# Patient Record
Sex: FEMALE | Race: WHITE | NOT HISPANIC OR LATINO | Employment: PART TIME | URBAN - METROPOLITAN AREA
[De-identification: names, ages, dates, MRNs, and addresses within clinical notes are randomized per-mention and may not be internally consistent; named-entity substitution may affect disease eponyms.]

---

## 2017-01-06 ENCOUNTER — CARE COORDINATION (OUTPATIENT)
Dept: CARE COORDINATION | Facility: CLINIC | Age: 57
End: 2017-01-06

## 2017-01-23 ENCOUNTER — CARE COORDINATION (OUTPATIENT)
Dept: CARE COORDINATION | Facility: CLINIC | Age: 57
End: 2017-01-23

## 2017-01-23 NOTE — PROGRESS NOTES
Clinic Care Coordination Contact 1/23/17  Care Team Conversations    Contacted the pt this am to inquire if she contacted [employer]. Pt and her  did and they found out the application will be processed by [place] and not [place]. They were directed to call [employer] which they did. [employer] reportedly was supposed to call them back with information in regards to their application but the couple has not heard from them yet. This writer encouraged them to follow up with [employer] or [employer] (as they may be able to see if the gary has been processed) to continue pushing their insurance plan through as open enrollment ends on 1/31/17.     This writer will follow up with them on Friday of this week to offer assistance if needed.    Ruth Joshi, [facility]  Care Coordinator Social Work    [facility]  [phone]  1/23/2017 11:39 AM

## 2017-01-27 ENCOUNTER — CARE COORDINATION (OUTPATIENT)
Dept: CARE COORDINATION | Facility: CLINIC | Age: 57
End: 2017-01-27

## 2017-01-27 NOTE — PROGRESS NOTES
Clinic Care Coordination Contact 1/26/17  Care Team Conversations-Social Work    Pt's  asked to see this writer as he was in clinic today. He explained that he has still not heard from [place] [employer] about [place]. This writer encouraged him to contact [place] [employer] often to f/u on the progress of the application as they are the ones who know the details of it. He was also recommended to contact the financial counselors at [facility] with questions as they may be of assistance to point him in the right direction.     This writer explained that open enrollment ended on the 31st of this month and encouraged him to call before than so there are no additional problems. SS will f/u with them on the 31st to inquire what progress they made.     Ruth Joshi, ESTEPHANIE  Care Coordinator [facility] Work    [facility]  [phone]  1/27/2017 1:58 PM

## 2017-01-31 ENCOUNTER — CARE COORDINATION (OUTPATIENT)
Dept: CARE COORDINATION | Facility: CLINIC | Age: 57
End: 2017-01-31

## 2017-01-31 NOTE — PROGRESS NOTES
Clinic Care Coordination Contact 1/31/17  Care Team Conversations-Social Work    Received a phone call from pt's  who states he sent the first payment for his insurance this am. They have elected to have [employer] insurance plan through [facility]. This writer will no longer follow the pt as we have reached our goal. Pt will contact this writer should he have any questions or concerns.     ESTEPHANIE Henderson  Care Coordinator Social Work    [facility]  [phone]  1/31/2017 3:34 PM

## 2017-01-31 NOTE — PROGRESS NOTES
Clinic Care Coordination Contact 1/31/17  [facility]/Voicemail    Referral Source: PCP (Dr. Anisha Alfaro)  Clinical Data: Care Coordinator Outreach  Outreach attempted x 1.  Left message on voicemail with call back information and requested return call.  Plan: Care Coordinator will try to reach patient again in 3-5 business days.    ESTEPHANIE Henderson  Care Coordinator Social Work    [facility]  [phone]  1/31/2017 2:22 PM

## 2017-04-05 NOTE — PROGRESS NOTES
SUBJECTIVE:                                                    Janice Ulloa is a 56 year old female who presents to clinic today for the following health issues:      Hyperlipidemia Follow-Up      Rate your low fat/cholesterol diet?: fair    Taking statin?  Yes, no muscle aches from statin    Other lipid medications/supplements?:  none     Hypertension Follow-up      Outpatient blood pressures are not being checked.    Low Salt Diet: not monitoring salt   She has gained some weight.  She is not eating differently. Exercise is about the same per pt.  No increase in salt intake.        Amount of exercise or physical activity: a lot lately doing things outside    Problems taking medications regularly: No    Medication side effects: none    Diet: regular (no restrictions), low salt and low fat/cholesterol          Problem list and histories reviewed & adjusted, as indicated.  Additional history: as documented    BP Readings from Last 3 Encounters:   04/11/17 138/76   09/20/16 130/74   03/10/16 124/66    Wt Readings from Last 3 Encounters:   04/11/17 176 lb (79.8 kg)   09/20/16 174 lb (78.9 kg)   03/10/16 168 lb (76.2 kg)                  Labs reviewed in EPIC    Reviewed and updated as needed this visit by clinical staff       Reviewed and updated as needed this visit by Provider         ROS:  C: NEGATIVE for fever, chills, change in weight  ENT/MOUTH: has had some PND, congestion, fatigue, and non productive cough this has been for the past 4 days.  No fevers HA , facial pain and pressure   RESP:non productive coughing  CV: NEGATIVE for chest pain, palpitations or peripheral edema  GI: NEGATIVE for nausea, abdominal pain, heartburn, or change in bowel habits  PSYCHIATRIC: has been feeling a bit down.  Also some anxiety.  At night when she is laying in bed she thinks about things.  if she lays on her left side her left arm goes numb, this worries her if she moves it resolves . See PHQ 9 and CHELO 7 questionnaires for  "symptoms.   Her symptoms have been an issue now since June. She lost her job as a result of a layoff in the fall.    OBJECTIVE:                                                    /76  Pulse 68  Temp 97.9  F (36.6  C) (Oral)  Ht 5' 1.5\" (1.562 m)  Wt 176 lb (79.8 kg)  LMP 08/19/2013  HC 12\" (30.5 cm)  BMI 32.72 kg/m2  Body mass index is 32.72 kg/(m^2).  GENERAL: healthy, alert and no distress  EYES: Eyes grossly normal to inspection  HENT: normal cephalic/atraumatic, ear canals and TM's normal, nose and mouth without ulcers or lesions, nasal mucosa edematous , oropharynx clear, oral mucous membranes moist and sinuses: not tender  NECK: no adenopathy, no asymmetry, masses, or scars and thyroid normal to palpation  RESP: lungs clear to auscultation - no rales, rhonchi or wheezes  CV: regular rate and rhythm, normal S1 S2, no S3 or S4, no murmur, click or rub, no peripheral edema and peripheral pulses strong  ABDOMEN: soft, nontender, no hepatosplenomegaly, no masses and bowel sounds normal  MS: no gross musculoskeletal defects noted, no edema  NEURO: Normal strength and tone, mentation intact and speech normal  PSYCH: mentation appears normal, anxious, fatigued, judgement and insight intact and appearance well groomed    Diagnostic Test Results:  Results for orders placed or performed in visit on 04/11/17 (from the past 24 hour(s))   Hemoglobin   Result Value Ref Range    Hemoglobin 12.7 11.7 - 15.7 g/dL        ASSESSMENT/PLAN:                                                          1. Hypertension goal BP (blood pressure) < 140/90  At goal, encourage weight loss and continued salt restriction to keep her under her goal  If BP increases when increase amlodipine  - Albumin Random Urine Quantitative  - amLODIPine (NORVASC) 5 MG tablet; Take 1 tablet (5 mg) by mouth daily  Dispense: 90 tablet; Refill: 1    2. Neck pain  Uses as needed  - tiZANidine (ZANAFLEX) 4 MG tablet; TAKE ONE TO TWO TABLETS BY MOUTH " THREE TIMES A DAY AS NEEDED FOR MUSCLE SPASM  Dispense: 30 tablet; Refill: 3    3. Viral URI with cough  Over-the-counter meds  Mucinex  Follow-up with worsening  - guaiFENesin-codeine (ROBITUSSIN AC) 100-10 MG/5ML SOLN solution; Take 5 mLs by mouth every 4 hours as needed for cough  Dispense: 120 mL; Refill: 0    4. Fatigue, unspecified type  Likely related to depression  - TSH with free T4 reflex  - Glucose  - Hemoglobin    5. Adjustment disorder with mixed anxiety and depressed mood  New diagnosis, Discussed use of medication, common side effects, how medication works and the fact that improvement in anticipated in 4-6 weeks.   - FLUoxetine (PROZAC) 20 MG capsule; Take 1 capsule (20 mg) by mouth daily  Dispense: 30 capsule; Refill: 1    Recheck as needed or phone or office visit in 1 month    Anisha Alfaro PA-C  [facility]  Electronically signed by Anisha Alfaro PA-C     This chart documentation was completed in part with Dragon voice recognition software.  Documentation is reviewed after completion, however, some words and grammatical errors may remain.  Anisha Alfaro PA-C

## 2017-04-11 ENCOUNTER — OFFICE VISIT (OUTPATIENT)
Dept: FAMILY MEDICINE | Facility: OTHER | Age: 57
End: 2017-04-11
Payer: COMMERCIAL

## 2017-04-11 VITALS
DIASTOLIC BLOOD PRESSURE: 76 MMHG | HEIGHT: 62 IN | SYSTOLIC BLOOD PRESSURE: 138 MMHG | TEMPERATURE: 97.9 F | BODY MASS INDEX: 32.39 KG/M2 | HEART RATE: 68 BPM | WEIGHT: 176 LBS

## 2017-04-11 DIAGNOSIS — M54.2 NECK PAIN: ICD-10-CM

## 2017-04-11 DIAGNOSIS — R53.83 FATIGUE, UNSPECIFIED TYPE: ICD-10-CM

## 2017-04-11 DIAGNOSIS — I10 HYPERTENSION GOAL BP (BLOOD PRESSURE) < 140/90: ICD-10-CM

## 2017-04-11 DIAGNOSIS — F43.23 ADJUSTMENT DISORDER WITH MIXED ANXIETY AND DEPRESSED MOOD: ICD-10-CM

## 2017-04-11 DIAGNOSIS — J06.9 VIRAL URI WITH COUGH: Primary | ICD-10-CM

## 2017-04-11 LAB
CREAT UR-MCNC: 36 MG/DL
GLUCOSE SERPL-MCNC: 92 MG/DL (ref 70–99)
HGB BLD-MCNC: 12.7 G/DL (ref 11.7–15.7)
MICROALBUMIN UR-MCNC: <5 MG/L
MICROALBUMIN/CREAT UR: NORMAL MG/G CR (ref 0–25)
TSH SERPL DL<=0.005 MIU/L-ACNC: 2.01 MU/L (ref 0.4–4)

## 2017-04-11 PROCEDURE — 99214 OFFICE O/P EST MOD 30 MIN: CPT | Performed by: PHYSICIAN ASSISTANT

## 2017-04-11 PROCEDURE — 36415 COLL VENOUS BLD VENIPUNCTURE: CPT | Performed by: PHYSICIAN ASSISTANT

## 2017-04-11 PROCEDURE — 85018 HEMOGLOBIN: CPT | Performed by: PHYSICIAN ASSISTANT

## 2017-04-11 PROCEDURE — 84443 ASSAY THYROID STIM HORMONE: CPT | Performed by: PHYSICIAN ASSISTANT

## 2017-04-11 PROCEDURE — 82043 UR ALBUMIN QUANTITATIVE: CPT | Performed by: PHYSICIAN ASSISTANT

## 2017-04-11 PROCEDURE — 82947 ASSAY GLUCOSE BLOOD QUANT: CPT | Performed by: PHYSICIAN ASSISTANT

## 2017-04-11 RX ORDER — AMLODIPINE BESYLATE 5 MG/1
5 TABLET ORAL DAILY
Qty: 90 TABLET | Refills: 1 | Status: SHIPPED | OUTPATIENT
Start: 2017-04-11 | End: 2017-10-11

## 2017-04-11 RX ORDER — CODEINE PHOSPHATE AND GUAIFENESIN 10; 100 MG/5ML; MG/5ML
1 SOLUTION ORAL EVERY 4 HOURS PRN
Qty: 120 ML | Refills: 0 | Status: SHIPPED | OUTPATIENT
Start: 2017-04-11 | End: 2017-06-26

## 2017-04-11 ASSESSMENT — ANXIETY QUESTIONNAIRES
IF YOU CHECKED OFF ANY PROBLEMS ON THIS QUESTIONNAIRE, HOW DIFFICULT HAVE THESE PROBLEMS MADE IT FOR YOU TO DO YOUR WORK, TAKE CARE OF THINGS AT HOME, OR GET ALONG WITH OTHER PEOPLE: SOMEWHAT DIFFICULT
6. BECOMING EASILY ANNOYED OR IRRITABLE: SEVERAL DAYS
GAD7 TOTAL SCORE: 11
5. BEING SO RESTLESS THAT IT IS HARD TO SIT STILL: SEVERAL DAYS
2. NOT BEING ABLE TO STOP OR CONTROL WORRYING: MORE THAN HALF THE DAYS
3. WORRYING TOO MUCH ABOUT DIFFERENT THINGS: MORE THAN HALF THE DAYS
1. FEELING NERVOUS, ANXIOUS, OR ON EDGE: SEVERAL DAYS
7. FEELING AFRAID AS IF SOMETHING AWFUL MIGHT HAPPEN: MORE THAN HALF THE DAYS

## 2017-04-11 ASSESSMENT — PATIENT HEALTH QUESTIONNAIRE - PHQ9: 5. POOR APPETITE OR OVEREATING: MORE THAN HALF THE DAYS

## 2017-04-11 ASSESSMENT — PAIN SCALES - GENERAL: PAINLEVEL: NO PAIN (0)

## 2017-04-11 NOTE — NURSING NOTE
"Chief Complaint   Patient presents with     Recheck Medication     Panel Management     MyChart, Microalbumin       Initial /76  Pulse 68  Temp 97.9  F (36.6  C) (Oral)  Ht 5' 1.5\" (1.562 m)  Wt 176 lb (79.8 kg)  LMP 08/19/2013  HC 12\" (30.5 cm)  BMI 32.72 kg/m2 Estimated body mass index is 32.72 kg/(m^2) as calculated from the following:    Height as of this encounter: 5' 1.5\" (1.562 m).    Weight as of this encounter: 176 lb (79.8 kg).  Medication Reconciliation: complete     Kamryn Quigley CMA (AAMA)      "

## 2017-04-11 NOTE — MR AVS SNAPSHOT
"              After Visit Summary   2017    Janice Ulloa    MRN: [id number]           Patient Information     Date Of Birth          1960        Visit Information        Provider Department      2017 12:00 PM Anisha Alfaro PA-C [facility]        Today's Diagnoses     Viral URI with cough    -  1    Hypertension goal BP (blood pressure) < 140/90        Neck pain        Fatigue, unspecified type        Adjustment disorder with mixed anxiety and depressed mood           Follow-ups after your visit        Who to contact     If you have questions or need follow up information about today's clinic visit or your schedule please contact [facility] directly at [phone].  Normal or non-critical lab and imaging results will be communicated to you by MyChart, letter or phone within 4 business days after the clinic has received the results. If you do not hear from us within 7 days, please contact the clinic through MyChart or phone. If you have a critical or abnormal lab result, we will notify you by phone as soon as possible.  Submit refill requests through [email or web address] or call your pharmacy and they will forward the refill request to us. Please allow 3 business days for your refill to be completed.          Additional Information About Your Visit        MyChart Information     [email or web address] lets you send messages to your doctor, view your test results, renew your prescriptions, schedule appointments and more. To sign up, go to [email or web address] . Click on \"Log in\" on the left side of the screen, which will take you to the Welcome page. Then click on \"Sign up Now\" on the right side of the page.     You will be asked to enter the access code listed below, as well as some personal information. Please follow the directions to create your username and password.     Your access code is: [email or web address]  Expires: 7/10/2017 12:20 PM     Your access code will  in 90 days. If you need " "help or a new code, please call your [facility] clinic or [phone].        Care EveryWhere ID     This is your Care EveryWhere ID. This could be used by other organizations to access your [facility] medical records  [id number]        Your Vitals Were     Pulse Temperature Height Last Period Head Circumference BMI (Body Mass Index)    68 97.9  F (36.6  C) (Oral) 5' 1.5\" (1.562 m) 08/19/2013 12\" (30.5 cm) 32.72 kg/m2       Blood Pressure from Last 3 Encounters:   04/11/17 138/76   09/20/16 130/74   03/10/16 124/66    Weight from Last 3 Encounters:   04/11/17 176 lb (79.8 kg)   09/20/16 174 lb (78.9 kg)   03/10/16 168 lb (76.2 kg)              We Performed the Following     Albumin Random Urine Quantitative     Glucose     Hemoglobin     TSH with free T4 reflex          Today's Medication Changes          These changes are accurate as of: 4/11/17 12:20 PM.  If you have any questions, ask your nurse or doctor.               Start taking these medicines.        Dose/Directions    FLUoxetine 20 MG capsule   Commonly known as:  PROzac   Used for:  Adjustment disorder with mixed anxiety and depressed mood   Started by:  Anisha Alfaro PA-C        Dose:  20 mg   Take 1 capsule (20 mg) by mouth daily   Quantity:  30 capsule   Refills:  1       guaiFENesin-codeine 100-10 MG/5ML Soln solution   Commonly known as:  ROBITUSSIN AC   Used for:  Viral URI with cough   Started by:  Anisha Alfaro PA-C        Dose:  1 tsp.   Take 5 mLs by mouth every 4 hours as needed for cough   Quantity:  120 mL   Refills:  0         These medicines have changed or have updated prescriptions.        Dose/Directions    amLODIPine 5 MG tablet   Commonly known as:  NORVASC   This may have changed:  See the new instructions.   Used for:  Hypertension goal BP (blood pressure) < 140/90   Changed by:  Anisha Alfaro PA-C        Dose:  5 mg   Take 1 tablet (5 mg) by mouth daily   Quantity:  90 tablet   Refills:  1       tiZANidine 4 MG tablet "   Commonly known as:  ZANAFLEX   This may have changed:  See the new instructions.   Used for:  Neck pain   Changed by:  Anisha Alfaro PA-C        TAKE ONE TO TWO TABLETS BY MOUTH THREE TIMES A DAY AS NEEDED FOR MUSCLE SPASM   Quantity:  30 tablet   Refills:  3            Where to get your medicines      These medications were sent to "[facility] - [place], [place] - [address]  [address], [facility] [address]     Phone:  [phone]     amLODIPine 5 MG tablet    FLUoxetine 20 MG capsule    tiZANidine 4 MG tablet         Some of these will need a paper prescription and others can be bought over the counter.  Ask your nurse if you have questions.     Bring a paper prescription for each of these medications     guaiFENesin-codeine 100-10 MG/5ML Soln solution                Primary Care Provider Office Phone # Fax #    Anisha Alfaro PA-C [phone]       [facility] [address] [place] [address]        Thank you!     Thank you for choosing [facility]  for your care. Our goal is always to provide you with excellent care. Hearing back from our patients is one way we can continue to improve our services. Please take a few minutes to complete the written survey that you may receive in the mail after your visit with us. Thank you!             Your Updated Medication List - Protect others around you: Learn how to safely use, store and throw away your medicines at www.disposemymeds.org.          This list is accurate as of: 4/11/17 12:20 PM.  Always use your most recent med list.                   Brand Name Dispense Instructions for use    amLODIPine 5 MG tablet    NORVASC    90 tablet    Take 1 tablet (5 mg) by mouth daily       aspirin 81 MG tablet      ONE DAILY       atorvastatin 20 MG tablet    LIPITOR    90 tablet    Take 1 tablet (20 mg) by mouth daily       FLUoxetine 20 MG capsule    PROzac    30 capsule    Take 1 capsule (20 mg) by mouth daily        guaiFENesin-codeine 100-10 MG/5ML Soln solution    ROBITUSSIN AC    120 mL    Take 5 mLs by mouth every 4 hours as needed for cough       IBUPROFEN PO      Take 400 mg by mouth daily as needed       lisinopril-hydrochlorothiazide 20-12.5 MG per tablet    PRINZIDE/ZESTORETIC    180 tablet    Take 2 tablets by mouth daily       MULTI-VITAMIN DAILY PO          propranolol 120 MG 24 hr capsule    INDERAL LA    60 capsule    TAKE TWO CAPSULES BY MOUTH EVERY DAY       tiZANidine 4 MG tablet    ZANAFLEX    30 tablet    TAKE ONE TO TWO TABLETS BY MOUTH THREE TIMES A DAY AS NEEDED FOR MUSCLE SPASM

## 2017-04-12 ASSESSMENT — PATIENT HEALTH QUESTIONNAIRE - PHQ9: SUM OF ALL RESPONSES TO PHQ QUESTIONS 1-9: 14

## 2017-04-12 ASSESSMENT — ANXIETY QUESTIONNAIRES: GAD7 TOTAL SCORE: 11

## 2017-06-23 ENCOUNTER — TELEPHONE (OUTPATIENT)
Dept: FAMILY MEDICINE | Facility: OTHER | Age: 57
End: 2017-06-23

## 2017-06-23 NOTE — PROGRESS NOTES
"  SUBJECTIVE:                                                    Janice Ulloa is a 56 year old female who presents to clinic today for the following health issues:  Pt not taking prozac anymore, she wasn't getting any sleep taking that medication, does not think that she needs any meds at this time, managing her depression better per pt.  She will let me know if she changes her mind. See PHQ 9 and CHELO 7 questionnaires for symptoms.     Acute Illness   Acute illness concerns: ear ache  Onset: 5 days, last wednesday    Fever: no    Chills/Sweats: no    Headache (location?): no    Sinus Pressure:no    Conjunctivitis:  YES- just a little bit of mattering    Ear Pain: YES: right that has pain but would like both checked, was so sore she could not even touch it     Rhinorrhea: YES- clear    Congestion: YES- little bit and sneezing, no known allergies     Sore Throat: no     Cough: Yes- non productive, with a tickle in throat, may be SOB at times passes quickly per pt     Wheeze: no    Decreased Appetite: no    Nausea: no    Vomiting: no    Diarrhea:  No little bit a while back    Dysuria/Freq.: no    Fatigue/Achiness: no    Sick/Strep Exposure: no     Therapies Tried and outcome: baby oil and lays on a heating pad          Problem list and histories reviewed & adjusted, as indicated.  Additional history: as documented    BP Readings from Last 3 Encounters:   06/26/17 130/80   04/11/17 138/76   09/20/16 130/74    Wt Readings from Last 3 Encounters:   06/26/17 171 lb 3.2 oz (77.7 kg)   04/11/17 176 lb (79.8 kg)   09/20/16 174 lb (78.9 kg)                  Labs reviewed in EPIC    Reviewed and updated as needed this visit by clinical staff       Reviewed and updated as needed this visit by Provider         ROS:  As documented above     OBJECTIVE:     /80  Pulse 68  Temp 98.1  F (36.7  C) (Temporal)  Resp 16  Ht 5' 1.5\" (1.562 m)  Wt 171 lb 3.2 oz (77.7 kg)  LMP 08/19/2013  BMI 31.82 kg/m2  Body mass index is " 31.82 kg/(m^2).  GENERAL: healthy, alert and no distress  EYES: Eyes grossly normal to inspection  HENT: normal cephalic/atraumatic, ear canals and TM's normal, nasal mucosa edematous , rhinorrhea clear, oropharynx clear, oral mucous membranes moist and sinuses: not tender  NECK: no adenopathy, no asymmetry, masses, or scars and thyroid normal to palpation  RESP: lungs clear to auscultation - no rales, rhonchi or wheezes  CV: regular rate and rhythm, normal S1 S2, no S3 or S4, no murmur, click or rub, no peripheral edema and peripheral pulses strong  PSYCH: mentation appears normal, affect normal/bright    Diagnostic Test Results:  none     ASSESSMENT/PLAN:             1. Acute seasonal allergic rhinitis, unspecified trigger  Use otc loratidine and flonase, if develops fevers or no improvements of symptoms needs recheck    2. Adjustment disorder with mixed anxiety and depressed mood  Pt will make appt or call for phone visit if she would like to try a different medication, likely lexapro or sertraline           Anisha Alfaro PA-C  [facility]  Electronically signed by Anisha Alfaro PA-C

## 2017-06-23 NOTE — TELEPHONE ENCOUNTER
Started out with sore ear on Wednesday. When she lays down it plugs up.  States she tired. Still doing daily activities. No fever. No headache.  Eating and drinking ok Responding to questions right away.   Only wants to see NP.  Would like Monday if possible.    Appointment made for Monday.    Michael Tomas, LAURA, BSN

## 2017-06-23 NOTE — TELEPHONE ENCOUNTER
Requested Provider:  KARLI Sanderson    PCP: Anisha Alfaro    Reason for visit: tired and lethargic and ear ache    Duration of symptoms: 3 days    Have you been treated for this in the past? No    Additional comments: wants to see Dominic next week. I did offer her other providers and she declined

## 2017-06-26 ENCOUNTER — OFFICE VISIT (OUTPATIENT)
Dept: FAMILY MEDICINE | Facility: OTHER | Age: 57
End: 2017-06-26
Payer: COMMERCIAL

## 2017-06-26 VITALS
RESPIRATION RATE: 16 BRPM | HEIGHT: 62 IN | TEMPERATURE: 98.1 F | SYSTOLIC BLOOD PRESSURE: 130 MMHG | WEIGHT: 171.2 LBS | HEART RATE: 68 BPM | DIASTOLIC BLOOD PRESSURE: 80 MMHG | BODY MASS INDEX: 31.5 KG/M2

## 2017-06-26 DIAGNOSIS — J30.2 ACUTE SEASONAL ALLERGIC RHINITIS, UNSPECIFIED TRIGGER: ICD-10-CM

## 2017-06-26 DIAGNOSIS — F43.23 ADJUSTMENT DISORDER WITH MIXED ANXIETY AND DEPRESSED MOOD: Primary | ICD-10-CM

## 2017-06-26 PROCEDURE — 99213 OFFICE O/P EST LOW 20 MIN: CPT | Performed by: PHYSICIAN ASSISTANT

## 2017-06-26 ASSESSMENT — ANXIETY QUESTIONNAIRES
1. FEELING NERVOUS, ANXIOUS, OR ON EDGE: SEVERAL DAYS
7. FEELING AFRAID AS IF SOMETHING AWFUL MIGHT HAPPEN: SEVERAL DAYS
5. BEING SO RESTLESS THAT IT IS HARD TO SIT STILL: SEVERAL DAYS
GAD7 TOTAL SCORE: 7
3. WORRYING TOO MUCH ABOUT DIFFERENT THINGS: SEVERAL DAYS
6. BECOMING EASILY ANNOYED OR IRRITABLE: SEVERAL DAYS
IF YOU CHECKED OFF ANY PROBLEMS ON THIS QUESTIONNAIRE, HOW DIFFICULT HAVE THESE PROBLEMS MADE IT FOR YOU TO DO YOUR WORK, TAKE CARE OF THINGS AT HOME, OR GET ALONG WITH OTHER PEOPLE: SOMEWHAT DIFFICULT
2. NOT BEING ABLE TO STOP OR CONTROL WORRYING: SEVERAL DAYS

## 2017-06-26 ASSESSMENT — PAIN SCALES - GENERAL: PAINLEVEL: NO PAIN (0)

## 2017-06-26 ASSESSMENT — PATIENT HEALTH QUESTIONNAIRE - PHQ9: 5. POOR APPETITE OR OVEREATING: SEVERAL DAYS

## 2017-06-26 NOTE — LETTER
My Depression Action Plan  Name: Janice Ulloa   Date of Birth 1960  Date: 6/23/2017    My doctor: Anisha Alfaro   My clinic: [address] [address] [phone]          GREEN    ZONE   Good Control    What it looks like:     Things are going generally well. You have normal up s and down s. You may even feel depressed from time to time, but bad moods usually last less than a day.   What you need to do:  1. Continue to care for yourself (see self care plan)  2. Check your depression survival kit and update it as needed  3. Follow your physician s recommendations including any medication.  4. Do not stop taking medication unless you consult with your physician first.           YELLOW         ZONE Getting Worse    What it looks like:     Depression is starting to interfere with your life.     It may be hard to get out of bed; you may be starting to isolate yourself from others.    Symptoms of depression are starting to last most all day and this has happened for several days.     You may have suicidal thoughts but they are not constant.   What you need to do:     1. Call your care team, your response to treatment will improve if you keep your care team informed of your progress. Yellow periods are signs an adjustment may need to be made.     2. Continue your self-care, even if you have to fake it!    3. Talk to someone in your support network    4. Open up your depression survival kit           RED    ZONE Medical Alert - Get Help    What it looks like:     Depression is seriously interfering with your life.     You may experience these or other symptoms: You can t get out of bed most days, can t work or engage in other necessary activities, you have trouble taking care of basic hygiene, or basic responsibilities, thoughts of suicide or death that will not go away, self-injurious behavior.     What you need to do:  1. Call your care team and  request a same-day appointment. If they are not available (weekends or after hours) call your local crisis line, emergency room or 911.      Electronically signed by: Angeline Anguiano, June 23, 2017    Depression Self Care Plan / Survival Kit    Self-Care for Depression  Here s the deal. Your body and mind are really not as separate as most people think.  What you do and think affects how you feel and how you feel influences what you do and think. This means if you do things that people who feel good do, it will help you feel better.  Sometimes this is all it takes.  There is also a place for medication and therapy depending on how severe your depression is, so be sure to consult with your medical provider and/ or Behavioral Health Consultant if your symptoms are worsening or not improving.     In order to better manage my stress, I will:    Exercise  Get some form of exercise, every day. This will help reduce pain and release endorphins, the  feel good  chemicals in your brain. This is almost as good as taking antidepressants!  This is not the same as joining a gym and then never going! (they count on that by the way ) It can be as simple as just going for a walk or doing some gardening, anything that will get you moving.      Hygiene   Maintain good hygiene (Get out of bed in the morning, Make your bed, Brush your teeth, Take a shower, and Get dressed like you were going to work, even if you are unemployed).  If your clothes don't fit try to get ones that do.    Diet  I will strive to eat foods that are good for me, drink plenty of water, and avoid excessive sugar, caffeine, alcohol, and other mood-altering substances.  Some foods that are helpful in depression are: complex carbohydrates, B vitamins, flaxseed, fish or fish oil, fresh fruits and vegetables.    Psychotherapy  I agree to participate in Individual Therapy (if recommended).    Medication  If prescribed medications, I agree to take them.  Missing  doses can result in serious side effects.  I understand that drinking alcohol, or other illicit drug use, may cause potential side effects.  I will not stop my medication abruptly without first discussing it with my provider.    Staying Connected With Others  I will stay in touch with my friends, family members, and my primary care provider/team.    Use your imagination  Be creative.  We all have a creative side; it doesn t matter if it s oil painting, sand castles, or mud pies! This will also kick up the endorphins.    Witness Beauty  (AKA stop and smell the roses) Take a look outside, even in mid-winter. Notice colors, textures. Watch the squirrels and birds.     Service to others  Be of service to others.  There is always someone else in need.  By helping others we can  get out of ourselves  and remember the really important things.  This also provides opportunities for practicing all the other parts of the program.    Humor  Laugh and be silly!  Adjust your TV habits for less news and crime-drama and more comedy.    Control your stress  Try breathing deep, massage therapy, biofeedback, and meditation. Find time to relax each day.     My support system    Clinic Contact:  Phone number:    Contact 1:  Phone number:    Contact 2:  Phone number:    Jainism/:  Phone number:    Therapist:  Phone number:    Local crisis center:    Phone number:    Other community support:  Phone number:

## 2017-06-26 NOTE — MR AVS SNAPSHOT
"              After Visit Summary   2017    Janice Ulloa    MRN: [id number]           Patient Information     Date Of Birth          1960        Visit Information        Provider Department      2017 9:30 AM Anisha Alfaro PA-C [facility]        Today's Diagnoses     Adjustment disorder with mixed anxiety and depressed mood    -  1       Follow-ups after your visit        Who to contact     If you have questions or need follow up information about today's clinic visit or your schedule please contact [facility] directly at [phone].  Normal or non-critical lab and imaging results will be communicated to you by Playviewshart, letter or phone within 4 business days after the clinic has received the results. If you do not hear from us within 7 days, please contact the clinic through Playviewshart or phone. If you have a critical or abnormal lab result, we will notify you by phone as soon as possible.  Submit refill requests through AquaMost or call your pharmacy and they will forward the refill request to us. Please allow 3 business days for your refill to be completed.          Additional Information About Your Visit        MyChart Information     AquaMost lets you send messages to your doctor, view your test results, renew your prescriptions, schedule appointments and more. To sign up, go to [email or web address] . Click on \"Log in\" on the left side of the screen, which will take you to the Welcome page. Then click on \"Sign up Now\" on the right side of the page.     You will be asked to enter the access code listed below, as well as some personal information. Please follow the directions to create your username and password.     Your access code is: [email or web address]  Expires: 7/10/2017 12:20 PM     Your access code will  in 90 days. If you need help or a new code, please call your [facility] or [phone].        Care EveryWhere ID     This is your Care EveryWhere ID. " "This could be used by other organizations to access your [facility] medical records  [id number]        Your Vitals Were     Pulse Temperature Respirations Height Last Period BMI (Body Mass Index)    68 98.1  F (36.7  C) (Temporal) 16 5' 1.5\" (1.562 m) 08/19/2013 31.82 kg/m2       Blood Pressure from Last 3 Encounters:   06/26/17 130/80   04/11/17 138/76   09/20/16 130/74    Weight from Last 3 Encounters:   06/26/17 171 lb 3.2 oz (77.7 kg)   04/11/17 176 lb (79.8 kg)   09/20/16 174 lb (78.9 kg)              We Performed the Following     DEPRESSION ACTION PLAN (DAP)          Today's Medication Changes          These changes are accurate as of: 6/26/17  9:42 AM.  If you have any questions, ask your nurse or doctor.               Stop taking these medicines if you haven't already. Please contact your care team if you have questions.     FLUoxetine 20 MG capsule   Commonly known as:  PROzac   Stopped by:  Anisha Alfaro PA-C                    Primary Care Provider Office Phone # Fax #    Anisha Alfaro PA-C [phone]       [facility] [address] [place] [address]        Equal Access to Services     JANNETTE OLIVA : Hadii chirag schaefero Soomaali, waaxda luqadaha, qaybta kaalmada adeegyada, kleber lima. So [facility] [phone].    ATENCIÓN: Si habla español, tiene a larios disposición servicios gratuitos de asistencia lingüística. Llame al [phone].    We comply with applicable federal civil rights laws and [place] laws. We do not discriminate on the basis of race, color, national origin, age, disability sex, sexual orientation or gender identity.            Thank you!     Thank you for choosing [facility]  for your care. Our goal is always to provide you with excellent care. Hearing back from our patients is one way we can continue to improve our services. Please take a few minutes to complete the written survey that you may receive in " the mail after your visit with us. Thank you!             Your Updated Medication List - Protect others around you: Learn how to safely use, store and throw away your medicines at www.disposemymeds.org.          This list is accurate as of: 6/26/17  9:42 AM.  Always use your most recent med list.                   Brand Name Dispense Instructions for use Diagnosis    amLODIPine 5 MG tablet    NORVASC    90 tablet    Take 1 tablet (5 mg) by mouth daily    Hypertension goal BP (blood pressure) < 140/90       aspirin 81 MG tablet      ONE DAILY        atorvastatin 20 MG tablet    LIPITOR    90 tablet    Take 1 tablet (20 mg) by mouth daily    Hyperlipidemia with target LDL less than 130       IBUPROFEN PO      Take 400 mg by mouth daily as needed        lisinopril-hydrochlorothiazide 20-12.5 MG per tablet    PRINZIDE/ZESTORETIC    180 tablet    Take 2 tablets by mouth daily    Essential hypertension with goal blood pressure less than 140/90       MULTI-VITAMIN DAILY PO           propranolol 120 MG 24 hr capsule    INDERAL LA    60 capsule    TAKE TWO CAPSULES BY MOUTH EVERY DAY    Essential hypertension with goal blood pressure less than 140/90       tiZANidine 4 MG tablet    ZANAFLEX    30 tablet    TAKE ONE TO TWO TABLETS BY MOUTH THREE TIMES A DAY AS NEEDED FOR MUSCLE SPASM    Neck pain

## 2017-06-26 NOTE — NURSING NOTE
"Chief Complaint   Patient presents with     Ear Problem     Panel Management     mychart, dap, phq9/alfredo       Initial BP (!) 146/100 (BP Location: Right arm, Patient Position: Chair, Cuff Size: Adult Regular)  Pulse 68  Temp 98.1  F (36.7  C) (Temporal)  Resp 16  Ht 5' 1.5\" (1.562 m)  Wt 171 lb 3.2 oz (77.7 kg)  LMP 08/19/2013  BMI 31.82 kg/m2 Estimated body mass index is 31.82 kg/(m^2) as calculated from the following:    Height as of this encounter: 5' 1.5\" (1.562 m).    Weight as of this encounter: 171 lb 3.2 oz (77.7 kg).  Medication Reconciliation: olamide Butt, RAFI    "

## 2017-06-27 ASSESSMENT — ANXIETY QUESTIONNAIRES: GAD7 TOTAL SCORE: 7

## 2017-08-25 ENCOUNTER — OFFICE VISIT (OUTPATIENT)
Dept: URGENT CARE | Facility: RETAIL CLINIC | Age: 57
End: 2017-08-25
Payer: COMMERCIAL

## 2017-08-25 VITALS
DIASTOLIC BLOOD PRESSURE: 80 MMHG | SYSTOLIC BLOOD PRESSURE: 140 MMHG | OXYGEN SATURATION: 96 % | TEMPERATURE: 98.2 F | WEIGHT: 180 LBS | RESPIRATION RATE: 12 BRPM | HEART RATE: 70 BPM | BODY MASS INDEX: 33.46 KG/M2

## 2017-08-25 DIAGNOSIS — H92.02 EAR DISCOMFORT, LEFT: ICD-10-CM

## 2017-08-25 DIAGNOSIS — H61.22 IMPACTED CERUMEN OF LEFT EAR: Primary | ICD-10-CM

## 2017-08-25 PROCEDURE — 99202 OFFICE O/P NEW SF 15 MIN: CPT | Performed by: PHYSICIAN ASSISTANT

## 2017-08-25 ASSESSMENT — PAIN SCALES - GENERAL: PAINLEVEL: MODERATE PAIN (5)

## 2017-08-25 NOTE — MR AVS SNAPSHOT
"              After Visit Summary   2017    Janice Ulloa    MRN: [id number]           Patient Information     Date Of Birth          1960        Visit Information        Provider Department      2017 11:20 AM Kerry Jamil PA-C [facility]        Today's Diagnoses     Impacted cerumen of left ear    -  1    Ear discomfort, left           Follow-ups after your visit        Your next 10 appointments already scheduled     Aug 29, 2017  9:15 AM CDT   SHORT with Anisha Alfaro PA-C   [facility]    [address] [facility] [address] [phone]              Who to contact     You can reach your care team any time of the day by calling [phone].  Notification of test results:  If you have an abnormal lab result, we will notify you by phone as soon as possible.         Additional Information About Your Visit        MyChart Information     Coherent Patht lets you send messages to your doctor, view your test results, renew your prescriptions, schedule appointments and more. To sign up, go to [email or web address] . Click on \"Log in\" on the left side of the screen, which will take you to the Welcome page. Then click on \"Sign up Now\" on the right side of the page.     You will be asked to enter the access code listed below, as well as some personal information. Please follow the directions to create your username and password.     Your access code is: [email or web address]  Expires: 2017 11:52 AM     Your access code will  in 90 days. If you need help or a new code, please call your [place] clinic or [phone].        Care EveryWhere ID     This is your Care EveryWhere ID. This could be used by other organizations to access your [place] medical records  [id number]        Your Vitals Were     Pulse Temperature Respirations Last Period Pulse Oximetry BMI (Body Mass Index)    70 98.2  F (36.8  C) (Tympanic) 12 2013 " 96% 33.46 kg/m2       Blood Pressure from Last 3 Encounters:   08/25/17 140/80   06/26/17 130/80   04/11/17 138/76    Weight from Last 3 Encounters:   08/25/17 180 lb (81.6 kg)   06/26/17 171 lb 3.2 oz (77.7 kg)   04/11/17 176 lb (79.8 kg)              Today, you had the following     No orders found for display       Primary Care Provider Office Phone # Fax #    Anisha Alfaro PA-C [phone] [address] [place] [address]        Equal Access to Services     [facility]: Hadii chirag thorne hadasho Soiliana, waaxda luqadaha, qaybta kaalmada adenidhiyada, kleber dorman . So [facility] [phone].    ATENCIÓN: Si habla español, tiene a larios disposición servicios gratuitos de asistencia lingüística. Llame al [phone].    We comply with applicable federal civil rights laws and [place] laws. We do not discriminate on the basis of race, color, national origin, age, disability sex, sexual orientation or gender identity.            Thank you!     Thank you for choosing [facility]  for your care. Our goal is always to provide you with excellent care. Hearing back from our patients is one way we can continue to improve our services. Please take a few minutes to complete the written survey that you may receive in the mail after your visit with us. Thank you!             Your Updated Medication List - Protect others around you: Learn how to safely use, store and throw away your medicines at www.disposemymeds.org.          This list is accurate as of: 8/25/17 11:52 AM.  Always use your most recent med list.                   Brand Name Dispense Instructions for use Diagnosis    amLODIPine 5 MG tablet    NORVASC    90 tablet    Take 1 tablet (5 mg) by mouth daily    Hypertension goal BP (blood pressure) < 140/90       aspirin 81 MG tablet      ONE DAILY        atorvastatin 20 MG tablet    LIPITOR    90 tablet    Take 1 tablet (20 mg) by mouth daily     Hyperlipidemia with target LDL less than 130       IBUPROFEN PO      Take 400 mg by mouth daily as needed        lisinopril-hydrochlorothiazide 20-12.5 MG per tablet    PRINZIDE/ZESTORETIC    180 tablet    Take 2 tablets by mouth daily    Essential hypertension with goal blood pressure less than 140/90       MULTI-VITAMIN DAILY PO           propranolol 120 MG 24 hr capsule    INDERAL LA    60 capsule    TAKE TWO CAPSULES BY MOUTH EVERY DAY    Essential hypertension with goal blood pressure less than 140/90       tiZANidine 4 MG tablet    ZANAFLEX    30 tablet    TAKE ONE TO TWO TABLETS BY MOUTH THREE TIMES A DAY AS NEEDED FOR MUSCLE SPASM    Neck pain

## 2017-08-25 NOTE — PROGRESS NOTES
Chief Complaint   Patient presents with     Ear Problem     Left Ear for about 4 days now     S: Pt presents to [facility] in [place] with possible cerumen impaction.  Plugged sensation left ear x 4 days.  Mild URI symptoms x few days -nasal congestion  No fever .  No ear pain .  Hx of cerumen impaction - no. Used OTC ear drops and warm pack last night -no help.    ROS:  ENT - denies throat pain.    CP - no cough,SOB or chest pain.   GI/ - Appetite - normal. No nausea, vomiting or diarrhea.   No bowel or bladder changes   MSK - no joint pain or swelling.   Skin-  no rashes. no lesions.    Past Medical History:   Diagnosis Date     Carpal tunnel syndrome      Intervertebral cervical disc disorder with myelopathy, cervical region     C6 herniation with right C6 radiculopathy     Migraine, unspecified, without mention of intractable migraine without mention of status migrainosus      Perimenopausal     irreg      Personal history of tobacco use, presenting hazards to health      Past Surgical History:   Procedure Laterality Date     C DISK SURG,ANTER,CERVICAL,SINGLE LVL  10/02/2007    C5-C6 anterior cervical diskectomy & fusion w/plate.     C NONSPECIFIC PROCEDURE      Bilateral inguinal hernia repairs     C NONSPECIFIC PROCEDURE      Oral surgery x2     COLONOSCOPY  1/10/2014    Procedure: COMBINED COLONOSCOPY, SINGLE BIOPSY/POLYPECTOMY BY BIOPSY;  colonoscopy with polypectomy by forceps;  Surgeon: Chevy Yang MD;  Location: [facility]     HC REVISE MEDIAN N/CARPAL TUNNEL SURG  2004     HC REVISE MEDIAN N/CARPAL TUNNEL SURG  06    Left     HC TREATMENT INCOMPLETE  SURG, ANY TRIMESTER      D&C after miscarriage     Patient Active Problem List   Diagnosis     Migraine     Carpal tunnel syndrome     Headache     Neck pain     Hypercholesteremia     Intervertebral cervical disc disorder with myelopathy, cervical region     Hyperlipidemia with target LDL less than 130      Perimenopausal     Anemia     Microscopic hematuria     Neck muscle spasm     Hypertension goal BP (blood pressure) < 140/90     Pneumonia     Sepsis (H)     Acute and chronic respiratory failure with hypoxia (H)     Hypokalemia     DVT prophylaxis     Advanced directives, counseling/discussion     Health Care Home     Adjustment disorder with mixed anxiety and depressed mood     Current Outpatient Prescriptions   Medication     amLODIPine (NORVASC) 5 MG tablet     tiZANidine (ZANAFLEX) 4 MG tablet     propranolol (INDERAL LA) 120 MG 24 hr capsule     atorvastatin (LIPITOR) 20 MG tablet     lisinopril-hydrochlorothiazide (PRINZIDE,ZESTORETIC) 20-12.5 MG per tablet     IBUPROFEN PO     Multiple Vitamin (MULTI-VITAMIN DAILY PO)     ASPIRIN 81 MG PO TABS     No current facility-administered medications for this visit.          O: /79  Pulse 70  Temp 98.2  F (36.8  C) (Tympanic)  Resp 12  Wt 180 lb (81.6 kg)  LMP 08/19/2013  SpO2 96%  BMI 33.46 kg/m2    Rt canal clear and TM appears normal.  Lt canal filled with soft cerumen.Lavage by MP and she became lightheaded, dizzy (10 squirts )and lavage stopped. She laid down, few minutes and lightheadedness passed. /80  Pulse 70  Temp 98.2  F (36.8  C) (Tympanic)  Resp 12  Wt 180 lb (81.6 kg)  LMP 08/19/2013  SpO2 96%  BMI 33.46 kg/m2   I rechecked ear and there was still some soft cerumen mid canal but I could see upper 1/3 of TM - appeared normal.  N - some congestion - clear discharge  T - unremarkable.  Neck - no palp LA  NT to palpate over frontal and max sinus areas.  Lungs -clear    A;    Ear discomfort, left  Impacted cerumen of left ear      P: No Qtips  Ear hygiene discussed.  Warm packs  Try gentle autoinusfflation  Saline nose spray if > nasal congestion  FOLLOW UP with PCP - appointment set 8/29/2017 - earlier if new or worsening symptoms.    Pt is comfortable with this plan.  Electronically signed,  RAMY Jamil, TAYE

## 2017-08-25 NOTE — NURSING NOTE
"Chief Complaint   Patient presents with     Ear Problem     Left Ear for about 4 days now       Initial /79  Pulse 70  Temp 98.2  F (36.8  C) (Tympanic)  Resp 12  Wt 180 lb (81.6 kg)  LMP 08/19/2013  SpO2 96%  BMI 33.46 kg/m2 Estimated body mass index is 33.46 kg/(m^2) as calculated from the following:    Height as of 6/26/17: 5' 1.5\" (1.562 m).    Weight as of this encounter: 180 lb (81.6 kg).  Medication Reconciliation: complete    "

## 2017-08-25 NOTE — PROGRESS NOTES
"  SUBJECTIVE:   Janice Ulloa is a 56 year old female who presents to clinic today for the following health issues:      Acute Illness   Acute illness concerns: Ear plugged, her other viral symptoms just started yesterday   Onset: plugged for about 8 days.  Had ears washed on Friday and started to feel dizzy so the ear wash was stopped and she was to come here to follow up .  She is still on occsaion a bit \"lightheaded\" though not as significantly as at [facility] care when is was being washed out    Fever: no     Chills/Sweats: YES- hot feeling just the once after the ear wash and then she gets hot flashes     Headache (location?): YES head feels full, no facial pain or pressure specific to sinus    Sinus Pressure:YES- post-nasal drainage, pressure in head all over    Conjunctivitis:  no    Ear Pain: YES: left- plugged and painful     Rhinorrhea: YES- clear    Congestion: YES    Sore Throat: no      Cough: YES-non-productive- this just started yesterday, not extreme    Wheeze: YES- sometimes, with activity    Decreased Appetite: not eating much    Nausea: no     Vomiting: no     Diarrhea:  no     Dysuria/Freq.: no     Fatigue/Achiness: YES- tired    Sick/Strep Exposure: no      Therapies Tried and outcome: OTC ibuprofen, OTC allergy, heating pad, warm shower        Problem list and histories reviewed & adjusted, as indicated.  Additional history: as documented    BP Readings from Last 3 Encounters:   08/29/17 124/62   08/25/17 140/80   06/26/17 130/80    Wt Readings from Last 3 Encounters:   08/29/17 175 lb 6.4 oz (79.6 kg)   08/25/17 180 lb (81.6 kg)   06/26/17 171 lb 3.2 oz (77.7 kg)                  Labs reviewed in EPIC      Reviewed and updated as needed this visit by clinical staff     Reviewed and updated as needed this visit by Provider         ROS:  CONSTITUTIONAL:she feels so tired all the time per pt, no more menses, she had significant anemia from heavy menses   ENT/MOUTH: as above  RESP:she gets out of " "breath when walking up a hill or flight of stairs  CV: NEGATIVE for chest pain, palpitations or peripheral edema  GI: occasional heartburn, tums treats this belches and it improves     OBJECTIVE:     /62  Pulse 72  Temp 98.1  F (36.7  C) (Oral)  Ht 5' 2\" (1.575 m)  Wt 175 lb 6.4 oz (79.6 kg)  LMP 08/19/2013  Breastfeeding? No  BMI 32.08 kg/m2  Body mass index is 32.08 kg/(m^2).  GENERAL: healthy, alert and no distress  EYES: Eyes grossly normal to inspection  HENT: normal cephalic/atraumatic, right ear: normal: no effusions, no erythema, normal landmarks, left ear: occluded with wax, nose and mouth without ulcers or lesions, oropharynx clear and oral mucous membranes moist  NECK: no adenopathy, no asymmetry, masses, or scars and thyroid normal to palpation  RESP: lungs clear to auscultation - no rales, rhonchi or wheezes  CV: regular rate and rhythm, normal S1 S2, no S3 or S4, no murmur, click or rub, no peripheral edema and peripheral pulses strong  MS: no gross musculoskeletal defects noted, no edema  PSYCH: mentation appears normal, affect normal/bright    Diagnostic Test Results:  Results for orders placed or performed in visit on 08/29/17 (from the past 24 hour(s))   CBC with platelets   Result Value Ref Range    WBC 7.1 4.0 - 11.0 10e9/L    RBC Count 4.87 3.8 - 5.2 10e12/L    Hemoglobin 13.6 11.7 - 15.7 g/dL    Hematocrit 41.0 35.0 - 47.0 %    MCV 84 78 - 100 fl    MCH 27.9 26.5 - 33.0 pg    MCHC 33.2 31.5 - 36.5 g/dL    RDW 14.7 10.0 - 15.0 %    Platelet Count 255 150 - 450 10e9/L       ASSESSMENT/PLAN:       1. Impacted cerumen of left ear  She is very bothered by the pressure in her ear, attempted lavage again and she had increased dizziness.  Will use wax softening drops and see ENT  - OTOLARYNGOLOGY REFERRAL    2. Fatigue, unspecified type  Will start with lab work up, if this is continuing consider, EKG , CXR, depression screening, perhaps echo  - Comprehensive metabolic panel (BMP + Alb, " Alk Phos, ALT, AST, Total. Bili, TP)  - CBC with platelets  - TSH with free T4 reflex  - Lyme Disease Funmi with reflex to WB Serum    3. Viral URI with cough  otc meds prn and follow up if worsening   - CBC with platelets    4. Hyperlipidemia with target LDL less than 130    - **Lipid panel reflex to direct LDL FUTURE anytime; Future  - **Lipid panel reflex to direct LDL FUTURE anytime    Recheck as needed     Anisha Alfaro PA-C  [facility]  Answers for HPI/ROS submitted by the patient on 8/29/2017   If you checked off any problems, how difficult have these problems made it for you to do your work, take care of things at home, or get along with other people?: Somewhat difficult  PHQ9 TOTAL SCORE: 12  CHELO 7 TOTAL SCORE: IncompleteElectronically signed by Anisha Alfaro PA-C

## 2017-08-29 ENCOUNTER — OFFICE VISIT (OUTPATIENT)
Dept: FAMILY MEDICINE | Facility: OTHER | Age: 57
End: 2017-08-29
Payer: COMMERCIAL

## 2017-08-29 VITALS
WEIGHT: 175.4 LBS | TEMPERATURE: 98.1 F | BODY MASS INDEX: 32.28 KG/M2 | SYSTOLIC BLOOD PRESSURE: 124 MMHG | HEART RATE: 72 BPM | HEIGHT: 62 IN | DIASTOLIC BLOOD PRESSURE: 62 MMHG

## 2017-08-29 DIAGNOSIS — H61.22 IMPACTED CERUMEN OF LEFT EAR: Primary | ICD-10-CM

## 2017-08-29 DIAGNOSIS — R53.83 FATIGUE, UNSPECIFIED TYPE: ICD-10-CM

## 2017-08-29 DIAGNOSIS — J06.9 VIRAL URI WITH COUGH: ICD-10-CM

## 2017-08-29 DIAGNOSIS — E78.5 HYPERLIPIDEMIA WITH TARGET LDL LESS THAN 130: ICD-10-CM

## 2017-08-29 LAB
ALBUMIN SERPL-MCNC: 4 G/DL (ref 3.4–5)
ALP SERPL-CCNC: 75 U/L (ref 40–150)
ALT SERPL W P-5'-P-CCNC: 43 U/L (ref 0–50)
ANION GAP SERPL CALCULATED.3IONS-SCNC: 11 MMOL/L (ref 3–14)
AST SERPL W P-5'-P-CCNC: 19 U/L (ref 0–45)
BILIRUB SERPL-MCNC: 0.3 MG/DL (ref 0.2–1.3)
BUN SERPL-MCNC: 10 MG/DL (ref 7–30)
CALCIUM SERPL-MCNC: 9.4 MG/DL (ref 8.5–10.1)
CHLORIDE SERPL-SCNC: 100 MMOL/L (ref 94–109)
CHOLEST SERPL-MCNC: 189 MG/DL
CO2 SERPL-SCNC: 29 MMOL/L (ref 20–32)
CREAT SERPL-MCNC: 0.8 MG/DL (ref 0.52–1.04)
ERYTHROCYTE [DISTWIDTH] IN BLOOD BY AUTOMATED COUNT: 14.7 % (ref 10–15)
GFR SERPL CREATININE-BSD FRML MDRD: 74 ML/MIN/1.7M2
GLUCOSE SERPL-MCNC: 102 MG/DL (ref 70–99)
HCT VFR BLD AUTO: 41 % (ref 35–47)
HDLC SERPL-MCNC: 48 MG/DL
HGB BLD-MCNC: 13.6 G/DL (ref 11.7–15.7)
LDLC SERPL CALC-MCNC: 91 MG/DL
MCH RBC QN AUTO: 27.9 PG (ref 26.5–33)
MCHC RBC AUTO-ENTMCNC: 33.2 G/DL (ref 31.5–36.5)
MCV RBC AUTO: 84 FL (ref 78–100)
NONHDLC SERPL-MCNC: 141 MG/DL
PLATELET # BLD AUTO: 255 10E9/L (ref 150–450)
POTASSIUM SERPL-SCNC: 3.4 MMOL/L (ref 3.4–5.3)
PROT SERPL-MCNC: 8.3 G/DL (ref 6.8–8.8)
RBC # BLD AUTO: 4.87 10E12/L (ref 3.8–5.2)
SODIUM SERPL-SCNC: 140 MMOL/L (ref 133–144)
TRIGL SERPL-MCNC: 249 MG/DL
TSH SERPL DL<=0.005 MIU/L-ACNC: 2.82 MU/L (ref 0.4–4)
WBC # BLD AUTO: 7.1 10E9/L (ref 4–11)

## 2017-08-29 PROCEDURE — 99214 OFFICE O/P EST MOD 30 MIN: CPT | Performed by: PHYSICIAN ASSISTANT

## 2017-08-29 PROCEDURE — 36415 COLL VENOUS BLD VENIPUNCTURE: CPT | Performed by: PHYSICIAN ASSISTANT

## 2017-08-29 PROCEDURE — 80053 COMPREHEN METABOLIC PANEL: CPT | Performed by: PHYSICIAN ASSISTANT

## 2017-08-29 PROCEDURE — 86618 LYME DISEASE ANTIBODY: CPT | Performed by: PHYSICIAN ASSISTANT

## 2017-08-29 PROCEDURE — 80061 LIPID PANEL: CPT | Performed by: PHYSICIAN ASSISTANT

## 2017-08-29 PROCEDURE — 84443 ASSAY THYROID STIM HORMONE: CPT | Performed by: PHYSICIAN ASSISTANT

## 2017-08-29 PROCEDURE — 85027 COMPLETE CBC AUTOMATED: CPT | Performed by: PHYSICIAN ASSISTANT

## 2017-08-29 ASSESSMENT — PATIENT HEALTH QUESTIONNAIRE - PHQ9
10. IF YOU CHECKED OFF ANY PROBLEMS, HOW DIFFICULT HAVE THESE PROBLEMS MADE IT FOR YOU TO DO YOUR WORK, TAKE CARE OF THINGS AT HOME, OR GET ALONG WITH OTHER PEOPLE: SOMEWHAT DIFFICULT
SUM OF ALL RESPONSES TO PHQ QUESTIONS 1-9: 12
SUM OF ALL RESPONSES TO PHQ QUESTIONS 1-9: 12

## 2017-08-29 ASSESSMENT — ANXIETY QUESTIONNAIRES: GAD7 TOTAL SCORE: INCOMPLETE

## 2017-08-29 ASSESSMENT — PAIN SCALES - GENERAL: PAINLEVEL: NO PAIN (0)

## 2017-08-29 NOTE — MR AVS SNAPSHOT
After Visit Summary   8/29/2017    Janice Ulloa    MRN: [id number]           Patient Information     Date Of Birth          1960        Visit Information        Provider Department      8/29/2017 9:15 AM Anisha Alfaro PA-C [facility]        Today's Diagnoses     Impacted cerumen of left ear    -  1    Fatigue, unspecified type        Viral URI with cough        Hyperlipidemia with target LDL less than 130           Follow-ups after your visit        Additional Services     OTOLARYNGOLOGY REFERRAL       Your provider has referred you to: [facility]: [facility] [place] [phone]   [email or web address]    Please be aware that coverage of these services is subject to the terms and limitations of your health insurance plan.  Call member services at your health plan with any benefit or coverage questions.      Please bring the following with you to your appointment:    (1) Any X-Rays, CTs or MRIs which have been performed.  Contact the facility where they were done to arrange for  prior to your scheduled appointment.   (2) List of current medications  (3) This referral request   (4) Any documents/labs given to you for this referral                  Who to contact     If you have questions or need follow up information about today's clinic visit or your schedule please contact [facility] directly at [phone].  Normal or non-critical lab and imaging results will be communicated to you by MyChart, letter or phone within 4 business days after the clinic has received the results. If you do not hear from us within 7 days, please contact the clinic through MyChart or phone. If you have a critical or abnormal lab result, we will notify you by phone as soon as possible.  Submit refill requests through [employer] or call your pharmacy and they will forward the refill request to us. Please allow 3 business days for your  "refill to be completed.          Additional Information About Your Visit        [facility] Information     [employer] lets you send messages to your doctor, view your test results, renew your prescriptions, schedule appointments and more. To sign up, go to [email or web address] . Click on \"Log in\" on the left side of the screen, which will take you to the Welcome page. Then click on \"Sign up Now\" on the right side of the page.     You will be asked to enter the access code listed below, as well as some personal information. Please follow the directions to create your username and password.     Your access code is: [email or web address]  Expires: 2017 11:52 AM     Your access code will  in 90 days. If you need help or a new code, please call your [place] clinic or [phone].        Care EveryWhere ID     This is your [facility] EveryWhere ID. This could be used by other organizations to access your [place] medical records  [id number]        Your Vitals Were     Pulse Temperature Height Last Period Breastfeeding? BMI (Body Mass Index)    72 98.1  F (36.7  C) (Oral) 5' 2\" (1.575 m) 2013 No 32.08 kg/m2       Blood Pressure from Last 3 Encounters:   17 124/62   17 140/80   17 130/80    Weight from Last 3 Encounters:   17 175 lb 6.4 oz (79.6 kg)   17 180 lb (81.6 kg)   17 171 lb 3.2 oz (77.7 kg)              We Performed the Following     **Lipid panel reflex to direct LDL FUTURE anytime     CBC with platelets     Comprehensive metabolic panel (BMP + Alb, Alk Phos, ALT, AST, Total. Bili, TP)     Lyme Disease Funmi with reflex to WB Serum     OTOLARYNGOLOGY REFERRAL     TSH with free T4 reflex        Primary Care Provider Office Phone # Fax Aryan Alfaro PA-C [phone] [address] [place] [address]        Equal Access to Services     JANNETTE OLIVA AH: Mara Boss, waaxnataliya andrew waxay idiin hayaan adeeg" marcy dorman ah. So [facility] [phone].    ATENCIÓN: Si habla luis fernando, tiene a larios disposición servicios gratuitos de asistencia lingüística. Franca miller [phone].    We comply with applicable federal civil rights laws and [place] laws. We do not discriminate on the basis of race, color, national origin, age, disability sex, sexual orientation or gender identity.            Thank you!     Thank you for choosing [facility]  for your care. Our goal is always to provide you with excellent care. Hearing back from our patients is one way we can continue to improve our services. Please take a few minutes to complete the written survey that you may receive in the mail after your visit with us. Thank you!             Your Updated Medication List - Protect others around you: Learn how to safely use, store and throw away your medicines at www.disposemymeds.org.          This list is accurate as of: 8/29/17 11:03 AM.  Always use your most recent med list.                   Brand Name Dispense Instructions for use Diagnosis    amLODIPine 5 MG tablet    NORVASC    90 tablet    Take 1 tablet (5 mg) by mouth daily    Hypertension goal BP (blood pressure) < 140/90       aspirin 81 MG tablet      ONE DAILY        atorvastatin 20 MG tablet    LIPITOR    90 tablet    Take 1 tablet (20 mg) by mouth daily    Hyperlipidemia with target LDL less than 130       IBUPROFEN PO      Take 400 mg by mouth daily as needed        lisinopril-hydrochlorothiazide 20-12.5 MG per tablet    PRINZIDE/ZESTORETIC    180 tablet    Take 2 tablets by mouth daily    Essential hypertension with goal blood pressure less than 140/90       MULTI-VITAMIN DAILY PO           propranolol 120 MG 24 hr capsule    INDERAL LA    60 capsule    TAKE TWO CAPSULES BY MOUTH EVERY DAY    Essential hypertension with goal blood pressure less than 140/90       tiZANidine 4 MG tablet    ZANAFLEX    30 tablet    TAKE ONE TO TWO TABLETS BY MOUTH THREE TIMES A DAY AS  NEEDED FOR MUSCLE SPASM    Neck pain

## 2017-08-29 NOTE — NURSING NOTE
"Chief Complaint   Patient presents with     Ear Problem     Panel Management     mychart, lipid, phq9, bmp       Initial /62  Pulse 72  Temp 98.1  F (36.7  C) (Oral)  Ht 5' 2\" (1.575 m)  Wt 175 lb 6.4 oz (79.6 kg)  LMP 08/19/2013  Breastfeeding? No  BMI 32.08 kg/m2 Estimated body mass index is 32.08 kg/(m^2) as calculated from the following:    Height as of this encounter: 5' 2\" (1.575 m).    Weight as of this encounter: 175 lb 6.4 oz (79.6 kg).  Medication Reconciliation: complete     Micahel Tomas, RN, BSN          "

## 2017-08-29 NOTE — LETTER
August 31, 2017      Janice Ulloa  [address]  [place] [address]        Dear ,    We are writing to inform you of your test results.    Your lyme test is negative.  Your kidney functions, liver functions and electrolytes are normal. Your blood sugar was slightly high at 102.  Your thyroid test and complete blood count is normal.  Your cholesterol is slightly better though your triglycerides are still elevated.  There is nothing showing up on your test results that would explain your fatigue.  I see that I will be seeing you on the 6th, we can further discuss our next steps then.     Resulted Orders   Comprehensive metabolic panel (BMP + Alb, Alk Phos, ALT, AST, Total. Bili, TP)   Result Value Ref Range    Sodium 140 133 - 144 mmol/L    Potassium 3.4 3.4 - 5.3 mmol/L    Chloride 100 94 - 109 mmol/L    Carbon Dioxide 29 20 - 32 mmol/L    Anion Gap 11 3 - 14 mmol/L    Glucose 102 (H) 70 - 99 mg/dL    Urea Nitrogen 10 7 - 30 mg/dL    Creatinine 0.80 0.52 - 1.04 mg/dL    GFR Estimate 74 >60 mL/min/1.7m2      Comment:      Non  GFR Calc    GFR Estimate If Black 89 >60 mL/min/1.7m2      Comment:       GFR Calc    Calcium 9.4 8.5 - 10.1 mg/dL    Bilirubin Total 0.3 0.2 - 1.3 mg/dL    Albumin 4.0 3.4 - 5.0 g/dL    Protein Total 8.3 6.8 - 8.8 g/dL    Alkaline Phosphatase 75 40 - 150 U/L    ALT 43 0 - 50 U/L    AST 19 0 - 45 U/L   CBC with platelets   Result Value Ref Range    WBC 7.1 4.0 - 11.0 10e9/L    RBC Count 4.87 3.8 - 5.2 10e12/L    Hemoglobin 13.6 11.7 - 15.7 g/dL    Hematocrit 41.0 35.0 - 47.0 %    MCV 84 78 - 100 fl    MCH 27.9 26.5 - 33.0 pg    MCHC 33.2 31.5 - 36.5 g/dL    RDW 14.7 10.0 - 15.0 %    Platelet Count 255 150 - 450 10e9/L   TSH with free T4 reflex   Result Value Ref Range    TSH 2.82 0.40 - 4.00 mU/L   Lyme Disease Funmi with reflex to WB Serum   Result Value Ref Range    Lyme Disease Antibodies Serum 0.08 0.00 - 0.89      Comment:      Negative,  Absence of detectable Borrelia burdorferi antibodies. A negative   result does not exclude the possibility of Borrelia burgdorferi infection. If   early Lyme disease is suspected, a second sample should be collected and   tested 2 to 4 weeks later.     **Lipid panel reflex to direct LDL FUTURE anytime   Result Value Ref Range    Cholesterol 189 <200 mg/dL    Triglycerides 249 (H) <150 mg/dL      Comment:      Borderline high:  150-199 mg/dl  High:             200-499 mg/dl  Very high:       >499 mg/dl      HDL Cholesterol 48 (L) >49 mg/dL    LDL Cholesterol Calculated 91 <100 mg/dL      Comment:      Desirable:       <100 mg/dl    Non HDL Cholesterol 141 (H) <130 mg/dL      Comment:      Above Desirable:  130-159 mg/dl  Borderline high:  160-189 mg/dl  High:             190-219 mg/dl  Very high:       >219 mg/dl         If you have any questions or concerns, please call the clinic at the number listed above.       Sincerely,        Anisha Alfaro PA-C

## 2017-08-30 LAB — B BURGDOR IGG+IGM SER QL: 0.08 (ref 0–0.89)

## 2017-08-30 ASSESSMENT — PATIENT HEALTH QUESTIONNAIRE - PHQ9: SUM OF ALL RESPONSES TO PHQ QUESTIONS 1-9: 12

## 2017-08-31 NOTE — PROGRESS NOTES
"  SUBJECTIVE:   Janice Ulloa is a 56 year old female who presents to clinic today for the following health issues:          Acute Illness   Acute illness concerns: left ear clogged , has been using wax softening drops and water irrigation at home  Onset: 3 weeks , the rest of her viral symptoms have improved but the plugged ears.  No longer feeling dizzy on a routine basis, will be dizzy on rare occasion    Fever: no     Chills/Sweats: no     Headache (location?): no     Sinus Pressure:no    Conjunctivitis:  no    Ear Pain: no    Rhinorrhea: yes    Congestion: no     Sore Throat: no      Cough: no    Wheeze: no     Decreased Appetite: no     Nausea: no     Vomiting: no     Diarrhea:  no     Dysuria/Freq.: no     Fatigue/Achiness: no     Sick/Strep Exposure: no      Therapies Tried and outcome:  Went to urgent care to flush out ear, but did not help.       Problem list and histories reviewed & adjusted, as indicated.  Additional history: as documented    BP Readings from Last 3 Encounters:   09/06/17 120/60   08/29/17 124/62   08/25/17 140/80    Wt Readings from Last 3 Encounters:   09/06/17 177 lb 1.6 oz (80.3 kg)   08/29/17 175 lb 6.4 oz (79.6 kg)   08/25/17 180 lb (81.6 kg)                  Labs reviewed in EPIC      Reviewed and updated as needed this visit by clinical staff     Reviewed and updated as needed this visit by Provider         ROS:  As above    OBJECTIVE:     /60  Pulse 69  Temp 97.6  F (36.4  C) (Temporal)  Resp 16  Ht 5' 1.42\" (1.56 m)  Wt 177 lb 1.6 oz (80.3 kg)  LMP 08/19/2013  SpO2 97%  BMI 33.01 kg/m2  Body mass index is 33.01 kg/(m^2).  GENERAL: healthy, alert and no distress  HENT: normal cephalic/atraumatic, right ear: normal: no effusions, no erythema, normal landmarks, left ear: darkly colored dry ear wax  After lavage the majority has been removed, nose and mouth without ulcers or lesions, oropharynx clear and oral mucous membranes moist    Diagnostic Test " Results:  Results for orders placed or performed in visit on 08/29/17   Comprehensive metabolic panel (BMP + Alb, Alk Phos, ALT, AST, Total. Bili, TP)   Result Value Ref Range    Sodium 140 133 - 144 mmol/L    Potassium 3.4 3.4 - 5.3 mmol/L    Chloride 100 94 - 109 mmol/L    Carbon Dioxide 29 20 - 32 mmol/L    Anion Gap 11 3 - 14 mmol/L    Glucose 102 (H) 70 - 99 mg/dL    Urea Nitrogen 10 7 - 30 mg/dL    Creatinine 0.80 0.52 - 1.04 mg/dL    GFR Estimate 74 >60 mL/min/1.7m2    GFR Estimate If Black 89 >60 mL/min/1.7m2    Calcium 9.4 8.5 - 10.1 mg/dL    Bilirubin Total 0.3 0.2 - 1.3 mg/dL    Albumin 4.0 3.4 - 5.0 g/dL    Protein Total 8.3 6.8 - 8.8 g/dL    Alkaline Phosphatase 75 40 - 150 U/L    ALT 43 0 - 50 U/L    AST 19 0 - 45 U/L   CBC with platelets   Result Value Ref Range    WBC 7.1 4.0 - 11.0 10e9/L    RBC Count 4.87 3.8 - 5.2 10e12/L    Hemoglobin 13.6 11.7 - 15.7 g/dL    Hematocrit 41.0 35.0 - 47.0 %    MCV 84 78 - 100 fl    MCH 27.9 26.5 - 33.0 pg    MCHC 33.2 31.5 - 36.5 g/dL    RDW 14.7 10.0 - 15.0 %    Platelet Count 255 150 - 450 10e9/L   TSH with free T4 reflex   Result Value Ref Range    TSH 2.82 0.40 - 4.00 mU/L   Lyme Disease Funmi with reflex to WB Serum   Result Value Ref Range    Lyme Disease Antibodies Serum 0.08 0.00 - 0.89   **Lipid panel reflex to direct LDL FUTURE anytime   Result Value Ref Range    Cholesterol 189 <200 mg/dL    Triglycerides 249 (H) <150 mg/dL    HDL Cholesterol 48 (L) >49 mg/dL    LDL Cholesterol Calculated 91 <100 mg/dL    Non HDL Cholesterol 141 (H) <130 mg/dL       ASSESSMENT/PLAN:         1. Impacted cerumen of left ear  Ear lavage by [facility]As    2. Sensation of plugged ear on left side  Will do a trial of flonase or nasacort to see if this is helpful, also she wanted us to make appt with ENT appt is not until sept 25, she will cancel it if not improved           Anisha Alfaro PA-C  [facility]  Electronically signed by Anisha Alfaro PA-C

## 2017-09-06 ENCOUNTER — OFFICE VISIT (OUTPATIENT)
Dept: FAMILY MEDICINE | Facility: OTHER | Age: 57
End: 2017-09-06
Payer: COMMERCIAL

## 2017-09-06 VITALS
OXYGEN SATURATION: 97 % | HEIGHT: 61 IN | DIASTOLIC BLOOD PRESSURE: 60 MMHG | WEIGHT: 177.1 LBS | TEMPERATURE: 97.6 F | BODY MASS INDEX: 33.44 KG/M2 | RESPIRATION RATE: 16 BRPM | HEART RATE: 69 BPM | SYSTOLIC BLOOD PRESSURE: 120 MMHG

## 2017-09-06 DIAGNOSIS — H93.8X2 SENSATION OF PLUGGED EAR ON LEFT SIDE: ICD-10-CM

## 2017-09-06 DIAGNOSIS — H61.22 IMPACTED CERUMEN OF LEFT EAR: Primary | ICD-10-CM

## 2017-09-06 PROCEDURE — 99213 OFFICE O/P EST LOW 20 MIN: CPT | Performed by: PHYSICIAN ASSISTANT

## 2017-09-06 ASSESSMENT — PAIN SCALES - GENERAL: PAINLEVEL: NO PAIN (0)

## 2017-09-06 NOTE — MR AVS SNAPSHOT
"              After Visit Summary   2017    Janice Ulloa    MRN: [id number]           Patient Information     Date Of Birth          1960        Visit Information        Provider Department      2017 8:15 AM Anisha Alfaro PA-C [facility]         Follow-ups after your visit        Your next 10 appointments already scheduled     Sep 25, 2017  9:45 AM CDT   New Visit with Coleman [facility]rego MD   [facility]    [address] [place] [address] [phone]              Who to contact     If you have questions or need follow up information about today's clinic visit or your schedule please contact [facility] directly at [phone].  Normal or non-critical lab and imaging results will be communicated to you by MyChart, letter or phone within 4 business days after the clinic has received the results. If you do not hear from us within 7 days, please contact the clinic through MyChart or phone. If you have a critical or abnormal lab result, we will notify you by phone as soon as possible.  Submit refill requests through [employer] or call your pharmacy and they will forward the refill request to us. Please allow 3 business days for your refill to be completed.          Additional Information About Your Visit        MyChart Information     [employer] lets you send messages to your doctor, view your test results, renew your prescriptions, schedule appointments and more. To sign up, go to [email or web address] . Click on \"Log in\" on the left side of the screen, which will take you to the Welcome page. Then click on \"Sign up Now\" on the right side of the page.     You will be asked to enter the access code listed below, as well as some personal information. Please follow the directions to create your username and password.     Your access code is: [email or web address]  Expires: 2017 11:52 AM     Your access code will  in 90 " "days. If you need help or a new code, please call your [place] clinic or [phone].        Care EveryWhere ID     This is your Care EveryWhere ID. This could be used by other organizations to access your [place] medical records  [id number]        Your Vitals Were     Pulse Temperature Respirations Height Last Period Pulse Oximetry    69 97.6  F (36.4  C) (Temporal) 16 5' 1.42\" (1.56 m) 08/19/2013 97%    BMI (Body Mass Index)                   33.01 kg/m2            Blood Pressure from Last 3 Encounters:   09/06/17 120/60   08/29/17 124/62   08/25/17 140/80    Weight from Last 3 Encounters:   09/06/17 177 lb 1.6 oz (80.3 kg)   08/29/17 175 lb 6.4 oz (79.6 kg)   08/25/17 180 lb (81.6 kg)              Today, you had the following     No orders found for display       Primary Care Provider Office Phone # Fax #    Anisha Alfaro PA-C [phone] [address] [place] [address]        Equal Access to Services     [facility]: Hadii aad ku hadasho Soomaali, waaxda luqadaha, qaybta kaalmada adenidhiyada, kleber dorman . So [facility] [phone].    ATENCIÓN: Si habla español, tiene a larios disposición servicios gratuitos de asistencia lingüística. [facility] [phone].    We comply with applicable federal civil rights laws and [place] laws. We do not discriminate on the basis of race, color, national origin, age, disability sex, sexual orientation or gender identity.            Thank you!     Thank you for choosing [facility] [place]  for your care. Our goal is always to provide you with excellent care. Hearing back from our patients is one way we can continue to improve our services. Please take a few minutes to complete the written survey that you may receive in the mail after your visit with us. Thank you!             Your Updated Medication List - Protect others around you: Learn how to safely use, store and throw away your medicines at " www.disposemymeds.org.          This list is accurate as of: 9/6/17  8:37 AM.  Always use your most recent med list.                   Brand Name Dispense Instructions for use Diagnosis    amLODIPine 5 MG tablet    NORVASC    90 tablet    Take 1 tablet (5 mg) by mouth daily    Hypertension goal BP (blood pressure) < 140/90       aspirin 81 MG tablet      ONE DAILY        atorvastatin 20 MG tablet    LIPITOR    90 tablet    Take 1 tablet (20 mg) by mouth daily    Hyperlipidemia with target LDL less than 130       IBUPROFEN PO      Take 400 mg by mouth daily as needed        lisinopril-hydrochlorothiazide 20-12.5 MG per tablet    PRINZIDE/ZESTORETIC    180 tablet    Take 2 tablets by mouth daily    Essential hypertension with goal blood pressure less than 140/90       MULTI-VITAMIN DAILY PO           propranolol 120 MG 24 hr capsule    INDERAL LA    60 capsule    TAKE TWO CAPSULES BY MOUTH EVERY DAY    Essential hypertension with goal blood pressure less than 140/90       tiZANidine 4 MG tablet    ZANAFLEX    30 tablet    TAKE ONE TO TWO TABLETS BY MOUTH THREE TIMES A DAY AS NEEDED FOR MUSCLE SPASM    Neck pain

## 2017-09-06 NOTE — NURSING NOTE
"Chief Complaint   Patient presents with     RECHECK     ear     Panel Management     UTD       Initial /60  Pulse 69  Temp 97.6  F (36.4  C) (Temporal)  Resp 16  Ht 5' 1.42\" (1.56 m)  Wt 177 lb 1.6 oz (80.3 kg)  LMP 08/19/2013  SpO2 97%  BMI 33.01 kg/m2 Estimated body mass index is 33.01 kg/(m^2) as calculated from the following:    Height as of this encounter: 5' 1.42\" (1.56 m).    Weight as of this encounter: 177 lb 1.6 oz (80.3 kg).  Medication Reconciliation: complete       Chantelle Ramey MA       "

## 2017-09-13 ENCOUNTER — TELEPHONE (OUTPATIENT)
Dept: FAMILY MEDICINE | Facility: OTHER | Age: 57
End: 2017-09-13

## 2017-09-13 NOTE — TELEPHONE ENCOUNTER
Reason for call:  Symptom  Reason for call:  Patient reporting a symptom    Symptom or request: left ear pain,  Duration (how long have symptoms been present): for 2 days    Have you been treated for this before? Yes    Additional comments:  she has been laying on heating pad and has been taking ibuprofin.  she has an appointment with the ENT on 9/25, but can she do for the pain in the mean time    Phone Number patient can be reached at:  Home number on file [phone] (home)    Best Time:  any    Can we leave a detailed message on this number:  YES    Call taken on 9/13/2017 at 3:29 PM by Liza Headley

## 2017-09-14 ENCOUNTER — HOSPITAL ENCOUNTER (EMERGENCY)
Facility: CLINIC | Age: 57
Discharge: HOME | End: 2017-09-14
Attending: FAMILY MEDICINE | Admitting: FAMILY MEDICINE
Payer: COMMERCIAL

## 2017-09-14 VITALS
OXYGEN SATURATION: 99 % | TEMPERATURE: 97 F | RESPIRATION RATE: 14 BRPM | DIASTOLIC BLOOD PRESSURE: 94 MMHG | BODY MASS INDEX: 32.62 KG/M2 | WEIGHT: 175 LBS | SYSTOLIC BLOOD PRESSURE: 187 MMHG

## 2017-09-14 DIAGNOSIS — H69.92 ETD (EUSTACHIAN TUBE DYSFUNCTION), LEFT: ICD-10-CM

## 2017-09-14 PROCEDURE — 99284 EMERGENCY DEPT VISIT MOD MDM: CPT | Mod: Z6 | Performed by: FAMILY MEDICINE

## 2017-09-14 PROCEDURE — 99282 EMERGENCY DEPT VISIT SF MDM: CPT | Performed by: FAMILY MEDICINE

## 2017-09-14 RX ORDER — HYDROCODONE BITARTRATE AND ACETAMINOPHEN 5; 325 MG/1; MG/1
1-2 TABLET ORAL 3 TIMES DAILY PRN
Qty: 12 TABLET | Refills: 0 | Status: SHIPPED | OUTPATIENT
Start: 2017-09-14 | End: 2017-10-11

## 2017-09-14 RX ORDER — PREDNISONE 10 MG/1
20 TABLET ORAL 2 TIMES DAILY
Qty: 20 TABLET | Refills: 0 | Status: SHIPPED | OUTPATIENT
Start: 2017-09-14 | End: 2017-09-19

## 2017-09-14 NOTE — ED AVS SNAPSHOT
[facility] Emergency Department    [address] [place] [address]    Phone:  [phone]    Fax:  [phone]                                       Janice Ulloa   MRN: [id number]    Department:  [facility] Emergency Department   Date of Visit:  9/14/2017           Patient Information     Date Of Birth          1960        Your diagnoses for this visit were:     ETD (eustachian tube dysfunction), left        You were seen by Jesu Fields MD.      Follow-up Information     Follow up with Anisha Alfaro PA-C In 4 days.    Specialty:  Family Practice    Why:  if not improving    Contact information:    [address] [place] [address] [phone]          Follow up with Coleman Borrego MD In 1 week.    Specialty:  Otolaryngology    Contact information:    [address] [place] [address]  [phone]          Discharge Instructions       You have eustachian tube dysfunction.    Take Augmentin 875 mg twice a day for 10 days.    Take prednisone 20 mg twice a day for 5 days.    Reserve Vicodin for severe pain at that time.    Follow up with Dr. Borrego as scheduled for next week.  Follow up with your primary care provider if you are not better in 3-5 days.    Discharge References/Attachments     EARACHE WITHOUT INFECTION (ADULT) (ENGLISH)      Future Appointments        Provider Department Dept Phone Center    9/21/2017 3:15 PM NIKOLE Schilling, Nikole [facility] [phone] [facility]    9/21/2017 3:45 PM Coleman Borrego MD, MD [facility] [phone] [facility]      24 Hour Appointment Hotline       To make an appointment at any [facility], call [phone] ([phone]). If you don't have a family doctor or clinic, we will help you find one. [facility] are conveniently located to serve the needs of you and your family.             Review of your medicines      START taking        Dose / Directions  Last dose taken    amoxicillin-clavulanate 875-125 MG per tablet   Commonly known as:  AUGMENTIN   Dose:  1 tablet   Quantity:  20 tablet        Take 1 tablet by mouth 2 times daily for 10 days   Refills:  0        HYDROcodone-acetaminophen 5-325 MG per tablet   Commonly known as:  NORCO   Dose:  1-2 tablet   Quantity:  12 tablet        Take 1-2 tablets by mouth 3 times daily as needed for moderate to severe pain   Refills:  0        predniSONE 10 MG tablet   Commonly known as:  DELTASONE   Dose:  20 mg   Quantity:  20 tablet        Take 2 tablets (20 mg) by mouth 2 times daily for 5 days   Refills:  0          Our records show that you are taking the medicines listed below. If these are incorrect, please call your family doctor or clinic.        Dose / Directions Last dose taken    amLODIPine 5 MG tablet   Commonly known as:  NORVASC   Dose:  5 mg   Quantity:  90 tablet        Take 1 tablet (5 mg) by mouth daily   Refills:  1        aspirin 81 MG tablet        ONE DAILY   Refills:  3        atorvastatin 20 MG tablet   Commonly known as:  LIPITOR   Dose:  20 mg   Quantity:  90 tablet        Take 1 tablet (20 mg) by mouth daily   Refills:  3        IBUPROFEN PO   Dose:  400 mg   Indication:  Fever        Take 400 mg by mouth daily as needed   Refills:  0        lisinopril-hydrochlorothiazide 20-12.5 MG per tablet   Commonly known as:  PRINZIDE/ZESTORETIC   Dose:  2 tablet   Quantity:  180 tablet        Take 2 tablets by mouth daily   Refills:  3        MULTI-VITAMIN DAILY PO        Refills:  0        propranolol 120 MG 24 hr capsule   Commonly known as:  INDERAL LA   Quantity:  60 capsule        TAKE TWO CAPSULES BY MOUTH EVERY DAY   Refills:  11        tiZANidine 4 MG tablet   Commonly known as:  ZANAFLEX   Quantity:  30 tablet        TAKE ONE TO TWO TABLETS BY MOUTH THREE TIMES A DAY AS NEEDED FOR MUSCLE SPASM   Refills:  3                Prescriptions were sent or printed at these locations (3 Prescriptions)      "              [facility] - [place], [place] - [address]   [address], [facility] [address]    Telephone:  [phone]   Fax:  [phone]   Hours:                  E-Prescribed (2 of 3)         amoxicillin-clavulanate (AUGMENTIN) 875-125 MG per tablet               predniSONE (DELTASONE) 10 MG tablet                 Printed at Department/Unit printer (1 of 3)         HYDROcodone-acetaminophen (NORCO) 5-325 MG per tablet                Orders Needing Specimen Collection     None      Pending Results     No orders found from 9/12/2017 to 9/15/2017.            Pending Culture Results     No orders found from 9/12/2017 to 9/15/2017.            Pending Results Instructions     If you had any lab results that were not finalized at the time of your Discharge, you can call the ED Lab Result RN at [phone]. You will be contacted by this team for any positive Lab results or changes in treatment. The nurses are available 7 days a week from 10A to 6:30P.  You can leave a message 24 hours per day and they will return your call.        Thank you for choosing [facility]       Thank you for choosing [facility] for your care. Our goal is always to provide you with excellent care. Hearing back from our patients is one way we can continue to improve our services. Please take a few minutes to complete the written survey that you may receive in the mail after you visit with us. Thank you!        [facility] Information     [employer] lets you send messages to your doctor, view your test results, renew your prescriptions, schedule appointments and more. To sign up, go to [email or web address] . Click on \"Log in\" on the left side of the screen, which will take you to the Welcome page. Then click on \"Sign up Now\" on the right side of the page.     You will be asked to enter the access code listed below, as well as some personal information. Please follow the directions to create your username and password.     Your access code is: " [email or web address]  Expires: 2017 11:52 AM     Your access code will  in 90 days. If you need help or a new code, please call your [facility] clinic or [phone].        Care EveryWhere ID     This is your Care EveryWhere ID. This could be used by other organizations to access your [facility] medical records  [id number]        Equal Access to Services     JANNETTE OLIVA : Hadii chirag schaefero Soiliana, waaxda luqadaha, qaybta kaalmada adeegyada, kleber dorman . So [facility] [phone].    ATENCIÓN: Si habla maxxañol, tiene a larios disposición servicios gratuitos de asistencia lingüística. Llame al [phone].    We comply with applicable federal civil rights laws and [place] laws. We do not discriminate on the basis of race, color, national origin, age, disability sex, sexual orientation or gender identity.            After Visit Summary       This is your record. Keep this with you and show to your community pharmacist(s) and doctor(s) at your next visit.

## 2017-09-14 NOTE — ED PROVIDER NOTES
ED Provider Note     CC:     Chief Complaint   Patient presents with     Otalgia          History is obtained from the patient.    HPI: Janice Ulloa is a 57 year old female presenting with left ear problems since August.  She was seen in urgent care and thought that she had cerumen impaction.  Her ears were flushed, but she states it was ineffective.  She has persistent fullness and discomfort in the left ear.  She denies any fever, chills, drainage.  She has been using Nasonex steroid spray.  She is having trouble sleeping because of the pressure in her ears.  Patient denies any neck pain, headache, blurred vision, fever, chills.  She does not have jaw joint pain.  Patient has hypertension and has been avoiding decongestants.      Patient Active Problem List   Diagnosis     Migraine     Carpal tunnel syndrome     Headache     Neck pain     Hypercholesteremia     Intervertebral cervical disc disorder with myelopathy, cervical region     Hyperlipidemia with target LDL less than 130     Perimenopausal     Anemia     Microscopic hematuria     Neck muscle spasm     Hypertension goal BP (blood pressure) < 140/90     Pneumonia     Sepsis (H)     Acute and chronic respiratory failure with hypoxia (H)     Hypokalemia     DVT prophylaxis     Advanced directives, counseling/discussion     Health Care Home     Adjustment disorder with mixed anxiety and depressed mood       MEDS:     No current facility-administered medications on file prior to encounter.   Current Outpatient Prescriptions on File Prior to Encounter:  amLODIPine (NORVASC) 5 MG tablet Take 1 tablet (5 mg) by mouth daily   tiZANidine (ZANAFLEX) 4 MG tablet TAKE ONE TO TWO TABLETS BY MOUTH THREE TIMES A DAY AS NEEDED FOR MUSCLE SPASM   propranolol (INDERAL LA) 120 MG 24 hr capsule TAKE TWO CAPSULES BY MOUTH EVERY DAY   atorvastatin (LIPITOR) 20 MG tablet Take 1 tablet (20 mg) by mouth daily    lisinopril-hydrochlorothiazide (PRINZIDE,ZESTORETIC) 20-12.5 MG per tablet Take 2 tablets by mouth daily   IBUPROFEN PO Take 400 mg by mouth daily as needed    Multiple Vitamin (MULTI-VITAMIN DAILY PO)    ASPIRIN 81 MG PO TABS ONE DAILY       Allergies: No known drug allergies    Triage and ursing notes were reviewed.    ROS: Negative except in HPI    Physical Exam:  Vitals:    09/14/17 1817 09/14/17 1848   BP: (!) 187/94 (!) 187/94   Resp: 14    Temp: 97  F (36.1  C)    TempSrc: Oral    SpO2: 98% 99%   Weight: 79.4 kg (175 lb)      GENERAL APPEARANCE: Alert, moderate discomfort due to ear discomfort and fullness   HEAD: atraumatic  EYES: PERRL, EOMI  HENT: oral exam benign; left TM is dull with fluid level  NECK: no adenopathy or masses, trachea is midline  RESP: lungs clear to auscultation - no rales, rhonchi or wheezes  CV: regular rate and rhythm, no significant murmurs or rubs  SKIN: no rash; as above  NEURO: mentation and speech normal; no focal deficits    No results found for this or any previous visit (from the past 24 hour(s)).        Impression:  Final diagnoses:   ETD (eustachian tube dysfunction), left         ED Course & Medical Decision Making (Plan):  Janice Ulloa is a 57 year old female with left ear problems for the past 4-6 weeks.  She has been seen at urgent care with possible cerumen impaction, status post irrigation, but continues to have fullness, plugged sensation in her ears, and no pain.  There is been no discharge or drainage, and she does not have any fever or chills.  She is currently using Nasonex spray.  Patient has an appointment with ENT but not until September 21.  On exam, patient has intact tympanic membrane on the left side with no significant cerumen in the canal.  There is some fluid that appears to be serous posterior to the tympanic membrane.  The rest of her exam is unremarkable.  Patient likely has eustachian tube dysfunction versus serous otitis media.  I asked patient  to begin Augmentin twice a day for 10 days since she has had the symptoms for well over 4 weeks.  Had prednisone to help with possible inflammation from eustachian tube dysfunction, and reserve Vicodin for severe pain.  Follow-up with Dr. Borrego as planned next week, and sooner with her primary care provider as needed.  Return to the ED if symptoms worsen further.  Written after-visit summary and instructions were given at the time of discharge.          Discharge Medication List as of 9/14/2017  6:39 PM      START taking these medications    Details   amoxicillin-clavulanate (AUGMENTIN) 875-125 MG per tablet Take 1 tablet by mouth 2 times daily for 10 days, Disp-20 tablet, R-0, E-Prescribe      predniSONE (DELTASONE) 10 MG tablet Take 2 tablets (20 mg) by mouth 2 times daily for 5 days, Disp-20 tablet, R-0, E-Prescribe      HYDROcodone-acetaminophen (NORCO) 5-325 MG per tablet Take 1-2 tablets by mouth 3 times daily as needed for moderate to severe pain, Disp-12 tablet, R-0, Local Print                 This note was completed in part using Dragon voice recognition, and may contain word and grammatical errors.        Jesu Fields MD  09/14/17 2805

## 2017-09-14 NOTE — DISCHARGE INSTRUCTIONS
You have eustachian tube dysfunction.    Take Augmentin 875 mg twice a day for 10 days.    Take prednisone 20 mg twice a day for 5 days.    Reserve Vicodin for severe pain at that time.    Follow up with Dr. Borrego as scheduled for next week.  Follow up with your primary care provider if you are not better in 3-5 days.

## 2017-09-14 NOTE — ED NOTES
Here with left ear pain that she has been dealing with since Aug. States she was seen in Urgent care where they attempted to flush her ear-that did not work so she was given drops which where ineffective. She has an up coming appointment with ENT but can not wait related to pain

## 2017-09-14 NOTE — ED AVS SNAPSHOT
[facility] Emergency Department    [address] [place] [address]    Phone:  [phone]    Fax:  [phone]                                       Janice Ulloa   MRN: [id number]    Department:  [facility] Emergency Department   Date of Visit:  9/14/2017           After Visit Summary Signature Page     I have received my discharge instructions, and my questions have been answered. I have discussed any challenges I see with this plan with the nurse or doctor.    ..........................................................................................................................................  Patient/Patient Representative Signature      ..........................................................................................................................................  Patient Representative Print Name and Relationship to Patient    ..................................................               ................................................  Date                                            Time    ..........................................................................................................................................  Reviewed by Signature/Title    ...................................................              ..............................................  Date                                                            Time

## 2017-09-15 DIAGNOSIS — I10 ESSENTIAL HYPERTENSION WITH GOAL BLOOD PRESSURE LESS THAN 140/90: ICD-10-CM

## 2017-09-18 RX ORDER — PROPRANOLOL HYDROCHLORIDE 120 MG/1
CAPSULE, EXTENDED RELEASE ORAL
Qty: 60 CAPSULE | Refills: 7 | Status: SHIPPED | OUTPATIENT
Start: 2017-09-18 | End: 2018-03-15

## 2017-09-18 NOTE — TELEPHONE ENCOUNTER
propranolol (INDERAL LA) 120 MG 24 hr capsule       Last Written Prescription Date: 9/20/16  Last Fill Quantity: 60, # refills: 11    Last Office Visit with G, P or [facility] prescribing provider:  4/11/17 elo   Future Office Visit:      Creatinine   Date Value Ref Range Status   08/29/2017 0.80 0.52 - 1.04 mg/dL Final

## 2017-09-18 NOTE — TELEPHONE ENCOUNTER
Prescription approved per [facility] Refill Protocol.  Michael Tomas, RN, BSN

## 2017-09-21 ENCOUNTER — OFFICE VISIT (OUTPATIENT)
Dept: OTOLARYNGOLOGY | Facility: CLINIC | Age: 57
End: 2017-09-21
Payer: COMMERCIAL

## 2017-09-21 VITALS — BODY MASS INDEX: 32.43 KG/M2 | HEART RATE: 74 BPM | WEIGHT: 174 LBS | OXYGEN SATURATION: 98 %

## 2017-09-21 DIAGNOSIS — I10 ESSENTIAL HYPERTENSION WITH GOAL BLOOD PRESSURE LESS THAN 140/90: ICD-10-CM

## 2017-09-21 DIAGNOSIS — H60.502 ACUTE OTITIS EXTERNA OF LEFT EAR, UNSPECIFIED TYPE: Primary | ICD-10-CM

## 2017-09-21 PROCEDURE — 99203 OFFICE O/P NEW LOW 30 MIN: CPT | Performed by: OTOLARYNGOLOGY

## 2017-09-21 RX ORDER — CIPROFLOXACIN 500 MG/1
500 TABLET, FILM COATED ORAL 2 TIMES DAILY
Qty: 20 TABLET | Refills: 0 | Status: SHIPPED | OUTPATIENT
Start: 2017-09-21 | End: 2017-10-11

## 2017-09-21 RX ORDER — OFLOXACIN 3 MG/ML
5 SOLUTION AURICULAR (OTIC) 2 TIMES DAILY
Qty: 5 ML | Refills: 0 | Status: SHIPPED | OUTPATIENT
Start: 2017-09-21 | End: 2017-10-01

## 2017-09-21 RX ORDER — PREDNISOLONE SODIUM PHOSPHATE 10 MG/ML
SOLUTION/ DROPS OPHTHALMIC
Qty: 4 ML | Refills: 1 | Status: SHIPPED | OUTPATIENT
Start: 2017-09-21 | End: 2018-04-25

## 2017-09-21 NOTE — PROGRESS NOTES
ENT Consultation    Janice Ulloa is a 57 year old female who is seen in consultation at the request of Anisha Alfaro PA-C.      History of Present Illness - Janice Ulloa is a 57 year old female here today for a consult for left  plugged ear.   Has been ongoing for about 4 weeks. Thee patient feels ear plugged and painful.  No prior ear infections, no h/o DM.  She was on oral abx and oral steroids with minimal improvement.      Past Medical History -   Past Medical History:   Diagnosis Date     Carpal tunnel syndrome      Intervertebral cervical disc disorder with myelopathy, cervical region 2007    C6 herniation with right C6 radiculopathy     Migraine, unspecified, without mention of intractable migraine without mention of status migrainosus      Perimenopausal     irreg 2011     Personal history of tobacco use, presenting hazards to health        Current Medications -   Current Outpatient Prescriptions:      propranolol (INDERAL LA) 120 MG 24 hr capsule, TAKE TWO CAPSULES BY MOUTH EVERY DAY, Disp: 60 capsule, Rfl: 7     amoxicillin-clavulanate (AUGMENTIN) 875-125 MG per tablet, Take 1 tablet by mouth 2 times daily for 10 days, Disp: 20 tablet, Rfl: 0     HYDROcodone-acetaminophen (NORCO) 5-325 MG per tablet, Take 1-2 tablets by mouth 3 times daily as needed for moderate to severe pain, Disp: 12 tablet, Rfl: 0     amLODIPine (NORVASC) 5 MG tablet, Take 1 tablet (5 mg) by mouth daily, Disp: 90 tablet, Rfl: 1     tiZANidine (ZANAFLEX) 4 MG tablet, TAKE ONE TO TWO TABLETS BY MOUTH THREE TIMES A DAY AS NEEDED FOR MUSCLE SPASM, Disp: 30 tablet, Rfl: 3     atorvastatin (LIPITOR) 20 MG tablet, Take 1 tablet (20 mg) by mouth daily, Disp: 90 tablet, Rfl: 3     lisinopril-hydrochlorothiazide (PRINZIDE,ZESTORETIC) 20-12.5 MG per tablet, Take 2 tablets by mouth daily, Disp: 180 tablet, Rfl: 3     IBUPROFEN PO, Take 400 mg by mouth daily as needed , Disp: , Rfl:      Multiple Vitamin (MULTI-VITAMIN DAILY PO), , Disp: ,  Rfl:      ASPIRIN 81 MG PO TABS, ONE DAILY, Disp: , Rfl: 3    Allergies -   Allergies   Allergen Reactions     No Known Drug Allergies        Social History -   Social History     Social History     Marital status:      Spouse name: Carlin     Number of children: 2     Years of education: 12     Occupational History      [employer]     Social History Main Topics     Smoking status: Former Smoker     Packs/day: 1.50     Years: 20.00     Types: Cigarettes     Quit date: 10/17/2007     Smokeless tobacco: Never Used     Alcohol use Yes      Comment: rare     Drug use: No     Sexual activity: Not Currently     Partners: Male      Comment: not currently     Other Topics Concern     Caffeine Concern No     Sleep Concern No     Stress Concern No     Weight Concern No     Exercise No     Seat Belt Yes     Parent/Sibling W/ Cabg, Mi Or Angioplasty Before 65f 55m? Yes     Social History Narrative    Lives with spouse and son. No domestic violence issues.       Family History -   Family History   Problem Relation Age of Onset     CANCER Mother      parotid     HEART DISEASE Father 47     MI     Depression Daughter      Hypertension Sister        Review of Systems - As per HPI and PMHx, otherwise review of system review of the head and neck negative.    Physical Exam  LMP 08/19/2013  BMI: There is no height or weight on file to calculate BMI.    General - The patient is well nourished and well developed, and appears to have good nutritional status.  Alert and oriented to person and place, answers questions and cooperates with examination appropriately.    SKIN - No suspicious lesions or rashes.  Respiration - No respiratory distress.     Head and Face - Normocephalic and atraumatic, with no gross asymmetry noted of the contour of the facial features.  The facial nerve is intact, with strong symmetric movements.    Voice and Breathing - The patient was breathing comfortably without the use of accessory  muscles. There was no wheezing, stridor, or stertor.  The patients voice was clear and strong, and had appropriate pitch and quality.    Ears - Bilateral pinna and EACs with normal appearing overlying skin. Right Tympanic membrane intact with good mobility on pneumatic otoscopy . Bony landmarks of the ossicular chain are normal. The tympanic membranes are normal in appearance. No retraction, perforation, or masses.  No fluid or purulence was seen in the external canal or the middle ear. On the left EAC is quite swollen and painful. WE suction some debris.  Tm has some pus on it but is fully mobile.     Eyes - Extraocular movements intact.  Sclera were not icteric or injected, conjunctiva were pink and moist.    Mouth - Examination of the oral cavity showed pink, healthy oral mucosa. No lesions or ulcerations noted.  The tongue was mobile and midline, and the dentition were in good condition.      Throat - The walls of the oropharynx were smooth, pink, moist, symmetric, and had no lesions or ulcerations.  The tonsillar pillars and soft palate were symmetric.  The uvula was midline on elevation.    Neck - Normal midline excursion of the laryngotracheal complex during swallowing.  Full range of motion on passive movement.  Palpation of the occipital, submental, submandibular, internal jugular chain, and supraclavicular nodes did not demonstrate any abnormal lymph nodes or masses.  The carotid pulse was palpable bilaterally.  Palpation of the thyroid was soft and smooth, with no nodules or goiter appreciated.  The trachea was mobile and midline.    Nose - External contour is symmetric, no gross deflection or scars.  Nasal mucosa is pink and moist with no abnormal mucus.  The septum was midline and non-obstructive, turbinates of normal size and position.  No polyps, masses, or purulence noted on examination.    Neuro - Nonfocal neuro exam is normal, CN 2 through 12 intact, normal gait and muscle tone.    Performed in  clinic today:  No procedures preformed in clinic today.          A/P - Janice Ulloa is a 57 year old female with left otitis externa. Considering duration for 4 weeks, will start on oral Cipro, Floxin and PRed Forte drops in left ear. Will reassess in 1 week.      Coleman Borrego MD

## 2017-09-21 NOTE — NURSING NOTE
"Chief Complaint   Patient presents with     Consult     Referring Anisha Alfaro PA-C     Ear Problem     Need for ear cleaning       Initial Pulse 74  Wt 78.9 kg (174 lb)  LMP 08/19/2013  SpO2 98%  BMI 32.43 kg/m2 Estimated body mass index is 32.43 kg/(m^2) as calculated from the following:    Height as of 9/6/17: 1.56 m (5' 1.42\").    Weight as of this encounter: 78.9 kg (174 lb).  Medication Reconciliation: complete  "

## 2017-09-21 NOTE — TELEPHONE ENCOUNTER
lisinopril-hydrochlorothiazide (PRINZIDE,ZESTORETIC) 20-12.5 MG per tablet      Last Written Prescription Date: 09/20/16  Last Fill Quantity: 180, # refills: 3  Last Office Visit with G, [facility] or [facility] prescribing provider: 09/06/17       Potassium   Date Value Ref Range Status   08/29/2017 3.4 3.4 - 5.3 mmol/L Final     Creatinine   Date Value Ref Range Status   08/29/2017 0.80 0.52 - 1.04 mg/dL Final     BP Readings from Last 3 Encounters:   09/14/17 (!) 187/94   09/06/17 120/60   08/29/17 124/62

## 2017-09-22 RX ORDER — LISINOPRIL AND HYDROCHLOROTHIAZIDE 12.5; 2 MG/1; MG/1
TABLET ORAL
Qty: 180 TABLET | Refills: 2 | Status: SHIPPED | OUTPATIENT
Start: 2017-09-22 | End: 2018-03-15

## 2017-09-22 NOTE — TELEPHONE ENCOUNTER
Prescription approved per [facility] Refill Protocol.  Last FP BP was in goal.     Janis Andersen, RN, BSN

## 2017-09-27 ENCOUNTER — OFFICE VISIT (OUTPATIENT)
Dept: OTOLARYNGOLOGY | Facility: OTHER | Age: 57
End: 2017-09-27
Payer: COMMERCIAL

## 2017-09-27 VITALS — OXYGEN SATURATION: 98 % | BODY MASS INDEX: 32.43 KG/M2 | HEART RATE: 76 BPM | WEIGHT: 174 LBS

## 2017-09-27 DIAGNOSIS — H71.92 CHOLESTEATOMA OF LEFT EAR: Primary | ICD-10-CM

## 2017-09-27 DIAGNOSIS — H60.502 ACUTE OTITIS EXTERNA OF LEFT EAR, UNSPECIFIED TYPE: ICD-10-CM

## 2017-09-27 PROCEDURE — 99213 OFFICE O/P EST LOW 20 MIN: CPT | Performed by: OTOLARYNGOLOGY

## 2017-09-27 NOTE — MR AVS SNAPSHOT
After Visit Summary   9/27/2017    Janice Ulloa    MRN: [id number]           Patient Information     Date Of Birth          1960        Visit Information        Provider Department      9/27/2017 2:45 PM Coleman Borrego MD [facility]        Today's Diagnoses     Cholesteatoma of left ear    -  1    Acute otitis externa of left ear, unspecified type           Follow-ups after your visit        Your next 10 appointments already scheduled     Sep 28, 2017  1:30 PM CDT   CT TEMPORAL ORBITAL SELLA W/O & W CONTRAST with PHCT1   [facility] CT Scan ([facility])    [address] [address] [phone]           Please bring any scans or X-rays taken at other hospitals, if similar tests were done. Also bring a list of your medicines, including vitamins, minerals and over-the-counter drugs. It is safest to leave personal items at home.  Be sure to tell your doctor:   If you have any allergies.   If there s any chance you are pregnant.   If you are breastfeeding.   If you have any special needs.  You will have contrast for this exam. To prepare:   Do not eat or drink for 2 hours before your exam. If you need to take medicine, you may take it with small sips of water. (We may ask you to take liquid medicine as well.)   The day before your exam, drink extra fluids at least six 8-ounce glasses (unless your doctor tells you to restrict your fluids).  Patients over 70 or patients with diabetes or kidney problems:   If you haven t had a blood test (creatinine test) within the last 30 days, go to your clinic or Diagnostic Imaging Department for this test.  If you have diabetes:   If your kidney function is normal, continue taking your metformin (Avandamet, Glucophage, Glucovance, Metaglip) on the day of your exam.   If your kidney function is abnormal, wait 48 hours before restarting this medicine.  Please wear loose clothing, such as a sweat suit or  "jogging clothes. Avoid snaps, zippers and other metal. We may ask you to undress and put on a hospital gown.  If you have any questions, please call the Imaging Department where you will have your exam.            Oct 11, 2017 10:15 AM CDT   Return Visit with Coleman Borrego MD   [facility]    [address]  [facility] [address] [phone]              Future tests that were ordered for you today     Open Future Orders        Priority Expected Expires Ordered    CT Temporal Orbital Sella w/o & w Cont Routine  9/27/2018 9/27/2017            Who to contact     If you have questions or need follow up information about today's clinic visit or your schedule please contact [facility] directly at [phone].  Normal or non-critical lab and imaging results will be communicated to you by MyChart, letter or phone within 4 business days after the clinic has received the results. If you do not hear from us within 7 days, please contact the clinic through Startupeandohart or phone. If you have a critical or abnormal lab result, we will notify you by phone as soon as possible.  Submit refill requests through MEETiiN or call your pharmacy and they will forward the refill request to us. Please allow 3 business days for your refill to be completed.          Additional Information About Your Visit        MyCharYPlan Information     MEETiiN lets you send messages to your doctor, view your test results, renew your prescriptions, schedule appointments and more. To sign up, go to [email or web address] . Click on \"Log in\" on the left side of the screen, which will take you to the Welcome page. Then click on \"Sign up Now\" on the right side of the page.     You will be asked to enter the access code listed below, as well as some personal information. Please follow the directions to create your username and password.     Your access code is: [email or web address]  Expires: " 2017 11:52 AM     Your access code will  in 90 days. If you need help or a new code, please call your [place] clinic or [phone].        Care EveryWhere ID     This is your Care EveryWhere ID. This could be used by other organizations to access your [place] medical records  [id number]        Your Vitals Were     Pulse Last Period Pulse Oximetry BMI (Body Mass Index)          76 2013 98% 32.43 kg/m2         Blood Pressure from Last 3 Encounters:   17 (!) 187/94   17 120/60   17 124/62    Weight from Last 3 Encounters:   17 78.9 kg (174 lb)   17 78.9 kg (174 lb)   17 79.4 kg (175 lb)               Primary Care Provider Office Phone # Fax #    Anisha Alfaro PA-C [phone] [address] [place] [address]        Equal Access to Services     [facility]: Hadii aad ku hadasho Soomaali, waaxda luqadaha, qaybta kaalmada adeegyada, waxay chentein hayboydn pawel dorman . So [facility] [phone].    ATENCIÓN: Si habla español, tiene a larios disposición servicios gratuitos de asistencia lingüística. [facility] [phone].    We comply with applicable federal civil rights laws and [place] laws. We do not discriminate on the basis of race, color, national origin, age, disability sex, sexual orientation or gender identity.            Thank you!     Thank you for choosing [facility]  for your care. Our goal is always to provide you with excellent care. Hearing back from our patients is one way we can continue to improve our services. Please take a few minutes to complete the written survey that you may receive in the mail after your visit with us. Thank you!             Your Updated Medication List - Protect others around you: Learn how to safely use, store and throw away your medicines at www.disposemymeds.org.          This list is accurate as of: 17  4:14 PM.  Always use your most recent med list.                    Brand Name Dispense Instructions for use Diagnosis    amLODIPine 5 MG tablet    NORVASC    90 tablet    Take 1 tablet (5 mg) by mouth daily    Hypertension goal BP (blood pressure) < 140/90       aspirin 81 MG tablet      ONE DAILY        atorvastatin 20 MG tablet    LIPITOR    90 tablet    Take 1 tablet (20 mg) by mouth daily    Hyperlipidemia with target LDL less than 130       ciprofloxacin 500 MG tablet    CIPRO    20 tablet    Take 1 tablet (500 mg) by mouth 2 times daily    Acute otitis externa of left ear, unspecified type       HYDROcodone-acetaminophen 5-325 MG per tablet    NORCO    12 tablet    Take 1-2 tablets by mouth 3 times daily as needed for moderate to severe pain        IBUPROFEN PO      Take 400 mg by mouth daily as needed        lisinopril-hydrochlorothiazide 20-12.5 MG per tablet    PRINZIDE/ZESTORETIC    180 tablet    TAKE TWO TABLETS BY MOUTH DAILY    Essential hypertension with goal blood pressure less than 140/90       MULTI-VITAMIN DAILY PO           ofloxacin 0.3 % otic solution    FLOXIN    5 mL    Place 5 drops Into the left ear 2 times daily for 10 days    Acute otitis externa of left ear, unspecified type       prednisoLONE sodium phosphate 1 % ophthalmic solution    INFLAMASE FORTE    4 mL    4 drops in left ear bid    Acute otitis externa of left ear, unspecified type       propranolol 120 MG 24 hr capsule    INDERAL LA    60 capsule    TAKE TWO CAPSULES BY MOUTH EVERY DAY    Essential hypertension with goal blood pressure less than 140/90       tiZANidine 4 MG tablet    ZANAFLEX    30 tablet    TAKE ONE TO TWO TABLETS BY MOUTH THREE TIMES A DAY AS NEEDED FOR MUSCLE SPASM    Neck pain

## 2017-09-27 NOTE — NURSING NOTE
"Chief Complaint   Patient presents with     RECHECK     Ear Problem       Initial Pulse 76  Wt 78.9 kg (174 lb)  LMP 08/19/2013  SpO2 98%  BMI 32.43 kg/m2 Estimated body mass index is 32.43 kg/(m^2) as calculated from the following:    Height as of 9/6/17: 1.56 m (5' 1.42\").    Weight as of this encounter: 78.9 kg (174 lb).  Medication Reconciliation: complete  "

## 2017-09-27 NOTE — PROGRESS NOTES
History of Present Illness - Janice Ulloa is a 57 year old female presenting in clinic today for a recheck on left otitis externa    Janice continues to experience sensation of plugged left ear since visit on 9/21. Otalgia of left ear has much improved since last week. She was prescribed PO Cipro, Floxin and Pred Forte. Prior to visit on 9/21, sensation of plugged ear had been present for 1 month. Associated current symptoms include: hearing loss, tinnitus, vertigo and otorrhea. Denies history of ear infections.       Past Medical History -   Past Medical History:   Diagnosis Date     Carpal tunnel syndrome      Intervertebral cervical disc disorder with myelopathy, cervical region 2007    C6 herniation with right C6 radiculopathy     Migraine, unspecified, without mention of intractable migraine without mention of status migrainosus      Perimenopausal     irreg 2011     Personal history of tobacco use, presenting hazards to health        Current Medications -   Current Outpatient Prescriptions:      lisinopril-hydrochlorothiazide (PRINZIDE/ZESTORETIC) 20-12.5 MG per tablet, TAKE TWO TABLETS BY MOUTH DAILY, Disp: 180 tablet, Rfl: 2     ofloxacin (FLOXIN) 0.3 % otic solution, Place 5 drops Into the left ear 2 times daily for 10 days, Disp: 5 mL, Rfl: 0     ciprofloxacin (CIPRO) 500 MG tablet, Take 1 tablet (500 mg) by mouth 2 times daily, Disp: 20 tablet, Rfl: 0     prednisoLONE sodium phosphate (INFLAMASE FORTE) 1 % ophthalmic solution, 4 drops in left ear bid, Disp: 4 mL, Rfl: 1     propranolol (INDERAL LA) 120 MG 24 hr capsule, TAKE TWO CAPSULES BY MOUTH EVERY DAY, Disp: 60 capsule, Rfl: 7     HYDROcodone-acetaminophen (NORCO) 5-325 MG per tablet, Take 1-2 tablets by mouth 3 times daily as needed for moderate to severe pain, Disp: 12 tablet, Rfl: 0     amLODIPine (NORVASC) 5 MG tablet, Take 1 tablet (5 mg) by mouth daily, Disp: 90 tablet, Rfl: 1     tiZANidine (ZANAFLEX) 4 MG tablet, TAKE ONE TO TWO TABLETS BY  MOUTH THREE TIMES A DAY AS NEEDED FOR MUSCLE SPASM, Disp: 30 tablet, Rfl: 3     atorvastatin (LIPITOR) 20 MG tablet, Take 1 tablet (20 mg) by mouth daily, Disp: 90 tablet, Rfl: 3     IBUPROFEN PO, Take 400 mg by mouth daily as needed , Disp: , Rfl:      Multiple Vitamin (MULTI-VITAMIN DAILY PO), , Disp: , Rfl:      ASPIRIN 81 MG PO TABS, ONE DAILY, Disp: , Rfl: 3    Allergies -   Allergies   Allergen Reactions     No Known Drug Allergies        Social History -   Social History     Social History     Marital status:      Spouse name: Carlin     Number of children: 2     Years of education: 12     Occupational History     [employer]     Social History Main Topics     Smoking status: Former Smoker     Packs/day: 1.50     Years: 20.00     Types: Cigarettes     Quit date: 10/17/2007     Smokeless tobacco: Never Used     Alcohol use Yes      Comment: rare     Drug use: No     Sexual activity: Not Currently     Partners: Male      Comment: not currently     Other Topics Concern     Caffeine Concern No     Sleep Concern No     Stress Concern No     Weight Concern No     Exercise No     Seat Belt Yes     Parent/Sibling W/ Cabg, Mi Or Angioplasty Before 65f 55m? Yes     Social History Narrative    Lives with spouse and son. No domestic violence issues.       Family History -   Family History   Problem Relation Age of Onset     CANCER Mother      parotid     HEART DISEASE Father 47     MI     Depression Daughter      Hypertension Sister        Review of Systems - As per HPI and PMHx, otherwise review of system review of the head and neck negative.    Physical Exam  LMP 08/19/2013  BMI: There is no height or weight on file to calculate BMI.    General - The patient is well nourished and well developed, and appears to have good nutritional status.  Alert and oriented to person and place, answers questions and cooperates with examination appropriately.    SKIN - No suspicious lesions or  rashes.  Respiration - No respiratory distress.     Head and Face - Normocephalic and atraumatic, with no gross asymmetry noted of the contour of the facial features.  The facial nerve is intact, with strong symmetric movements.    Voice and Breathing - The patient was breathing comfortably without the use of accessory muscles. There was no wheezing, stridor, or stertor.  The patients voice was clear and strong, and had appropriate pitch and quality.    Ears - Bilateral pinna and EACs with normal appearing overlying skin. Tympanic membrane intact with good mobility on pneumatic otoscopy bilaterally. However, patient becomes dizzy when I place pneumatic otoscopy in left ear. Bony landmarks of the ossicular chain are normal. The tympanic membranes are normal in appearance. No retraction, perforation, or masses.  No fluid or purulence was seen in the external canal or the middle ear. Left EAC with inflammation. Wynne goes to the right, Rinne: AC>BC    Eyes - Extraocular movements intact.  Sclera were not icteric or injected, conjunctiva were pink and moist.    Mouth - Examination of the oral cavity showed pink, healthy oral mucosa. No lesions or ulcerations noted.  The tongue was mobile and midline, and the dentition were in good condition.      Throat - The walls of the oropharynx were smooth, pink, moist, symmetric, and had no lesions or ulcerations.  The tonsillar pillars and soft palate were symmetric. The uvula was midline on elevation.    Neck - Normal midline excursion of the laryngotracheal complex during swallowing.  Full range of motion on passive movement.  Palpation of the occipital, submental, submandibular, internal jugular chain, and supraclavicular nodes did not demonstrate any abnormal lymph nodes or masses.  The carotid pulse was palpable bilaterally.  Palpation of the thyroid was soft and smooth, with no nodules or goiter appreciated.  The trachea was mobile and midline.    Nose - External contour is  symmetric, no gross deflection or scars.  Nasal mucosa is pink and moist with no abnormal mucus.  The septum was midline and non-obstructive, turbinates of normal size and position.  No polyps, masses, or purulence noted on examination.    Neuro - Nonfocal neuro exam is normal, CN 2 through 12 intact, normal gait and muscle tone.      Performed in clinic today:  No procedures performed in clinic today      A/P - Janice Ulloa is a 57 year old female with Cholesteatoma of Left Ear and Acute Otitis Externa of Left Ear. She continues to experience sensation of plugged ear, while otalgia has resolved. I have ordered a CT Scan to be completed prior to there next appointment to further investigate. Patient will continue with Pred Forte and Floxin drops.     Janice should follow up in in 2 weeks     At Janice next appointment they will need a hearing test.    The information in this document, created by the medical scribe for me, accurately reflects the services I personally performed and the decisions made by me. I have reviewed and approved this document for accuracy prior to leaving the patient care area.  September 27, 2017 2:55 PM    Coleman Borrego MD

## 2017-09-28 ENCOUNTER — HOSPITAL ENCOUNTER (OUTPATIENT)
Dept: CT IMAGING | Facility: CLINIC | Age: 57
Discharge: HOME | End: 2017-09-28
Attending: OTOLARYNGOLOGY | Admitting: OTOLARYNGOLOGY
Payer: COMMERCIAL

## 2017-09-28 DIAGNOSIS — H71.92 CHOLESTEATOMA OF LEFT EAR: ICD-10-CM

## 2017-09-28 PROCEDURE — 70480 CT ORBIT/EAR/FOSSA W/O DYE: CPT

## 2017-09-29 NOTE — PROGRESS NOTES
History of Present Illness - Janice Ulloa is a 57 year old female presenting in clinic today for a recheck on ears and CT results.       Janice with history of persistent external otitis media. Symptoms include: otalgia, tinnitus, vertigo, ear itching and plugged at night and they remain unchanged. CT scan on 9/28/17 showed circumferential mucosal thickening in the left external auditory canal, normal middle ear and mastoid. There was no evidence of cholesteatoma. She continues to use ear drops.       Past Medical History -   Past Medical History:   Diagnosis Date     Carpal tunnel syndrome      Intervertebral cervical disc disorder with myelopathy, cervical region 2007    C6 herniation with right C6 radiculopathy     Migraine, unspecified, without mention of intractable migraine without mention of status migrainosus      Perimenopausal     irreg 2011     Personal history of tobacco use, presenting hazards to health        Current Medications -   Current Outpatient Prescriptions:      lisinopril-hydrochlorothiazide (PRINZIDE/ZESTORETIC) 20-12.5 MG per tablet, TAKE TWO TABLETS BY MOUTH DAILY, Disp: 180 tablet, Rfl: 2     prednisoLONE sodium phosphate (INFLAMASE FORTE) 1 % ophthalmic solution, 4 drops in left ear bid, Disp: 4 mL, Rfl: 1     propranolol (INDERAL LA) 120 MG 24 hr capsule, TAKE TWO CAPSULES BY MOUTH EVERY DAY, Disp: 60 capsule, Rfl: 7     amLODIPine (NORVASC) 5 MG tablet, Take 1 tablet (5 mg) by mouth daily, Disp: 90 tablet, Rfl: 1     tiZANidine (ZANAFLEX) 4 MG tablet, TAKE ONE TO TWO TABLETS BY MOUTH THREE TIMES A DAY AS NEEDED FOR MUSCLE SPASM, Disp: 30 tablet, Rfl: 3     atorvastatin (LIPITOR) 20 MG tablet, Take 1 tablet (20 mg) by mouth daily, Disp: 90 tablet, Rfl: 3     IBUPROFEN PO, Take 400 mg by mouth daily as needed , Disp: , Rfl:      Multiple Vitamin (MULTI-VITAMIN DAILY PO), , Disp: , Rfl:      ASPIRIN 81 MG PO TABS, ONE DAILY, Disp: , Rfl: 3    Allergies -   Allergies   Allergen Reactions      No Known Drug Allergies        Social History -   Social History     Social History     Marital status:      Spouse name: Carlin     Number of children: 2     Years of education: 12     Occupational History      [employer]     Social History Main Topics     Smoking status: Former Smoker     Packs/day: 1.50     Years: 20.00     Types: Cigarettes     Quit date: 10/17/2007     Smokeless tobacco: Never Used     Alcohol use Yes      Comment: rare     Drug use: No     Sexual activity: Not Currently     Partners: Male      Comment: not currently     Other Topics Concern     Caffeine Concern No     Sleep Concern No     Stress Concern No     Weight Concern No     Exercise No     Seat Belt Yes     Parent/Sibling W/ Cabg, Mi Or Angioplasty Before 65f 55m? Yes     Social History Narrative    Lives with spouse and son. No domestic violence issues.       Family History -   Family History   Problem Relation Age of Onset     CANCER Mother      parotid     HEART DISEASE Father 47     MI     Depression Daughter      Hypertension Sister        Review of Systems - As per HPI and PMHx, otherwise review of system review of the head and neck negative.    Physical Exam  Pulse 97  Wt 78.9 kg (174 lb)  LMP 08/19/2013  SpO2 97%  BMI 32.43 kg/m2  BMI: Body mass index is 32.43 kg/(m^2).    General - The patient is well nourished and well developed, and appears to have good nutritional status.  Alert and oriented to person and place, answers questions and cooperates with examination appropriately.    SKIN - No suspicious lesions or rashes.  Respiration - No respiratory distress.  Head and Face - Normocephalic and atraumatic, with no gross asymmetry noted of the contour of the facial features.  The facial nerve is intact, with strong symmetric movements.    Voice and Breathing - The patient was breathing comfortably without the use of accessory muscles. There was no wheezing, stridor, or stertor.  The patients  voice was clear and strong, and had appropriate pitch and quality.    Ears - Bilateral pinna and EACs with normal appearing overlying skin. Inflammation of left EAC has improved. Tympanic membrane intact with good mobility on pneumatic otoscopy bilaterally. Bony landmarks of the ossicular chain are normal. The tympanic membranes are normal in appearance. No retraction, perforation, or masses.  No fluid or purulence was seen in the external canal or the middle ear.     Eyes - Extraocular movements intact.  Sclera were not icteric or injected, conjunctiva were pink and moist.    Mouth - Examination of the oral cavity showed pink, healthy oral mucosa. No lesions or ulcerations noted.  The tongue was mobile and midline, and the dentition were in good condition.      Throat - The walls of the oropharynx were smooth, pink, moist, symmetric, and had no lesions or ulcerations.  The tonsillar pillars and soft palate were symmetric. The uvula was midline on elevation.    Neck - Normal midline excursion of the laryngotracheal complex during swallowing.  Full range of motion on passive movement.  Palpation of the occipital, submental, submandibular, internal jugular chain, and supraclavicular nodes did not demonstrate any abnormal lymph nodes or masses.  The carotid pulse was palpable bilaterally.  Palpation of the thyroid was soft and smooth, with no nodules or goiter appreciated.  The trachea was mobile and midline.    Nose - External contour is symmetric, no gross deflection or scars.  Nasal mucosa is pink and moist with no abnormal mucus.  The septum was midline and non-obstructive, turbinates of normal size and position.  No polyps, masses, or purulence noted on examination.    Neuro - Nonfocal neuro exam is normal, CN 2 through 12 intact, normal gait and muscle tone.      Performed in clinic today:  Ears - On examination of the ears, I found that left ears were impacted with debris. I positioned the patient in the  examination chair in a semi-supine position. I used the magnified speculum to perform cerumen removal.  On the left side, I began by using a cerumen loop to gently lift the edges of the cerumen mass away from the walls of the external canal.  Once I did this, I was able to use suction to  pull/suction away fragments of wax and debris. I removed all the wax and debri.  The tympanic membrane was intact, no sign of perforation or middle ear effusion. Collected culture of debris in ear canal.           A/P - Janice Ulloa is a 57 year old female with Left Otitis Externa. CT results were unremarkable for cholesteatoma. Culture obtained in clinic today, will treat pending results if necessary. Will discontinue ear drops as inflammation has improved significantly. Advised patient to keep ear dry at all times. To prevent water entering EAC during shower, apply Vaseline.     Janice should follow up in in 1 week for additional treatment.      At Janice next appointment they will not need a hearing test.    The information in this document, created by the medical scribe for me, accurately reflects the services I personally performed and the decisions made by me. I have reviewed and approved this document for accuracy prior to leaving the patient care area.  October 11, 2017 10:41 AM    Coleman Borrego MD

## 2017-10-11 ENCOUNTER — OFFICE VISIT (OUTPATIENT)
Dept: OTOLARYNGOLOGY | Facility: OTHER | Age: 57
End: 2017-10-11
Payer: COMMERCIAL

## 2017-10-11 VITALS — HEART RATE: 97 BPM | WEIGHT: 174 LBS | BODY MASS INDEX: 32.43 KG/M2 | OXYGEN SATURATION: 97 %

## 2017-10-11 DIAGNOSIS — I10 HYPERTENSION GOAL BP (BLOOD PRESSURE) < 140/90: ICD-10-CM

## 2017-10-11 DIAGNOSIS — H60.392 OTHER INFECTIVE ACUTE OTITIS EXTERNA OF LEFT EAR: Primary | ICD-10-CM

## 2017-10-11 PROCEDURE — 87106 FUNGI IDENTIFICATION YEAST: CPT | Performed by: OTOLARYNGOLOGY

## 2017-10-11 PROCEDURE — 87070 CULTURE OTHR SPECIMN AEROBIC: CPT | Performed by: OTOLARYNGOLOGY

## 2017-10-11 PROCEDURE — 99213 OFFICE O/P EST LOW 20 MIN: CPT | Performed by: OTOLARYNGOLOGY

## 2017-10-11 PROCEDURE — 87107 FUNGI IDENTIFICATION MOLD: CPT | Mod: 91 | Performed by: OTOLARYNGOLOGY

## 2017-10-11 NOTE — NURSING NOTE
"Chief Complaint   Patient presents with     RECHECK     Results     CT of Orbital Sella       Initial Pulse 97  Wt 78.9 kg (174 lb)  LMP 08/19/2013  SpO2 97%  BMI 32.43 kg/m2 Estimated body mass index is 32.43 kg/(m^2) as calculated from the following:    Height as of 9/6/17: 1.56 m (5' 1.42\").    Weight as of this encounter: 78.9 kg (174 lb).  Medication Reconciliation: complete  "

## 2017-10-11 NOTE — MR AVS SNAPSHOT
"              After Visit Summary   10/11/2017    Janice Ulloa    MRN: [id number]           Patient Information     Date Of Birth          1960        Visit Information        Provider Department      10/11/2017 10:15 AM Coleman Borrego MD [facility]        Today's Diagnoses     Other infective acute otitis externa of left ear    -  1       Follow-ups after your visit        Additional Services     AUDIOLOGY ADULT REFERRAL                 Your next 10 appointments already scheduled     Oct 19, 2017 11:15 AM CDT   [facility] with [facility]   [facility]    [address] [place] [address] [phone]              Who to contact     If you have questions or need follow up information about today's clinic visit or your schedule please contact [facility] directly at [phone].  Normal or non-critical lab and imaging results will be communicated to you by MyChart, letter or phone within 4 business days after the clinic has received the results. If you do not hear from us within 7 days, please contact the clinic through MyChart or phone. If you have a critical or abnormal lab result, we will notify you by phone as soon as possible.  Submit refill requests through [employer] or call your pharmacy and they will forward the refill request to us. Please allow 3 business days for your refill to be completed.          Additional Information About Your Visit        MyChart Information     [employer] lets you send messages to your doctor, view your test results, renew your prescriptions, schedule appointments and more. To sign up, go to [email or web address] . Click on \"Log in\" on the left side of the screen, which will take you to the Welcome page. Then click on \"Sign up Now\" on the right side of the page.     You will be asked to enter the access code listed below, as well as some personal information. Please follow " the directions to create your username and password.     Your access code is: [email or web address]  Expires: 2017 11:52 AM     Your access code will  in 90 days. If you need help or a new code, please call your [facility] clinic or [phone].        Care EveryWhere ID     This is your Care EveryWhere ID. This could be used by other organizations to access your [facility] medical records  [id number]        Your Vitals Were     Pulse Last Period Pulse Oximetry BMI (Body Mass Index)          97 2013 97% 32.43 kg/m2         Blood Pressure from Last 3 Encounters:   17 (!) 187/94   17 120/60   17 124/62    Weight from Last 3 Encounters:   10/11/17 78.9 kg (174 lb)   17 78.9 kg (174 lb)   17 78.9 kg (174 lb)              We Performed the Following     AUDIOLOGY ADULT REFERRAL     Ear Culture Aerobic Bacterial        Primary Care Provider Office Phone # Fax #    Anisha Alfaro PA-C [phone] [address] [place] [address]        Equal Access to Services     DESMOND [facility]NEELAM AH: Hadii aad ku hadasho Soomaali, waaxda luqadaha, qaybta kaalmada adeegyada, kleber lima. So [facility] [phone].    ATENCIÓN: Si habla español, tiene a larios disposición servicios gratuitos de asistencia lingüística. Llame al [phone].    We comply with applicable federal civil rights laws and [place] laws. We do not discriminate on the basis of race, color, national origin, age, disability, sex, sexual orientation, or gender identity.            Thank you!     Thank you for choosing [facility]  for your care. Our goal is always to provide you with excellent care. Hearing back from our patients is one way we can continue to improve our services. Please take a few minutes to complete the written survey that you may receive in the mail after your visit with us. Thank you!             Your Updated Medication List - Protect others around you:  Learn how to safely use, store and throw away your medicines at www.disposemymeds.org.          This list is accurate as of: 10/11/17 12:10 PM.  Always use your most recent med list.                   Brand Name Dispense Instructions for use Diagnosis    amLODIPine 5 MG tablet    NORVASC    90 tablet    Take 1 tablet (5 mg) by mouth daily    Hypertension goal BP (blood pressure) < 140/90       aspirin 81 MG tablet      ONE DAILY        atorvastatin 20 MG tablet    LIPITOR    90 tablet    Take 1 tablet (20 mg) by mouth daily    Hyperlipidemia with target LDL less than 130       IBUPROFEN PO      Take 400 mg by mouth daily as needed        lisinopril-hydrochlorothiazide 20-12.5 MG per tablet    PRINZIDE/ZESTORETIC    180 tablet    TAKE TWO TABLETS BY MOUTH DAILY    Essential hypertension with goal blood pressure less than 140/90       MULTI-VITAMIN DAILY PO           prednisoLONE sodium phosphate 1 % ophthalmic solution    INFLAMASE FORTE    4 mL    4 drops in left ear bid    Acute otitis externa of left ear, unspecified type       propranolol 120 MG 24 hr capsule    INDERAL LA    60 capsule    TAKE TWO CAPSULES BY MOUTH EVERY DAY    Essential hypertension with goal blood pressure less than 140/90       tiZANidine 4 MG tablet    ZANAFLEX    30 tablet    TAKE ONE TO TWO TABLETS BY MOUTH THREE TIMES A DAY AS NEEDED FOR MUSCLE SPASM    Neck pain

## 2017-10-13 NOTE — PROGRESS NOTES
History of Present Illness - Janice Ulloa is a 57 year old female presenting in clinic today for a recheck on Left Otitis Externa      Present Symptoms include: tinnitis and vertigo and they are stable.  Janice denies otolgia and otorhea.    Janice has a history of Other:  No surgery.    She has had this infection ongoing for a while and she has been treated with Gentin Violet and ear drops.  We did culture this and it showed heavy growth of Candida albicans / dubliniensis and Moderate growth of Aspergillus niger.        Past Medical History -   Past Medical History:   Diagnosis Date     Carpal tunnel syndrome      Intervertebral cervical disc disorder with myelopathy, cervical region 2007    C6 herniation with right C6 radiculopathy     Migraine, unspecified, without mention of intractable migraine without mention of status migrainosus      Perimenopausal     irreg 2011     Personal history of tobacco use, presenting hazards to health        Current Medications -   Current Outpatient Prescriptions:      lisinopril-hydrochlorothiazide (PRINZIDE/ZESTORETIC) 20-12.5 MG per tablet, TAKE TWO TABLETS BY MOUTH DAILY, Disp: 180 tablet, Rfl: 2     prednisoLONE sodium phosphate (INFLAMASE FORTE) 1 % ophthalmic solution, 4 drops in left ear bid, Disp: 4 mL, Rfl: 1     propranolol (INDERAL LA) 120 MG 24 hr capsule, TAKE TWO CAPSULES BY MOUTH EVERY DAY, Disp: 60 capsule, Rfl: 7     amLODIPine (NORVASC) 5 MG tablet, Take 1 tablet (5 mg) by mouth daily, Disp: 90 tablet, Rfl: 1     tiZANidine (ZANAFLEX) 4 MG tablet, TAKE ONE TO TWO TABLETS BY MOUTH THREE TIMES A DAY AS NEEDED FOR MUSCLE SPASM, Disp: 30 tablet, Rfl: 3     atorvastatin (LIPITOR) 20 MG tablet, Take 1 tablet (20 mg) by mouth daily, Disp: 90 tablet, Rfl: 3     IBUPROFEN PO, Take 400 mg by mouth daily as needed , Disp: , Rfl:      Multiple Vitamin (MULTI-VITAMIN DAILY PO), , Disp: , Rfl:      ASPIRIN 81 MG PO TABS, ONE DAILY, Disp: , Rfl: 3    Allergies -   Allergies    Allergen Reactions     No Known Drug Allergies        Social History -   Social History     Social History     Marital status:      Spouse name: Carlin     Number of children: 2     Years of education: 12     Occupational History      [email or web address]     Social History Main Topics     Smoking status: Former Smoker     Packs/day: 1.50     Years: 20.00     Types: Cigarettes     Quit date: 10/17/2007     Smokeless tobacco: Never Used     Alcohol use Yes      Comment: rare     Drug use: No     Sexual activity: Not Currently     Partners: Male      Comment: not currently     Other Topics Concern     Caffeine Concern No     Sleep Concern No     Stress Concern No     Weight Concern No     Exercise No     Seat Belt Yes     Parent/Sibling W/ Cabg, Mi Or Angioplasty Before 65f 55m? Yes     Social History Narrative    Lives with spouse and son. No domestic violence issues.       Family History -   Family History   Problem Relation Age of Onset     CANCER Mother      parotid     HEART DISEASE Father 47     MI     Depression Daughter      Hypertension Sister        Review of Systems - As per HPI and PMHx, otherwise review of system review of the head and neck negative.    Physical Exam  Pulse 88  Wt 78.9 kg (174 lb)  LMP 08/19/2013  SpO2 98%  BMI 32.43 kg/m2  BMI: Body mass index is 32.43 kg/(m^2).    General - The patient is well nourished and well developed, and appears to have good nutritional status.  Alert and oriented to person and place, answers questions and cooperates with examination appropriately.    SKIN - No suspicious lesions or rashes.  Respiration - No respiratory distress.  Head and Face - Normocephalic and atraumatic, with no gross asymmetry noted of the contour of the facial features.  The facial nerve is intact, with strong symmetric movements.    Voice and Breathing - The patient was breathing comfortably without the use of accessory muscles. There was no wheezing, stridor, or  stertor.  The patients voice was clear and strong, and had appropriate pitch and quality.    Ears - Bilateral pinna and EACs with normal appearing overlying skin. Left ear canal looks much better today.  There is still debris and this is suctioned out of the canal.      Eyes - Extraocular movements intact.  Sclera were not icteric or injected, conjunctiva were pink and moist.        Performed in clinic today:  No procedures preformed in clinic today          A/P - Janice Ulloa is a 57 year old female with infective Otitis externa.      Janice is advised to continue on her ear drops for the next week and she will rechecked in 2 weeks.        Janice should follow up in in 2 weeks.      At Janice next appointment they will not need a hearing test.    Progress note was partially captured by Soumya Saenz MA and reviewed/addended by Dr. Borrego for accuracy and content.      Coleman Borrego MD

## 2017-10-13 NOTE — TELEPHONE ENCOUNTER
Routing refill request to provider for review/approval because:  Labs out of range:  BP is not at goal.  Michael Tomas RN, BSN

## 2017-10-13 NOTE — TELEPHONE ENCOUNTER
amLODIPine (NORVASC) 5 MG tablet      Last Written Prescription Date: 4/11/17  Last Fill Quantity: 90, # refills: 1  Last Office Visit with [facility], [facility] or [facility] prescribing provider: 9/6/17 DOUG  Next 5 appointments (look out 90 days)     Oct 19, 2017 11:15 AM CDT   [facility] with Coleman [facility]    [address] [place] [address] [phone]                   Potassium   Date Value Ref Range Status   08/29/2017 3.4 3.4 - 5.3 mmol/L Final     Creatinine   Date Value Ref Range Status   08/29/2017 0.80 0.52 - 1.04 mg/dL Final     BP Readings from Last 3 Encounters:   09/14/17 (!) 187/94   09/06/17 120/60   08/29/17 124/62

## 2017-10-15 DIAGNOSIS — H60.392 INFECTIVE OTITIS EXTERNA, LEFT: Primary | ICD-10-CM

## 2017-10-15 RX ORDER — CLOTRIMAZOLE 1 G/ML
SOLUTION TOPICAL
Qty: 10 ML | Refills: 0 | Status: SHIPPED | OUTPATIENT
Start: 2017-10-15 | End: 2018-04-20

## 2017-10-16 DIAGNOSIS — E78.5 HYPERLIPIDEMIA WITH TARGET LDL LESS THAN 130: ICD-10-CM

## 2017-10-16 RX ORDER — AMLODIPINE BESYLATE 5 MG/1
TABLET ORAL
Qty: 90 TABLET | Refills: 0 | Status: SHIPPED | OUTPATIENT
Start: 2017-10-16 | End: 2017-12-11

## 2017-10-16 NOTE — TELEPHONE ENCOUNTER
Left message for pt to return call, when call is returned give information below and help schedule float visit. Rx was sent to pharmacy.    Kamryn Quigley CMA ([facility])

## 2017-10-16 NOTE — TELEPHONE ENCOUNTER
Please call pt- I see that her bp have been high, she needs float nurse appt and or [facility] bp recheck  Anisha Alfaro PA-C

## 2017-10-17 LAB
BACTERIA SPEC CULT: ABNORMAL
Lab: ABNORMAL
SPECIMEN SOURCE: ABNORMAL

## 2017-10-17 RX ORDER — ATORVASTATIN CALCIUM 20 MG/1
TABLET, FILM COATED ORAL
Qty: 90 TABLET | Refills: 1 | Status: SHIPPED | OUTPATIENT
Start: 2017-10-17 | End: 2018-03-15

## 2017-10-17 NOTE — TELEPHONE ENCOUNTER
atorvastatin (LIPITOR) 20 MG tablet     Last Written Prescription Date: 09/20/16  Last Fill Quantity: 90, # refills: 3  Last Office Visit with [facility], [facility] or [facility] prescribing provider: 09/06/17  Next 5 appointments (look out 90 days)     Oct 19, 2017 11:00 AM CDT   Nurse Only with CAROL VAZQUEZ TEAM JEY [facility] ([facility]    [address] [place] [address]   [phone]            Oct 19, 2017 11:15 AM CDT   Return Visit with Coleman Borrego MD   [facility]    [address] [place] [address]   [phone]                   Lab Results   Component Value Date    CHOL 189 08/29/2017     Lab Results   Component Value Date    HDL 48 08/29/2017     Lab Results   Component Value Date    LDL 91 08/29/2017     Lab Results   Component Value Date    TRIG 249 08/29/2017     Lab Results   Component Value Date    CHOLHDLRATIO 4.6 11/13/2014

## 2017-10-17 NOTE — TELEPHONE ENCOUNTER
atorvastatin (LIPITOR) 20 MG tablet  Prescription approved per [facility] Refill Protocol.  Fiona Bronson RN, BSN

## 2017-10-19 ENCOUNTER — ALLIED HEALTH/NURSE VISIT (OUTPATIENT)
Dept: FAMILY MEDICINE | Facility: CLINIC | Age: 57
End: 2017-10-19
Payer: COMMERCIAL

## 2017-10-19 ENCOUNTER — OFFICE VISIT (OUTPATIENT)
Dept: OTOLARYNGOLOGY | Facility: CLINIC | Age: 57
End: 2017-10-19
Payer: COMMERCIAL

## 2017-10-19 VITALS — HEART RATE: 88 BPM | OXYGEN SATURATION: 98 % | WEIGHT: 174 LBS | BODY MASS INDEX: 32.43 KG/M2

## 2017-10-19 VITALS — SYSTOLIC BLOOD PRESSURE: 132 MMHG | DIASTOLIC BLOOD PRESSURE: 86 MMHG

## 2017-10-19 DIAGNOSIS — H60.392 INFECTIVE OTITIS EXTERNA, LEFT: Primary | ICD-10-CM

## 2017-10-19 DIAGNOSIS — I10 HYPERTENSION GOAL BP (BLOOD PRESSURE) < 140/90: Primary | ICD-10-CM

## 2017-10-19 PROCEDURE — 99213 OFFICE O/P EST LOW 20 MIN: CPT | Performed by: OTOLARYNGOLOGY

## 2017-10-19 PROCEDURE — 99207 ZZC NO CHARGE NURSE ONLY: CPT

## 2017-10-19 NOTE — MR AVS SNAPSHOT
"              After Visit Summary   10/19/2017    Janice Ulloa    MRN: [id number]           Patient Information     Date Of Birth          1960        Visit Information        Provider Department      10/19/2017 11:15 AM Coleman Borrego MD [facility]        Today's Diagnoses     Infective otitis externa, left    -  1       Follow-ups after your visit        Who to contact     If you have questions or need follow up information about today's clinic visit or your schedule please contact [facility] directly at [phone].  Normal or non-critical lab and imaging results will be communicated to you by [employer], letter or phone within 4 business days after the clinic has received the results. If you do not hear from us within 7 days, please contact the clinic through [employer] or phone. If you have a critical or abnormal lab result, we will notify you by phone as soon as possible.  Submit refill requests through "[employer]" or call your pharmacy and they will forward the refill request to us. Please allow 3 business days for your refill to be completed.          Additional Information About Your Visit        MyChart Information     "[employer]" lets you send messages to your doctor, view your test results, renew your prescriptions, schedule appointments and more. To sign up, go to [email or web address]" . Click on \"Log in\" on the left side of the screen, which will take you to the Welcome page. Then click on \"Sign up Now\" on the right side of the page.     You will be asked to enter the access code listed below, as well as some personal information. Please follow the directions to create your username and password.     Your access code is: [email or web address]  Expires: 2017 11:52 AM     Your access code will  in 90 days. If you need help or a new code, please call your [facility] or [phone].        Care EveryWhere ID     This is your Care EveryWhere ID. This could be used by " other organizations to access your [facility] medical records  [id number]        Your Vitals Were     Pulse Last Period Pulse Oximetry BMI (Body Mass Index)          88 08/19/2013 98% 32.43 kg/m2         Blood Pressure from Last 3 Encounters:   10/19/17 132/86   09/14/17 (!) 187/94   09/06/17 120/60    Weight from Last 3 Encounters:   10/19/17 78.9 kg (174 lb)   10/11/17 78.9 kg (174 lb)   09/27/17 78.9 kg (174 lb)              Today, you had the following     No orders found for display       Primary Care Provider Office Phone # Fax #    Anisha Alfaro PA-C [phone] [address] [place] [address]        Equal Access to Services     JANNETTE OLIVA : Hadii aad ku hadasho Soomaali, waaxda luqadaha, qaybta kaalmada adeegyada, kleber dorman . So [facility] [phone].    ATENCIÓN: Si habla español, tiene a larios disposición servicios gratuitos de asistencia lingüística. Llame al [phone].    We comply with applicable federal civil rights laws and [place] laws. We do not discriminate on the basis of race, color, national origin, age, disability, sex, sexual orientation, or gender identity.            Thank you!     Thank you for choosing [facility]  for your care. Our goal is always to provide you with excellent care. Hearing back from our patients is one way we can continue to improve our services. Please take a few minutes to complete the written survey that you may receive in the mail after your visit with us. Thank you!             Your Updated Medication List - Protect others around you: Learn how to safely use, store and throw away your medicines at www.disposemymeds.org.          This list is accurate as of: 10/19/17 12:04 PM.  Always use your most recent med list.                   Brand Name Dispense Instructions for use Diagnosis    amLODIPine 5 MG tablet    NORVASC    90 tablet    TAKE ONE TABLET BY MOUTH DAILY.    Hypertension goal BP  (blood pressure) < 140/90       aspirin 81 MG tablet      ONE DAILY        atorvastatin 20 MG tablet    LIPITOR    90 tablet    TAKE ONE TABLET BY MOUTH DAILY    Hyperlipidemia with target LDL less than 130       clotrimazole 1 % solution    LOTRIMIN    10 mL    4 drops in left ear twice daily        IBUPROFEN PO      Take 400 mg by mouth daily as needed        lisinopril-hydrochlorothiazide 20-12.5 MG per tablet    PRINZIDE/ZESTORETIC    180 tablet    TAKE TWO TABLETS BY MOUTH DAILY    Essential hypertension with goal blood pressure less than 140/90       MULTI-VITAMIN DAILY PO           prednisoLONE sodium phosphate 1 % ophthalmic solution    INFLAMASE FORTE    4 mL    4 drops in left ear bid    Acute otitis externa of left ear, unspecified type       propranolol 120 MG 24 hr capsule    INDERAL LA    60 capsule    TAKE TWO CAPSULES BY MOUTH EVERY DAY    Essential hypertension with goal blood pressure less than 140/90       tiZANidine 4 MG tablet    ZANAFLEX    30 tablet    TAKE ONE TO TWO TABLETS BY MOUTH THREE TIMES A DAY AS NEEDED FOR MUSCLE SPASM    Neck pain

## 2017-10-19 NOTE — LETTER
10/19/2017         RE: Janice Ulloa  [address]  [place] [address]        Dear Colleague,    Thank you for referring your patient, Janice Ulloa, to the [facility]. Please see a copy of my visit note below.    History of Present Illness - Janice Ulloa is a 57 year old female presenting in clinic today for a recheck on Left Otitis Externa      Present Symptoms include: tinnitis and vertigo and they are stable.  Janice denies otolgia and otorhea.    Janice has a history of Other:  No surgery.    She has had this infection ongoing for a while and she has been treated with Gentin Violet and ear drops.  We did culture this and it showed heavy growth of Candida albicans / dubliniensis and Moderate growth of Aspergillus niger.        Past Medical History -   Past Medical History:   Diagnosis Date     Carpal tunnel syndrome      Intervertebral cervical disc disorder with myelopathy, cervical region 2007    C6 herniation with right C6 radiculopathy     Migraine, unspecified, without mention of intractable migraine without mention of status migrainosus      Perimenopausal     irreg 2011     Personal history of tobacco use, presenting hazards to health        Current Medications -   Current Outpatient Prescriptions:      lisinopril-hydrochlorothiazide (PRINZIDE/ZESTORETIC) 20-12.5 MG per tablet, TAKE TWO TABLETS BY MOUTH DAILY, Disp: 180 tablet, Rfl: 2     prednisoLONE sodium phosphate (INFLAMASE FORTE) 1 % ophthalmic solution, 4 drops in left ear bid, Disp: 4 mL, Rfl: 1     propranolol (INDERAL LA) 120 MG 24 hr capsule, TAKE TWO CAPSULES BY MOUTH EVERY DAY, Disp: 60 capsule, Rfl: 7     amLODIPine (NORVASC) 5 MG tablet, Take 1 tablet (5 mg) by mouth daily, Disp: 90 tablet, Rfl: 1     tiZANidine (ZANAFLEX) 4 MG tablet, TAKE ONE TO TWO TABLETS BY MOUTH THREE TIMES A DAY AS NEEDED FOR MUSCLE SPASM, Disp: 30 tablet, Rfl: 3     atorvastatin (LIPITOR) 20 MG tablet, Take 1 tablet (20 mg) by mouth  daily, Disp: 90 tablet, Rfl: 3     IBUPROFEN PO, Take 400 mg by mouth daily as needed , Disp: , Rfl:      Multiple Vitamin (MULTI-VITAMIN DAILY PO), , Disp: , Rfl:      ASPIRIN 81 MG PO TABS, ONE DAILY, Disp: , Rfl: 3    Allergies -   Allergies   Allergen Reactions     No Known Drug Allergies        Social History -   Social History     Social History     Marital status:      Spouse name: Carlin     Number of children: 2     Years of education: 12     Occupational History      [employer]     Social History Main Topics     Smoking status: Former Smoker     Packs/day: 1.50     Years: 20.00     Types: Cigarettes     Quit date: 10/17/2007     Smokeless tobacco: Never Used     Alcohol use Yes      Comment: rare     Drug use: No     Sexual activity: Not Currently     Partners: Male      Comment: not currently     Other Topics Concern     Caffeine Concern No     Sleep Concern No     Stress Concern No     Weight Concern No     Exercise No     Seat Belt Yes     Parent/Sibling W/ Cabg, Mi Or Angioplasty Before 65f 55m? Yes     Social History Narrative    Lives with spouse and son. No domestic violence issues.       Family History -   Family History   Problem Relation Age of Onset     CANCER Mother      parotid     HEART DISEASE Father 47     MI     Depression Daughter      Hypertension Sister        Review of Systems - As per HPI and PMHx, otherwise review of system review of the head and neck negative.    Physical Exam  Pulse 88  Wt 78.9 kg (174 lb)  LMP 08/19/2013  SpO2 98%  BMI 32.43 kg/m2  BMI: Body mass index is 32.43 kg/(m^2).    General - The patient is well nourished and well developed, and appears to have good nutritional status.  Alert and oriented to person and place, answers questions and cooperates with examination appropriately.    SKIN - No suspicious lesions or rashes.  Respiration - No respiratory distress.  Head and Face - Normocephalic and atraumatic, with no gross asymmetry  noted of the contour of the facial features.  The facial nerve is intact, with strong symmetric movements.    Voice and Breathing - The patient was breathing comfortably without the use of accessory muscles. There was no wheezing, stridor, or stertor.  The patients voice was clear and strong, and had appropriate pitch and quality.    Ears - Bilateral pinna and EACs with normal appearing overlying skin. Left ear canal looks much better today.  There is still debris and this is suctioned out of the canal.      Eyes - Extraocular movements intact.  Sclera were not icteric or injected, conjunctiva were pink and moist.        Performed in clinic today:  No procedures preformed in clinic today          A/P - Janice Ulloa is a 57 year old female with infective Otitis externa.      Janice is advised to continue on her ear drops for the next week and she will rechecked in 2 weeks.        Janice should follow up in in 2 weeks.      At Janice next appointment they will not need a hearing test.    Progress note was partially captured by Soumya Saenz MA and reviewed/addended by Dr. Borrego for accuracy and content.      Coleman Borrego MD      Again, thank you for allowing me to participate in the care of your patient.        Sincerely,        Coleman Borrego MD, MD

## 2017-10-19 NOTE — NURSING NOTE
Patient came into clinic today for a blood pressure check.  Patients blood pressure was 132/86.  No further action is needed at this time.     Shi Fairchild, CMA

## 2017-10-19 NOTE — NURSING NOTE
"Chief Complaint   Patient presents with     RECHECK     Ear Problem       Initial Pulse 88  Wt 78.9 kg (174 lb)  LMP 08/19/2013  SpO2 98%  BMI 32.43 kg/m2 Estimated body mass index is 32.43 kg/(m^2) as calculated from the following:    Height as of 9/6/17: 1.56 m (5' 1.42\").    Weight as of this encounter: 78.9 kg (174 lb).  Medication Reconciliation: complete  "

## 2017-10-24 ENCOUNTER — TELEPHONE (OUTPATIENT)
Dept: OTOLARYNGOLOGY | Facility: CLINIC | Age: 57
End: 2017-10-24

## 2017-10-24 NOTE — TELEPHONE ENCOUNTER
Reason for Call:  Other call back    Detailed comments: Fu left ear next wk, per pt had visit with Dr Borrego last wk and he wanted to see him back in 1 wk.  Please advise.Thank You Rosita      Phone Number Patient can be reached at: Home number on file [phone] (home)    Best Time:any    Can we leave a detailed message on this number? YES    Call taken on 10/24/2017 at 11:51 AM by Rosita Luis

## 2017-11-03 NOTE — PROGRESS NOTES
History of Present Illness - Janice Ulloa is a 57 year old female presenting in clinic today for a recheck on  Left Otitis Externa  She had quite prolonged course of her OE which finally feels better after aggressive treatment and Gentian Violet therapy.      Present Symptoms include: No symptoms and they are   getting better .  Janice denies otolgia, otorhea and ear itching.      Past Medical History -   Past Medical History:   Diagnosis Date     Carpal tunnel syndrome      Intervertebral cervical disc disorder with myelopathy, cervical region 2007    C6 herniation with right C6 radiculopathy     Migraine, unspecified, without mention of intractable migraine without mention of status migrainosus      Perimenopausal     irreg 2011     Personal history of tobacco use, presenting hazards to health        Current Medications -   Current Outpatient Prescriptions:      atorvastatin (LIPITOR) 20 MG tablet, TAKE ONE TABLET BY MOUTH DAILY, Disp: 90 tablet, Rfl: 1     amLODIPine (NORVASC) 5 MG tablet, TAKE ONE TABLET BY MOUTH DAILY., Disp: 90 tablet, Rfl: 0     clotrimazole (LOTRIMIN) 1 % solution, 4 drops in left ear twice daily, Disp: 10 mL, Rfl: 0     lisinopril-hydrochlorothiazide (PRINZIDE/ZESTORETIC) 20-12.5 MG per tablet, TAKE TWO TABLETS BY MOUTH DAILY, Disp: 180 tablet, Rfl: 2     prednisoLONE sodium phosphate (INFLAMASE FORTE) 1 % ophthalmic solution, 4 drops in left ear bid, Disp: 4 mL, Rfl: 1     propranolol (INDERAL LA) 120 MG 24 hr capsule, TAKE TWO CAPSULES BY MOUTH EVERY DAY, Disp: 60 capsule, Rfl: 7     tiZANidine (ZANAFLEX) 4 MG tablet, TAKE ONE TO TWO TABLETS BY MOUTH THREE TIMES A DAY AS NEEDED FOR MUSCLE SPASM, Disp: 30 tablet, Rfl: 3     IBUPROFEN PO, Take 400 mg by mouth daily as needed , Disp: , Rfl:      Multiple Vitamin (MULTI-VITAMIN DAILY PO), , Disp: , Rfl:      ASPIRIN 81 MG PO TABS, ONE DAILY, Disp: , Rfl: 3    Allergies -   Allergies   Allergen Reactions     No Known Drug Allergies         Social History -   Social History     Social History     Marital status:      Spouse name: Carlin     Number of children: 2     Years of education: 12     Occupational History      [employer]     Social History Main Topics     Smoking status: Former Smoker     Packs/day: 1.50     Years: 20.00     Types: Cigarettes     Quit date: 10/17/2007     Smokeless tobacco: Never Used     Alcohol use Yes      Comment: rare     Drug use: No     Sexual activity: Not Currently     Partners: Male      Comment: not currently     Other Topics Concern     Caffeine Concern No     Sleep Concern No     Stress Concern No     Weight Concern No     Exercise No     Seat Belt Yes     Parent/Sibling W/ Cabg, Mi Or Angioplasty Before 65f 55m? Yes     Social History Narrative    Lives with spouse and son. No domestic violence issues.       Family History -   Family History   Problem Relation Age of Onset     CANCER Mother      parotid     HEART DISEASE Father 47     MI     Depression Daughter      Hypertension Sister        Review of Systems - As per HPI and PMHx, otherwise review of system review of the head and neck negative.    Physical Exam  LMP 08/19/2013  BMI: There is no height or weight on file to calculate BMI.    General - The patient is well nourished and well developed, and appears to have good nutritional status.  Alert and oriented to person and place, answers questions and cooperates with examination appropriately.    SKIN - No suspicious lesions or rashes.  Respiration - No respiratory distress.     Head and Face - Normocephalic and atraumatic, with no gross asymmetry noted of the contour of the facial features.  The facial nerve is intact, with strong symmetric movements.    Voice and Breathing - The patient was breathing comfortably without the use of accessory muscles. There was no wheezing, stridor, or stertor.  The patients voice was clear and strong, and had appropriate pitch and  quality.    Ears - Bilateral pinna and EACs with normal appearing overlying skin. Tympanic membrane intact with good mobility on pneumatic otoscopy bilaterally. Bony landmarks of the ossicular chain are normal. The tympanic membranes are normal in appearance. No retraction, perforation, or masses.  No fluid or purulence was seen in the external canal or the middle ear.     Eyes - Extraocular movements intact.  Sclera were not icteric or injected, conjunctiva were pink and moist.    Mouth - Examination of the oral cavity showed pink, healthy oral mucosa. No lesions or ulcerations noted.  The tongue was mobile and midline, and the dentition were in good condition.      Throat - The walls of the oropharynx were smooth, pink, moist, symmetric, and had no lesions or ulcerations.  The tonsillar pillars and soft palate were symmetric. The uvula was midline on elevation.    Neck - Normal midline excursion of the laryngotracheal complex during swallowing.  Full range of motion on passive movement.  Palpation of the occipital, submental, submandibular, internal jugular chain, and supraclavicular nodes did not demonstrate any abnormal lymph nodes or masses.  The carotid pulse was palpable bilaterally.  Palpation of the thyroid was soft and smooth, with no nodules or goiter appreciated.  The trachea was mobile and midline.    Nose - External contour is symmetric, no gross deflection or scars.  Nasal mucosa is pink and moist with no abnormal mucus.  The septum was midline and non-obstructive, turbinates of normal size and position.  No polyps, masses, or purulence noted on examination.    Neuro - Nonfocal neuro exam is normal, CN 2 through 12 intact, normal gait and muscle tone.      Performed in clinic today:  No procedures preformed in clinic today          A/P - Janice RIVERO Artemio is a 57 year old female with resolved OE chronic.      Janice should follow up in as needed.        Coleman Borrego MD

## 2017-11-06 ENCOUNTER — OFFICE VISIT (OUTPATIENT)
Dept: OTOLARYNGOLOGY | Facility: CLINIC | Age: 57
End: 2017-11-06
Payer: COMMERCIAL

## 2017-11-06 VITALS — HEART RATE: 71 BPM | BODY MASS INDEX: 32.99 KG/M2 | OXYGEN SATURATION: 100 % | WEIGHT: 177 LBS

## 2017-11-06 DIAGNOSIS — H60.392 INFECTIVE OTITIS EXTERNA, LEFT: Primary | ICD-10-CM

## 2017-11-06 PROCEDURE — 99213 OFFICE O/P EST LOW 20 MIN: CPT | Performed by: OTOLARYNGOLOGY

## 2017-11-06 NOTE — NURSING NOTE
"Chief Complaint   Patient presents with     RECHECK     Ear Problem      Left Otitis Externa       Initial Pulse 71  Wt 80.3 kg (177 lb)  LMP 08/19/2013  SpO2 100%  BMI 32.99 kg/m2 Estimated body mass index is 32.99 kg/(m^2) as calculated from the following:    Height as of 9/6/17: 1.56 m (5' 1.42\").    Weight as of this encounter: 80.3 kg (177 lb).  Medication Reconciliation: complete  "

## 2017-11-06 NOTE — MR AVS SNAPSHOT
"              After Visit Summary   2017    Janice Ulloa    MRN: [id number]           Patient Information     Date Of Birth          1960        Visit Information        Provider Department      2017 2:45 PM Coleman Borrego MD [facility]        Today's Diagnoses     Infective otitis externa, left    -  1       Follow-ups after your visit        Who to contact     If you have questions or need follow up information about today's clinic visit or your schedule please contact [facility] directly at [phone].  Normal or non-critical lab and imaging results will be communicated to you by JellyCloudhart, letter or phone within 4 business days after the clinic has received the results. If you do not hear from us within 7 days, please contact the clinic through JellyCloudhart or phone. If you have a critical or abnormal lab result, we will notify you by phone as soon as possible.  Submit refill requests through [employer] or call your pharmacy and they will forward the refill request to us. Please allow 3 business days for your refill to be completed.          Additional Information About Your Visit        MyChart Information     [employer] lets you send messages to your doctor, view your test results, renew your prescriptions, schedule appointments and more. To sign up, go to [email or web address] . Click on \"Log in\" on the left side of the screen, which will take you to the Welcome page. Then click on \"Sign up Now\" on the right side of the page.     You will be asked to enter the access code listed below, as well as some personal information. Please follow the directions to create your username and password.     Your access code is: [email or web address]  Expires: 2017 10:52 AM     Your access code will  in 90 days. If you need help or a new code, please call your [facility] or [phone].        Care EveryWhere ID     This is your Care EveryWhere ID. This could be used by " other organizations to access your [place] medical records  [id number]        Your Vitals Were     Pulse Last Period Pulse Oximetry BMI (Body Mass Index)          71 08/19/2013 100% 32.99 kg/m2         Blood Pressure from Last 3 Encounters:   10/19/17 132/86   09/14/17 (!) 187/94   09/06/17 120/60    Weight from Last 3 Encounters:   11/06/17 80.3 kg (177 lb)   10/19/17 78.9 kg (174 lb)   10/11/17 78.9 kg (174 lb)              Today, you had the following     No orders found for display       Primary Care Provider Office Phone # Fax #    Anisha Alfaro PA-C [phone] [address] [place] [address]        Equal Access to Services     JANNETTE OLIVA : Hadii aad ku hadasho Soomaali, waaxda luqadaha, qaybta kaalmada adeegyada, kleber dorman . So [facility] [phone].    ATENCIÓN: Si habla español, tiene a larios disposición servicios gratuitos de asistencia lingüística. Llame al [phone].    We comply with applicable federal civil rights laws and [place] laws. We do not discriminate on the basis of race, color, national origin, age, disability, sex, sexual orientation, or gender identity.            Thank you!     Thank you for choosing [facility]  for your care. Our goal is always to provide you with excellent care. Hearing back from our patients is one way we can continue to improve our services. Please take a few minutes to complete the written survey that you may receive in the mail after your visit with us. Thank you!             Your Updated Medication List - Protect others around you: Learn how to safely use, store and throw away your medicines at www.disposemymeds.org.          This list is accurate as of: 11/6/17  4:19 PM.  Always use your most recent med list.                   Brand Name Dispense Instructions for use Diagnosis    amLODIPine 5 MG tablet    NORVASC    90 tablet    TAKE ONE TABLET BY MOUTH DAILY.    Hypertension goal BP  (blood pressure) < 140/90       aspirin 81 MG tablet      ONE DAILY        atorvastatin 20 MG tablet    LIPITOR    90 tablet    TAKE ONE TABLET BY MOUTH DAILY    Hyperlipidemia with target LDL less than 130       clotrimazole 1 % solution    LOTRIMIN    10 mL    4 drops in left ear twice daily        IBUPROFEN PO      Take 400 mg by mouth daily as needed        lisinopril-hydrochlorothiazide 20-12.5 MG per tablet    PRINZIDE/ZESTORETIC    180 tablet    TAKE TWO TABLETS BY MOUTH DAILY    Essential hypertension with goal blood pressure less than 140/90       MULTI-VITAMIN DAILY PO           prednisoLONE sodium phosphate 1 % ophthalmic solution    INFLAMASE FORTE    4 mL    4 drops in left ear bid    Acute otitis externa of left ear, unspecified type       propranolol 120 MG 24 hr capsule    INDERAL LA    60 capsule    TAKE TWO CAPSULES BY MOUTH EVERY DAY    Essential hypertension with goal blood pressure less than 140/90       tiZANidine 4 MG tablet    ZANAFLEX    30 tablet    TAKE ONE TO TWO TABLETS BY MOUTH THREE TIMES A DAY AS NEEDED FOR MUSCLE SPASM    Neck pain

## 2017-11-06 NOTE — LETTER
11/6/2017         RE: Janice Ulloa  [address]  [place] [facility] [facility] [address]        Dear Colleague,    Thank you for referring your patient, Janice Ulloa, to the [facility]. Please see a copy of my visit note below.    History of Present Illness - Janice Ulloa is a 57 year old female presenting in clinic today for a recheck on  Left Otitis Externa  She had quite prolonged course of her OE which finally feels better after aggressive treatment and Gentian Violet therapy.      Present Symptoms include: No symptoms and they are   getting better .  Janice denies otolgia, otorhea and ear itching.      Past Medical History -   Past Medical History:   Diagnosis Date     Carpal tunnel syndrome      Intervertebral cervical disc disorder with myelopathy, cervical region 2007    C6 herniation with right C6 radiculopathy     Migraine, unspecified, without mention of intractable migraine without mention of status migrainosus      Perimenopausal     irreg 2011     Personal history of tobacco use, presenting hazards to health        Current Medications -   Current Outpatient Prescriptions:      atorvastatin (LIPITOR) 20 MG tablet, TAKE ONE TABLET BY MOUTH DAILY, Disp: 90 tablet, Rfl: 1     amLODIPine (NORVASC) 5 MG tablet, TAKE ONE TABLET BY MOUTH DAILY., Disp: 90 tablet, Rfl: 0     clotrimazole (LOTRIMIN) 1 % solution, 4 drops in left ear twice daily, Disp: 10 mL, Rfl: 0     lisinopril-hydrochlorothiazide (PRINZIDE/ZESTORETIC) 20-12.5 MG per tablet, TAKE TWO TABLETS BY MOUTH DAILY, Disp: 180 tablet, Rfl: 2     prednisoLONE sodium phosphate (INFLAMASE FORTE) 1 % ophthalmic solution, 4 drops in left ear bid, Disp: 4 mL, Rfl: 1     propranolol (INDERAL LA) 120 MG 24 hr capsule, TAKE TWO CAPSULES BY MOUTH EVERY DAY, Disp: 60 capsule, Rfl: 7     tiZANidine (ZANAFLEX) 4 MG tablet, TAKE ONE TO TWO TABLETS BY MOUTH THREE TIMES A DAY AS NEEDED FOR MUSCLE SPASM, Disp: 30 tablet, Rfl: 3     IBUPROFEN PO, Take 400  mg by mouth daily as needed , Disp: , Rfl:      Multiple Vitamin (MULTI-VITAMIN DAILY PO), , Disp: , Rfl:      ASPIRIN 81 MG PO TABS, ONE DAILY, Disp: , Rfl: 3    Allergies -   Allergies   Allergen Reactions     No Known Drug Allergies        Social History -   Social History     Social History     Marital status:      Spouse name: Carlin     Number of children: 2     Years of education: 12     Occupational History      [employer]     Social History Main Topics     Smoking status: Former Smoker     Packs/day: 1.50     Years: 20.00     Types: Cigarettes     Quit date: 10/17/2007     Smokeless tobacco: Never Used     Alcohol use Yes      Comment: rare     Drug use: No     Sexual activity: Not Currently     Partners: Male      Comment: not currently     Other Topics Concern     Caffeine Concern No     Sleep Concern No     Stress Concern No     Weight Concern No     Exercise No     Seat Belt Yes     Parent/Sibling W/ Cabg, Mi Or Angioplasty Before 65f 55m? Yes     Social History Narrative    Lives with spouse and son. No domestic violence issues.       Family History -   Family History   Problem Relation Age of Onset     CANCER Mother      parotid     HEART DISEASE Father 47     MI     Depression Daughter      Hypertension Sister        Review of Systems - As per HPI and PMHx, otherwise review of system review of the head and neck negative.    Physical Exam  LMP 08/19/2013  BMI: There is no height or weight on file to calculate BMI.    General - The patient is well nourished and well developed, and appears to have good nutritional status.  Alert and oriented to person and place, answers questions and cooperates with examination appropriately.    SKIN - No suspicious lesions or rashes.  Respiration - No respiratory distress.     Head and Face - Normocephalic and atraumatic, with no gross asymmetry noted of the contour of the facial features.  The facial nerve is intact, with strong symmetric  movements.    Voice and Breathing - The patient was breathing comfortably without the use of accessory muscles. There was no wheezing, stridor, or stertor.  The patients voice was clear and strong, and had appropriate pitch and quality.    Ears - Bilateral pinna and EACs with normal appearing overlying skin. Tympanic membrane intact with good mobility on pneumatic otoscopy bilaterally. Bony landmarks of the ossicular chain are normal. The tympanic membranes are normal in appearance. No retraction, perforation, or masses.  No fluid or purulence was seen in the external canal or the middle ear.     Eyes - Extraocular movements intact.  Sclera were not icteric or injected, conjunctiva were pink and moist.    Mouth - Examination of the oral cavity showed pink, healthy oral mucosa. No lesions or ulcerations noted.  The tongue was mobile and midline, and the dentition were in good condition.      Throat - The walls of the oropharynx were smooth, pink, moist, symmetric, and had no lesions or ulcerations.  The tonsillar pillars and soft palate were symmetric. The uvula was midline on elevation.    Neck - Normal midline excursion of the laryngotracheal complex during swallowing.  Full range of motion on passive movement.  Palpation of the occipital, submental, submandibular, internal jugular chain, and supraclavicular nodes did not demonstrate any abnormal lymph nodes or masses.  The carotid pulse was palpable bilaterally.  Palpation of the thyroid was soft and smooth, with no nodules or goiter appreciated.  The trachea was mobile and midline.    Nose - External contour is symmetric, no gross deflection or scars.  Nasal mucosa is pink and moist with no abnormal mucus.  The septum was midline and non-obstructive, turbinates of normal size and position.  No polyps, masses, or purulence noted on examination.    Neuro - Nonfocal neuro exam is normal, CN 2 through 12 intact, normal gait and muscle tone.      Performed in clinic  today:  No procedures preformed in clinic today          A/P - Janice Ulloa is a 57 year old female with resolved OE chronic.      Janice should follow up in as needed.        Coleman Borrego MD      Again, thank you for allowing me to participate in the care of your patient.        Sincerely,        Coleman Borrego MD, MD

## 2017-12-11 DIAGNOSIS — M54.2 NECK PAIN: ICD-10-CM

## 2017-12-11 DIAGNOSIS — I10 HYPERTENSION GOAL BP (BLOOD PRESSURE) < 140/90: ICD-10-CM

## 2017-12-13 RX ORDER — AMLODIPINE BESYLATE 5 MG/1
TABLET ORAL
Qty: 90 TABLET | Refills: 1 | Status: SHIPPED | OUTPATIENT
Start: 2017-12-13 | End: 2018-03-15

## 2017-12-13 NOTE — TELEPHONE ENCOUNTER
Amlodipine refill:  BP Readings from Last 3 Encounters:   10/19/17 132/86   09/14/17 (!) 187/94   09/06/17 120/60     Lab Results   Component Value Date    ALT 43 08/29/2017     Prescription approved per [facility] Refill Protocol.      Tizanidine refill:  Out of RN scope, routing to provider.    Alfonso Mayberry, RN, BSN

## 2018-03-12 NOTE — PROGRESS NOTES
SUBJECTIVE:   Janice Ulloa is a 57 year old female who presents to clinic today for the following health issues:      History of Present Illness     Asthma:     Cough::  No    Wheezing::  YES    Dyspnea::  YES    Use of rescue inhaler::  None    Taking Asthma medication as prescribed::  NO    Asthma triggers::  Exercise or sports    ER/UC Visits or Admissions::  None    Depression & Anxiety Follow-up:     Depression/Anxiety:  Depression only    Status since last visit::  Stable    Other associated symptoms of depression::  None    Significant life event::  No    Current substance use::  None    Anxiety/Panic symptoms::  No       Today's PHQ-9         PHQ-9 Total Score:     (P) 8   PHQ-9 Q9 Suicidal ideation:   (P) Not at all   Thoughts of suicide or self harm:      Self-harm Plan:        Self-harm Action:          Safety concerns for self or others:       CHELO-7 Total Score: (P) 3  Depression & Anxiety Follow-up comment:  She still feels that she is doing well off medications and does not need them at this time    Hypertension:     Outpatient blood pressures:  Are not being checked    Dietary sodium intake::  1 gm sodium diet    Diet:  Other  Frequency of exercise:  2-3 days/week  Duration of exercise:  15-30 minutes  Taking medications regularly:  Yes  Medication side effects:  None      Problem list and histories reviewed & adjusted, as indicated.  Additional history: She believes she strained her arm and neck shoveling 1-2 weeks ago.  Her left upper back neck and shoulder has been sore.  She wonders what she can take.  She has tizanidine at home but does not like to use it because it makes her too tired.  She wonders if she can take ibuprofen for this    Her eyes have been watering and burning.  Her vision is blurry.  She denies any eye pain.  She has no history of eye allergies or allergic rhinitis.        BP Readings from Last 3 Encounters:   03/15/18 126/80   10/19/17 132/86   09/14/17 (!) 187/94    Wt  Readings from Last 3 Encounters:   03/15/18 176 lb (79.8 kg)   11/06/17 177 lb (80.3 kg)   10/19/17 174 lb (78.9 kg)                  Labs reviewed in EPIC    ROS:  CONSTITUTIONAL: NEGATIVE for fever, chills, change in weight  EYES: Eyes are watery and burning, blurred vision  ENT/MOUTH: NEGATIVE for ear, mouth and throat problems  RESP: NEGATIVE for significant cough or SOB  Breast: She has left lateral breast pain, states she has always had a red right breast she has had it for many many years without any changes  CV: NEGATIVE for chest pain, palpitations or peripheral edema  CV: Occasionally feels that her heart is beating fast, notices it mostly at night when she is laying down though may notice it throughout the day no associated chest pains or palpitations  GI: NEGATIVE for nausea, abdominal pain, heartburn, or change in bowel habits  ENDOCRINE: She states she is always tired, wonders if any of her medications may cause this    OBJECTIVE:     /80  Temp 98.2  F (36.8  C) (Temporal)  Resp 18  Wt 176 lb (79.8 kg)  LMP 08/19/2013  BMI 32.8 kg/m2  Body mass index is 32.8 kg/(m^2).  GENERAL: healthy, alert and no distress  NECK: no adenopathy, no asymmetry, masses, or scars and thyroid normal to palpation  RESP: lungs clear to auscultation - no rales, rhonchi or wheezes  BREAST: Right breast  normal without masses, tenderness or nipple discharge, she does have erythematous macular vascular pattern over the most of her right breast and no palpable axillary masses or adenopathy  BREAST: tenderness left lateral breast, no corresponding masses or axillary masses.  Is difficult to tell if its breast tissue that is tender or if it is underlying musculature  CV: regular rate and rhythm, normal S1 S2, no S3 or S4, no murmur, click or rub, no peripheral edema and peripheral pulses strong  ABDOMEN: soft, nontender, no hepatosplenomegaly, no masses and bowel sounds normal  MS: no gross musculoskeletal defects noted,  no edema  SKIN: no suspicious lesions or rashes  NEURO: Normal strength and tone, mentation intact and speech normal  PSYCH: mentation appears normal, affect normal/bright    Diagnostic Test Results:  Results for orders placed or performed in visit on 03/15/18 (from the past 24 hour(s))   CBC with platelets   Result Value Ref Range    WBC 6.4 4.0 - 11.0 10e9/L    RBC Count 4.70 3.8 - 5.2 10e12/L    Hemoglobin 13.4 11.7 - 15.7 g/dL    Hematocrit 40.2 35.0 - 47.0 %    MCV 86 78 - 100 fl    MCH 28.5 26.5 - 33.0 pg    MCHC 33.3 31.5 - 36.5 g/dL    RDW 14.6 10.0 - 15.0 %    Platelet Count 256 150 - 450 10e9/L       ASSESSMENT/PLAN:             1. Essential hypertension with goal blood pressure less than 140/90  Due to patient's fatigue, we will try to lower her propranolol to 1 pill daily continue other meds.  She will make an appointment to see the [facility] nurse for blood pressure recheck in 2 weeks to ensure that her blood pressure continues to be well controlled  - propranolol (INDERAL LA) 120 MG 24 hr capsule; Take 1 capsule (120 mg) by mouth daily  Dispense: 90 capsule; Refill: 1  - lisinopril-hydrochlorothiazide (PRINZIDE/ZESTORETIC) 20-12.5 MG per tablet; Take 2 tablets by mouth daily  Dispense: 180 tablet; Refill: 1  - Potassium    2. Hypertension goal BP (blood pressure) < 140/90  As above  - amLODIPine (NORVASC) 5 MG tablet; Take 1 tablet (5 mg) by mouth daily  Dispense: 90 tablet; Refill: 1  - Potassium    3. Neck pain  For now she will heat stretch apply ice if this is not getting better we could have her see physical therapy    4. Hyperlipidemia with target LDL less than 130  Continue current meds  - atorvastatin (LIPITOR) 20 MG tablet; Take 1 tablet (20 mg) by mouth daily  Dispense: 90 tablet; Refill: 1  - Lipid panel reflex to direct LDL Fasting    5. Breast pain, left  Difficult to tell if her pain is related to her breast versus underlying musculature or radicular pain from her neck.  We will evaluate with  diagnostic imaging  - MA Diagnostic Digital Bilateral; Future  - US Breast Left Complete 4 Quadrants; Future    6. Fatigue, unspecified type  We will try to reduce the dosage of propranolol  - TSH with free T4 reflex  - CBC with platelets    If her fatigue has not improved I would consider depression that is undertreated as well as further diagnostic workup should follow-up accordingly    Anisha Alfaro PA-C  [facility]  Electronically signed by Anisha Alfaro PA-C

## 2018-03-15 ENCOUNTER — OFFICE VISIT (OUTPATIENT)
Dept: FAMILY MEDICINE | Facility: OTHER | Age: 58
End: 2018-03-15
Payer: COMMERCIAL

## 2018-03-15 VITALS
BODY MASS INDEX: 32.8 KG/M2 | RESPIRATION RATE: 18 BRPM | DIASTOLIC BLOOD PRESSURE: 80 MMHG | TEMPERATURE: 98.2 F | WEIGHT: 176 LBS | SYSTOLIC BLOOD PRESSURE: 126 MMHG

## 2018-03-15 DIAGNOSIS — R53.83 FATIGUE, UNSPECIFIED TYPE: ICD-10-CM

## 2018-03-15 DIAGNOSIS — N64.4 BREAST PAIN, LEFT: Primary | ICD-10-CM

## 2018-03-15 DIAGNOSIS — E78.5 HYPERLIPIDEMIA WITH TARGET LDL LESS THAN 130: ICD-10-CM

## 2018-03-15 DIAGNOSIS — I10 ESSENTIAL HYPERTENSION WITH GOAL BLOOD PRESSURE LESS THAN 140/90: ICD-10-CM

## 2018-03-15 DIAGNOSIS — I10 HYPERTENSION GOAL BP (BLOOD PRESSURE) < 140/90: ICD-10-CM

## 2018-03-15 DIAGNOSIS — M54.2 NECK PAIN: ICD-10-CM

## 2018-03-15 LAB
CHOLEST SERPL-MCNC: 156 MG/DL
ERYTHROCYTE [DISTWIDTH] IN BLOOD BY AUTOMATED COUNT: 14.6 % (ref 10–15)
HCT VFR BLD AUTO: 40.2 % (ref 35–47)
HDLC SERPL-MCNC: 45 MG/DL
HGB BLD-MCNC: 13.4 G/DL (ref 11.7–15.7)
LDLC SERPL CALC-MCNC: 73 MG/DL
MCH RBC QN AUTO: 28.5 PG (ref 26.5–33)
MCHC RBC AUTO-ENTMCNC: 33.3 G/DL (ref 31.5–36.5)
MCV RBC AUTO: 86 FL (ref 78–100)
NONHDLC SERPL-MCNC: 111 MG/DL
PLATELET # BLD AUTO: 256 10E9/L (ref 150–450)
POTASSIUM SERPL-SCNC: 3.6 MMOL/L (ref 3.4–5.3)
RBC # BLD AUTO: 4.7 10E12/L (ref 3.8–5.2)
TRIGL SERPL-MCNC: 191 MG/DL
TSH SERPL DL<=0.005 MIU/L-ACNC: 2.98 MU/L (ref 0.4–4)
WBC # BLD AUTO: 6.4 10E9/L (ref 4–11)

## 2018-03-15 PROCEDURE — 80061 LIPID PANEL: CPT | Performed by: PHYSICIAN ASSISTANT

## 2018-03-15 PROCEDURE — 84443 ASSAY THYROID STIM HORMONE: CPT | Performed by: PHYSICIAN ASSISTANT

## 2018-03-15 PROCEDURE — 84132 ASSAY OF SERUM POTASSIUM: CPT | Performed by: PHYSICIAN ASSISTANT

## 2018-03-15 PROCEDURE — 99214 OFFICE O/P EST MOD 30 MIN: CPT | Performed by: PHYSICIAN ASSISTANT

## 2018-03-15 PROCEDURE — 85027 COMPLETE CBC AUTOMATED: CPT | Performed by: PHYSICIAN ASSISTANT

## 2018-03-15 PROCEDURE — 36415 COLL VENOUS BLD VENIPUNCTURE: CPT | Performed by: PHYSICIAN ASSISTANT

## 2018-03-15 RX ORDER — ATORVASTATIN CALCIUM 20 MG/1
20 TABLET, FILM COATED ORAL DAILY
Qty: 90 TABLET | Refills: 1 | Status: SHIPPED | OUTPATIENT
Start: 2018-03-15 | End: 2018-08-17

## 2018-03-15 RX ORDER — PROPRANOLOL HYDROCHLORIDE 120 MG/1
120 CAPSULE, EXTENDED RELEASE ORAL DAILY
Qty: 90 CAPSULE | Refills: 1 | Status: SHIPPED | OUTPATIENT
Start: 2018-03-15 | End: 2018-09-19

## 2018-03-15 RX ORDER — AMLODIPINE BESYLATE 5 MG/1
5 TABLET ORAL DAILY
Qty: 90 TABLET | Refills: 1 | Status: SHIPPED | OUTPATIENT
Start: 2018-03-15 | End: 2018-09-19

## 2018-03-15 RX ORDER — LISINOPRIL AND HYDROCHLOROTHIAZIDE 12.5; 2 MG/1; MG/1
2 TABLET ORAL DAILY
Qty: 180 TABLET | Refills: 1 | Status: SHIPPED | OUTPATIENT
Start: 2018-03-15 | End: 2018-08-30

## 2018-03-15 ASSESSMENT — PATIENT HEALTH QUESTIONNAIRE - PHQ9
SUM OF ALL RESPONSES TO PHQ QUESTIONS 1-9: 8
10. IF YOU CHECKED OFF ANY PROBLEMS, HOW DIFFICULT HAVE THESE PROBLEMS MADE IT FOR YOU TO DO YOUR WORK, TAKE CARE OF THINGS AT HOME, OR GET ALONG WITH OTHER PEOPLE: NOT DIFFICULT AT ALL
SUM OF ALL RESPONSES TO PHQ QUESTIONS 1-9: 8

## 2018-03-15 ASSESSMENT — ANXIETY QUESTIONNAIRES
6. BECOMING EASILY ANNOYED OR IRRITABLE: NOT AT ALL
2. NOT BEING ABLE TO STOP OR CONTROL WORRYING: MORE THAN HALF THE DAYS
7. FEELING AFRAID AS IF SOMETHING AWFUL MIGHT HAPPEN: NOT AT ALL
3. WORRYING TOO MUCH ABOUT DIFFERENT THINGS: SEVERAL DAYS
5. BEING SO RESTLESS THAT IT IS HARD TO SIT STILL: NOT AT ALL
7. FEELING AFRAID AS IF SOMETHING AWFUL MIGHT HAPPEN: NOT AT ALL
GAD7 TOTAL SCORE: 3
4. TROUBLE RELAXING: NOT AT ALL
GAD7 TOTAL SCORE: 3
1. FEELING NERVOUS, ANXIOUS, OR ON EDGE: NOT AT ALL
GAD7 TOTAL SCORE: 3

## 2018-03-15 ASSESSMENT — PAIN SCALES - GENERAL: PAINLEVEL: NO PAIN (0)

## 2018-03-15 NOTE — LETTER
March 19, 2018      Janice Ulloa  [address]  [place] [address]        Dear ,    We are writing to inform you of your test results.    Your thyroid level and potassium levels are normal.  Your complete blood count is normal as well your cholesterol has improved though the triglycerides, a fatty portion in your blood, are still higher than recommended.  We will continue the current dosages of your medications. .Eating a diet low in saturated fats, cholesterol, sugar, and alcohol  as well as exercising on a regular basis will help to improve these results.     Resulted Orders   TSH with free T4 reflex   Result Value Ref Range    TSH 2.98 0.40 - 4.00 mU/L   Potassium   Result Value Ref Range    Potassium 3.6 3.4 - 5.3 mmol/L   Lipid panel reflex to direct LDL Fasting   Result Value Ref Range    Cholesterol 156 <200 mg/dL    Triglycerides 191 (H) <150 mg/dL      Comment:      Borderline high:  150-199 mg/dl  High:             200-499 mg/dl  Very high:       >499 mg/dl  Fasting specimen      HDL Cholesterol 45 (L) >49 mg/dL    LDL Cholesterol Calculated 73 <100 mg/dL      Comment:      Desirable:       <100 mg/dl    Non HDL Cholesterol 111 <130 mg/dL   CBC with platelets   Result Value Ref Range    WBC 6.4 4.0 - 11.0 10e9/L    RBC Count 4.70 3.8 - 5.2 10e12/L    Hemoglobin 13.4 11.7 - 15.7 g/dL    Hematocrit 40.2 35.0 - 47.0 %    MCV 86 78 - 100 fl    MCH 28.5 26.5 - 33.0 pg    MCHC 33.3 31.5 - 36.5 g/dL    RDW 14.6 10.0 - 15.0 %    Platelet Count 256 150 - 450 10e9/L       If you have any questions or concerns, please call the clinic at the number listed above.       Sincerely,        Anisha Alfaro PA-C

## 2018-03-15 NOTE — MR AVS SNAPSHOT
"              After Visit Summary   3/15/2018    Janice Ulloa    MRN: [id number]           Patient Information     Date Of Birth          1960        Visit Information        Provider Department      3/15/2018 10:15 AM Anisha Alfaro PA-C [facility]        Today's Diagnoses     Breast pain, left    -  1    Essential hypertension with goal blood pressure less than 140/90        Hypertension goal BP (blood pressure) < 140/90        Neck pain        Hyperlipidemia with target LDL less than 130        Fatigue, unspecified type           Follow-ups after your visit        Your next 10 appointments already scheduled     Mar 27, 2018  1:00 PM CDT   (Arrive by 12:45 PM)   MA DIAGNOSTIC DIGITAL BILATERAL with PHMA1, PH RAD   [facility]    [address] [facility] [address] [phone]           Do not use any powder, lotion or deodorant under your arms or on your breast. If you do, we will ask you to remove it before your exam.  Wear comfortable, two-piece clothing.  If you have any allergies, tell your care team.  Bring any previous mammograms from other facilities or have them mailed to the breast center.  Three-dimensional (3D) mammograms are available at [place] locations in [facility], [facility], [facility], [place], [place], and [place]. [facility] locations include [place] and [facility] in [place]. Benefits of 3D mammograms include: - Improved rate of cancer detection - Decreases your chance of having to go back for more tests, which means fewer: - \"False-positive\" results (This means that there is an abnormal area but it isn't cancer.) - Invasive testing procedures, such as a biopsy or surgery - Can provide clearer images of the breast if you have dense breast tissue. 3D mammography is an optional exam that anyone can have with a 2D mammogram. It doesn't replace or take the place of a 2D " mammogram. 2D mammograms remain an effective screening test for all women.  Not all insurance companies cover the cost of a 3D mammogram. Check with your insurance.            Mar 27, 2018  1:30 PM CDT   US BREAST LEFT CMPL 4 QUAD with PHUS2, PH RAD   [facility] ([facility])    [address] [place] [id number] [phone]           Please bring a list of your medicines (including vitamins, minerals and over-the-counter drugs). Also, tell your doctor about any allergies you may have. Wear comfortable clothes and leave your valuables at home.  You do not need to do anything special to prepare for your exam.  Please call the Imaging Department at your exam site with any questions.              Future tests that were ordered for you today     Open Future Orders        Priority Expected Expires Ordered    MA Diagnostic Digital Bilateral Routine  3/15/2019 3/15/2018    US Breast Left Complete 4 Quadrants Routine  3/15/2019 3/15/2018            Who to contact     If you have questions or need follow up information about today's clinic visit or your schedule please contact [facility] directly at [phone].  Normal or non-critical lab and imaging results will be communicated to you by MyChart, letter or phone within 4 business days after the clinic has received the results. If you do not hear from us within 7 days, please contact the clinic through [employer] or phone. If you have a critical or abnormal lab result, we will notify you by phone as soon as possible.  Submit refill requests through [employer] or call your pharmacy and they will forward the refill request to us. Please allow 3 business days for your refill to be completed.          Additional Information About Your Visit        Ray[employer] Information     [employer] lets you send messages to your doctor, view your test results, renew your prescriptions, schedule appointments and more. To sign up, go to  "[email or web address] . Click on \"Log in\" on the left side of the screen, which will take you to the Welcome page. Then click on \"Sign up Now\" on the right side of the page.     You will be asked to enter the access code listed below, as well as some personal information. Please follow the directions to create your username and password.     Your access code is: [email or web address]  Expires: 2018 10:54 AM     Your access code will  in 90 days. If you need help or a new code, please call your [facility] clinic or [phone].        Care EveryWhere ID     This is your Care EveryWhere ID. This could be used by other organizations to access your [facility] medical records  [id number]        Your Vitals Were     Temperature Respirations Last Period BMI (Body Mass Index)          98.2  F (36.8  C) (Temporal) 18 2013 32.8 kg/m2         Blood Pressure from Last 3 Encounters:   03/15/18 126/80   10/19/17 132/86   17 (!) 187/94    Weight from Last 3 Encounters:   03/15/18 176 lb (79.8 kg)   17 177 lb (80.3 kg)   10/19/17 174 lb (78.9 kg)              We Performed the Following     CBC with platelets     Lipid panel reflex to direct LDL Fasting     Potassium     TSH with free T4 reflex          Today's Medication Changes          These changes are accurate as of 3/15/18 10:54 AM.  If you have any questions, ask your nurse or doctor.               These medicines have changed or have updated prescriptions.        Dose/Directions    amLODIPine 5 MG tablet   Commonly known as:  NORVASC   This may have changed:  See the new instructions.   Used for:  Hypertension goal BP (blood pressure) < 140/90   Changed by:  Anisha Alfaro PA-C        Dose:  5 mg   Take 1 tablet (5 mg) by mouth daily   Quantity:  90 tablet   Refills:  1       atorvastatin 20 MG tablet   Commonly known as:  LIPITOR   This may have changed:  See the new instructions.   Used for:  Hyperlipidemia with target LDL less than 130   Changed by: "  Anisha Alfaro PA-C        Dose:  20 mg   Take 1 tablet (20 mg) by mouth daily   Quantity:  90 tablet   Refills:  1       lisinopril-hydrochlorothiazide 20-12.5 MG per tablet   Commonly known as:  PRINZIDE/ZESTORETIC   This may have changed:  See the new instructions.   Used for:  Essential hypertension with goal blood pressure less than 140/90   Changed by:  Anisha Alfaro PA-C        Dose:  2 tablet   Take 2 tablets by mouth daily   Quantity:  180 tablet   Refills:  1       propranolol 120 MG 24 hr capsule   Commonly known as:  INDERAL LA   This may have changed:  See the new instructions.   Used for:  Essential hypertension with goal blood pressure less than 140/90   Changed by:  Anisha Alfaro PA-C        Dose:  120 mg   Take 1 capsule (120 mg) by mouth daily   Quantity:  90 capsule   Refills:  1            Where to get your medicines      These medications were sent to [address]  [address], [facility] [address]     Phone:  [phone]     amLODIPine 5 MG tablet    atorvastatin 20 MG tablet    lisinopril-hydrochlorothiazide 20-12.5 MG per tablet    propranolol 120 MG 24 hr capsule                Primary Care Provider Office Phone # Fax #    Anisha Alfaro PA-C [phone] [address] [place] [address]        Equal Access to Services     [facility]: Hadii chirag ku hadasho Soomaali, waaxda luqadaha, qaybta kaalmada adeegyada, waxjames eldridgein hayaan pawel dorman . So [facility] [phone].    ATENCIÓN: Si habla español, tiene a larios disposición servicios gratuitos de asistencia lingüística. Llame al [phone].    We comply with applicable federal civil rights laws and [place] laws. We do not discriminate on the basis of race, color, national origin, age, disability, sex, sexual orientation, or gender identity.            Thank you!     Thank you for choosing [facility]  for your care. Our goal is always to provide you  with excellent care. Hearing back from our patients is one way we can continue to improve our services. Please take a few minutes to complete the written survey that you may receive in the mail after your visit with us. Thank you!             Your Updated Medication List - Protect others around you: Learn how to safely use, store and throw away your medicines at www.disposemymeds.org.          This list is accurate as of 3/15/18 10:54 AM.  Always use your most recent med list.                   Brand Name Dispense Instructions for use Diagnosis    amLODIPine 5 MG tablet    NORVASC    90 tablet    Take 1 tablet (5 mg) by mouth daily    Hypertension goal BP (blood pressure) < 140/90       aspirin 81 MG tablet      ONE DAILY        atorvastatin 20 MG tablet    LIPITOR    90 tablet    Take 1 tablet (20 mg) by mouth daily    Hyperlipidemia with target LDL less than 130       clotrimazole 1 % solution    LOTRIMIN    10 mL    4 drops in left ear twice daily        IBUPROFEN PO      Take 400 mg by mouth daily as needed        lisinopril-hydrochlorothiazide 20-12.5 MG per tablet    PRINZIDE/ZESTORETIC    180 tablet    Take 2 tablets by mouth daily    Essential hypertension with goal blood pressure less than 140/90       MULTI-VITAMIN DAILY PO           prednisoLONE sodium phosphate 1 % ophthalmic solution    INFLAMASE FORTE    4 mL    4 drops in left ear bid    Acute otitis externa of left ear, unspecified type       propranolol 120 MG 24 hr capsule    INDERAL LA    90 capsule    Take 1 capsule (120 mg) by mouth daily    Essential hypertension with goal blood pressure less than 140/90       tiZANidine 4 MG tablet    ZANAFLEX    30 tablet    TAKE ONE TO TWO TABLETS BY MOUTH THREE TIMES DAILY AS NEEDED FOR MUSCLE SPASM.    Neck pain

## 2018-03-16 ASSESSMENT — PATIENT HEALTH QUESTIONNAIRE - PHQ9: SUM OF ALL RESPONSES TO PHQ QUESTIONS 1-9: 8

## 2018-03-16 ASSESSMENT — ANXIETY QUESTIONNAIRES: GAD7 TOTAL SCORE: 3

## 2018-03-27 ENCOUNTER — HOSPITAL ENCOUNTER (OUTPATIENT)
Dept: ULTRASOUND IMAGING | Facility: CLINIC | Age: 58
Discharge: HOME | End: 2018-03-27
Attending: PHYSICIAN ASSISTANT | Admitting: PHYSICIAN ASSISTANT
Payer: COMMERCIAL

## 2018-03-27 ENCOUNTER — HOSPITAL ENCOUNTER (OUTPATIENT)
Dept: MAMMOGRAPHY | Facility: CLINIC | Age: 58
End: 2018-03-27
Attending: PHYSICIAN ASSISTANT
Payer: COMMERCIAL

## 2018-03-27 DIAGNOSIS — N64.4 BREAST PAIN, LEFT: ICD-10-CM

## 2018-03-27 PROCEDURE — 76642 ULTRASOUND BREAST LIMITED: CPT | Mod: LT

## 2018-03-27 PROCEDURE — G0279 TOMOSYNTHESIS, MAMMO: HCPCS

## 2018-04-13 ENCOUNTER — TELEPHONE (OUTPATIENT)
Dept: FAMILY MEDICINE | Facility: OTHER | Age: 58
End: 2018-04-13

## 2018-04-13 DIAGNOSIS — I10 ESSENTIAL HYPERTENSION WITH GOAL BLOOD PRESSURE LESS THAN 140/90: ICD-10-CM

## 2018-04-13 RX ORDER — PROPRANOLOL HYDROCHLORIDE 120 MG/1
CAPSULE, EXTENDED RELEASE ORAL
Qty: 60 CAPSULE | Refills: 5 | Status: SHIPPED | OUTPATIENT
Start: 2018-04-13 | End: 2018-09-19

## 2018-04-13 NOTE — TELEPHONE ENCOUNTER
Reason for Call:  Other prescription    Detailed comments: patient is wanting her prescription for Propranolol changed back to 2 tablets. Patient states she tried just one and it wasn't working so she went back to taking two.     Phone Number Patient can be reached at: Home number on file [phone] (home)    Best Time: anytime    Can we leave a detailed message on this number? YES    Call taken on 4/13/2018 at 10:10 AM by Shayy Douglas

## 2018-04-13 NOTE — TELEPHONE ENCOUNTER
Okay to leave detailed message so informed that the Rx with 2 capsules has been sent to the pharmacy.  Keagan Davidson, CMA

## 2018-04-20 ENCOUNTER — TELEPHONE (OUTPATIENT)
Dept: FAMILY MEDICINE | Facility: OTHER | Age: 58
End: 2018-04-20

## 2018-04-20 ENCOUNTER — APPOINTMENT (OUTPATIENT)
Dept: GENERAL RADIOLOGY | Facility: CLINIC | Age: 58
End: 2018-04-20
Attending: FAMILY MEDICINE
Payer: COMMERCIAL

## 2018-04-20 ENCOUNTER — HOSPITAL ENCOUNTER (EMERGENCY)
Facility: CLINIC | Age: 58
Discharge: HOME | End: 2018-04-20
Attending: FAMILY MEDICINE | Admitting: FAMILY MEDICINE
Payer: COMMERCIAL

## 2018-04-20 VITALS
RESPIRATION RATE: 8 BRPM | DIASTOLIC BLOOD PRESSURE: 78 MMHG | SYSTOLIC BLOOD PRESSURE: 144 MMHG | HEART RATE: 65 BPM | TEMPERATURE: 98 F | HEIGHT: 62 IN | BODY MASS INDEX: 32.02 KG/M2 | WEIGHT: 174 LBS | OXYGEN SATURATION: 92 %

## 2018-04-20 DIAGNOSIS — R07.89 ATYPICAL CHEST PAIN: ICD-10-CM

## 2018-04-20 DIAGNOSIS — J20.9 ACUTE BRONCHITIS, UNSPECIFIED ORGANISM: ICD-10-CM

## 2018-04-20 DIAGNOSIS — I10 HYPERTENSION, POOR CONTROL: ICD-10-CM

## 2018-04-20 LAB
ALBUMIN SERPL-MCNC: 4.3 G/DL (ref 3.4–5)
ALBUMIN UR-MCNC: NEGATIVE MG/DL
ALP SERPL-CCNC: 73 U/L (ref 40–150)
ALT SERPL W P-5'-P-CCNC: 41 U/L (ref 0–50)
ANION GAP SERPL CALCULATED.3IONS-SCNC: 7 MMOL/L (ref 3–14)
APPEARANCE UR: ABNORMAL
AST SERPL W P-5'-P-CCNC: 24 U/L (ref 0–45)
BACTERIA #/AREA URNS HPF: ABNORMAL /HPF
BASOPHILS # BLD AUTO: 0 10E9/L (ref 0–0.2)
BASOPHILS NFR BLD AUTO: 0.5 %
BILIRUB SERPL-MCNC: 0.5 MG/DL (ref 0.2–1.3)
BILIRUB UR QL STRIP: NEGATIVE
BUN SERPL-MCNC: 12 MG/DL (ref 7–30)
CALCIUM SERPL-MCNC: 10 MG/DL (ref 8.5–10.1)
CHLORIDE SERPL-SCNC: 101 MMOL/L (ref 94–109)
CO2 SERPL-SCNC: 32 MMOL/L (ref 20–32)
COLOR UR AUTO: YELLOW
CREAT SERPL-MCNC: 0.5 MG/DL (ref 0.52–1.04)
DIFFERENTIAL METHOD BLD: NORMAL
EOSINOPHIL # BLD AUTO: 0.2 10E9/L (ref 0–0.7)
EOSINOPHIL NFR BLD AUTO: 4.2 %
ERYTHROCYTE [DISTWIDTH] IN BLOOD BY AUTOMATED COUNT: 14.6 % (ref 10–15)
GFR SERPL CREATININE-BSD FRML MDRD: >90 ML/MIN/1.7M2
GLUCOSE SERPL-MCNC: 109 MG/DL (ref 70–99)
GLUCOSE UR STRIP-MCNC: NEGATIVE MG/DL
HCT VFR BLD AUTO: 43.3 % (ref 35–47)
HGB BLD-MCNC: 14.3 G/DL (ref 11.7–15.7)
HGB UR QL STRIP: NEGATIVE
IMM GRANULOCYTES # BLD: 0 10E9/L (ref 0–0.4)
IMM GRANULOCYTES NFR BLD: 0.2 %
KETONES UR STRIP-MCNC: NEGATIVE MG/DL
LEUKOCYTE ESTERASE UR QL STRIP: NEGATIVE
LYMPHOCYTES # BLD AUTO: 1.6 10E9/L (ref 0.8–5.3)
LYMPHOCYTES NFR BLD AUTO: 28.1 %
MCH RBC QN AUTO: 28 PG (ref 26.5–33)
MCHC RBC AUTO-ENTMCNC: 33 G/DL (ref 31.5–36.5)
MCV RBC AUTO: 85 FL (ref 78–100)
MONOCYTES # BLD AUTO: 0.5 10E9/L (ref 0–1.3)
MONOCYTES NFR BLD AUTO: 8.6 %
NEUTROPHILS # BLD AUTO: 3.3 10E9/L (ref 1.6–8.3)
NEUTROPHILS NFR BLD AUTO: 58.4 %
NITRATE UR QL: NEGATIVE
NT-PROBNP SERPL-MCNC: 100 PG/ML (ref 0–900)
PH UR STRIP: 8 PH (ref 5–7)
PLATELET # BLD AUTO: 283 10E9/L (ref 150–450)
POTASSIUM SERPL-SCNC: 3 MMOL/L (ref 3.4–5.3)
PROT SERPL-MCNC: 8.4 G/DL (ref 6.8–8.8)
RBC # BLD AUTO: 5.1 10E12/L (ref 3.8–5.2)
RBC #/AREA URNS AUTO: 1 /HPF (ref 0–2)
SODIUM SERPL-SCNC: 140 MMOL/L (ref 133–144)
SOURCE: ABNORMAL
SP GR UR STRIP: 1 (ref 1–1.03)
SQUAMOUS #/AREA URNS AUTO: <1 /HPF (ref 0–1)
TROPONIN I SERPL-MCNC: <0.015 UG/L (ref 0–0.04)
UROBILINOGEN UR STRIP-MCNC: 0 MG/DL (ref 0–2)
WBC # BLD AUTO: 5.7 10E9/L (ref 4–11)
WBC #/AREA URNS AUTO: 4 /HPF (ref 0–5)

## 2018-04-20 PROCEDURE — 85025 COMPLETE CBC W/AUTO DIFF WBC: CPT | Performed by: FAMILY MEDICINE

## 2018-04-20 PROCEDURE — 93010 ELECTROCARDIOGRAM REPORT: CPT | Mod: Z6 | Performed by: FAMILY MEDICINE

## 2018-04-20 PROCEDURE — 84484 ASSAY OF TROPONIN QUANT: CPT | Performed by: FAMILY MEDICINE

## 2018-04-20 PROCEDURE — 71046 X-RAY EXAM CHEST 2 VIEWS: CPT | Mod: TC

## 2018-04-20 PROCEDURE — 93005 ELECTROCARDIOGRAM TRACING: CPT | Performed by: FAMILY MEDICINE

## 2018-04-20 PROCEDURE — 81001 URINALYSIS AUTO W/SCOPE: CPT | Performed by: FAMILY MEDICINE

## 2018-04-20 PROCEDURE — 80053 COMPREHEN METABOLIC PANEL: CPT | Performed by: FAMILY MEDICINE

## 2018-04-20 PROCEDURE — 25000128 H RX IP 250 OP 636: Performed by: FAMILY MEDICINE

## 2018-04-20 PROCEDURE — 96374 THER/PROPH/DIAG INJ IV PUSH: CPT | Performed by: FAMILY MEDICINE

## 2018-04-20 PROCEDURE — 83880 ASSAY OF NATRIURETIC PEPTIDE: CPT | Performed by: FAMILY MEDICINE

## 2018-04-20 PROCEDURE — 99285 EMERGENCY DEPT VISIT HI MDM: CPT | Mod: 25 | Performed by: FAMILY MEDICINE

## 2018-04-20 RX ORDER — AZITHROMYCIN 250 MG/1
TABLET, FILM COATED ORAL
Qty: 6 TABLET | Refills: 0 | Status: SHIPPED | OUTPATIENT
Start: 2018-04-20 | End: 2018-04-25

## 2018-04-20 RX ORDER — KETOROLAC TROMETHAMINE 15 MG/ML
15 INJECTION, SOLUTION INTRAMUSCULAR; INTRAVENOUS ONCE
Status: COMPLETED | OUTPATIENT
Start: 2018-04-20 | End: 2018-04-20

## 2018-04-20 RX ADMIN — KETOROLAC TROMETHAMINE 15 MG: 15 INJECTION, SOLUTION INTRAMUSCULAR; INTRAVENOUS at 11:26

## 2018-04-20 NOTE — TELEPHONE ENCOUNTER
Reason for call:  Other   Patient called regarding (reason for call): appointment  Additional comments: ed follow up chest pain sometime the week of 04/23/18    Phone number to reach patient:  Home number on file [phone] (home)    Best Time:  anytime    Can we leave a detailed message on this number?  YES

## 2018-04-20 NOTE — TELEPHONE ENCOUNTER
Provider are you okay working this pt in sometimes next week?  It looks like you only have short appts available and same day appts.  Keagan Davidson, CMA

## 2018-04-20 NOTE — TELEPHONE ENCOUNTER
Attempted to reach pt by home phone and cell phone.  Left message at home and unable to leave message on cell phone.  Please inform her of the message below and assist with scheduling.  See NP's message below.  Keagan Davidson, CMA

## 2018-04-20 NOTE — ED TRIAGE NOTES
Pt says her Dad  with an MI at age 46. She quit smoking 11 years ago, she has hypertension and hyperlipidemia.

## 2018-04-20 NOTE — ED NOTES
Pt asks to stop enroute to room 5 to void in the bathroom, allowed to do so, but stayed with her. Pt says she has had left upper chest pain for the past week off and on  And it is worse when she lies down. She is hypertensive and is on 3 BP meds that she says she is taking as ordered.

## 2018-04-20 NOTE — ED AVS SNAPSHOT
[facility] Emergency Department    [address] [place] [address]    Phone:  [phone]    Fax:  [phone]                                       Janice Ulloa   MRN: [id number]    Department:  [facility] Emergency Department   Date of Visit:  4/20/2018           Patient Information     Date Of Birth          1960        Your diagnoses for this visit were:     Acute bronchitis     Atypical chest pain     Hypertension, poor control        You were seen by eJsu Fields MD.      Follow-up Information     Follow up with Anisha Alfaro PA-C In 4 days.    Specialty:  Family Practice    Contact information:    [address] [place] [id number] [phone]          Follow up with [facility] Emergency Department.    Specialty:  EMERGENCY MEDICINE    Why:  If symptoms worsen    Contact information:    [address] [place] [id number] [phone]    Additional information:    From [address]: Exit at [address] on [address] side of [place]. Turn right on [address]. Turn left at stoplight on [address] [place]. [facility] will be in view two blocks ahead        Discharge Instructions       Thank you for giving us the opportunity to see you. The impression is that you have acute bronchitis with atypical chest pain and poorly controlled hypertension.    Continue with her blood pressure medications.  Follow-up with Anisha Alfaro next week.  We are trying to work in an appointment for you.    Begin Zithromax and take as directed for 5 days.  Drink plenty of fluids and rest.  Take ibuprofen/Tylenol as needed for pain.    You will likely need adjustment of your blood pressure medications, and an outpatient cardiac stress test.    After discharge, please closely monitor for any new or worsening symptoms. Return to the Emergency Department at any time if your symptoms worsen.        Bronchitis, Antibiotic Treatment (Adult)    Bronchitis is an  infection of the air passages (bronchial tubes) in your lungs. It often occurs when you have a cold. This illness is contagious during the first few days and is spread through the air by coughing and sneezing, or by direct contact (touching the sick person and then touching your own eyes, nose, or mouth).  Symptoms of bronchitis include cough with mucus (phlegm) and low-grade fever. Bronchitis usually lasts 7 to 14 days. Mild cases can be treated with simple home remedies. More severe infection is treated with an antibiotic.  Home care  Follow these guidelines when caring for yourself at home:    If your symptoms are severe, rest at home for the first 2 to 3 days. When you go back to your usual activities, don't let yourself get too tired.    Do not smoke. Also avoid being exposed to secondhand smoke.    You may use over-the-counter medicines to control fever or pain, unless another medicine was prescribed. If you have chronic liver or kidney disease or have ever had a stomach ulcer or gastrointestinal bleeding, talk with your healthcare provider before using these medicines. Also talk to your provider if you are taking medicine to prevent blood clots. Aspirin should never be given to anyone younger than 18 years of age who is ill with a viral infection or fever. It may cause severe liver or brain damage.    Your appetite may be poor, so a light diet is fine. Avoid dehydration by drinking 6 to 8 glasses of fluids per day (such as water, soft drinks, sports drinks, juices, tea, or soup). Extra fluids will help loosen secretions in the nose and lungs.    Over-the-counter cough, cold, and sore-throat medicines will not shorten the length of the illness, but they may be helpful to reduce symptoms. (Note: Do not use decongestants if you have high blood pressure.)    Finish all antibiotic medicine. Do this even if you are feeling better after only a few days.  Follow-up care  Follow up with your healthcare provider, or as  advised. If you had an X-ray or ECG (electrocardiogram), a specialist will review it. You will be notified of any new findings that may affect your care.  If you are age 65 or older, or if you have a chronic lung disease or condition that affects your immune system, or you smoke, ask your healthcare provider about getting a pneumococcal vaccine and a yearly flu shot (influenza vaccine).  When to seek medical advice  Call your healthcare provider right away if any of these occur:    Fever of 100.4 F (38 C) or higher, or as directed by your healthcare provider    Coughing up increased amounts of colored sputum    Weakness, drowsiness, headache, facial pain, ear pain, or a stiff neck  Call 911  Call 911 if any of these occur.    Coughing up blood    Worsening weakness, drowsiness, headache, or stiff neck    Trouble breathing, wheezing, or pain with breathing  Date Last Reviewed: 9/13/2015 2000-2017 [employer]. [address] [place] [address]. All rights reserved. This information is not intended as a substitute for professional medical care. Always follow your healthcare professional's instructions.          Discharge References/Attachments     CHEST PAIN, UNCERTAIN CAUSE (ENGLISH)      24 Hour Appointment Hotline       To make an appointment at any [facility], call [phone] ([phone]). If you don't have a family doctor or clinic, we will help you find one. [place] clinics are conveniently located to serve the needs of you and your family.             Review of your medicines      START taking        Dose / Directions Last dose taken    azithromycin 250 MG tablet   Commonly known as:  ZITHROMAX Z-HANSEL   Quantity:  6 tablet        500 MG day 1, then 250 MG daily for 4 more days   Refills:  0          Our records show that you are taking the medicines listed below. If these are incorrect, please call your family doctor or clinic.        Dose / Directions Last dose taken     amLODIPine 5 MG tablet   Commonly known as:  NORVASC   Dose:  5 mg   Quantity:  90 tablet        Take 1 tablet (5 mg) by mouth daily   Refills:  1        aspirin 81 MG tablet        ONE DAILY   Refills:  3        atorvastatin 20 MG tablet   Commonly known as:  LIPITOR   Dose:  20 mg   Quantity:  90 tablet        Take 1 tablet (20 mg) by mouth daily   Refills:  1        IBUPROFEN PO   Dose:  400 mg   Indication:  Fever        Take 400 mg by mouth daily as needed   Refills:  0        lisinopril-hydrochlorothiazide 20-12.5 MG per tablet   Commonly known as:  PRINZIDE/ZESTORETIC   Dose:  2 tablet   Quantity:  180 tablet        Take 2 tablets by mouth daily   Refills:  1        MULTI-VITAMIN DAILY PO        Refills:  0        prednisoLONE sodium phosphate 1 % ophthalmic solution   Commonly known as:  INFLAMASE FORTE   Quantity:  4 mL        4 drops in left ear bid   Refills:  1        * propranolol 120 MG 24 hr capsule   Commonly known as:  INDERAL LA   Dose:  120 mg   Quantity:  90 capsule        Take 1 capsule (120 mg) by mouth daily   Refills:  1        * propranolol 120 MG 24 hr capsule   Commonly known as:  INDERAL LA   Quantity:  60 capsule        TAKE TWO CAPSULES BY MOUTH EVERY DAY   Refills:  5        tiZANidine 4 MG tablet   Commonly known as:  ZANAFLEX   Quantity:  30 tablet        TAKE ONE TO TWO TABLETS BY MOUTH THREE TIMES DAILY AS NEEDED FOR MUSCLE SPASM.   Refills:  3        * Notice:  This list has 2 medication(s) that are the same as other medications prescribed for you. Read the directions carefully, and ask your doctor or other care provider to review them with you.            Prescriptions were sent or printed at these locations (1 Prescription)                   [facility] [address] [place] [address] - [address]   [address] [facility] [address]    Telephone:  [phone]   Fax:  [phone]   Hours:                  E-Prescribed (1 of 1)         azithromycin (ZITHROMAX Z-HANSEL) 250 MG tablet           "      Procedures and tests performed during your visit     CBC with platelets differential    Comprehensive metabolic panel    EKG 12-lead, tracing only    Nt probnp inpatient (BNP)    Peripheral IV catheter    Troponin I    UA reflex to Microscopic    XR Chest 2 Views      Orders Needing Specimen Collection     None      Pending Results     No orders found from 2018 to 2018.            Pending Culture Results     No orders found from 2018 to 2018.            Pending Results Instructions     If you had any lab results that were not finalized at the time of your Discharge, you can call the ED Lab Result RN at [phone]. You will be contacted by this team for any positive Lab results or changes in treatment. The nurses are available 7 days a week from 10A to 6:30P.  You can leave a message 24 hours per day and they will return your call.        Thank you for choosing [facility]       Thank you for choosing [facility] for your care. Our goal is always to provide you with excellent care. Hearing back from our patients is one way we can continue to improve our services. Please take a few minutes to complete the written survey that you may receive in the mail after you visit with us. Thank you!        [facility] Information     [facility] lets you send messages to your doctor, view your test results, renew your prescriptions, schedule appointments and more. To sign up, go to [email or web address] . Click on \"Log in\" on the left side of the screen, which will take you to the Welcome page. Then click on \"Sign up Now\" on the right side of the page.     You will be asked to enter the access code listed below, as well as some personal information. Please follow the directions to create your username and password.     Your access code is: [email or web address]  Expires: 2018 10:54 AM     Your access code will  in 90 days. If you need help or a new code, please call your [facility] clinic or [phone].      "   Care EveryWhere ID     This is your Care EveryWhere ID. This could be used by other organizations to access your [facility] medical records  [id number]        Equal Access to Services     JANNETTE OLIVA : Mara Boss, camacho neely, nataliya jeffries, kleber lima. So [facility] [phone].    ATENCIÓN: Si habla español, tiene a larios disposición servicios gratuitos de asistencia lingüística. Llame al [phone].    We comply with applicable federal civil rights laws and [place] laws. We do not discriminate on the basis of race, color, national origin, age, disability, sex, sexual orientation, or gender identity.            After Visit Summary       This is your record. Keep this with you and show to your community pharmacist(s) and doctor(s) at your next visit.

## 2018-04-20 NOTE — ED PROVIDER NOTES
"                                                            [facility] ED Provider Note   CC:     Chief Complaint   Patient presents with     Chest Pain     HPI:  Janice Ulloa is a 57 year old female who presented to the emergency department with one week history of intermittent sharp left sided chest pain with radiation into the left shoulder and arm.  Patient states that her symptoms came on gradually, and there is a pounding quality to the pain.  She states that the pain is worse with laying down and somewhat better with sitting up.  Pain is not worsened by exertion or activity.  She states that her breathing has been more typical lately and she feels that her lungs are \"full.\"  She has not smoked for over 11 years, but has a intermittent cough presently that is nonproductive and with audible wheezing.  Patient denies any fever, chills, vomiting, diarrhea, abdominal pain, calf pain, ankle swelling.  She has a history of hypertension and is currently in the midst of having her blood pressure better control.  Her blood pressures this morning had been high in the range of 190/85.  She denies any diaphoresis, nausea or vomiting, but does have some shortness of breath \"like I have pneumonia.\"  Patient's cardiac risk factors include hypertension, hyperlipidemia, and family history of premature heart disease with her father dying of an MI at age 47.  Patient had a cardiac stress test in 2010.  She does not have any history of asthma, DVT or PE.  She states she had a history of migraine headaches but has not had headaches since.  Other symptoms include occasional dizziness.  All other review of systems are negative.    Problem List:  Patient Active Problem List    Diagnosis     Adjustment disorder with mixed anxiety and depressed mood     Health Care Home     Pneumonia     Sepsis (H)     Acute and chronic respiratory failure with hypoxia (H)     Hypokalemia     DVT prophylaxis     Advanced directives, " counseling/discussion     Hypertension goal BP (blood pressure) < 140/90     Neck muscle spasm     Microscopic hematuria     Anemia     Perimenopausal     Hyperlipidemia with target LDL less than 130     Hypercholesteremia     Intervertebral cervical disc disorder with myelopathy, cervical region     Headache     Neck pain     Carpal tunnel syndrome     Migraine       MEDS:   Discharge Medication List as of 4/20/2018 11:47 AM      CONTINUE these medications which have NOT CHANGED    Details   amLODIPine (NORVASC) 5 MG tablet Take 1 tablet (5 mg) by mouth daily, Disp-90 tablet, R-1, E-Prescribe      ASPIRIN 81 MG PO TABS ONE DAILY, R-3, Historical      atorvastatin (LIPITOR) 20 MG tablet Take 1 tablet (20 mg) by mouth daily, Disp-90 tablet, R-1, E-Prescribe      IBUPROFEN PO Take 400 mg by mouth daily as needed , Historical      lisinopril-hydrochlorothiazide (PRINZIDE/ZESTORETIC) 20-12.5 MG per tablet Take 2 tablets by mouth daily, Disp-180 tablet, R-1, E-Prescribe      Multiple Vitamin (MULTI-VITAMIN DAILY PO) Historical      !! propranolol (INDERAL LA) 120 MG 24 hr capsule Take 1 capsule (120 mg) by mouth daily, Disp-90 capsule, R-1, E-Prescribe      tiZANidine (ZANAFLEX) 4 MG tablet TAKE ONE TO TWO TABLETS BY MOUTH THREE TIMES DAILY AS NEEDED FOR MUSCLE SPASM., Disp-30 tablet, R-3, E-Prescribe      prednisoLONE sodium phosphate (INFLAMASE FORTE) 1 % ophthalmic solution 4 drops in left ear bid, Disp-4 mL, R-1, E-Prescribe      !! propranolol (INDERAL LA) 120 MG 24 hr capsule TAKE TWO CAPSULES BY MOUTH EVERY DAY, Disp-60 capsule, R-5, E-Prescribe       !! - Potential duplicate medications found. Please discuss with provider.          ALLERGIES:    Allergies   Allergen Reactions     No Known Drug Allergies        Past Surgical History:   Procedure Laterality Date     C DISK SURG,ANTER,CERVICAL,SINGLE LVL  10/02/2007    C5-C6 anterior cervical diskectomy & fusion w/plate.     C NONSPECIFIC PROCEDURE      Bilateral  "inguinal hernia repairs     C NONSPECIFIC PROCEDURE      Oral surgery x2     COLONOSCOPY  1/10/2014    Procedure: COMBINED COLONOSCOPY, SINGLE BIOPSY/POLYPECTOMY BY BIOPSY;  colonoscopy with polypectomy by forceps;  Surgeon: Chevy Yang MD;  Location: [facility]     HC REVISE MEDIAN N/CARPAL TUNNEL SURG  2004     HC REVISE MEDIAN N/CARPAL TUNNEL SURG  06    Left     HC TREATMENT INCOMPLETE  SURG, ANY TRIMESTER      D&C after miscarriage       Social History   Substance Use Topics     Smoking status: Former Smoker     Packs/day: 1.50     Years: 20.00     Types: Cigarettes     Quit date: 10/17/2007     Smokeless tobacco: Never Used     Alcohol use Yes      Comment: rare         Review of Systems   Except as noted in HPI, all other systems were reviewed and are negative    Physical Exam     Vitals were reviewed  Patient Vitals for the past 8 hrs:   BP Temp Pulse Heart Rate Resp SpO2 Height Weight   18 1145 144/78 - - - - - - -   18 1115 - - - 66 - 92 % - -   18 1100 - - - 64 - 94 % - -   18 1030 - - - 65 8 94 % - -   18 1015 (!) 175/95 - - 70 15 97 % - -   18 1000 (!) 184/92 - 65 - - 95 % - -   18 0955 - 98  F (36.7  C) - - 16 98 % 1.575 m (5' 2\") 78.9 kg (174 lb)   18 0947 (!) 200/106 - 76 - 16 - - -     GENERAL APPEARANCE: Alert and oriented; patient appears slightly somnolent, but responds appropriately to verbal stimuli  FACE: normal facies  EYES: Pupils are equal  HENT: normal external exam; oropharynx is not injected  NECK: no adenopathy or asymmetry  RESP: normal respiratory effort; clear breath sounds bilaterally  CV: regular rate and rhythm; no significant murmurs, gallops or rubs  ABD: soft, no tenderness; no rebound or guarding; bowel sounds are normal  MS: no gross deformities noted; normal muscle tone.  EXT: No calf tenderness or pitting edema  SKIN: no worrisome rash  NEURO: no facial droop; no focal deficits, speech is normal  PSYCH: " normal mood and affect      Available Lab/Imaging Results     Results for orders placed or performed during the hospital encounter of 04/20/18 (from the past 24 hour(s))   UA reflex to Microscopic   Result Value Ref Range    Color Urine Yellow     Appearance Urine Slightly Cloudy     Glucose Urine Negative NEG^Negative mg/dL    Bilirubin Urine Negative NEG^Negative    Ketones Urine Negative NEG^Negative mg/dL    Specific Gravity Urine 1.004 1.003 - 1.035    Blood Urine Negative NEG^Negative    pH Urine 8.0 (H) 5.0 - 7.0 pH    Protein Albumin Urine Negative NEG^Negative mg/dL    Urobilinogen mg/dL 0.0 0.0 - 2.0 mg/dL    Nitrite Urine Negative NEG^Negative    Leukocyte Esterase Urine Negative NEG^Negative    Source Midstream Urine     RBC Urine 1 0 - 2 /HPF    WBC Urine 4 0 - 5 /HPF    Bacteria Urine Few (A) NEG^Negative /HPF    Squamous Epithelial /HPF Urine <1 0 - 1 /HPF   CBC with platelets differential   Result Value Ref Range    WBC 5.7 4.0 - 11.0 10e9/L    RBC Count 5.10 3.8 - 5.2 10e12/L    Hemoglobin 14.3 11.7 - 15.7 g/dL    Hematocrit 43.3 35.0 - 47.0 %    MCV 85 78 - 100 fl    MCH 28.0 26.5 - 33.0 pg    MCHC 33.0 31.5 - 36.5 g/dL    RDW 14.6 10.0 - 15.0 %    Platelet Count 283 150 - 450 10e9/L    Diff Method Automated Method     % Neutrophils 58.4 %    % Lymphocytes 28.1 %    % Monocytes 8.6 %    % Eosinophils 4.2 %    % Basophils 0.5 %    % Immature Granulocytes 0.2 %    Absolute Neutrophil 3.3 1.6 - 8.3 10e9/L    Absolute Lymphocytes 1.6 0.8 - 5.3 10e9/L    Absolute Monocytes 0.5 0.0 - 1.3 10e9/L    Absolute Eosinophils 0.2 0.0 - 0.7 10e9/L    Absolute Basophils 0.0 0.0 - 0.2 10e9/L    Abs Immature Granulocytes 0.0 0 - 0.4 10e9/L   Comprehensive metabolic panel   Result Value Ref Range    Sodium 140 133 - 144 mmol/L    Potassium 3.0 (L) 3.4 - 5.3 mmol/L    Chloride 101 94 - 109 mmol/L    Carbon Dioxide 32 20 - 32 mmol/L    Anion Gap 7 3 - 14 mmol/L    Glucose 109 (H) 70 - 99 mg/dL    Urea Nitrogen 12 7 -  30 mg/dL    Creatinine 0.50 (L) 0.52 - 1.04 mg/dL    GFR Estimate >90 >60 mL/min/1.7m2    GFR Estimate If Black >90 >60 mL/min/1.7m2    Calcium 10.0 8.5 - 10.1 mg/dL    Bilirubin Total 0.5 0.2 - 1.3 mg/dL    Albumin 4.3 3.4 - 5.0 g/dL    Protein Total 8.4 6.8 - 8.8 g/dL    Alkaline Phosphatase 73 40 - 150 U/L    ALT 41 0 - 50 U/L    AST 24 0 - 45 U/L   Troponin I   Result Value Ref Range    Troponin I ES <0.015 0.000 - 0.045 ug/L   Nt probnp inpatient (BNP)   Result Value Ref Range    N-Terminal Pro BNP Inpatient 100 0 - 900 pg/mL   XR Chest 2 Views    Narrative    XR CHEST 2 VW 4/20/2018 10:50 AM    HISTORY: Chest pain.    COMPARISON: 11/24/2015    FINDINGS: No airspace consolidation, pleural effusion or pneumothorax.  Normal heart size.      Impression    IMPRESSION: No acute cardiopulmonary abnormality.    BRINDA COWAN MD         EKG reviewed by me: Normal sinus rhythm with heart rate of 70.  Normal TN, QT and QRS intervals.  Normal axis.  There are nonspecific T-wave abnormalities.  There are inverted T waves in leads V3 through V5.  Compared with previous EKG, the T waves are inverted in leads V3 and V4 impression: Nonspecific T-wave abnormality.  No acute ST wave changes.        Impression     Final diagnoses:   Acute bronchitis   Atypical chest pain   Hypertension, poor control       ED Course & Medical Decision Making   Janice Ulloa is a 57 year old female who presented to the emergency department with atypical left-sided chest pain that she described as sharp with radiation into the left shoulder and arm.  This is in the context of a chest cold with a cough that is occasionally productive of cream-colored sputum.  There has been no hemoptysis, fever, chills, etc.  Patient was seen shortly after arrival.  Vital signs reveal an elbow pressure 200/106, with follow-up blood pressure 124/92.  Temp is 98 , heart rate of 65-76, with 98% oxygen saturation.  EKG was nondiagnostic but no signs of acute ischemia.   The patient's workup reveals a normal CBC, BMP, CBC, and comprehensive metabolic panel.  The patient's workup reveals a normal CBC, and comprehensive metabolic panel with exception of a nonfasting glucose of 109, normal troponin, and BNP.  The patient has clinical signs of acute bronchitis and will begin Zithromax.  Her blood pressure should be closely monitored and followed up with her primary care provider within the next week.  Follow-up also on an outpatient basis to discuss scheduling an outpatient cardiac stress test.  Return to the ED at any time if symptoms worsen.      Written after-visit summary and instructions were given at the time of discharge.      Discharge Medication List as of 4/20/2018 11:47 AM      START taking these medications    Details   azithromycin (ZITHROMAX Z-HANSEL) 250 MG tablet 500 MG day 1, then 250 MG daily for 4 more days, Disp-6 tablet, R-0, E-Prescribe               This note was completed in part using Dragon voice recognition, and may contain word and grammatical errors.        Jesu Fields MD  04/20/18 5800

## 2018-04-20 NOTE — DISCHARGE INSTRUCTIONS
Thank you for giving us the opportunity to see you. The impression is that you have acute bronchitis with atypical chest pain and poorly controlled hypertension.    Continue with her blood pressure medications.  Follow-up with Anisha Alfaro next week.  We are trying to work in an appointment for you.    Begin Zithromax and take as directed for 5 days.  Drink plenty of fluids and rest.  Take ibuprofen/Tylenol as needed for pain.    You will likely need adjustment of your blood pressure medications, and an outpatient cardiac stress test.    After discharge, please closely monitor for any new or worsening symptoms. Return to the Emergency Department at any time if your symptoms worsen.        Bronchitis, Antibiotic Treatment (Adult)    Bronchitis is an infection of the air passages (bronchial tubes) in your lungs. It often occurs when you have a cold. This illness is contagious during the first few days and is spread through the air by coughing and sneezing, or by direct contact (touching the sick person and then touching your own eyes, nose, or mouth).  Symptoms of bronchitis include cough with mucus (phlegm) and low-grade fever. Bronchitis usually lasts 7 to 14 days. Mild cases can be treated with simple home remedies. More severe infection is treated with an antibiotic.  Home care  Follow these guidelines when caring for yourself at home:    If your symptoms are severe, rest at home for the first 2 to 3 days. When you go back to your usual activities, don't let yourself get too tired.    Do not smoke. Also avoid being exposed to secondhand smoke.    You may use over-the-counter medicines to control fever or pain, unless another medicine was prescribed. If you have chronic liver or kidney disease or have ever had a stomach ulcer or gastrointestinal bleeding, talk with your healthcare provider before using these medicines. Also talk to your provider if you are taking medicine to prevent blood clots. Aspirin should  never be given to anyone younger than 18 years of age who is ill with a viral infection or fever. It may cause severe liver or brain damage.    Your appetite may be poor, so a light diet is fine. Avoid dehydration by drinking 6 to 8 glasses of fluids per day (such as water, soft drinks, sports drinks, juices, tea, or soup). Extra fluids will help loosen secretions in the nose and lungs.    Over-the-counter cough, cold, and sore-throat medicines will not shorten the length of the illness, but they may be helpful to reduce symptoms. (Note: Do not use decongestants if you have high blood pressure.)    Finish all antibiotic medicine. Do this even if you are feeling better after only a few days.  Follow-up care  Follow up with your healthcare provider, or as advised. If you had an X-ray or ECG (electrocardiogram), a specialist will review it. You will be notified of any new findings that may affect your care.  If you are age 65 or older, or if you have a chronic lung disease or condition that affects your immune system, or you smoke, ask your healthcare provider about getting a pneumococcal vaccine and a yearly flu shot (influenza vaccine).  When to seek medical advice  Call your healthcare provider right away if any of these occur:    Fever of 100.4 F (38 C) or higher, or as directed by your healthcare provider    Coughing up increased amounts of colored sputum    Weakness, drowsiness, headache, facial pain, ear pain, or a stiff neck  Call 911  Call 911 if any of these occur.    Coughing up blood    Worsening weakness, drowsiness, headache, or stiff neck    Trouble breathing, wheezing, or pain with breathing  Date Last Reviewed: 9/13/2015 2000-2017 [employer]. [address] [place] [address]. All rights reserved. This information is not intended as a substitute for professional medical care. Always follow your healthcare professional's instructions.

## 2018-04-20 NOTE — ED AVS SNAPSHOT
[facility] Emergency Department    [address] [place] [address]    Phone:  [phone]    Fax:  [phone]                                       Janice Ulloa   MRN: [id number]    Department:  [facility] Emergency Department   Date of Visit:  4/20/2018           After Visit Summary Signature Page     I have received my discharge instructions, and my questions have been answered. I have discussed any challenges I see with this plan with the nurse or doctor.    ..........................................................................................................................................  Patient/Patient Representative Signature      ..........................................................................................................................................  Patient Representative Print Name and Relationship to Patient    ..................................................               ................................................  Date                                            Time    ..........................................................................................................................................  Reviewed by Signature/Title    ...................................................              ..............................................  Date                                                            Time

## 2018-04-20 NOTE — ED NOTES
This nurse taking over care of patient. Patient states she is still having that off and on left sided chest pain. She is comfortable at this time, she has her call light and blood pressure cuff and cardiac monitor was in place.

## 2018-04-23 NOTE — PROGRESS NOTES
SUBJECTIVE:   Janice Ulloa is a 57 year old female who presents to clinic today for the following health issues:  Patient no longer using prednisolone drops. Please remove if appropriate.    The 10-year ASCVD risk score ([place] MELINDA Jr, et al., 2013) is: 4.3%    Values used to calculate the score:      Age: 57 years      Sex: Female      Is Non- : No      Diabetic: No      Tobacco smoker: No      Systolic Blood Pressure: 144 mmHg      Is BP treated: Yes      HDL Cholesterol: 45 mg/dL      Total Cholesterol: 156 mg/dL  Patient is eligible for use of low-dose aspirin for primary prevention of heart attack and stroke.  Provider has discussed aspirin with patient and our decision was:     Prescribe:  Daily low-dose aspirin recommended for primary prevention, patient agrees with plan.        HPI  ED/UC Followup:    Facility:  [facility]  Date of visit: 4/20/2018  Reason for visit: Chest pain  Current Status: Patient still has pain on left side of chest, but she thinks it is from daily activity - grabbing, lifting, and pulling at work. Patient works at restaurant in kitchen. Pain is 4/10. Pain bothers her when she is trying to sleep on right side.  It also seems to be worse after work.  Does not seem to increase with exertion.  He is tried some ibuprofen and ice which helps for a short time but does not have lasting effect.  She does do some range of motion exercises and stretching also helps a little bit but not for any length of time.  She went to the ER because her blood pressure was so high and she could hear pounding in her ears her blood pressure was 200/100 upon arrival.  She could feel the pain started in her left lateral chest radiates into her shoulder and down her arm.  Her father did have an MA at the age of 47 that was fatal.  Her cardiac workup in the ER was negative.  She was clinically diagnosed with bronchitis and started on Zithromax.  She states she does not have a cough  "she will cough on occasion in her chest just sounds congested.  She is not sure that it helped.  Continues to have her extreme fatigue.  We recently did a laboratory workup for this without any obvious reasons for her fatigue     Answers for HPI/ROS submitted by the patient on 4/25/2018   If you checked off any problems, how difficult have these problems made it for you to do your work, take care of things at home, or get along with other people?: Not difficult at all  PHQ9 TOTAL SCORE: 4    Problem list and histories reviewed & adjusted, as indicated.  Additional history: as documented        BP Readings from Last 3 Encounters:   04/25/18 122/84   04/20/18 144/78   03/15/18 126/80    Wt Readings from Last 3 Encounters:   04/25/18 171 lb 11.2 oz (77.9 kg)   04/20/18 174 lb (78.9 kg)   03/15/18 176 lb (79.8 kg)                  Labs reviewed in EPIC    ROS:  CONSTITUTIONAL: NEGATIVE for fever, chills, change in weight  ENT/MOUTH: NEGATIVE for ear, mouth and throat problems  RESP: NEGATIVE for significant cough or SOB  BREAST: We recently did a diagnostic mammogram and ultrasound which did not indicate any abnormalities  CV: Continued left chest wall pain  GI: NEGATIVE for nausea, abdominal pain, heartburn, or change in bowel habits  MUSCULOSKELETAL: Pain in her left chest wall extending into her left shoulder and neck  ENDOCRINE: Fatigue  Psych- See PHQ 9 and CHELO 7 questionnaires for symptoms.  She really has no feelings of depression per patient  OBJECTIVE:     /84 (BP Location: Right arm, Patient Position: Sitting, Cuff Size: Adult Regular)  Pulse 60  Temp 98.8  F (37.1  C) (Temporal)  Resp 12  Ht 5' 1.3\" (1.557 m)  Wt 171 lb 11.2 oz (77.9 kg)  LMP 08/19/2013  BMI 32.13 kg/m2  Body mass index is 32.13 kg/(m^2).  GENERAL: healthy, alert and no distress  NECK: no adenopathy, no asymmetry, masses, or scars and thyroid normal to palpation  RESP: lungs scattered wheeze throught - no rales, rhonchi   CV: " regular rate and rhythm, normal S1 S2, no S3 or S4, no murmur, click or rub, no peripheral edema and peripheral pulses strong  MS: TTP over left lateral chest wall, left trap muscle  NEURO: Normal strength and tone, mentation intact and speech normal  PSYCH: mentation appears normal, affect normal/bright    Diagnostic Test Results:  Results for orders placed or performed during the hospital encounter of 04/20/18   XR Chest 2 Views    Narrative    XR CHEST 2 VW 4/20/2018 10:50 AM    HISTORY: Chest pain.    COMPARISON: 11/24/2015    FINDINGS: No airspace consolidation, pleural effusion or pneumothorax.  Normal heart size.      Impression    IMPRESSION: No acute cardiopulmonary abnormality.    BRINDA COWAN MD   CBC with platelets differential   Result Value Ref Range    WBC 5.7 4.0 - 11.0 10e9/L    RBC Count 5.10 3.8 - 5.2 10e12/L    Hemoglobin 14.3 11.7 - 15.7 g/dL    Hematocrit 43.3 35.0 - 47.0 %    MCV 85 78 - 100 fl    MCH 28.0 26.5 - 33.0 pg    MCHC 33.0 31.5 - 36.5 g/dL    RDW 14.6 10.0 - 15.0 %    Platelet Count 283 150 - 450 10e9/L    Diff Method Automated Method     % Neutrophils 58.4 %    % Lymphocytes 28.1 %    % Monocytes 8.6 %    % Eosinophils 4.2 %    % Basophils 0.5 %    % Immature Granulocytes 0.2 %    Absolute Neutrophil 3.3 1.6 - 8.3 10e9/L    Absolute Lymphocytes 1.6 0.8 - 5.3 10e9/L    Absolute Monocytes 0.5 0.0 - 1.3 10e9/L    Absolute Eosinophils 0.2 0.0 - 0.7 10e9/L    Absolute Basophils 0.0 0.0 - 0.2 10e9/L    Abs Immature Granulocytes 0.0 0 - 0.4 10e9/L   Comprehensive metabolic panel   Result Value Ref Range    Sodium 140 133 - 144 mmol/L    Potassium 3.0 (L) 3.4 - 5.3 mmol/L    Chloride 101 94 - 109 mmol/L    Carbon Dioxide 32 20 - 32 mmol/L    Anion Gap 7 3 - 14 mmol/L    Glucose 109 (H) 70 - 99 mg/dL    Urea Nitrogen 12 7 - 30 mg/dL    Creatinine 0.50 (L) 0.52 - 1.04 mg/dL    GFR Estimate >90 >60 mL/min/1.7m2    GFR Estimate If Black >90 >60 mL/min/1.7m2    Calcium 10.0 8.5 - 10.1 mg/dL     Bilirubin Total 0.5 0.2 - 1.3 mg/dL    Albumin 4.3 3.4 - 5.0 g/dL    Protein Total 8.4 6.8 - 8.8 g/dL    Alkaline Phosphatase 73 40 - 150 U/L    ALT 41 0 - 50 U/L    AST 24 0 - 45 U/L   Troponin I   Result Value Ref Range    Troponin I ES <0.015 0.000 - 0.045 ug/L   Nt probnp inpatient (BNP)   Result Value Ref Range    N-Terminal Pro BNP Inpatient 100 0 - 900 pg/mL   UA reflex to Microscopic   Result Value Ref Range    Color Urine Yellow     Appearance Urine Slightly Cloudy     Glucose Urine Negative NEG^Negative mg/dL    Bilirubin Urine Negative NEG^Negative    Ketones Urine Negative NEG^Negative mg/dL    Specific Gravity Urine 1.004 1.003 - 1.035    Blood Urine Negative NEG^Negative    pH Urine 8.0 (H) 5.0 - 7.0 pH    Protein Albumin Urine Negative NEG^Negative mg/dL    Urobilinogen mg/dL 0.0 0.0 - 2.0 mg/dL    Nitrite Urine Negative NEG^Negative    Leukocyte Esterase Urine Negative NEG^Negative    Source Midstream Urine     RBC Urine 1 0 - 2 /HPF    WBC Urine 4 0 - 5 /HPF    Bacteria Urine Few (A) NEG^Negative /HPF    Squamous Epithelial /HPF Urine <1 0 - 1 /HPF       ASSESSMENT/PLAN:         1. Left-sided chest wall pain  We will further evaluate her fatigue and left-sided chest pain with a stress echocardiogram particularly in light of her history of premature heart disease  - Exercise Stress Echocardiogram; Future    2. Hypertension goal BP (blood pressure) < 140/90  Currently at goal continue current meds    3. Bronchospasm  Albuterol inhaler prescribed for her wheezing  - albuterol (PROAIR HFA/PROVENTIL HFA/VENTOLIN HFA) 108 (90 Base) MCG/ACT Inhaler; Inhale 2 puffs into the lungs every 6 hours as needed for shortness of breath / dyspnea or wheezing  Dispense: 1 Inhaler; Refill: 0    4. Family history of premature CAD    - Exercise Stress Echocardiogram; Future    Follow-up as needed after her tests, I offered physical therapy for her left shoulder and neck pain she will consider this    Anisha Alfaro,  SAURABH  [facility]  Electronically signed by Anisha Alfaro PA-C

## 2018-04-25 ENCOUNTER — OFFICE VISIT (OUTPATIENT)
Dept: FAMILY MEDICINE | Facility: OTHER | Age: 58
End: 2018-04-25
Payer: COMMERCIAL

## 2018-04-25 VITALS
WEIGHT: 171.7 LBS | HEART RATE: 60 BPM | TEMPERATURE: 98.8 F | RESPIRATION RATE: 12 BRPM | DIASTOLIC BLOOD PRESSURE: 84 MMHG | HEIGHT: 61 IN | BODY MASS INDEX: 32.42 KG/M2 | SYSTOLIC BLOOD PRESSURE: 122 MMHG

## 2018-04-25 DIAGNOSIS — J98.01 BRONCHOSPASM: ICD-10-CM

## 2018-04-25 DIAGNOSIS — R07.89 LEFT-SIDED CHEST WALL PAIN: Primary | ICD-10-CM

## 2018-04-25 DIAGNOSIS — I10 HYPERTENSION GOAL BP (BLOOD PRESSURE) < 140/90: ICD-10-CM

## 2018-04-25 DIAGNOSIS — Z82.49 FAMILY HISTORY OF PREMATURE CAD: ICD-10-CM

## 2018-04-25 PROCEDURE — 99214 OFFICE O/P EST MOD 30 MIN: CPT | Performed by: PHYSICIAN ASSISTANT

## 2018-04-25 RX ORDER — ALBUTEROL SULFATE 90 UG/1
2 AEROSOL, METERED RESPIRATORY (INHALATION) EVERY 6 HOURS PRN
Qty: 1 INHALER | Refills: 0 | Status: SHIPPED | OUTPATIENT
Start: 2018-04-25 | End: 2019-03-18

## 2018-04-25 ASSESSMENT — PATIENT HEALTH QUESTIONNAIRE - PHQ9
SUM OF ALL RESPONSES TO PHQ QUESTIONS 1-9: 4
SUM OF ALL RESPONSES TO PHQ QUESTIONS 1-9: 4
10. IF YOU CHECKED OFF ANY PROBLEMS, HOW DIFFICULT HAVE THESE PROBLEMS MADE IT FOR YOU TO DO YOUR WORK, TAKE CARE OF THINGS AT HOME, OR GET ALONG WITH OTHER PEOPLE: NOT DIFFICULT AT ALL

## 2018-04-25 ASSESSMENT — PAIN SCALES - GENERAL: PAINLEVEL: MODERATE PAIN (4)

## 2018-04-25 NOTE — MR AVS SNAPSHOT
After Visit Summary   4/25/2018    Janice Ulloa    MRN: [id number]           Patient Information     Date Of Birth          1960        Visit Information        Provider Department      4/25/2018 11:00 AM Anisha Alfaro PA-C [facility]        Today's Diagnoses     Left-sided chest wall pain    -  1    Hypertension goal BP (blood pressure) < 140/90        Bronchospasm        Family history of premature CAD           Follow-ups after your visit        Your next 10 appointments already scheduled     May 07, 2018  9:00 AM CDT   Ech Stress Test with PHECHR2   [facility] Echocardiography ([facility])    [address] [place] [address]   [phone]           1. Please bring or wear a comfortable two-piece outfit and walking shoes. 2. Stop eating 3 hours before the test. You may drink water or juice. 3. Stop all caffeine 12 hours before the test. This includes coffee, tea, soda pop, chocolate and certain medicines (such as Anacin and Excederin). Also avoid decaf coffee and tea, as these contain small amounts of caffeine. 4. No alcohol, smoking or use of other tobacco products for 12 hours before the test. 5. Refer to your provider instructions to see if you need to stop any medications (such as beta-blockers or nitrates) for this test. 6. For patients with diabetes: - If you take insulin, call your diabetes care team. Ask if you should take a   dose the morning of your test. - If you take diabetes medicine by mouth, dont take it on the morning of your test. Bring it with you to take after the test. (If you have questions, call your diabetes care team) 7. When you arrive, please tell us if: - You have diabetes. - You have taken Viagra, Cialis or Levitra in the past 48 hours. 8. For any questions that cannot be answered, please contact the ordering physician              Future tests that were ordered for you today     Open Future Orders         "Priority Expected Expires Ordered    Exercise Stress Echocardiogram Routine  2019            Who to contact     If you have questions or need follow up information about today's clinic visit or your schedule please contact [facility] directly at [phone].  Normal or non-critical lab and imaging results will be communicated to you by MyChart, letter or phone within 4 business days after the clinic has received the results. If you do not hear from us within 7 days, please contact the clinic through MyChart or phone. If you have a critical or abnormal lab result, we will notify you by phone as soon as possible.  Submit refill requests through [employer] or call your pharmacy and they will forward the refill request to us. Please allow 3 business days for your refill to be completed.          Additional Information About Your Visit        MyChart Information     [employer] lets you send messages to your doctor, view your test results, renew your prescriptions, schedule appointments and more. To sign up, go to [email or web address] . Click on \"Log in\" on the left side of the screen, which will take you to the Welcome page. Then click on \"Sign up Now\" on the right side of the page.     You will be asked to enter the access code listed below, as well as some personal information. Please follow the directions to create your username and password.     Your access code is: [email or web address]  Expires: 2018 10:54 AM     Your access code will  in 90 days. If you need help or a new code, please call your [place] clinic or [phone].        Care EveryWhere ID     This is your Care EveryWhere ID. This could be used by other organizations to access your [place] medical records  [id number]        Your Vitals Were     Pulse Temperature Respirations Height Last Period BMI (Body Mass Index)    60 98.8  F (37.1  C) (Temporal) 12 5' 1.3\" (1.557 m) 2013 32.13 kg/m2       Blood Pressure from " Last 3 Encounters:   04/25/18 122/84   04/20/18 144/78   03/15/18 126/80    Weight from Last 3 Encounters:   04/25/18 171 lb 11.2 oz (77.9 kg)   04/20/18 174 lb (78.9 kg)   03/15/18 176 lb (79.8 kg)                 Today's Medication Changes          These changes are accurate as of 4/25/18 11:30 AM.  If you have any questions, ask your nurse or doctor.               Start taking these medicines.        Dose/Directions    albuterol 108 (90 Base) MCG/ACT Inhaler   Commonly known as:  PROAIR HFA/PROVENTIL HFA/VENTOLIN HFA   Used for:  Bronchospasm   Started by:  Anisha Alfaro PA-C        Dose:  2 puff   Inhale 2 puffs into the lungs every 6 hours as needed for shortness of breath / dyspnea or wheezing   Quantity:  1 Inhaler   Refills:  0            Where to get your medicines      These medications were sent [address]  [address] [facility] [address]     Phone:  [phone]     albuterol 108 (90 Base) MCG/ACT Inhaler                Primary Care Provider Office Phone # Fax #    Anisha Alfaro PA-C [phone] [address] [place] [address]        Equal Access to Services     [facility]: Hadii chirag thorne hadasho Soomaali, waaxda luqadaha, qaybta kaalmada adeegyada, kleber mehta haysumaya dorman . So [facility] [phone].    ATENCIÓN: Si habla español, tiene a larios disposición servicios gratuitos de asistencia lingüística. Llame al [phone].    We comply with applicable federal civil rights laws and [place] laws. We do not discriminate on the basis of race, color, national origin, age, disability, sex, sexual orientation, or gender identity.            Thank you!     Thank you for choosing [facility]  for your care. Our goal is always to provide you with excellent care. Hearing back from our patients is one way we can continue to improve our services. Please take a few minutes to complete the written survey that you may  receive in the mail after your visit with us. Thank you!             Your Updated Medication List - Protect others around you: Learn how to safely use, store and throw away your medicines at www.disposemymeds.org.          This list is accurate as of 4/25/18 11:30 AM.  Always use your most recent med list.                   Brand Name Dispense Instructions for use Diagnosis    albuterol 108 (90 Base) MCG/ACT Inhaler    PROAIR HFA/PROVENTIL HFA/VENTOLIN HFA    1 Inhaler    Inhale 2 puffs into the lungs every 6 hours as needed for shortness of breath / dyspnea or wheezing    Bronchospasm       amLODIPine 5 MG tablet    NORVASC    90 tablet    Take 1 tablet (5 mg) by mouth daily    Hypertension goal BP (blood pressure) < 140/90       aspirin 81 MG tablet      ONE DAILY        atorvastatin 20 MG tablet    LIPITOR    90 tablet    Take 1 tablet (20 mg) by mouth daily    Hyperlipidemia with target LDL less than 130       IBUPROFEN PO      Take 400 mg by mouth daily as needed        lisinopril-hydrochlorothiazide 20-12.5 MG per tablet    PRINZIDE/ZESTORETIC    180 tablet    Take 2 tablets by mouth daily    Essential hypertension with goal blood pressure less than 140/90       MULTI-VITAMIN DAILY PO           * propranolol 120 MG 24 hr capsule    INDERAL LA    90 capsule    Take 1 capsule (120 mg) by mouth daily    Essential hypertension with goal blood pressure less than 140/90       * propranolol 120 MG 24 hr capsule    INDERAL LA    60 capsule    TAKE TWO CAPSULES BY MOUTH EVERY DAY    Essential hypertension with goal blood pressure less than 140/90       tiZANidine 4 MG tablet    ZANAFLEX    30 tablet    TAKE ONE TO TWO TABLETS BY MOUTH THREE TIMES DAILY AS NEEDED FOR MUSCLE SPASM.    Neck pain       * Notice:  This list has 2 medication(s) that are the same as other medications prescribed for you. Read the directions carefully, and ask your doctor or other care provider to review them with you.

## 2018-04-26 ASSESSMENT — PATIENT HEALTH QUESTIONNAIRE - PHQ9: SUM OF ALL RESPONSES TO PHQ QUESTIONS 1-9: 4

## 2018-05-07 ENCOUNTER — HOSPITAL ENCOUNTER (OUTPATIENT)
Dept: CARDIOLOGY | Facility: CLINIC | Age: 58
Discharge: HOME | End: 2018-05-07
Attending: PHYSICIAN ASSISTANT | Admitting: PHYSICIAN ASSISTANT
Payer: COMMERCIAL

## 2018-05-07 DIAGNOSIS — R07.89 LEFT-SIDED CHEST WALL PAIN: ICD-10-CM

## 2018-05-07 DIAGNOSIS — Z82.49 FAMILY HISTORY OF PREMATURE CAD: ICD-10-CM

## 2018-05-07 PROCEDURE — 93325 DOPPLER ECHO COLOR FLOW MAPG: CPT | Mod: TC

## 2018-05-07 PROCEDURE — 93350 STRESS TTE ONLY: CPT | Mod: 26 | Performed by: INTERNAL MEDICINE

## 2018-05-07 PROCEDURE — 25500064 ZZH RX 255 OP 636: Performed by: PHYSICIAN ASSISTANT

## 2018-05-07 PROCEDURE — 93325 DOPPLER ECHO COLOR FLOW MAPG: CPT | Mod: 26 | Performed by: INTERNAL MEDICINE

## 2018-05-07 PROCEDURE — 93016 CV STRESS TEST SUPVJ ONLY: CPT | Performed by: INTERNAL MEDICINE

## 2018-05-07 PROCEDURE — 93018 CV STRESS TEST I&R ONLY: CPT | Performed by: INTERNAL MEDICINE

## 2018-05-07 PROCEDURE — 93321 DOPPLER ECHO F-UP/LMTD STD: CPT | Mod: 26 | Performed by: INTERNAL MEDICINE

## 2018-05-07 PROCEDURE — 93017 CV STRESS TEST TRACING ONLY: CPT | Performed by: REHABILITATION PRACTITIONER

## 2018-05-07 RX ADMIN — HUMAN ALBUMIN MICROSPHERES AND PERFLUTREN 3 ML: 10; .22 INJECTION, SOLUTION INTRAVENOUS at 09:25

## 2018-05-08 ENCOUNTER — TELEPHONE (OUTPATIENT)
Dept: FAMILY MEDICINE | Facility: OTHER | Age: 58
End: 2018-05-08

## 2018-05-08 DIAGNOSIS — R53.83 FATIGUE, UNSPECIFIED TYPE: Primary | ICD-10-CM

## 2018-05-08 DIAGNOSIS — Z82.49 FAMILY HISTORY OF PREMATURE CORONARY HEART DISEASE: ICD-10-CM

## 2018-05-08 NOTE — TELEPHONE ENCOUNTER
Message given to patient who stated her insurance is not very good. She will call back at another time to schedule.  Vonnie Cam CMA ([facility])

## 2018-05-08 NOTE — TELEPHONE ENCOUNTER
Please call patient, I received the results of her stress test.  The results were inconclusive because she was unable to exercise long enough for it to be a valid test.  Due to this fact and her family history of early heart disease, I would like her to see a cardiologist in the office to discuss her symptoms to see if they recommend further evaluation.  Please help her to schedule this.  Anisha Alfaro PA-C

## 2018-06-28 DIAGNOSIS — M54.2 NECK PAIN: ICD-10-CM

## 2018-06-28 NOTE — TELEPHONE ENCOUNTER
Tizanidine    Routing refill request to provider for review/approval because:  Drug not on the [facility] refill protocol     Deana Chairez RN, BSN

## 2018-08-17 DIAGNOSIS — E78.5 HYPERLIPIDEMIA WITH TARGET LDL LESS THAN 130: ICD-10-CM

## 2018-08-20 RX ORDER — ATORVASTATIN CALCIUM 20 MG/1
TABLET, FILM COATED ORAL
Qty: 90 TABLET | Refills: 1 | Status: SHIPPED | OUTPATIENT
Start: 2018-08-20 | End: 2018-09-19

## 2018-08-20 NOTE — TELEPHONE ENCOUNTER
Lipitor:  Prescription approved per [facility] Refill Protocol.    Janis Andersen, RN, BSN

## 2018-08-30 DIAGNOSIS — I10 ESSENTIAL HYPERTENSION WITH GOAL BLOOD PRESSURE LESS THAN 140/90: ICD-10-CM

## 2018-08-30 NOTE — TELEPHONE ENCOUNTER
Lisinopril-hydrochlorothiazide    Routing refill request to provider for review/approval because:  Labs out of range:  Creatinine and Potassium    Deana Chairez RN, BSN

## 2018-09-04 RX ORDER — LISINOPRIL AND HYDROCHLOROTHIAZIDE 12.5; 2 MG/1; MG/1
TABLET ORAL
Qty: 60 TABLET | Refills: 0 | Status: SHIPPED | OUTPATIENT
Start: 2018-09-04 | End: 2018-09-19

## 2018-09-14 NOTE — PROGRESS NOTES
SUBJECTIVE:   Janice Ulloa is a 58 year old female who presents to clinic today for the following health issues:      History of Present Illness     Hypertension:     Outpatient blood pressures:  Are not being checked    Dietary sodium intake::  Other    Diet:  Other  Frequency of exercise:  2-3 days/week  Duration of exercise:  30-45 minutes  Taking medications regularly:  Yes  Medication side effects:  None  Additional concerns today:  No      Problem list and histories reviewed & adjusted, as indicated.  Additional history: states she also needs her lipids filled , labs are UTD    She wonders if it is okay to use a muscle relaxer every night, it does help with the muscle spasm in her neck so she can sleep better.  It makes her drowsy so she sleeps better also.        BP Readings from Last 3 Encounters:   09/19/18 120/78   04/25/18 122/84   04/20/18 144/78    Wt Readings from Last 3 Encounters:   09/19/18 167 lb 8 oz (76 kg)   04/25/18 171 lb 11.2 oz (77.9 kg)   04/20/18 174 lb (78.9 kg)                  Labs reviewed in EPIC    ROS:  CONSTITUTIONAL: NEGATIVE for fever, chills, change in weight  ENT/MOUTH: NEGATIVE for ear, mouth and throat problems  RESP: NEGATIVE for significant cough or SOB  CV: NEGATIVE for chest pain, palpitations or peripheral edema, she is no longer having CP, her level of fatigue has normalized   GI: NEGATIVE for nausea, abdominal pain, heartburn, or change in bowel habits  PSYCHIATRIC: NEGATIVE for changes in mood or affect    OBJECTIVE:     /78  Pulse 64  Temp 96.8  F (36  C) (Temporal)  Resp 12  Wt 167 lb 8 oz (76 kg)  LMP 08/19/2013  BMI 31.34 kg/m2  Body mass index is 31.34 kg/(m^2).  GENERAL: healthy, alert and no distress  NECK: no adenopathy, no asymmetry, masses, or scars and thyroid normal to palpation  RESP: lungs clear to auscultation - no rales, rhonchi or wheezes  CV: regular rate and rhythm, normal S1 S2, no S3 or S4, no murmur, click or rub, no peripheral  edema and peripheral pulses strong  ABDOMEN: soft, nontender, no hepatosplenomegaly, no masses and bowel sounds normal  MS: no gross musculoskeletal defects noted, no edema  NEURO: Normal strength and tone, mentation intact and speech normal  PSYCH: mentation appears normal, affect normal/bright more so than typical for her     Diagnostic Test Results:  No results found for this or any previous visit (from the past 24 hour(s)).    ASSESSMENT/PLAN:         1. Hypertension goal BP (blood pressure) < 140/90  At goal continue current meds  - amLODIPine (NORVASC) 5 MG tablet; Take 1 tablet (5 mg) by mouth daily  Dispense: 90 tablet; Refill: 1  - Basic metabolic panel    2. Hyperlipidemia with target LDL less than 130  Labs up-to-date continue current meds  - atorvastatin (LIPITOR) 20 MG tablet; Take 1 tablet (20 mg) by mouth daily  Dispense: 90 tablet; Refill: 3    3. Essential hypertension with goal blood pressure less than 140/90  At goal  - lisinopril-hydrochlorothiazide (PRINZIDE/ZESTORETIC) 20-12.5 MG per tablet; TAKE TWO TABLETS BY MOUTH EVERY DAY  Dispense: 180 tablet; Refill: 1  - propranolol (INDERAL LA) 120 MG 24 hr capsule; TAKE TWO CAPSULES BY MOUTH EVERY DAY  Dispense: 180 capsule; Refill: 1  - Basic metabolic panel    4. Neck pain  Patient uses nightly it helps her sleep and improves her neck pain  - tiZANidine (ZANAFLEX) 4 MG tablet; TAKE ONE TO TWO TABLETS AS NEEDED FOR MUSCLE SPASM at bedtime  Dispense: 90 tablet; Refill: 3    5. Need for prophylactic vaccination and inoculation against influenza  Updated  - FLU VACCINE, SPLIT VIRUS, IM (QUADRIVALENT) [00038]- >3 YRS  - Vaccine Administration, Initial [32658]    This chart documentation was completed in part with Dragon voice recognition software.  Documentation is reviewed after completion, however, some words and grammatical errors may remain.  SAURABH Sanderson PA-C  [facility]  Electronically signed by Anisha  Dominic WILEY

## 2018-09-19 ENCOUNTER — OFFICE VISIT (OUTPATIENT)
Dept: FAMILY MEDICINE | Facility: OTHER | Age: 58
End: 2018-09-19
Payer: COMMERCIAL

## 2018-09-19 VITALS
BODY MASS INDEX: 31.34 KG/M2 | DIASTOLIC BLOOD PRESSURE: 78 MMHG | SYSTOLIC BLOOD PRESSURE: 120 MMHG | HEART RATE: 64 BPM | RESPIRATION RATE: 12 BRPM | TEMPERATURE: 96.8 F | WEIGHT: 167.5 LBS

## 2018-09-19 DIAGNOSIS — E78.5 HYPERLIPIDEMIA WITH TARGET LDL LESS THAN 130: ICD-10-CM

## 2018-09-19 DIAGNOSIS — I10 ESSENTIAL HYPERTENSION WITH GOAL BLOOD PRESSURE LESS THAN 140/90: ICD-10-CM

## 2018-09-19 DIAGNOSIS — Z23 NEED FOR PROPHYLACTIC VACCINATION AND INOCULATION AGAINST INFLUENZA: Primary | ICD-10-CM

## 2018-09-19 DIAGNOSIS — I10 HYPERTENSION GOAL BP (BLOOD PRESSURE) < 140/90: ICD-10-CM

## 2018-09-19 DIAGNOSIS — M54.2 NECK PAIN: ICD-10-CM

## 2018-09-19 LAB
ANION GAP SERPL CALCULATED.3IONS-SCNC: 5 MMOL/L (ref 3–14)
BUN SERPL-MCNC: 8 MG/DL (ref 7–30)
CALCIUM SERPL-MCNC: 9.4 MG/DL (ref 8.5–10.1)
CHLORIDE SERPL-SCNC: 99 MMOL/L (ref 94–109)
CO2 SERPL-SCNC: 35 MMOL/L (ref 20–32)
CREAT SERPL-MCNC: 0.52 MG/DL (ref 0.52–1.04)
GFR SERPL CREATININE-BSD FRML MDRD: >90 ML/MIN/1.7M2
GLUCOSE SERPL-MCNC: 70 MG/DL (ref 70–99)
POTASSIUM SERPL-SCNC: 3.3 MMOL/L (ref 3.4–5.3)
SODIUM SERPL-SCNC: 139 MMOL/L (ref 133–144)

## 2018-09-19 PROCEDURE — 99214 OFFICE O/P EST MOD 30 MIN: CPT | Mod: 25 | Performed by: PHYSICIAN ASSISTANT

## 2018-09-19 PROCEDURE — 36415 COLL VENOUS BLD VENIPUNCTURE: CPT | Performed by: PHYSICIAN ASSISTANT

## 2018-09-19 PROCEDURE — 90471 IMMUNIZATION ADMIN: CPT | Performed by: PHYSICIAN ASSISTANT

## 2018-09-19 PROCEDURE — 90686 IIV4 VACC NO PRSV 0.5 ML IM: CPT | Performed by: PHYSICIAN ASSISTANT

## 2018-09-19 PROCEDURE — 80048 BASIC METABOLIC PNL TOTAL CA: CPT | Performed by: PHYSICIAN ASSISTANT

## 2018-09-19 RX ORDER — PROPRANOLOL HYDROCHLORIDE 120 MG/1
CAPSULE, EXTENDED RELEASE ORAL
Qty: 180 CAPSULE | Refills: 1 | Status: SHIPPED | OUTPATIENT
Start: 2018-09-19 | End: 2019-03-18

## 2018-09-19 RX ORDER — AMLODIPINE BESYLATE 5 MG/1
5 TABLET ORAL DAILY
Qty: 90 TABLET | Refills: 1 | Status: SHIPPED | OUTPATIENT
Start: 2018-09-19 | End: 2019-03-18

## 2018-09-19 RX ORDER — LISINOPRIL AND HYDROCHLOROTHIAZIDE 12.5; 2 MG/1; MG/1
TABLET ORAL
Qty: 180 TABLET | Refills: 1 | Status: SHIPPED | OUTPATIENT
Start: 2018-09-19 | End: 2019-03-18

## 2018-09-19 RX ORDER — ATORVASTATIN CALCIUM 20 MG/1
20 TABLET, FILM COATED ORAL DAILY
Qty: 90 TABLET | Refills: 3 | Status: SHIPPED | OUTPATIENT
Start: 2018-09-19 | End: 2019-03-18

## 2018-09-19 ASSESSMENT — ANXIETY QUESTIONNAIRES
3. WORRYING TOO MUCH ABOUT DIFFERENT THINGS: NOT AT ALL
7. FEELING AFRAID AS IF SOMETHING AWFUL MIGHT HAPPEN: NOT AT ALL
GAD7 TOTAL SCORE: 0
5. BEING SO RESTLESS THAT IT IS HARD TO SIT STILL: NOT AT ALL
GAD7 TOTAL SCORE: 0
4. TROUBLE RELAXING: NOT AT ALL
GAD7 TOTAL SCORE: 0
2. NOT BEING ABLE TO STOP OR CONTROL WORRYING: NOT AT ALL
1. FEELING NERVOUS, ANXIOUS, OR ON EDGE: NOT AT ALL
7. FEELING AFRAID AS IF SOMETHING AWFUL MIGHT HAPPEN: NOT AT ALL
6. BECOMING EASILY ANNOYED OR IRRITABLE: NOT AT ALL

## 2018-09-19 ASSESSMENT — PATIENT HEALTH QUESTIONNAIRE - PHQ9
SUM OF ALL RESPONSES TO PHQ QUESTIONS 1-9: 5
SUM OF ALL RESPONSES TO PHQ QUESTIONS 1-9: 5
10. IF YOU CHECKED OFF ANY PROBLEMS, HOW DIFFICULT HAVE THESE PROBLEMS MADE IT FOR YOU TO DO YOUR WORK, TAKE CARE OF THINGS AT HOME, OR GET ALONG WITH OTHER PEOPLE: NOT DIFFICULT AT ALL

## 2018-09-19 ASSESSMENT — PAIN SCALES - GENERAL: PAINLEVEL: NO PAIN (0)

## 2018-09-19 NOTE — MR AVS SNAPSHOT
After Visit Summary   9/19/2018    Janice Ulloa    MRN: [id number]           Patient Information     Date Of Birth          1960        Visit Information        Provider Department      9/19/2018 11:30 AM Anisha Alfaro PA-C [facility]        Today's Diagnoses     Need for prophylactic vaccination and inoculation against influenza    -  1    Hypertension goal BP (blood pressure) < 140/90        Hyperlipidemia with target LDL less than 130        Essential hypertension with goal blood pressure less than 140/90        Neck pain           Follow-ups after your visit        Follow-up notes from your care team     Return in about 6 months (around 3/19/2019) for BP Recheck, lipids.      Your next 10 appointments already scheduled     Mar 20, 2019 11:30 AM CDT   Office Visit with Anisha Alfaro PA-C   [facility]    [address] [facility] [address] [phone]           Bring a current list of meds and any records pertaining to this visit. For Physicals, please bring immunization records and any forms needing to be filled out. Please arrive 10 minutes early to complete paperwork.              Who to contact     If you have questions or need follow up information about today's clinic visit or your schedule please contact [facility] directly at [phone].  Normal or non-critical lab and imaging results will be communicated to you by MyChart, letter or phone within 4 business days after the clinic has received the results. If you do not hear from us within 7 days, please contact the clinic through MyChart or phone. If you have a critical or abnormal lab result, we will notify you by phone as soon as possible.  Submit refill requests through [employer] or call your pharmacy and they will forward the refill request to us. Please allow 3 business days for your refill to be completed.          Additional Information  "About Your Visit        View the [employer] Information     [facility] lets you send messages to your doctor, view your test results, renew your prescriptions, schedule appointments and more. To sign up, go to [email or web address] . Click on \"Log in\" on the left side of the screen, which will take you to the Welcome page. Then click on \"Sign up Now\" on the right side of the page.     You will be asked to enter the access code listed below, as well as some personal information. Please follow the directions to create your username and password.     Your access code is: [email or web address]  Expires: 2018 11:55 AM     Your access code will  in 90 days. If you need help or a new code, please call your [facility] clinic or [phone].        Care EveryWhere ID     This is your Care EveryWhere ID. This could be used by other organizations to access your [facility] medical records  [id number]        Your Vitals Were     Pulse Temperature Respirations Last Period BMI (Body Mass Index)       64 96.8  F (36  C) (Temporal) 12 2013 31.34 kg/m2        Blood Pressure from Last 3 Encounters:   18 120/78   18 122/84   18 144/78    Weight from Last 3 Encounters:   18 167 lb 8 oz (76 kg)   18 171 lb 11.2 oz (77.9 kg)   18 174 lb (78.9 kg)              We Performed the Following     Basic metabolic panel     FLU VACCINE, SPLIT VIRUS, IM (QUADRIVALENT) [78386]- >3 YRS     Vaccine Administration, Initial [35038]          Today's Medication Changes          These changes are accurate as of 18 11:55 AM.  If you have any questions, ask your nurse or doctor.               These medicines have changed or have updated prescriptions.        Dose/Directions    atorvastatin 20 MG tablet   Commonly known as:  LIPITOR   This may have changed:  See the new instructions.   Used for:  Hyperlipidemia with target LDL less than 130   Changed by:  Anisha Alfaro PA-C        Dose:  20 mg   Take 1 tablet (20 mg) by " mouth daily   Quantity:  90 tablet   Refills:  3       propranolol 120 MG 24 hr capsule   Commonly known as:  INDERAL LA   This may have changed:  Another medication with the same name was removed. Continue taking this medication, and follow the directions you see here.   Used for:  Essential hypertension with goal blood pressure less than 140/90   Changed by:  Anisha Alfaro PA-C        TAKE TWO CAPSULES BY MOUTH EVERY DAY   Quantity:  180 capsule   Refills:  1       tiZANidine 4 MG tablet   Commonly known as:  ZANAFLEX   This may have changed:  See the new instructions.   Used for:  Neck pain   Changed by:  Anisha Alfaro PA-C        TAKE ONE TO TWO TABLETS AS NEEDED FOR MUSCLE SPASM at bedtime   Quantity:  90 tablet   Refills:  3            Where to get your medicines      These medications were sent to [address]  [address], [facility] [address]     Phone:  [phone]     amLODIPine 5 MG tablet    atorvastatin 20 MG tablet    lisinopril-hydrochlorothiazide 20-12.5 MG per tablet    propranolol 120 MG 24 hr capsule    tiZANidine 4 MG tablet                Primary Care Provider Office Phone # Fax #    Anisha Alfaro PA-C [phone] [address] [place] [address]        Equal Access to Services     DESMOND OLIVA AH: Hadii chirag thorne hadasho Soomaali, waaxda luqadaha, qaybta kaalmada adeegyada, waxay chentein hayboydn pawel lima. So [facility] [phone].    ATENCIÓN: Si habla español, tiene a larios disposición servicios gratuitos de asistencia lingüística. Llame al [phone].    We comply with applicable federal civil rights laws and [place] laws. We do not discriminate on the basis of race, color, national origin, age, disability, sex, sexual orientation, or gender identity.            Thank you!     Thank you for choosing [facility]  for your care. Our goal is always to provide you with excellent care. Hearing back from our  patients is one way we can continue to improve our services. Please take a few minutes to complete the written survey that you may receive in the mail after your visit with us. Thank you!             Your Updated Medication List - Protect others around you: Learn how to safely use, store and throw away your medicines at www.disposemymeds.org.          This list is accurate as of 9/19/18 11:55 AM.  Always use your most recent med list.                   Brand Name Dispense Instructions for use Diagnosis    albuterol 108 (90 Base) MCG/ACT inhaler    PROAIR HFA/PROVENTIL HFA/VENTOLIN HFA    1 Inhaler    Inhale 2 puffs into the lungs every 6 hours as needed for shortness of breath / dyspnea or wheezing    Bronchospasm       amLODIPine 5 MG tablet    NORVASC    90 tablet    Take 1 tablet (5 mg) by mouth daily    Hypertension goal BP (blood pressure) < 140/90       aspirin 81 MG tablet      ONE DAILY        atorvastatin 20 MG tablet    LIPITOR    90 tablet    Take 1 tablet (20 mg) by mouth daily    Hyperlipidemia with target LDL less than 130       IBUPROFEN PO      Take 400 mg by mouth daily as needed        lisinopril-hydrochlorothiazide 20-12.5 MG per tablet    PRINZIDE/ZESTORETIC    180 tablet    TAKE TWO TABLETS BY MOUTH EVERY DAY    Essential hypertension with goal blood pressure less than 140/90       MULTI-VITAMIN DAILY PO           propranolol 120 MG 24 hr capsule    INDERAL LA    180 capsule    TAKE TWO CAPSULES BY MOUTH EVERY DAY    Essential hypertension with goal blood pressure less than 140/90       tiZANidine 4 MG tablet    ZANAFLEX    90 tablet    TAKE ONE TO TWO TABLETS AS NEEDED FOR MUSCLE SPASM at bedtime    Neck pain

## 2018-09-19 NOTE — PROGRESS NOTES
Injectable Influenza Immunization Documentation    1.  Is the person to be vaccinated sick today?   No    2. Does the person to be vaccinated have an allergy to a component   of the vaccine?   No  Egg Allergy Algorithm Link    3. Has the person to be vaccinated ever had a serious reaction   to influenza vaccine in the past?   No    4. Has the person to be vaccinated ever had Guillain-Barré syndrome?   No    Form completed by Jie Avila CMA    Prior to injection verified patient identity using patient's name and date of birth.  Due to injection administration, patient instructed to remain in clinic for 15 minutes  afterwards, and to report any adverse reaction to me immediately.

## 2018-09-19 NOTE — LETTER
September 19, 2018      Janice Ulloa  [address] [place] [address]        Dear ,    We are writing to inform you of your test results.    Your potassium is only slightly low, make sure that you are eating potassium in your diet on a daily basis such as orange juice, bananas, and potatoes.  Your kidney functions and blood sugar are normal.     Resulted Orders   Basic metabolic panel   Result Value Ref Range    Sodium 139 133 - 144 mmol/L    Potassium 3.3 (L) 3.4 - 5.3 mmol/L    Chloride 99 94 - 109 mmol/L    Carbon Dioxide 35 (H) 20 - 32 mmol/L    Anion Gap 5 3 - 14 mmol/L    Glucose 70 70 - 99 mg/dL    Urea Nitrogen 8 7 - 30 mg/dL    Creatinine 0.52 0.52 - 1.04 mg/dL    GFR Estimate >90 >60 mL/min/1.7m2      Comment:      Non  GFR Calc    GFR Estimate If Black >90 >60 mL/min/1.7m2      Comment:       GFR Calc    Calcium 9.4 8.5 - 10.1 mg/dL       If you have any questions or concerns, please call the clinic at the number listed above.       Sincerely,        Anisha Alfaro PA-C

## 2018-09-19 NOTE — LETTER
My Depression Action Plan  Name: Janice Ulloa   Date of Birth 1960  Date: 9/14/2018    My doctor: Anisha Alfaro   My clinic: [address] [address] [phone]          GREEN    ZONE   Good Control    What it looks like:     Things are going generally well. You have normal up s and down s. You may even feel depressed from time to time, but bad moods usually last less than a day.   What you need to do:  1. Continue to care for yourself (see self care plan)  2. Check your depression survival kit and update it as needed  3. Follow your physician s recommendations including any medication.  4. Do not stop taking medication unless you consult with your physician first.           YELLOW         ZONE Getting Worse    What it looks like:     Depression is starting to interfere with your life.     It may be hard to get out of bed; you may be starting to isolate yourself from others.    Symptoms of depression are starting to last most all day and this has happened for several days.     You may have suicidal thoughts but they are not constant.   What you need to do:     1. Call your care team, your response to treatment will improve if you keep your care team informed of your progress. Yellow periods are signs an adjustment may need to be made.     2. Continue your self-care, even if you have to fake it!    3. Talk to someone in your support network    4. Open up your depression survival kit           RED    ZONE Medical Alert - Get Help    What it looks like:     Depression is seriously interfering with your life.     You may experience these or other symptoms: You can t get out of bed most days, can t work or engage in other necessary activities, you have trouble taking care of basic hygiene, or basic responsibilities, thoughts of suicide or death that will not go away, self-injurious behavior.     What you need to do:  1. Call your care team and  request a same-day appointment. If they are not available (weekends or after hours) call your local crisis line, emergency room or 911.            Depression Self Care Plan / Survival Kit    Self-Care for Depression  Here s the deal. Your body and mind are really not as separate as most people think.  What you do and think affects how you feel and how you feel influences what you do and think. This means if you do things that people who feel good do, it will help you feel better.  Sometimes this is all it takes.  There is also a place for medication and therapy depending on how severe your depression is, so be sure to consult with your medical provider and/ or Behavioral Health Consultant if your symptoms are worsening or not improving.     In order to better manage my stress, I will:    Exercise  Get some form of exercise, every day. This will help reduce pain and release endorphins, the  feel good  chemicals in your brain. This is almost as good as taking antidepressants!  This is not the same as joining a gym and then never going! (they count on that by the way ) It can be as simple as just going for a walk or doing some gardening, anything that will get you moving.      Hygiene   Maintain good hygiene (Get out of bed in the morning, Make your bed, Brush your teeth, Take a shower, and Get dressed like you were going to work, even if you are unemployed).  If your clothes don't fit try to get ones that do.    Diet  I will strive to eat foods that are good for me, drink plenty of water, and avoid excessive sugar, caffeine, alcohol, and other mood-altering substances.  Some foods that are helpful in depression are: complex carbohydrates, B vitamins, flaxseed, fish or fish oil, fresh fruits and vegetables.    Psychotherapy  I agree to participate in Individual Therapy (if recommended).    Medication  If prescribed medications, I agree to take them.  Missing doses can result in serious side effects.  I understand that  drinking alcohol, or other illicit drug use, may cause potential side effects.  I will not stop my medication abruptly without first discussing it with my provider.    Staying Connected With Others  I will stay in touch with my friends, family members, and my primary care provider/team.    Use your imagination  Be creative.  We all have a creative side; it doesn t matter if it s oil painting, sand castles, or mud pies! This will also kick up the endorphins.    Witness Beauty  (AKA stop and smell the roses) Take a look outside, even in mid-winter. Notice colors, textures. Watch the squirrels and birds.     Service to others  Be of service to others.  There is always someone else in need.  By helping others we can  get out of ourselves  and remember the really important things.  This also provides opportunities for practicing all the other parts of the program.    Humor  Laugh and be silly!  Adjust your TV habits for less news and crime-drama and more comedy.    Control your stress  Try breathing deep, massage therapy, biofeedback, and meditation. Find time to relax each day.     My support system    Clinic Contact:  Phone number:    Contact 1:  Phone number:    Contact 2:  Phone number:    Rastafari/:  Phone number:    Therapist:  Phone number:    Local crisis center:    Phone number:    Other community support:  Phone number:

## 2018-09-20 ASSESSMENT — ANXIETY QUESTIONNAIRES: GAD7 TOTAL SCORE: 0

## 2018-09-20 ASSESSMENT — PATIENT HEALTH QUESTIONNAIRE - PHQ9: SUM OF ALL RESPONSES TO PHQ QUESTIONS 1-9: 5

## 2019-03-12 NOTE — PROGRESS NOTES
SUBJECTIVE:   Janice Ulloa is a 58 year old female who presents to clinic today for the following health issues:  Remove/review albuterol inhaler that was for bronchospasm    History of Present Illness     Depression & Anxiety Follow-up:     Depression/Anxiety:  Depression & Anxiety    Current substance use::  None       Today's PHQ-9         PHQ-9 Total Score:     (P) 5   PHQ-9 Q9 Suicidal ideation:   (P) Several days   Thoughts of suicide or self harm:  (P) No   Self-harm Plan:        Self-harm Action:          Safety concerns for self or others: (P) No     CHELO-7 Total Score: (P) 2    Hyperlipidemia:     Low fat/chol diet rating::  Good    Taking Statins::  YES    Lipid Medications or Supplements::  None    Hypertension:     Outpatient blood pressures:  Are being checked    Blood pressures checked at:  Home    Dietary sodium intake::  Low salt diet    Migraines:     Migraine Preventative Medications::  Inderal    ER or UC Visits::  None    Diet:  Low fat/cholesterol  Frequency of exercise:  2-3 days/week  Duration of exercise:  15-30 minutes  Taking medications regularly:  Yes  Medication side effects:  None  Additional concerns today:  No      Problem list and histories reviewed & adjusted, as indicated.  Additional history: She is requesting a hemoglobin, she has not been taking her iron supplement routinely.  She came in with an extremely low hemoglobin due to menstrual bleeding several years ago.  She has no longer having periods        BP Readings from Last 3 Encounters:   03/18/19 128/70   09/19/18 120/78   04/25/18 122/84    Wt Readings from Last 3 Encounters:   03/18/19 77.1 kg (170 lb)   09/19/18 76 kg (167 lb 8 oz)   04/25/18 77.9 kg (171 lb 11.2 oz)                  Labs reviewed in EPIC    ROS:  CONSTITUTIONAL: NEGATIVE for fever, chills, change in weight  ENT/MOUTH: NEGATIVE for ear, mouth and throat problems  RESP: NEGATIVE for significant cough or SOB  CV: NEGATIVE for chest pain, palpitations  "or peripheral edema  GI: NEGATIVE for nausea, abdominal pain, heartburn, or change in bowel habits and 2 weeks ago had what she thinks is food poisoning she had a lot of vomiting.  Thankfully this is resolved  MUSCULOSKELETAL: Continues with chronic neck pain, using tizanidine at at bedtime helps her to sleep  PSYCHIATRIC: NEGATIVE for changes in mood or affect, See PHQ 9 and CHELO 7 questionnaires for symptoms.  States she is doing well, she has no suicidal plans or intention    OBJECTIVE:     /70   Pulse 82   Temp 97.8  F (36.6  C) (Temporal)   Resp 16   Ht 1.57 m (5' 1.81\")   Wt 77.1 kg (170 lb)   LMP 08/19/2013 (Approximate)   BMI 31.28 kg/m    Body mass index is 31.28 kg/m .  GENERAL: healthy, alert and no distress  NECK: no adenopathy, no asymmetry, masses, or scars and thyroid normal to palpation  RESP: lungs clear to auscultation - no rales, rhonchi or wheezes  CV: regular rate and rhythm, normal S1 S2, no S3 or S4, no murmur, click or rub, no peripheral edema and peripheral pulses strong  ABDOMEN: soft, nontender, no hepatosplenomegaly, no masses and bowel sounds normal  MS: no gross musculoskeletal defects noted, no edema  SKIN: no suspicious lesions or rashes  PSYCH: mentation appears normal, affect normal/bright    Diagnostic Test Results:  No results found for this or any previous visit (from the past 24 hour(s)).    ASSESSMENT/PLAN:         1. Essential hypertension with goal blood pressure less than 140/90  At goal, continue current meds  - Albumin Random Urine Quantitative with Creat Ratio  - amLODIPine (NORVASC) 5 MG tablet; Take 1 tablet (5 mg) by mouth daily  Dispense: 90 tablet; Refill: 1  - lisinopril-hydrochlorothiazide (PRINZIDE/ZESTORETIC) 20-12.5 MG tablet; TAKE TWO TABLETS BY MOUTH EVERY DAY  Dispense: 180 tablet; Refill: 1  - propranolol ER (INDERAL LA) 120 MG 24 hr capsule; TAKE TWO CAPSULES BY MOUTH EVERY DAY  Dispense: 180 capsule; Refill: 1  - Comprehensive metabolic panel " (BMP + Alb, Alk Phos, ALT, AST, Total. Bili, TP)    2. Hyperlipidemia with target LDL less than 130  Doing well, continue current meds  - Lipid panel reflex to direct LDL Fasting  - atorvastatin (LIPITOR) 20 MG tablet; Take 1 tablet (20 mg) by mouth daily  Dispense: 90 tablet; Refill: 3    3. Neck pain    - tiZANidine (ZANAFLEX) 4 MG tablet; TAKE ONE TO TWO TABLETS AS NEEDED FOR MUSCLE SPASM at bedtime  Dispense: 90 tablet; Refill: 3    4. Screen for colon cancer  Test will be ordered,     5. Screening for HIV (human immunodeficiency virus)  Client    6. Screening for deficiency anemia  Irregular use of iron supplement  - CBC with platelets    Follow-up 6 months    This chart documentation was completed in part with Dragon voice recognition software.  Documentation is reviewed after completion, however, some words and grammatical errors may remain.  SAURABH Sanderson PA-C  [facility]  Answers for HPI/ROS submitted by the patient on 3/18/2019   Chronic problems general questions HPI Form  If you checked off any problems, how difficult have these problems made it for you to do your work, take care of things at home, or get along with other people?: Not difficult at all  PHQ9 TOTAL SCORE: 5  CHELO 7 TOTAL SCORE: 2

## 2019-03-18 ENCOUNTER — OFFICE VISIT (OUTPATIENT)
Dept: FAMILY MEDICINE | Facility: OTHER | Age: 59
End: 2019-03-18
Payer: COMMERCIAL

## 2019-03-18 VITALS
TEMPERATURE: 97.8 F | RESPIRATION RATE: 16 BRPM | SYSTOLIC BLOOD PRESSURE: 128 MMHG | HEART RATE: 82 BPM | WEIGHT: 170 LBS | HEIGHT: 62 IN | DIASTOLIC BLOOD PRESSURE: 70 MMHG | BODY MASS INDEX: 31.28 KG/M2

## 2019-03-18 DIAGNOSIS — E78.5 HYPERLIPIDEMIA WITH TARGET LDL LESS THAN 130: ICD-10-CM

## 2019-03-18 DIAGNOSIS — Z13.0 SCREENING FOR DEFICIENCY ANEMIA: Primary | ICD-10-CM

## 2019-03-18 DIAGNOSIS — Z12.11 SCREEN FOR COLON CANCER: ICD-10-CM

## 2019-03-18 DIAGNOSIS — M54.2 NECK PAIN: ICD-10-CM

## 2019-03-18 DIAGNOSIS — I10 ESSENTIAL HYPERTENSION WITH GOAL BLOOD PRESSURE LESS THAN 140/90: ICD-10-CM

## 2019-03-18 DIAGNOSIS — Z11.4 SCREENING FOR HIV (HUMAN IMMUNODEFICIENCY VIRUS): ICD-10-CM

## 2019-03-18 LAB
ALBUMIN SERPL-MCNC: 3.8 G/DL (ref 3.4–5)
ALP SERPL-CCNC: 67 U/L (ref 40–150)
ALT SERPL W P-5'-P-CCNC: 29 U/L (ref 0–50)
ANION GAP SERPL CALCULATED.3IONS-SCNC: 10 MMOL/L (ref 3–14)
AST SERPL W P-5'-P-CCNC: 13 U/L (ref 0–45)
BILIRUB SERPL-MCNC: 0.3 MG/DL (ref 0.2–1.3)
BUN SERPL-MCNC: 12 MG/DL (ref 7–30)
CALCIUM SERPL-MCNC: 9.3 MG/DL (ref 8.5–10.1)
CHLORIDE SERPL-SCNC: 103 MMOL/L (ref 94–109)
CHOLEST SERPL-MCNC: 166 MG/DL
CO2 SERPL-SCNC: 28 MMOL/L (ref 20–32)
CREAT SERPL-MCNC: 0.68 MG/DL (ref 0.52–1.04)
CREAT UR-MCNC: 16 MG/DL
ERYTHROCYTE [DISTWIDTH] IN BLOOD BY AUTOMATED COUNT: 13.5 % (ref 10–15)
GFR SERPL CREATININE-BSD FRML MDRD: >90 ML/MIN/{1.73_M2}
GLUCOSE SERPL-MCNC: 81 MG/DL (ref 70–99)
HCT VFR BLD AUTO: 40.1 % (ref 35–47)
HDLC SERPL-MCNC: 45 MG/DL
HGB BLD-MCNC: 13.3 G/DL (ref 11.7–15.7)
LDLC SERPL CALC-MCNC: 75 MG/DL
MCH RBC QN AUTO: 28.7 PG (ref 26.5–33)
MCHC RBC AUTO-ENTMCNC: 33.2 G/DL (ref 31.5–36.5)
MCV RBC AUTO: 86 FL (ref 78–100)
MICROALBUMIN UR-MCNC: 6 MG/L
MICROALBUMIN/CREAT UR: 37.39 MG/G CR (ref 0–25)
NONHDLC SERPL-MCNC: 121 MG/DL
PLATELET # BLD AUTO: 202 10E9/L (ref 150–450)
POTASSIUM SERPL-SCNC: 3.7 MMOL/L (ref 3.4–5.3)
PROT SERPL-MCNC: 7.7 G/DL (ref 6.8–8.8)
RBC # BLD AUTO: 4.64 10E12/L (ref 3.8–5.2)
SODIUM SERPL-SCNC: 141 MMOL/L (ref 133–144)
TRIGL SERPL-MCNC: 231 MG/DL
WBC # BLD AUTO: 5.1 10E9/L (ref 4–11)

## 2019-03-18 PROCEDURE — 82043 UR ALBUMIN QUANTITATIVE: CPT | Performed by: PHYSICIAN ASSISTANT

## 2019-03-18 PROCEDURE — 85027 COMPLETE CBC AUTOMATED: CPT | Performed by: PHYSICIAN ASSISTANT

## 2019-03-18 PROCEDURE — 80053 COMPREHEN METABOLIC PANEL: CPT | Performed by: PHYSICIAN ASSISTANT

## 2019-03-18 PROCEDURE — 80061 LIPID PANEL: CPT | Performed by: PHYSICIAN ASSISTANT

## 2019-03-18 PROCEDURE — 36415 COLL VENOUS BLD VENIPUNCTURE: CPT | Performed by: PHYSICIAN ASSISTANT

## 2019-03-18 PROCEDURE — 99214 OFFICE O/P EST MOD 30 MIN: CPT | Performed by: PHYSICIAN ASSISTANT

## 2019-03-18 RX ORDER — ATORVASTATIN CALCIUM 20 MG/1
20 TABLET, FILM COATED ORAL DAILY
Qty: 90 TABLET | Refills: 3 | Status: SHIPPED | OUTPATIENT
Start: 2019-03-18 | End: 2020-03-31

## 2019-03-18 RX ORDER — AMLODIPINE BESYLATE 5 MG/1
5 TABLET ORAL DAILY
Qty: 90 TABLET | Refills: 1 | Status: SHIPPED | OUTPATIENT
Start: 2019-03-18 | End: 2019-09-17

## 2019-03-18 RX ORDER — LISINOPRIL AND HYDROCHLOROTHIAZIDE 12.5; 2 MG/1; MG/1
TABLET ORAL
Qty: 180 TABLET | Refills: 1 | Status: SHIPPED | OUTPATIENT
Start: 2019-03-18 | End: 2019-09-17

## 2019-03-18 RX ORDER — PROPRANOLOL HYDROCHLORIDE 120 MG/1
CAPSULE, EXTENDED RELEASE ORAL
Qty: 180 CAPSULE | Refills: 1 | Status: SHIPPED | OUTPATIENT
Start: 2019-03-18 | End: 2019-09-17

## 2019-03-18 ASSESSMENT — PATIENT HEALTH QUESTIONNAIRE - PHQ9
SUM OF ALL RESPONSES TO PHQ QUESTIONS 1-9: 5
10. IF YOU CHECKED OFF ANY PROBLEMS, HOW DIFFICULT HAVE THESE PROBLEMS MADE IT FOR YOU TO DO YOUR WORK, TAKE CARE OF THINGS AT HOME, OR GET ALONG WITH OTHER PEOPLE: NOT DIFFICULT AT ALL
SUM OF ALL RESPONSES TO PHQ QUESTIONS 1-9: 5

## 2019-03-18 ASSESSMENT — MIFFLIN-ST. JEOR: SCORE: 1296.36

## 2019-03-18 ASSESSMENT — ANXIETY QUESTIONNAIRES
1. FEELING NERVOUS, ANXIOUS, OR ON EDGE: NOT AT ALL
2. NOT BEING ABLE TO STOP OR CONTROL WORRYING: SEVERAL DAYS
7. FEELING AFRAID AS IF SOMETHING AWFUL MIGHT HAPPEN: NOT AT ALL
4. TROUBLE RELAXING: NOT AT ALL
GAD7 TOTAL SCORE: 2
GAD7 TOTAL SCORE: 2
7. FEELING AFRAID AS IF SOMETHING AWFUL MIGHT HAPPEN: NOT AT ALL
GAD7 TOTAL SCORE: 2
3. WORRYING TOO MUCH ABOUT DIFFERENT THINGS: SEVERAL DAYS
6. BECOMING EASILY ANNOYED OR IRRITABLE: NOT AT ALL
5. BEING SO RESTLESS THAT IT IS HARD TO SIT STILL: NOT AT ALL

## 2019-03-18 ASSESSMENT — PAIN SCALES - GENERAL: PAINLEVEL: NO PAIN (0)

## 2019-03-19 ENCOUNTER — TELEPHONE (OUTPATIENT)
Dept: FAMILY MEDICINE | Facility: OTHER | Age: 59
End: 2019-03-19

## 2019-03-19 ASSESSMENT — ANXIETY QUESTIONNAIRES: GAD7 TOTAL SCORE: 2

## 2019-03-19 NOTE — TELEPHONE ENCOUNTER
Results were in MA slot, not sure what needs to be done with these?    LM for patient to return call to see if these were printed for her to  or mail. With what was talked about in OV. Had previously sent letter today about results before I saw these.     Placed back in MA slot until we know.  Debbie Salazar MA

## 2019-03-19 NOTE — TELEPHONE ENCOUNTER
Left message for patient to return call to schedule EGD/colonoscopy. If Shanda or Natalie are not available, please transfer to same day surgery

## 2019-03-22 ENCOUNTER — HOSPITAL ENCOUNTER (OUTPATIENT)
Facility: CLINIC | Age: 59
End: 2019-03-22
Attending: SURGERY | Admitting: SURGERY
Payer: COMMERCIAL

## 2019-03-22 ENCOUNTER — TELEPHONE (OUTPATIENT)
Dept: FAMILY MEDICINE | Facility: OTHER | Age: 59
End: 2019-03-22

## 2019-03-22 NOTE — TELEPHONE ENCOUNTER
Date of colonoscopy/EGD: 4/16  Surgeon: Dr. Verduzco  Prep:Miralax  Packet:Colonoscopy/EGD instructions mailed to patient's home address.   Date: 3/22/2019      Surgery Scheduler

## 2019-03-22 NOTE — LETTER
[facility] [place]  [address]  [place] [address]  [phone]        March 22, 2019    Janice Ulloa  [address]  [place] [address]

## 2019-03-22 NOTE — LETTER

## 2019-09-17 DIAGNOSIS — I10 ESSENTIAL HYPERTENSION WITH GOAL BLOOD PRESSURE LESS THAN 140/90: ICD-10-CM

## 2019-09-18 RX ORDER — LISINOPRIL AND HYDROCHLOROTHIAZIDE 12.5; 2 MG/1; MG/1
TABLET ORAL
Qty: 60 TABLET | Refills: 0 | Status: SHIPPED | OUTPATIENT
Start: 2019-09-18 | End: 2019-10-02

## 2019-09-18 RX ORDER — PROPRANOLOL HYDROCHLORIDE 120 MG/1
CAPSULE, EXTENDED RELEASE ORAL
Qty: 60 CAPSULE | Refills: 0 | Status: SHIPPED | OUTPATIENT
Start: 2019-09-18 | End: 2019-10-02

## 2019-09-18 RX ORDER — AMLODIPINE BESYLATE 5 MG/1
TABLET ORAL
Qty: 30 TABLET | Refills: 0 | Status: SHIPPED | OUTPATIENT
Start: 2019-09-18 | End: 2019-10-02

## 2019-09-18 NOTE — TELEPHONE ENCOUNTER
Prinzide, Propranolol, Norvasc  Medication is being filled for 1 time refill only due to:  Patient needs to be seen because 6 month.    Janis Andersen, RN, BSN

## 2019-09-19 NOTE — TELEPHONE ENCOUNTER
Left message for patient to return call to clinic. When call is returned please see below.     Wing Potter,

## 2019-09-27 NOTE — PROGRESS NOTES
"Subjective     Janice Ulloa is a 59 year old female who presents to clinic today for the following health issues:    History of Present Illness        Hyperlipidemia:  She presents for follow up of hyperlipidemia.  She is taking medication to lower cholesterol. She is not having myalgia or other side effects to statin medications.She is not reporting shortness of breath, increased sweating or nausea with activity, left-sided neck or arm pain, chest pain or pressure, or pain in calves when walking 1-2 blocks.    Hypertension: She presents for follow up of hypertension.  She does not check blood pressure  regularly outside of the clinic. Outpatient blood pressures have not been over 140/90. She does not follow a low salt diet.     She eats 0-1 servings of fruits and vegetables daily.She consumes 3 sweetened beverage(s) daily.  She is taking medications regularly.     Janice is doing very well.  She is feeling fine and healthy.  She has no concerns today just here to get her blood pressure meds rechecked      BP Readings from Last 3 Encounters:   10/02/19 138/84   03/18/19 128/70   09/19/18 120/78    Wt Readings from Last 3 Encounters:   10/02/19 76.2 kg (168 lb)   03/18/19 77.1 kg (170 lb)   09/19/18 76 kg (167 lb 8 oz)                    Reviewed and updated as needed this visit by Provider         Review of Systems   ROS COMP: CONSTITUTIONAL: NEGATIVE for fever, chills, change in weight  ENT/MOUTH: NEGATIVE for ear, mouth and throat problems  RESP: NEGATIVE for significant cough or SOB  CV: NEGATIVE for chest pain, palpitations or peripheral edema  GI: NEGATIVE for nausea, abdominal pain, heartburn, or change in bowel habits  PSYCHIATRIC: NEGATIVE for changes in mood or affect      Objective    /84   Pulse 66   Temp 97.9  F (36.6  C) (Temporal)   Ht 1.55 m (5' 1.02\")   Wt 76.2 kg (168 lb)   LMP 08/19/2013 (Approximate)   HC 16 cm (6.3\")   SpO2 96%   BMI 31.72 kg/m    Body mass index is 31.72 " "kg/m .  Physical Exam   GENERAL: healthy, alert and no distress  NECK: no adenopathy, no asymmetry, masses, or scars and thyroid normal to palpation  RESP: lungs clear to auscultation - no rales, rhonchi or wheezes  CV: regular rate and rhythm, normal S1 S2, no S3 or S4, no murmur, click or rub, no peripheral edema and peripheral pulses strong  ABDOMEN: soft, nontender, no hepatosplenomegaly, no masses and bowel sounds normal  MS: no gross musculoskeletal defects noted, no edema  PSYCH: mentation appears normal, affect normal/bright    Diagnostic Test Results:  Labs reviewed in Epic  No results found for this or any previous visit (from the past 24 hour(s)).        Assessment & Plan     1. Essential hypertension with goal blood pressure less than 140/90  At goal, continue current meds recheck 6 months  - amLODIPine (NORVASC) 5 MG tablet; Take 1 tablet (5 mg) by mouth daily  Dispense: 90 tablet; Refill: 1  - lisinopril-hydrochlorothiazide (PRINZIDE/ZESTORETIC) 20-12.5 MG tablet; TAKE TWO TABLETS BY MOUTH EVERY DAY  Dispense: 180 tablet; Refill: 1  - propranolol ER (INDERAL LA) 120 MG 24 hr capsule; TAKE TWO CAPSULES BY MOUTH EVERY DAY  Dispense: 180 capsule; Refill: 1  - Potassium    2. Neck pain  Patient uses one at bedtime to help her sleep  - tiZANidine (ZANAFLEX) 4 MG tablet; TAKE ONE TO TWO TABLETS AS NEEDED FOR MUSCLE SPASM at bedtime  Dispense: 90 tablet; Refill: 3    3. Hyperlipidemia with target LDL less than 130  Continue to use statin as prescribed no side effects or issues labs due in 6 months       BMI:   Estimated body mass index is 31.72 kg/m  as calculated from the following:    Height as of this encounter: 1.55 m (5' 1.02\").    Weight as of this encounter: 76.2 kg (168 lb).   Weight management plan: Discussed healthy diet and exercise guidelines            Return in about 6 months (around 4/2/2020) for BP Recheck, lipids.    Anisha Alfaro PA-C  [facility]3] [facility]    Answers for HPI/ROS " submitted by the patient on 10/2/2019   Chronic problems general questions HPI Form  PHQ9 TOTAL SCORE: 4  CHELO 7 TOTAL SCORE: 3

## 2019-10-02 ENCOUNTER — OFFICE VISIT (OUTPATIENT)
Dept: FAMILY MEDICINE | Facility: OTHER | Age: 59
End: 2019-10-02
Payer: COMMERCIAL

## 2019-10-02 VITALS
WEIGHT: 168 LBS | TEMPERATURE: 97.9 F | OXYGEN SATURATION: 96 % | BODY MASS INDEX: 31.72 KG/M2 | DIASTOLIC BLOOD PRESSURE: 84 MMHG | SYSTOLIC BLOOD PRESSURE: 138 MMHG | HEIGHT: 61 IN | HEART RATE: 66 BPM

## 2019-10-02 DIAGNOSIS — I10 ESSENTIAL HYPERTENSION WITH GOAL BLOOD PRESSURE LESS THAN 140/90: ICD-10-CM

## 2019-10-02 DIAGNOSIS — Z23 NEED FOR PROPHYLACTIC VACCINATION AND INOCULATION AGAINST INFLUENZA: ICD-10-CM

## 2019-10-02 DIAGNOSIS — M54.2 NECK PAIN: ICD-10-CM

## 2019-10-02 DIAGNOSIS — E78.5 HYPERLIPIDEMIA WITH TARGET LDL LESS THAN 130: Primary | ICD-10-CM

## 2019-10-02 LAB — POTASSIUM SERPL-SCNC: 3.4 MMOL/L (ref 3.4–5.3)

## 2019-10-02 PROCEDURE — 99214 OFFICE O/P EST MOD 30 MIN: CPT | Mod: 25 | Performed by: PHYSICIAN ASSISTANT

## 2019-10-02 PROCEDURE — 90471 IMMUNIZATION ADMIN: CPT | Performed by: PHYSICIAN ASSISTANT

## 2019-10-02 PROCEDURE — 84132 ASSAY OF SERUM POTASSIUM: CPT | Performed by: PHYSICIAN ASSISTANT

## 2019-10-02 PROCEDURE — 90682 RIV4 VACC RECOMBINANT DNA IM: CPT | Performed by: PHYSICIAN ASSISTANT

## 2019-10-02 PROCEDURE — 36415 COLL VENOUS BLD VENIPUNCTURE: CPT | Performed by: PHYSICIAN ASSISTANT

## 2019-10-02 RX ORDER — LISINOPRIL AND HYDROCHLOROTHIAZIDE 12.5; 2 MG/1; MG/1
TABLET ORAL
Qty: 180 TABLET | Refills: 1 | Status: SHIPPED | OUTPATIENT
Start: 2019-10-02 | End: 2020-03-31

## 2019-10-02 RX ORDER — AMLODIPINE BESYLATE 5 MG/1
5 TABLET ORAL DAILY
Qty: 90 TABLET | Refills: 1 | Status: SHIPPED | OUTPATIENT
Start: 2019-10-02 | End: 2020-04-17

## 2019-10-02 RX ORDER — PROPRANOLOL HYDROCHLORIDE 120 MG/1
CAPSULE, EXTENDED RELEASE ORAL
Qty: 180 CAPSULE | Refills: 1 | Status: SHIPPED | OUTPATIENT
Start: 2019-10-02 | End: 2020-03-31

## 2019-10-02 ASSESSMENT — PATIENT HEALTH QUESTIONNAIRE - PHQ9
SUM OF ALL RESPONSES TO PHQ QUESTIONS 1-9: 4
SUM OF ALL RESPONSES TO PHQ QUESTIONS 1-9: 4

## 2019-10-02 ASSESSMENT — ANXIETY QUESTIONNAIRES
6. BECOMING EASILY ANNOYED OR IRRITABLE: NOT AT ALL
2. NOT BEING ABLE TO STOP OR CONTROL WORRYING: NOT AT ALL
GAD7 TOTAL SCORE: 3
3. WORRYING TOO MUCH ABOUT DIFFERENT THINGS: SEVERAL DAYS
5. BEING SO RESTLESS THAT IT IS HARD TO SIT STILL: SEVERAL DAYS
GAD7 TOTAL SCORE: 3
1. FEELING NERVOUS, ANXIOUS, OR ON EDGE: NOT AT ALL
7. FEELING AFRAID AS IF SOMETHING AWFUL MIGHT HAPPEN: NOT AT ALL
GAD7 TOTAL SCORE: 3
4. TROUBLE RELAXING: SEVERAL DAYS
7. FEELING AFRAID AS IF SOMETHING AWFUL MIGHT HAPPEN: NOT AT ALL

## 2019-10-02 ASSESSMENT — MIFFLIN-ST. JEOR: SCORE: 1269.8

## 2019-10-03 ENCOUNTER — TELEPHONE (OUTPATIENT)
Dept: FAMILY MEDICINE | Facility: OTHER | Age: 59
End: 2019-10-03

## 2019-10-03 ASSESSMENT — PATIENT HEALTH QUESTIONNAIRE - PHQ9: SUM OF ALL RESPONSES TO PHQ QUESTIONS 1-9: 4

## 2019-10-03 ASSESSMENT — ANXIETY QUESTIONNAIRES: GAD7 TOTAL SCORE: 3

## 2019-10-03 NOTE — TELEPHONE ENCOUNTER
Called pt left message to call the clinic back. Need to relay Anisha Alfaro's message to patient. Lab results Normal Potassium.     Alfonso Patterson MA on 10/3/2019 at 12:37 PM

## 2020-03-02 ENCOUNTER — TELEPHONE (OUTPATIENT)
Dept: FAMILY MEDICINE | Facility: OTHER | Age: 60
End: 2020-03-02

## 2020-03-02 ENCOUNTER — OFFICE VISIT (OUTPATIENT)
Dept: FAMILY MEDICINE | Facility: OTHER | Age: 60
End: 2020-03-02
Payer: COMMERCIAL

## 2020-03-02 VITALS
WEIGHT: 163 LBS | DIASTOLIC BLOOD PRESSURE: 66 MMHG | TEMPERATURE: 98.9 F | SYSTOLIC BLOOD PRESSURE: 126 MMHG | HEART RATE: 68 BPM | BODY MASS INDEX: 30.77 KG/M2 | RESPIRATION RATE: 16 BRPM

## 2020-03-02 DIAGNOSIS — M54.42 ACUTE LEFT-SIDED LOW BACK PAIN WITH LEFT-SIDED SCIATICA: Primary | ICD-10-CM

## 2020-03-02 PROCEDURE — 99213 OFFICE O/P EST LOW 20 MIN: CPT | Performed by: PHYSICIAN ASSISTANT

## 2020-03-02 RX ORDER — METHYLPREDNISOLONE 4 MG
TABLET, DOSE PACK ORAL
Qty: 21 TABLET | Refills: 0 | Status: SHIPPED | OUTPATIENT
Start: 2020-03-02 | End: 2020-03-09

## 2020-03-02 ASSESSMENT — PAIN SCALES - GENERAL: PAINLEVEL: NO PAIN (0)

## 2020-03-02 NOTE — TELEPHONE ENCOUNTER
Janice stated that she had hurt her back last Thursday and thought it would go away but now she says her left leg is going numb and wondering if you could see her sometime today.  Please call

## 2020-03-02 NOTE — PROGRESS NOTES
Subjective     Janice Ulloa is a 59 year old female who presents to clinic today for the following health issues:  Have you ever done Advance Care Planning? (For example, a Health Directive, POLST, or a discussion with a medical provider or your loved ones about your wishes): No, advance care planning information given to patient to review.  Patient declined advance care planning discussion at this time.  History of Present Illness        Back Pain:  She presents for follow up of back pain. Patient's back pain is a new problem.    Original cause of back pain: walking  First noticed back pain: in the last week  Patient feels back pain: 2 to 4 times per dayLocation of back pain:  Left lower back  Description of back pain: other  Back pain spreads: left thigh and left buttocks    Since patient first noticed back pain, pain is: always present, but gets better and worse  Does back pain interfere with her job:  Yes  On a scale of 1-10 (10 being the worst), patient describes pain as:  7  What makes back pain worse: standing  Acupuncture: not tried  Acetaminophen: not tried  Activity or exercise: not tried  Chiropractor:  Not tried  Cold: helpful  Heat: helpful  Massage: helpful  Muscle relaxants: helpful  NSAIDS: not tried  Opioids: not tried  Physical Therapy: not tried  Rest: helpful  Steroid Injection: not tried  Stretching: helpful  Surgery: not tried  TENS unit: not tried  Topical pain relievers: not tried  Other healthcare providers patient is seeing for back pain: None    She eats 0-1 servings of fruits and vegetables daily.She consumes 3 sweetened beverage(s) daily.She exercises with enough effort to increase her heart rate 20 to 29 minutes per day.  She exercises with enough effort to increase her heart rate 6 days per week.   She is taking medications regularly.       She reports stepping over her dog and then going to work.  At work she developed left lower back pain.  The back pain has improved but now she  has pain into her left buttocks and numbness and tingling that extends down to her foot.  She has no bowel or bladder incontinence.  She is uncomfortable and bothered by the numbness down her leg.  She is using a muscle relaxer which helps her to sleep and using heat and ice as well.    BP Readings from Last 3 Encounters:   03/02/20 126/66   10/02/19 138/84   03/18/19 128/70    Wt Readings from Last 3 Encounters:   03/02/20 73.9 kg (163 lb)   10/02/19 76.2 kg (168 lb)   03/18/19 77.1 kg (170 lb)                    Reviewed and updated as needed this visit by Provider         Review of Systems   As documented above       Objective    /66   Pulse 68   Temp 98.9  F (37.2  C) (Temporal)   Resp 16   Wt 73.9 kg (163 lb)   LMP 08/19/2013 (Approximate)   BMI 30.77 kg/m    Body mass index is 30.77 kg/m .  Physical Exam   GENERAL: healthy, alert and no distress  RESP: lungs clear to auscultation - no rales, rhonchi or wheezes  CV: regular rate and rhythm, normal S1 S2, no S3 or S4, no murmur, click or rub, no peripheral edema and peripheral pulses strong  Comprehensive back pain exam:  No tenderness, Range of motion not limited by pain, Lower extremity strength functional and equal on both sides, Lower extremity reflexes within normal limits bilaterally and Straight leg positive on  left, indicating possible ipsilateral radiculopathy    Diagnostic Test Results:  Labs reviewed in Epic  none         Assessment & Plan     1. Acute left-sided low back pain with left-sided sciatica  Will start with prednisone burst, if this does not resolve her symptoms I recommend physical therapy.  She may call me and I can schedule this for her  - methylPREDNISolone (MEDROL DOSEPAK) 4 MG tablet therapy pack; Follow Package Directions  Dispense: 21 tablet; Refill: 0       This chart documentation was completed in part with Dragon voice recognition software.  Documentation is reviewed after completion, however, some words and  grammatical errors may remain.  Anisha Alfaro PA-C      Return in about 1 month (around 4/2/2020) for Physical Exam, BP Recheck.    Anisha Alfaro PA-C  [facility]

## 2020-03-09 ENCOUNTER — OFFICE VISIT (OUTPATIENT)
Dept: FAMILY MEDICINE | Facility: OTHER | Age: 60
End: 2020-03-09
Payer: COMMERCIAL

## 2020-03-09 ENCOUNTER — TELEPHONE (OUTPATIENT)
Dept: FAMILY MEDICINE | Facility: OTHER | Age: 60
End: 2020-03-09

## 2020-03-09 VITALS
DIASTOLIC BLOOD PRESSURE: 82 MMHG | BODY MASS INDEX: 30.66 KG/M2 | RESPIRATION RATE: 16 BRPM | TEMPERATURE: 98.3 F | OXYGEN SATURATION: 96 % | WEIGHT: 162.4 LBS | HEIGHT: 61 IN | HEART RATE: 68 BPM | SYSTOLIC BLOOD PRESSURE: 132 MMHG

## 2020-03-09 DIAGNOSIS — M54.42 ACUTE LEFT-SIDED LOW BACK PAIN WITH LEFT-SIDED SCIATICA: Primary | ICD-10-CM

## 2020-03-09 PROCEDURE — 99213 OFFICE O/P EST LOW 20 MIN: CPT | Performed by: PHYSICIAN ASSISTANT

## 2020-03-09 ASSESSMENT — PATIENT HEALTH QUESTIONNAIRE - PHQ9
10. IF YOU CHECKED OFF ANY PROBLEMS, HOW DIFFICULT HAVE THESE PROBLEMS MADE IT FOR YOU TO DO YOUR WORK, TAKE CARE OF THINGS AT HOME, OR GET ALONG WITH OTHER PEOPLE: SOMEWHAT DIFFICULT
SUM OF ALL RESPONSES TO PHQ QUESTIONS 1-9: 8
SUM OF ALL RESPONSES TO PHQ QUESTIONS 1-9: 8

## 2020-03-09 ASSESSMENT — ANXIETY QUESTIONNAIRES
GAD7 TOTAL SCORE: 7
2. NOT BEING ABLE TO STOP OR CONTROL WORRYING: SEVERAL DAYS
3. WORRYING TOO MUCH ABOUT DIFFERENT THINGS: SEVERAL DAYS
7. FEELING AFRAID AS IF SOMETHING AWFUL MIGHT HAPPEN: SEVERAL DAYS
GAD7 TOTAL SCORE: 7
1. FEELING NERVOUS, ANXIOUS, OR ON EDGE: SEVERAL DAYS
7. FEELING AFRAID AS IF SOMETHING AWFUL MIGHT HAPPEN: SEVERAL DAYS
5. BEING SO RESTLESS THAT IT IS HARD TO SIT STILL: SEVERAL DAYS
GAD7 TOTAL SCORE: 7
6. BECOMING EASILY ANNOYED OR IRRITABLE: SEVERAL DAYS
4. TROUBLE RELAXING: SEVERAL DAYS

## 2020-03-09 ASSESSMENT — MIFFLIN-ST. JEOR: SCORE: 1244.4

## 2020-03-09 NOTE — TELEPHONE ENCOUNTER
Left message for patient to return call to any triage RN.    Did schedule her for 11.  Need to confirm.    Michael Tomas, RN, BSN

## 2020-03-09 NOTE — LETTER
March 9, 2020      Janice Ulloa  [address] [facility] [address]        To Whom It May Concern:    Janice Ulloa  was seen on March 9, 2020 .  Please excuse her from work this week due to pain and numbness in her left leg.  She may return to work in 1 week.  If she is not able to work, I can update her work restrictions or ability to work.      Sincerely,        Anisha Alfaro PA-C

## 2020-03-09 NOTE — TELEPHONE ENCOUNTER
Reason for call:  Same Day Appointment  Reason for Call:  Same Day Appointment, Requested Provider:  KARLI Sanderson    PCP: Anisha Alfaro    Reason for visit: Leg pain - continued    Duration of symptoms: over a week    Have you been treated for this in the past? Yes    Additional comments: Scheduled for Wed but would like to be in prior if possible or call to advise what else she can do.     Can we leave a detailed message on this number? YES    Phone number patient can be reached at: [phone]    Best Time: Any    Call taken on 3/9/2020 at 9:23 AM by Soumya Hernandez

## 2020-03-09 NOTE — PROGRESS NOTES
Subjective     Janice Ulloa is a 59 year old female who presents to clinic today for the following health issues:    HPI   Joint Pain    Onset: 2/27/20    Description:   Location: left leg, was diagnosed with sciatic radiculopathy on March 2.  She is treated with Medrol Dosepak.  Pain in her back has lessened now she has more pain just in her leg.  Some numbness tingling aching pain.  She does not sleep so well anymore.  Standing is difficult.  Laying down is okay as long she lays on her right side.  If she sits on a heating pad that is when she has the most relief.  She does walk to see if this will improve her symptoms as well.  She denies any bowel or bladder incontinence  Character: Sharp and Stabbing    Intensity: moderate    Progression of Symptoms: same    Accompanying Signs & Symptoms:  Other symptoms: radiation of pain to the toes, numbness    History:   Previous similar pain: YES- 3/2/20      Precipitating factors:   Trauma or overuse: no     Alleviating factors:  Improved by: heat, stretching and Zanaflex, medrol dose pack    Therapies Tried and outcome: see above          Patient Active Problem List   Diagnosis     Migraine     Carpal tunnel syndrome     Headache     Neck pain     Hypercholesteremia     Intervertebral cervical disc disorder with myelopathy, cervical region     Hyperlipidemia with target LDL less than 130     Perimenopausal     Anemia     Microscopic hematuria     Neck muscle spasm     Hypertension goal BP (blood pressure) < 140/90     Pneumonia     Sepsis (H)     Acute and chronic respiratory failure with hypoxia (H)     Hypokalemia     DVT prophylaxis     Advanced directives, counseling/discussion     Health Care Home     Adjustment disorder with mixed anxiety and depressed mood     Family history of premature coronary heart disease     Past Surgical History:   Procedure Laterality Date     C DISK SURG,ANTER,CERVICAL,SINGLE LVL  10/02/2007    C5-C6 anterior cervical diskectomy &  fusion w/plate.     C NONSPECIFIC PROCEDURE      Bilateral inguinal hernia repairs     C NONSPECIFIC PROCEDURE      Oral surgery x2     COLONOSCOPY  1/10/2014    Procedure: COMBINED COLONOSCOPY, SINGLE BIOPSY/POLYPECTOMY BY BIOPSY;  colonoscopy with polypectomy by forceps;  Surgeon: Chevy Yang MD;  Location: [facility]     HC REVISE MEDIAN N/CARPAL TUNNEL SURG  2004     HC REVISE MEDIAN N/CARPAL TUNNEL SURG  06    Left     HC TREATMENT INCOMPLETE  SURG, ANY TRIMESTER      D&C after miscarriage       Social History     Tobacco Use     Smoking status: Former Smoker     Packs/day: 1.50     Years: 20.00     Pack years: 30.00     Types: Cigarettes     Last attempt to quit: 10/17/2007     Years since quittin.4     Smokeless tobacco: Never Used   Substance Use Topics     Alcohol use: Yes     Comment: rare     Family History   Problem Relation Age of Onset     Cancer Mother         parotid     Heart Disease Father 47        MI     Depression Daughter      Hypertension Sister          Current Outpatient Medications   Medication Sig Dispense Refill     amLODIPine (NORVASC) 5 MG tablet Take 1 tablet (5 mg) by mouth daily 90 tablet 1     ASPIRIN 81 MG PO TABS ONE DAILY  3     atorvastatin (LIPITOR) 20 MG tablet Take 1 tablet (20 mg) by mouth daily 90 tablet 3     IBUPROFEN PO Take 400 mg by mouth daily as needed        lisinopril-hydrochlorothiazide (PRINZIDE/ZESTORETIC) 20-12.5 MG tablet TAKE TWO TABLETS BY MOUTH EVERY  tablet 1     Multiple Vitamin (MULTI-VITAMIN DAILY PO)        propranolol ER (INDERAL LA) 120 MG 24 hr capsule TAKE TWO CAPSULES BY MOUTH EVERY  capsule 1     tiZANidine (ZANAFLEX) 4 MG tablet TAKE ONE TO TWO TABLETS AS NEEDED FOR MUSCLE SPASM at bedtime 90 tablet 3       Reviewed and updated as needed this visit by Provider         Review of Systems   She denies any bowel or bladder incontinence, abdominal pain, chest pain, and shortness of breath      Objective    BP  "132/82   Pulse 68   Temp 98.3  F (36.8  C) (Temporal)   Resp 16   Ht 1.55 m (5' 1.02\")   Wt 73.7 kg (162 lb 6.4 oz)   LMP 08/19/2013 (Approximate)   SpO2 96%   BMI 30.66 kg/m    Body mass index is 30.66 kg/m .  Physical Exam   GENERAL: healthy, alert and no distress  Comprehensive back pain exam:  Tenderness of left buttocks, Pain limits the following motions: Pain is not limiting range of motion, Lower extremity strength functional and equal on both sides, Lower extremity reflexes within normal limits bilaterally, Lower extremity sensation normal and equal on both sides and Straight leg raise negative bilaterally    Diagnostic Test Results:  Labs reviewed in Epic  none, patient wonders about an x-ray we discussed the fact that x-rays will not show with nerves or discs, she is had no trauma therefore I am not worried about any bony pathology at this time        Assessment & Plan     1. Acute left-sided low back pain with left-sided sciatica  Recommend physical therapy, if physical therapy fails would consider MRI several weeks down the way.  She was offered pain meds to help her sleep at night, she declines.  She will continue to use her muscle relaxer and over-the-counter Tylenol  - PHYSICAL THERAPY REFERRAL; Future       This chart documentation was completed in part with Dragon voice recognition software.  Documentation is reviewed after completion, however, some words and grammatical errors may remain.  Anisha Alfaro PA-C      Return in about 1 week (around 3/16/2020), or PT.    Anisha Alfaro PA-C  [facility]    Answers for HPI/ROS submitted by the patient on 3/9/2020   If you checked off any problems, how difficult have these problems made it for you to do your work, take care of things at home, or get along with other people?: Somewhat difficult  PHQ9 TOTAL SCORE: 8  CHELO 7 TOTAL SCORE: 7    "

## 2020-03-10 ASSESSMENT — PATIENT HEALTH QUESTIONNAIRE - PHQ9: SUM OF ALL RESPONSES TO PHQ QUESTIONS 1-9: 8

## 2020-03-10 ASSESSMENT — ANXIETY QUESTIONNAIRES: GAD7 TOTAL SCORE: 7

## 2020-03-24 ENCOUNTER — HOSPITAL ENCOUNTER (OUTPATIENT)
Dept: PHYSICAL THERAPY | Facility: OTHER | Age: 60
Setting detail: THERAPIES SERIES
End: 2020-03-24
Attending: PHYSICIAN ASSISTANT
Payer: COMMERCIAL

## 2020-03-24 DIAGNOSIS — M54.42 ACUTE LEFT-SIDED LOW BACK PAIN WITH LEFT-SIDED SCIATICA: ICD-10-CM

## 2020-03-24 PROCEDURE — 97161 PT EVAL LOW COMPLEX 20 MIN: CPT | Mod: GP | Performed by: PHYSICAL THERAPIST

## 2020-03-24 PROCEDURE — 97110 THERAPEUTIC EXERCISES: CPT | Mod: GP | Performed by: PHYSICAL THERAPIST

## 2020-03-24 PROCEDURE — 97530 THERAPEUTIC ACTIVITIES: CPT | Mod: GP | Performed by: PHYSICAL THERAPIST

## 2020-03-24 NOTE — PROGRESS NOTES
03/24/20 1515   General Information   Type of Visit Initial OP Ortho PT Evaluation   Start of Care Date 03/24/20   Referring Physician Anisha Alfaro PA-C   Patient/Family Goals Statement I want to get the numbness to go away. To get back to normal activity.   Orders Evaluate and Treat   Orders Comment None   Date of Order 03/09/20   Certification Required? Yes   Medical Diagnosis Left sided low back pain and left sided sciatica   Surgical/Medical history reviewed Yes   Precautions/Limitations   (Off week x 1 week. )   Weight-Bearing Status - LUE full weight-bearing   Weight-Bearing Status - RUE full weight-bearing   Weight-Bearing Status - LLE full weight-bearing   Weight-Bearing Status - RLE full weight-bearing   Body Part(s)   Body Part(s) Lumbar Spine/SI   Presentation and Etiology   Pertinent history of current problem (include personal factors and/or comorbidities that impact the POC) Janice reports that on 2/27 she was leaning over her dog to turn on the light and she felt a pop in the left low back. She states that initially the pain was in her low back, now she has pain into the left lateral leg and numbness down the leg to the last 2 toes on her left foot.    Impairments A. Pain;D. Decreased ROM;E. Decreased flexibility;K. Numbness;L. Tingling   Functional Limitations perform activities of daily living;perform required work activities;perform desired leisure / sports activities   Symptom Location Left low back, down the lateral leg to the last 2 toes of the left foot.    How/Where did it occur At home   Onset date of current episode/exacerbation 02/27/20   Chronicity New   Pain rating (0-10 point scale) Other   Pain rating comment At worst the pain can be a 9.5/10 and typically it is a 6/10.    Pain quality A. Sharp;C. Aching   Frequency of pain/symptoms A. Constant   Pain/symptoms are: The same all the time   Pain/symptoms exacerbated by A. Sitting;I. Bending;J. ADL;K. Home tasks;L. Work tasks    Pain/symptoms eased by B. Walking;E. Changing positions;H. Cold;G. Heat   Progression of symptoms since onset: Worsened   Prior Level of Function   Prior Level of Function-Mobility Independent ambulator   Prior Level of Function-ADLs Indpendent   Current Level of Function   Current Community Support Family/friend caregiver   Patient role/employment history A. 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    Living environment House/townhome   Home/community accessibility No concerns. No stairs to enter   Current equipment-Gait/Locomotion None   Current equipment-ADL None   Fall Risk Screen   Fall screen completed by PT   Have you fallen 2 or more times in the past year? No   Have you fallen and had an injury in the past year? No   Is patient a fall risk? No   System Outcome Measures   Outcome Measures Low Back Pain (see Oswestry and Gerardo)   Functional Scales   Functional Scales Other   Other Scales  Pt. self reports that she is functioning at 65% of her normal.    Lumbar Spine/SI Objective Findings   Observation Janice is a 59 year old female who demonstrates moderate pain behaviors. She is not able to staying sitting as we go thruogh her evaluation.    Integumentary No deficits identified.    Posture Neutral sitting and standing posture.    Gait/Locomotion Walks with a limp on the left side, is guarded with a short stride length.   Balance/Proprioception (Single Leg Stance) NT   Flexion ROM Limited by 75%   Extension ROM WNL   Ankle Dorsiflexion (L4) Strength 5-/5   Ankle Plantar Flexion (S1) Strength 5-/5   Lumbar/SI Special Tests Comments Directional preference using Static/Dynamic Force Analysis. flexion increases symptoms so did not continue with testing in flexion, standing extension decreases symptoms to the toes, prone lying abolishes all symptoms moving into prone on elbows and prone press-ups all symptoms remain abolished.    Patellar Tendon Reflexes  WNL bilaterally   Achilles  Tendon Reflexes Absent on Left   Planned Therapy Interventions   Planned Therapy Interventions joint mobilization;manual therapy;neuromuscular re-education;ROM;strengthening;stretching   Planned Therapy Interventions Comment ther. ex., ther. act., directional preference.    Planned Modality Interventions   Planned Modality Interventions Cryotherapy;Electrical stimulation;Hot packs   Planned Modality Interventions Comments Will use as needed for symptom control.    Clinical Impression   Criteria for Skilled Therapeutic Interventions Met yes, treatment indicated   PT Diagnosis Mechanical low back involvement, with radiating left lower extremity symptoms. Poor tolerance to activity.   Influenced by the following impairments Pain, numbness, weakness, decreased ROM.    Functional limitations due to impairments Bending, prolonged sitting, prolonged standing, prolonged walking, sleeping through the night.    Clinical Presentation Stable/Uncomplicated   Clinical Presentation Rationale Clinical judgement, 1 body system   Clinical Decision Making (Complexity) Low complexity   Therapy Frequency 1 time/week   Predicted Duration of Therapy Intervention (days/wks) 12 visits   Risk & Benefits of therapy have been explained Yes   Patient, Family & other staff in agreement with plan of care Yes   Clinical Impression Comments Pt. is presenting with mechanical low back involvement with left lower extremity symptoms, absent achilles reflex, ankle PF/DF weakness.    Education Assessment   Preferred Learning Style Listening;Reading;Demonstration   Barriers to Learning Cognitive   ORTHO GOALS   PT Ortho Eval Goals 1;2;3   Ortho Goal 1   Goal Identifier Symptoms   Goal Description Janice will use strategies learned in P.T. to decrease the intensity of her symptoms by 50% to help reach a personal goal of symptom relief.    Target Date 04/23/20   Ortho Goal 2   Goal Identifier Strength   Goal Description Janice will demonstrate left lower  extremity strength to be 5/5 allowing for her to return to her active lifestyle.    Target Date 05/23/20   Ortho Goal 3   Goal Identifier Function   Goal Description Janice will improve her BELÉN by 30% indicating improved overall function, she will return to work without restrictions.    Target Date 06/22/20   Total Evaluation Time   PT Eval, Low Complexity Minutes (35602) 30   Therapy Certification   Certification date from 03/24/20   Certification date to 06/22/20   Medical Diagnosis Left low back pain with radiculopathy

## 2020-03-24 NOTE — PROGRESS NOTES
[facility]          OUTPATIENT PHYSICAL THERAPY ORTHOPEDIC EVALUATION  PLAN OF TREATMENT FOR OUTPATIENT REHABILITATION  (COMPLETE FOR INITIAL CLAIMS ONLY)  Patient's Last Name, First Name, M.I.  YOB: 1960  Janice Ulloa    Provider s Name:  [facility]   Medical Record No.  [id number]   Start of Care Date:  03/24/20   Onset Date:  02/27/20   Type:     _X__PT   ___OT   ___SLP Medical Diagnosis:  Left low back pain with radiculopathy     PT Diagnosis:  Mechanical low back involvement, with radiating left lower extremity symptoms. Poor tolerance to activity.   Visits from SOC:  1      _________________________________________________________________________________  Plan of Treatment/Functional Goals:  joint mobilization, manual therapy, neuromuscular re-education, ROM, strengthening, stretching  ther. ex., ther. act., directional preference.   Cryotherapy, Electrical stimulation, Hot packs  Will use as needed for symptom control.   Goals  Goal Identifier: Symptoms  Goal Description: Janice will use strategies learned in P.T. to decrease the intensity of her symptoms by 50% to help reach a personal goal of symptom relief.   Target Date: 04/23/20    Goal Identifier: Strength  Goal Description: Janice will demonstrate left lower extremity strength to be 5/5 allowing for her to return to her active lifestyle.   Target Date: 05/23/20    Goal Identifier: Function  Goal Description: Janice will improve her BELÉN by 30% indicating improved overall function, she will return to work without restrictions.   Target Date: 06/22/20          Therapy Frequency:  1 time/week (will be affected by CoVid19)  Predicted Duration of Therapy Intervention:  12 visits    Lupis Rosenbaum, PT                 I CERTIFY THE NEED FOR THESE SERVICES FURNISHED UNDER        THIS PLAN OF TREATMENT AND WHILE UNDER MY CARE     (Physician co-signature of this document indicates review and  certification of the therapy plan).                       Certification Date From:  03/24/20   Certification Date To:  06/22/20    Referring Provider:  Anisha Alfaro PA-C    Initial Assessment        See Epic Evaluation Start of Care Date: 03/24/20

## 2020-03-30 NOTE — PROGRESS NOTES
"Subjective     Janice Ulloa is a 59 year old female who is being evaluated via a billable telephone visit.      The patient has been notified of following:     \"This telephone visit will be conducted via a call between you and your physician/provider. We have found that certain health care needs can be provided without the need for a physical exam.  This service lets us provide the care you need with a short phone conversation.  If a prescription is necessary we can send it directly to your pharmacy.  If lab work is needed we can place an order for that and you can then stop by our lab to have the test done at a later time.    If during the course of the call the physician/provider feels a telephone visit is not appropriate, you will not be charged for this service.\"     Physician has received verbal consent for a Telephone Visit from the patient? Yes    Janice Ulloa complains of No chief complaint on file.      Her back is the same, still having numbness down her left leg to the level of her left toes. No B/B incontinence.  Does not feel like it is worsening, though at times feels worse for a short time.  She was able to see Lupis once in PT, has been doing her exercises as instructed.  Using ice, heat, walking.  Uses tizanidine at bedtime, declined pain meds at our last visit,  Pain is worst in the morning per pt.    Needs bp meds refilled, has not checked BP recently but states it is typically well controlled.     Has been feeling well. Is staying home to be healthy .      ALLERGIES  No known drug allergies           Current Outpatient Medications   Medication Sig Dispense Refill     amLODIPine (NORVASC) 5 MG tablet Take 1 tablet (5 mg) by mouth daily 90 tablet 1     ASPIRIN 81 MG PO TABS ONE DAILY  3     atorvastatin (LIPITOR) 20 MG tablet Take 1 tablet (20 mg) by mouth daily 90 tablet 3     IBUPROFEN PO Take 400 mg by mouth daily as needed        lisinopril-hydrochlorothiazide (ZESTORETIC) 20-12.5 MG tablet " TAKE TWO TABLETS BY MOUTH EVERY  tablet 1     Multiple Vitamin (MULTI-VITAMIN DAILY PO)        propranolol ER (INDERAL LA) 120 MG 24 hr capsule TAKE TWO CAPSULES BY MOUTH EVERY  capsule 1     tiZANidine (ZANAFLEX) 4 MG tablet TAKE ONE TO TWO TABLETS AS NEEDED FOR MUSCLE SPASM at bedtime 90 tablet 3     traMADol (ULTRAM) 50 MG tablet Take 1 tablet (50 mg) by mouth every 6 hours as needed for severe pain 20 tablet 0     BP Readings from Last 3 Encounters:   03/09/20 132/82   03/02/20 126/66   10/02/19 138/84    Wt Readings from Last 3 Encounters:   03/09/20 73.7 kg (162 lb 6.4 oz)   03/02/20 73.9 kg (163 lb)   10/02/19 76.2 kg (168 lb)                    Reviewed and updated as needed this visit by Provider  Tobacco  Allergies  Meds  Problems  Med Hx  Surg Hx  Fam Hx         Review of Systems   ROS COMP: CONSTITUTIONAL: NEGATIVE for fever, chills, change in weight  ENT/MOUTH: NEGATIVE for ear, mouth and throat problems  RESP: NEGATIVE for significant cough or SOB  CV: NEGATIVE for chest pain, palpitations or peripheral edema  MUSCULOSKELETAL: Back pain as above       Objective   Reported vitals:  LMP 08/19/2013 (Approximate)    healthy, alert and no distress  Psych: Alert and oriented times 3; coherent speech, normal   rate and volume, able to articulate logical thoughts, able   to abstract reason, no tangential thoughts, no hallucinations   or delusions  Her affect is normal      She is able to rise up on her tiptoes, per pt.      Diagnostic Test Results:  Labs reviewed in Epic  none         Assessment/Plan:  This visit was completed virtually due to our current COVID 19 pandemic.  The patient will be seen as soon as possible in the office.  If there is any significant change in or worsening of symptoms patient will call in to be triaged, present to the emergency department, or call 911 if acute worsening is noted.   1. Acute left-sided low back pain with left-sided sciatica  Stable, continue PT  exercises, alert me at once if symptoms are worsening , to ER if she has loss of bowel or bladder function, plan to resume PT when able, she may use tizanidine prn, ice, heat, and will give her some tramadol for prn use   - traMADol (ULTRAM) 50 MG tablet; Take 1 tablet (50 mg) by mouth every 6 hours as needed for severe pain  Dispense: 20 tablet; Refill: 0  RETURN TO CLINIC prn after COVID 19, sooner with worsening    2. Hyperlipidemia with target LDL less than 130    - atorvastatin (LIPITOR) 20 MG tablet; Take 1 tablet (20 mg) by mouth daily  Dispense: 90 tablet; Refill: 3    3. Essential hypertension with goal blood pressure less than 140/90    - lisinopril-hydrochlorothiazide (ZESTORETIC) 20-12.5 MG tablet; TAKE TWO TABLETS BY MOUTH EVERY DAY  Dispense: 180 tablet; Refill: 1  - propranolol ER (INDERAL LA) 120 MG 24 hr capsule; TAKE TWO CAPSULES BY MOUTH EVERY DAY  Dispense: 180 capsule; Refill: 1    4. Neck pain    - tiZANidine (ZANAFLEX) 4 MG tablet; TAKE ONE TO TWO TABLETS AS NEEDED FOR MUSCLE SPASM at bedtime  Dispense: 90 tablet; Refill: 3    Return in about 1 month (around 4/30/2020), or if symptoms worsen or fail to improve, sooner as needed.      Phone call duration:  8.5 minutes    Anisha Alfaro PA-C

## 2020-03-31 ENCOUNTER — VIRTUAL VISIT (OUTPATIENT)
Dept: FAMILY MEDICINE | Facility: OTHER | Age: 60
End: 2020-03-31
Payer: COMMERCIAL

## 2020-03-31 DIAGNOSIS — E78.5 HYPERLIPIDEMIA WITH TARGET LDL LESS THAN 130: ICD-10-CM

## 2020-03-31 DIAGNOSIS — I10 ESSENTIAL HYPERTENSION WITH GOAL BLOOD PRESSURE LESS THAN 140/90: ICD-10-CM

## 2020-03-31 DIAGNOSIS — M54.42 ACUTE LEFT-SIDED LOW BACK PAIN WITH LEFT-SIDED SCIATICA: Primary | ICD-10-CM

## 2020-03-31 DIAGNOSIS — M54.2 NECK PAIN: ICD-10-CM

## 2020-03-31 PROCEDURE — 99214 OFFICE O/P EST MOD 30 MIN: CPT | Mod: TEL | Performed by: PHYSICIAN ASSISTANT

## 2020-03-31 RX ORDER — PROPRANOLOL HYDROCHLORIDE 120 MG/1
CAPSULE, EXTENDED RELEASE ORAL
Qty: 180 CAPSULE | Refills: 1 | Status: SHIPPED | OUTPATIENT
Start: 2020-03-31 | End: 2020-09-09

## 2020-03-31 RX ORDER — LISINOPRIL AND HYDROCHLOROTHIAZIDE 12.5; 2 MG/1; MG/1
TABLET ORAL
Qty: 180 TABLET | Refills: 1 | Status: SHIPPED | OUTPATIENT
Start: 2020-03-31 | End: 2020-09-09

## 2020-03-31 RX ORDER — ATORVASTATIN CALCIUM 20 MG/1
20 TABLET, FILM COATED ORAL DAILY
Qty: 90 TABLET | Refills: 3 | Status: SHIPPED | OUTPATIENT
Start: 2020-03-31 | End: 2021-03-16

## 2020-03-31 RX ORDER — TRAMADOL HYDROCHLORIDE 50 MG/1
50 TABLET ORAL EVERY 6 HOURS PRN
Qty: 20 TABLET | Refills: 0 | Status: SHIPPED | OUTPATIENT
Start: 2020-03-31 | End: 2020-04-03

## 2020-04-17 DIAGNOSIS — I10 ESSENTIAL HYPERTENSION WITH GOAL BLOOD PRESSURE LESS THAN 140/90: ICD-10-CM

## 2020-04-17 RX ORDER — AMLODIPINE BESYLATE 5 MG/1
5 TABLET ORAL DAILY
Qty: 90 TABLET | Refills: 1 | Status: SHIPPED | OUTPATIENT
Start: 2020-04-17 | End: 2020-09-09

## 2020-04-17 NOTE — TELEPHONE ENCOUNTER
Pending Prescriptions:                       Disp   Refills    amLODIPine (NORVASC) 5 MG tablet           90 tab*1        Sig: Take 1 tablet (5 mg) by mouth daily    Routing refill request to provider for review/approval because:  Labs not current:    Creatinine   Date Value Ref Range Status   03/18/2019 0.68 0.52 - 1.04 mg/dL Final     Would you like lab only or visit?

## 2020-04-17 NOTE — TELEPHONE ENCOUNTER
Reason for call:  Medication  Reason for Call:  Medication or medication refill:    Do you use a [facility]?  Name of the pharmacy and phone number for the current request:  [facility] - [phone]    Name of the medication requested: Amlodipine     Other request: Was in for virtual visit and this was not refilled at that time.     Can we leave a detailed message on this number? YES    Phone number patient can be reached at: Home number on file [phone] (home)    Best Time: Any    Call taken on 4/17/2020 at 8:45 AM by Soumya Hernandez

## 2020-06-08 ENCOUNTER — VIRTUAL VISIT (OUTPATIENT)
Dept: FAMILY MEDICINE | Facility: OTHER | Age: 60
End: 2020-06-08
Payer: COMMERCIAL

## 2020-06-08 DIAGNOSIS — M54.16 LUMBAR RADICULOPATHY: ICD-10-CM

## 2020-06-08 DIAGNOSIS — M54.42 ACUTE LEFT-SIDED LOW BACK PAIN WITH LEFT-SIDED SCIATICA: Primary | ICD-10-CM

## 2020-06-08 PROCEDURE — 99213 OFFICE O/P EST LOW 20 MIN: CPT | Mod: 95 | Performed by: PHYSICIAN ASSISTANT

## 2020-06-08 NOTE — PROGRESS NOTES
"Janice Ulloa is a 59 year old female who is being evaluated via a billable telephone visit.      The patient has been notified of following:     \"This telephone visit will be conducted via a call between you and your physician/provider. We have found that certain health care needs can be provided without the need for a physical exam.  This service lets us provide the care you need with a short phone conversation.  If a prescription is necessary we can send it directly to your pharmacy.  If lab work is needed we can place an order for that and you can then stop by our lab to have the test done at a later time.    Telephone visits are billed at different rates depending on your insurance coverage. During this emergency period, for some insurers they may be billed the same as an in-person visit.  Please reach out to your insurance provider with any questions.    If during the course of the call the physician/provider feels a telephone visit is not appropriate, you will not be charged for this service.\"    Patient has given verbal consent for Telephone visit?  Yes    What phone number would you like to be contacted at? [phone]    How would you like to obtain your AVS? Mail a copy    Subjective     Janice Ulloa is a 59 year old female who presents via phone visit today for the following health issues:    HPI  Concern - lower back pain  Onset: 3 months    Description:   Still having the same pain, pain I her back but she is mostly bothered by the burning numbness in her left leg and foot.  Her foot and toes hurt badly at night, the toes and foot appear normal just really hurt and burn. She has pain meds but has not taken one yet, uses her muscle relaxer sometimes at night with benefit.    Intensity: 2/10    Progression of Symptoms:  same and numb    Accompanying Signs & Symptoms:  Numb and slight pain    Previous history of similar problem:   Yes     Precipitating factors:   Worsened by: if she over does her " activities     Alleviating factors:  Improved by: heat and ice    Therapies Tried and outcome: has done heat and ice and message and slight relief  She did start PT, had 1 visit before COVID, she is doing exercises.  She did not do more PT because her insurance would not cover it per pt, the reason for this is not clear       Patient Active Problem List   Diagnosis     Migraine     Carpal tunnel syndrome     Headache     Neck pain     Hypercholesteremia     Intervertebral cervical disc disorder with myelopathy, cervical region     Hyperlipidemia with target LDL less than 130     Perimenopausal     Anemia     Microscopic hematuria     Neck muscle spasm     Hypertension goal BP (blood pressure) < 140/90     Pneumonia     Sepsis (H)     Acute and chronic respiratory failure with hypoxia (H)     Hypokalemia     DVT prophylaxis     Advanced directives, counseling/discussion     Health Care Home     Adjustment disorder with mixed anxiety and depressed mood     Family history of premature coronary heart disease     Past Surgical History:   Procedure Laterality Date     C DISK SURG,ANTER,CERVICAL,SINGLE LVL  10/02/2007    C5-C6 anterior cervical diskectomy & fusion w/plate.     C NONSPECIFIC PROCEDURE      Bilateral inguinal hernia repairs     C NONSPECIFIC PROCEDURE      Oral surgery x2     COLONOSCOPY  1/10/2014    Procedure: COMBINED COLONOSCOPY, SINGLE BIOPSY/POLYPECTOMY BY BIOPSY;  colonoscopy with polypectomy by forceps;  Surgeon: Chevy Yang MD;  Location:  GI     [facility] REVISE MEDIAN N/CARPAL TUNNEL SURG  2004     HC REVISE MEDIAN N/CARPAL TUNNEL SURG  06    Left     HC TREATMENT INCOMPLETE  SURG, ANY TRIMESTER      D&C after miscarriage       Social History     Tobacco Use     Smoking status: Former Smoker     Packs/day: 1.50     Years: 20.00     Pack years: 30.00     Types: Cigarettes     Last attempt to quit: 10/17/2007     Years since quittin.6     Smokeless tobacco: Never Used    Substance Use Topics     Alcohol use: Yes     Comment: rare     Family History   Problem Relation Age of Onset     Cancer Mother         parotid     Heart Disease Father 47        MI     Depression Daughter      Hypertension Sister          Current Outpatient Medications   Medication Sig Dispense Refill     amLODIPine (NORVASC) 5 MG tablet Take 1 tablet (5 mg) by mouth daily 90 tablet 1     ASPIRIN 81 MG PO TABS ONE DAILY  3     atorvastatin (LIPITOR) 20 MG tablet Take 1 tablet (20 mg) by mouth daily 90 tablet 3     lisinopril-hydrochlorothiazide (ZESTORETIC) 20-12.5 MG tablet TAKE TWO TABLETS BY MOUTH EVERY  tablet 1     Multiple Vitamin (MULTI-VITAMIN DAILY PO)        propranolol ER (INDERAL LA) 120 MG 24 hr capsule TAKE TWO CAPSULES BY MOUTH EVERY  capsule 1     tiZANidine (ZANAFLEX) 4 MG tablet TAKE ONE TO TWO TABLETS AS NEEDED FOR MUSCLE SPASM at bedtime 90 tablet 3     IBUPROFEN PO Take 400 mg by mouth daily as needed        BP Readings from Last 3 Encounters:   03/09/20 132/82   03/02/20 126/66   10/02/19 138/84    Wt Readings from Last 3 Encounters:   03/09/20 73.7 kg (162 lb 6.4 oz)   03/02/20 73.9 kg (163 lb)   10/02/19 76.2 kg (168 lb)                    Reviewed and updated as needed this visit by Provider         Review of Systems   CONSTITUTIONAL: NEGATIVE for fever, chills, change in weight  ENT/MOUTH: NEGATIVE for ear, mouth and throat problems  RESP: NEGATIVE for significant cough or SOB  CV: NEGATIVE for chest pain, palpitations or peripheral edema  MUSCULOSKELETAL: lumbar radiculopathy   NEURO: denies bowel or bladder incontinence       Objective   Reported vitals:  [facility] 08/19/2013 (Approximate)    healthy, alert and no distress  PSYCH: Alert and oriented times 3; coherent speech, normal   rate and volume, able to articulate logical thoughts, able   to abstract reason, no tangential thoughts, no hallucinations   or delusions  Her affect is normal and pleasant  RESP: No cough, no  audible wheezing, able to talk in full sentences  Remainder of exam unable to be completed due to telephone visits    Diagnostic Test Results:  Labs reviewed in Epic  none         Assessment/Plan:  1. Acute left-sided low back pain with left-sided sciatica  Continue current meds and ice/heat as tolerated and needed  - NONPHYSICIAN TELEPHONE ASSESSMENT 5-10 MIN    2. Lumbar radiculopathy  Will set up MRI and consider more treatment options once results are known  - MR Lumbar Spine w/o Contrast; Future  - NONPHYSICIAN TELEPHONE ASSESSMENT 5-10 MIN    No follow-ups on file.      Phone call duration:  6 minutes    Anisha Alfaro PA-C

## 2020-06-15 ENCOUNTER — HOSPITAL ENCOUNTER (OUTPATIENT)
Dept: MRI IMAGING | Facility: CLINIC | Age: 60
Discharge: HOME | End: 2020-06-15
Attending: PHYSICIAN ASSISTANT | Admitting: PHYSICIAN ASSISTANT
Payer: COMMERCIAL

## 2020-06-15 DIAGNOSIS — M54.16 LUMBAR RADICULOPATHY: ICD-10-CM

## 2020-06-15 PROCEDURE — 72148 MRI LUMBAR SPINE W/O DYE: CPT

## 2020-06-16 ENCOUNTER — TELEPHONE (OUTPATIENT)
Dept: FAMILY MEDICINE | Facility: OTHER | Age: 60
End: 2020-06-16

## 2020-06-16 DIAGNOSIS — M51.369 BULGING OF LUMBAR INTERVERTEBRAL DISC: ICD-10-CM

## 2020-06-16 DIAGNOSIS — M54.42 ACUTE LEFT-SIDED LOW BACK PAIN WITH LEFT-SIDED SCIATICA: Primary | ICD-10-CM

## 2020-06-16 DIAGNOSIS — M54.16 LUMBAR RADICULOPATHY: ICD-10-CM

## 2020-06-16 NOTE — TELEPHONE ENCOUNTER
Patient returned call. Was given message and asked for phone number to call tomorrow for scheduling.

## 2020-06-16 NOTE — TELEPHONE ENCOUNTER
Please call pt she has 2 disc bulges that are likely the cause of her pain.  One of these bulges is pinching a nerve which is why her leg has been in pain as well.    I have referred her to neurosurgery, they are the spine specialists, to discuss her treatment options.  Please help her schedule, with Ernesto or Trevin.  Anisha Alfaro PA-C

## 2020-06-23 ENCOUNTER — OFFICE VISIT (OUTPATIENT)
Dept: NEUROSURGERY | Facility: CLINIC | Age: 60
End: 2020-06-23
Attending: PHYSICIAN ASSISTANT
Payer: COMMERCIAL

## 2020-06-23 VITALS
DIASTOLIC BLOOD PRESSURE: 101 MMHG | HEIGHT: 61 IN | SYSTOLIC BLOOD PRESSURE: 182 MMHG | RESPIRATION RATE: 16 BRPM | HEART RATE: 68 BPM | BODY MASS INDEX: 30.58 KG/M2 | OXYGEN SATURATION: 95 % | WEIGHT: 162 LBS

## 2020-06-23 DIAGNOSIS — M51.369 BULGING OF LUMBAR INTERVERTEBRAL DISC: ICD-10-CM

## 2020-06-23 DIAGNOSIS — M54.16 LUMBAR RADICULOPATHY: ICD-10-CM

## 2020-06-23 DIAGNOSIS — M54.42 ACUTE LEFT-SIDED LOW BACK PAIN WITH LEFT-SIDED SCIATICA: ICD-10-CM

## 2020-06-23 PROCEDURE — G0463 HOSPITAL OUTPT CLINIC VISIT: HCPCS

## 2020-06-23 PROCEDURE — 99203 OFFICE O/P NEW LOW 30 MIN: CPT | Performed by: PHYSICIAN ASSISTANT

## 2020-06-23 ASSESSMENT — MIFFLIN-ST. JEOR: SCORE: 1242.21

## 2020-06-23 ASSESSMENT — PAIN SCALES - GENERAL: PAINLEVEL: NO PAIN (0)

## 2020-06-23 NOTE — PROGRESS NOTES
Dr. James Orozco  [facility]  Neurosurgery Clinic Visit      CC: left leg pain    Primary care Provider: Anisha Alfaro    Referring Provider:  Anisha Alfaro PA-C      Reason For Visit:   I was asked to consult on the patient for left leg pain.      HPI: Janice Ulloa is a 59 year old female who presents for evaluation of her chief complaint of left leg pain.  She describes pain that radiates down the posterior lateral aspect of her left thigh and calf.  She also has some numbness and tingling in her left foot.  She has had symptoms since February 27, when she stumbled awkwardly and nearly fell.  She has been treated by her primary care provider, and was given a Medrol Dosepak, with minimal symptomatic improvement.  She has also been working with physical therapy, mild symptomatic improvement.  She denies bowel or bladder changes, or problems with balance or coordination.    Past Medical History:   Diagnosis Date     Carpal tunnel syndrome      Intervertebral cervical disc disorder with myelopathy, cervical region 2007    C6 herniation with right C6 radiculopathy     Migraine, unspecified, without mention of intractable migraine without mention of status migrainosus      Perimenopausal     irreg 2011     Personal history of tobacco use, presenting hazards to health        Past Medical History reviewed with patient during visit.    Past Surgical History:   Procedure Laterality Date     C DISK SURG,ANTER,CERVICAL,SINGLE LVL  10/02/2007    C5-C6 anterior cervical diskectomy & fusion w/plate.     C NONSPECIFIC PROCEDURE      Bilateral inguinal hernia repairs     C NONSPECIFIC PROCEDURE      Oral surgery x2     COLONOSCOPY  1/10/2014    Procedure: COMBINED COLONOSCOPY, SINGLE BIOPSY/POLYPECTOMY BY BIOPSY;  colonoscopy with polypectomy by forceps;  Surgeon: Chevy Yang MD;  Location: [facility]     [facility] REVISE MEDIAN N/CARPAL TUNNEL SURG  05/24/2004     HC REVISE MEDIAN N/CARPAL TUNNEL SURG  06/05/06     Left     HC TREATMENT INCOMPLETE  SURG, ANY TRIMESTER      D&C after miscarriage     Past Surgical History reviewed with patient during visit.    Current Outpatient Medications   Medication     amLODIPine (NORVASC) 5 MG tablet     ASPIRIN 81 MG PO TABS     atorvastatin (LIPITOR) 20 MG tablet     IBUPROFEN PO     lisinopril-hydrochlorothiazide (ZESTORETIC) 20-12.5 MG tablet     Multiple Vitamin (MULTI-VITAMIN DAILY PO)     propranolol ER (INDERAL LA) 120 MG 24 hr capsule     tiZANidine (ZANAFLEX) 4 MG tablet     No current facility-administered medications for this visit.        Allergies   Allergen Reactions     No Known Drug Allergies        Social History     Socioeconomic History     Marital status:      Spouse name: Carlin     Number of children: 2     Years of education: 12     Highest education level: None   Occupational History     Occupation:      Employer: [employer]   Social Needs     Financial resource strain: None     Food insecurity     Worry: None     Inability: None     Transportation needs     Medical: None     Non-medical: None   Tobacco Use     Smoking status: Former Smoker     Packs/day: 1.50     Years: 20.00     Pack years: 30.00     Types: Cigarettes     Last attempt to quit: 10/17/2007     Years since quittin.6     Smokeless tobacco: Never Used   Substance and Sexual Activity     Alcohol use: Yes     Comment: rare     Drug use: No     Sexual activity: Not Currently     Partners: Male     Comment: not currently   Lifestyle     Physical activity     Days per week: None     Minutes per session: None     Stress: None   Relationships     Social connections     Talks on phone: None     Gets together: None     Attends [employer] service: None     Active member of club or organization: None     Attends meetings of clubs or organizations: None     Relationship status: None     Intimate partner violence     Fear of current or ex partner: None     Emotionally abused:  "None     Physically abused: None     Forced sexual activity: None   Other Topics Concern      Service Not Asked     Blood Transfusions Not Asked     Caffeine Concern No     Occupational Exposure Not Asked     Hobby Hazards Not Asked     Sleep Concern No     Stress Concern No     Weight Concern No     Special Diet Not Asked     Back Care Not Asked     Exercise No     Bike Helmet Not Asked     Seat Belt Yes     Self-Exams Not Asked     Parent/sibling w/ CABG, MI or angioplasty before 65F 55M? Yes   Social History Narrative    Lives with spouse and son. No domestic violence issues.       Family History   Problem Relation Age of Onset     Cancer Mother         parotid     Heart Disease Father 47        MI     Depression Daughter      Hypertension Sister           ROS: 10 point ROS neg other than the symptoms noted above in the HPI.    Vital Signs: BP (!) 182/101   Pulse 68   Resp 16   Ht 1.549 m (5' 1\")   Wt 73.5 kg (162 lb)   LMP 08/19/2013 (Approximate)   SpO2 95%   BMI 30.61 kg/m      Examination:  Constitutional:  Alert, well nourished, NAD.  HEENT: Normocephalic, atraumatic.   Pulmonary:  Without shortness of breath, normal effort.   Lymph: no lymphadenopathy to low back or LE.   Integumentary: Skin is free of rashes or lesions.   Cardiovascular:  No pitting edema of BLE.    Psych: Normal affect, no apparent distress    Neurological:  Awake  Alert  Oriented x 3  Speech clear  Cranial nerves II - XII grossly intact  Motor exam   Hip Flexor:                Right: 5/5  Left:  5/5  Hip Adductor:             Right:  5/5  Left:  5/5  Hip Abductor:             Right:  5/5  Left:  5/5  Gastroc Soleus:        Right:  5/5  Left:  5/5  Tib/Ant:                      Right:  5/5  Left:  5/5  EHL:                          Right:  5/5  Left:  5/5       Sensation normal to bilateral upper and lower extremities.    Reflexes are 2+ in the patellar and Achilles. There is no clonus. Downgoing " Babinski.    Musculoskeletal:  Gait: Able to stand from a seated position. Normal non-antalgic, non-myelopathic gait.      Lumbar examination reveals no tenderness of the spine or paraspinous muscles.  Hip height is symmetrical. Negative SI joint, sciatic notch or greater trochanteric tenderness to palpation bilaterally.  Straight leg raise is negative bilaterally.      Imaging:   MRI of the lumbar spine was reviewed in the office today.  It demonstrates moderate central stenosis at L4-5, due to broad-based disc bulging and bilateral facet hypertrophy.    Assessment/Plan:     Lumbar disc degeneration  L4-5 central stenosis      Janice Ulloa is a 59 year old female.  I did have a discussion the patient regarding her symptoms.  She understands the findings described on her MRI.  She does have a severe left leg pain.  This is consistent with her central stenosis at L4-5.  She has worked with physical therapy, and is also been taking a steroid and some anti-inflammatory medication.  These have provided minimal symptomatic improvement.  I did discuss the potential benefits of an epidural injection, versus consultation with Dr. Orozco, she is wishing to try the injection first.  We will order an L4-5 translaminar epidural injection.  This can be done at [facility].  She will follow-up with Dr. Orozco if she does not improve symptomatically.          Trevin GARCIA [facility]  [address] [address]    Tel [phone]  Pager [phone]

## 2020-06-23 NOTE — NURSING NOTE
"Janice Ulloa is a 59 year old female who presents for:  Chief Complaint   Patient presents with     Consult For     LBP        Initial Vitals:  BP (!) 182/101   Pulse 68   Resp 16   Ht 5' 1\" (1.549 m)   Wt 162 lb (73.5 kg)   LMP 08/19/2013 (Approximate)   SpO2 95%   BMI 30.61 kg/m   Estimated body mass index is 30.61 kg/m  as calculated from the following:    Height as of this encounter: 5' 1\" (1.549 m).    Weight as of this encounter: 162 lb (73.5 kg).. Body surface area is 1.78 meters squared. BP completed using cuff size: regular  No Pain (0)    Nursing Comments: Pt present today for LBP.    Yaya Brewer, [facility]    "

## 2020-06-29 ENCOUNTER — TELEPHONE (OUTPATIENT)
Dept: SURGERY | Facility: CLINIC | Age: 60
End: 2020-06-29

## 2020-06-29 ENCOUNTER — TELEPHONE (OUTPATIENT)
Dept: FAMILY MEDICINE | Facility: OTHER | Age: 60
End: 2020-06-29

## 2020-06-29 DIAGNOSIS — Z11.59 ENCOUNTER FOR SCREENING FOR OTHER VIRAL DISEASES: Primary | ICD-10-CM

## 2020-06-29 NOTE — TELEPHONE ENCOUNTER
Patient is calling to check on status of getting back injections.  She stated she saw Trevin Barragan last week and he was going to coordinate this with Anisha Alfaro and some one would be calling her but she has not received a call. Ok to leave message if need be.

## 2020-06-29 NOTE — TELEPHONE ENCOUNTER
Pt scheduled for RENÉ    Date: 07/9/20  Time: 0800  Dr. Middleton    Instructed pt to have H&P and  for procedure.  Patient informed of COVID testing process.

## 2020-07-01 NOTE — PROGRESS NOTES
[address]  [facility] [address]  [phone]  Dept: [phone]    PRE-OP EVALUATION:  Today's date: 2020    Janice Ulloa (: 1960) presents for pre-operative evaluation assessment as requested by Dr. Middleton.  She requires evaluation and anesthesia risk assessment prior to undergoing surgery/procedure for treatment ofLumbar radiculopathy   .    Proposed Surgery/ Procedure: NINJECTION, SPINE, LUMBAR, 4-5 EPIDURAL TRANSLAMINAR   Date of Surgery/ Procedure: 2020  Time of Surgery/ Procedure: 8am  Hospital/Surgical Facility: [facility]  Primary Physician: Anisha Alfaro  Type of Anesthesia Anticipated: to be determined    Patient has a Health Care Directive or Living Will:  NO    1. NO - Do you have a history of heart attack, stroke, stent, bypass or surgery on an artery in the head, neck, heart or legs?  2. NO - Do you ever have any pain or discomfort in your chest?  3. NO - Do you have a history of  Heart Failure?  4. NO - Are you troubled by shortness of breath when: walking on the level, up a slight hill or at night?  5. NO - Do you currently have a cold, bronchitis or other respiratory infection?  6. NO - Do you have a cough, shortness of breath or wheezing?  7. YES - DO YOU SOMETIMES GET PAINS IN THE CALVES OF YOUR LEGS WHEN YOU WALK? Left leg, related to radiculopathy or L5 central stenosis  8. NO - Do you or anyone in your family have previous history of blood clots?  9. NO - Do you or does anyone in your family have a serious bleeding problem such as prolonged bleeding following surgeries or cuts?  10. NO - Have you ever had problems with anemia or been told to take iron pills?  11. NO - Have you had any abnormal blood loss such as black, tarry or bloody stools, or abnormal vaginal bleeding?  12. NO - Have you ever had a blood transfusion?  13. NO - Have you or any of your relatives ever had problems with anesthesia?  14. NO - Do you have  sleep apnea, excessive snoring or daytime drowsiness?  15. NO - Do you have any prosthetic heart valves?  16. NO - Do you have prosthetic joints?  17. NO - Is there any chance that you may be pregnant?      HPI:     HPI related to upcoming procedure: Patient has had left low back pain but she had a near fall.  She had been treated conservatively Medrol physical therapy.  This failed and MRI was obtained indicating lumbar degenerative disc disease and L4-L5 central stenosis.  She continues to be bothered by left leg paresthesias especially in her toes at night      See problem list for active medical problems.  Problems all longstanding and stable, except as noted/documented.  See ROS for pertinent symptoms related to these conditions.      MEDICAL HISTORY:     Patient Active Problem List    Diagnosis Date Noted     Family history of premature coronary heart disease 05/08/2018     Priority: Medium     Adjustment disorder with mixed anxiety and depressed mood 04/11/2017     Priority: Medium     Health Care Home 11/18/2016     Priority: Medium     Date:  11-18-16  Status:  Accepted       Pneumonia 01/16/2015     Priority: Medium     Sepsis (H) 01/16/2015     Priority: Medium     Problem list name updated by automated process. Provider to review       Acute and chronic respiratory failure with hypoxia (H) 01/16/2015     Priority: Medium     Hypokalemia 01/16/2015     Priority: Medium     DVT prophylaxis 01/16/2015     Priority: Medium     Advanced directives, counseling/discussion 01/16/2015     Priority: Medium     Hypertension goal BP (blood pressure) < 140/90 11/18/2013     Priority: Medium     Neck muscle spasm 12/06/2011     Priority: Medium     Microscopic hematuria 07/08/2011     Priority: Medium     Needs FU       Anemia 06/15/2011     Priority: Medium     Perimenopausal      Priority: Medium     irreg 2011       Hyperlipidemia with target LDL less than 130 07/01/2010     Priority: Medium     Diagnosis updated  by automated process. Provider to review and confirm.       Hypercholesteremia 06/10/2010     Priority: Medium     Intervertebral cervical disc disorder with myelopathy, cervical region      Priority: Medium     C6 herniation with right C6 radiculopathy       Headache 07/10/2009     Priority: Medium     Problem list name updated by automated process. Provider to review       Neck pain 07/10/2009     Priority: Medium     Carpal tunnel syndrome 2006     Priority: Medium     Migraine 2006     Priority: Medium     Problem list name updated by automated process. Provider to review        Past Medical History:   Diagnosis Date     Carpal tunnel syndrome      Intervertebral cervical disc disorder with myelopathy, cervical region     C6 herniation with right C6 radiculopathy     Migraine, unspecified, without mention of intractable migraine without mention of status migrainosus      Perimenopausal     irreg      Personal history of tobacco use, presenting hazards to health      Past Surgical History:   Procedure Laterality Date     C DISK SURG,ANTER,CERVICAL,SINGLE LVL  10/02/2007    C5-C6 anterior cervical diskectomy & fusion w/plate.     C NONSPECIFIC PROCEDURE      Bilateral inguinal hernia repairs     C NONSPECIFIC PROCEDURE      Oral surgery x2     COLONOSCOPY  1/10/2014    Procedure: COMBINED COLONOSCOPY, SINGLE BIOPSY/POLYPECTOMY BY BIOPSY;  colonoscopy with polypectomy by forceps;  Surgeon: Chevy Yang MD;  Location:  GI     HC REVISE MEDIAN N/CARPAL TUNNEL SURG  2004     HC REVISE MEDIAN N/CARPAL TUNNEL SURG  06    Left     HC TREATMENT INCOMPLETE  SURG, ANY TRIMESTER      D&C after miscarriage     Current Outpatient Medications   Medication Sig Dispense Refill     amLODIPine (NORVASC) 5 MG tablet Take 1 tablet (5 mg) by mouth daily 90 tablet 1     ASPIRIN 81 MG PO TABS ONE DAILY  3     atorvastatin (LIPITOR) 20 MG tablet Take 1 tablet (20 mg) by mouth daily 90 tablet 3  "    IBUPROFEN PO Take 400 mg by mouth daily as needed        lisinopril-hydrochlorothiazide (ZESTORETIC) 20-12.5 MG tablet TAKE TWO TABLETS BY MOUTH EVERY  tablet 1     Multiple Vitamin (MULTI-VITAMIN DAILY PO)        propranolol ER (INDERAL LA) 120 MG 24 hr capsule TAKE TWO CAPSULES BY MOUTH EVERY  capsule 1     tiZANidine (ZANAFLEX) 4 MG tablet TAKE ONE TO TWO TABLETS AS NEEDED FOR MUSCLE SPASM at bedtime 90 tablet 3     OTC products: None, except as noted above    Allergies   Allergen Reactions     No Known Drug Allergies       Latex Allergy: NO    Social History     Tobacco Use     Smoking status: Former Smoker     Packs/day: 1.50     Years: 20.00     Pack years: 30.00     Types: Cigarettes     Last attempt to quit: 10/17/2007     Years since quittin.7     Smokeless tobacco: Never Used   Substance Use Topics     Alcohol use: Yes     Comment: rare     History   Drug Use No       REVIEW OF SYSTEMS:   CONSTITUTIONAL: Patient has had weight gain, she is not sure why.  She is continuing to walk and is eating similarly.  She denies any pedal edema   eYES: NEGATIVE for vision changes or irritation  ENT/MOUTH: NEGATIVE for ear, mouth and throat problems  RESP: NEGATIVE for significant cough or SOB  CV: NEGATIVE for chest pain, palpitations or peripheral edema  GI: She has noticed some abdominal bloating towards evening.  Her BMs are normal.  She is taking a stool softener.  She does not have any abdominal pain  : She denies any urinary symptoms  MUSCULOSKELETAL: Back pain as above with left sided lumbar radiculopathy  NEURO: Lumbar radiculopathy  PSYCHIATRIC: NEGATIVE for changes in mood or affect    EXAM:   BP (!) 130/90   Pulse 70   Temp 99  F (37.2  C) (Temporal)   Resp 16   Ht 1.58 m (5' 2.21\")   Wt 79.2 kg (174 lb 9.6 oz)   LMP 2013 (Approximate)   SpO2 95%   BMI 31.72 kg/m    GENERAL APPEARANCE: healthy, alert and no distress  EYES: Eyes grossly normal to inspection, PERRL and " conjunctivae and sclerae normal  HENT: ear canals and TM's normal and nose and mouth without ulcers or lesions  RESP: lungs clear to auscultation - no rales, rhonchi or wheezes  CV: regular rate and rhythm, normal S1 S2, no S3 or S4 and no murmur, click or rub   ABDOMEN: soft, nontender, no HSM or masses and bowel sounds normal  SKIN: no suspicious lesions or rashes  NEURO: Normal strength and tone, sensory exam grossly normal, mentation intact and speech normal  PSYCH: mentation appears normal and affect normal/bright    DIAGNOSTICS:     EKG: Not indicated due to non-vascular surgery and low risk of event (age <65 and without cardiac risk factors)  Labs Resulted Today: No results found for any visits on 07/07/20.  Labs Drawn and in Process:   Unresulted Labs Ordered in the Past 30 Days of this Admission     Date and Time Order Name Status Description    7/7/2020 1023 ALBUMIN RANDOM URINE QUANTITATIVE In process     7/7/2020 1023 LIPID REFLEX TO DIRECT LDL PANEL In process     7/7/2020 1023 BASIC METABOLIC PANEL In process           Recent Labs   Lab Test 10/02/19  1341 03/18/19  1007 09/19/18  1157 04/20/18  1029   HGB  --  13.3  --  14.3   PLT  --  202  --  283   NA  --  141 139 140   POTASSIUM 3.4 3.7 3.3* 3.0*   CR  --  0.68 0.52 0.50*        IMPRESSION:   Reason for surgery/procedure: Lumbar degenerative disc disease with L4-L5 central stenosis/epidural steroid injection  Diagnosis/reason for consult: Anesthesia clearance, hyperlipidemia, hypertension    The proposed surgical procedure is considered LOW risk.    REVISED CARDIAC RISK INDEX  The patient has the following serious cardiovascular risks for perioperative complications such as (MI, PE, VFib and 3  AV Block):  No serious cardiac risks  INTERPRETATION: 0 risks: Class I (very low risk - 0.4% complication rate)    The patient has the following additional risks for perioperative complications:  No identified additional risks      ICD-10-CM    1.  Intervertebral cervical disc disorder with myelopathy, cervical region  M50.00    2. Preop general physical exam  Z01.818    3. Screening for HIV (human immunodeficiency virus)  Z11.4    4. Screen for colon cancer  Z12.11    5. Hypertension goal BP (blood pressure) < 140/90  I10 BASIC METABOLIC PANEL     Albumin Random Urine Quantitative with Creat Ratio   6. Hyperlipidemia with target LDL less than 130  E78.5 Lipid panel reflex to direct LDL Fasting   7. Breast cancer screening  Z12.39        RECOMMENDATIONS:       Cardiovascular Risk  Patient is already on a Beta Blocker. Continue Betablocker therapy after surgery, using Beta blocker order set as necessary for NPO status.      --Patient is to take all scheduled medications on the day of surgery EXCEPT for modifications listed below.    Anticoagulant or Antiplatelet Medication Use  ASPIRIN: Discontinue ASA 7-10 days prior to procedure to reduce bleeding risk.  It should be resumed post-operatively.  Patient had already discontinued aspirin may resume post injection        ACE Inhibitor or Angiotensin Receptor Blocker (ARB) Use  Ace inhibitor or Angiotensin Receptor Blocker (ARB) and should HOLD this medication for the 24 hours prior to surgery.      APPROVAL GIVEN to proceed with proposed procedure, without further diagnostic evaluation       Signed Electronically by: Anisha Alfaro PA-C    Copy of this evaluation report is provided to requesting physician.    [facility] Preop Guidelines    Revised Cardiac Risk Index

## 2020-07-07 ENCOUNTER — OFFICE VISIT (OUTPATIENT)
Dept: FAMILY MEDICINE | Facility: OTHER | Age: 60
End: 2020-07-07
Payer: COMMERCIAL

## 2020-07-07 VITALS
BODY MASS INDEX: 32.13 KG/M2 | SYSTOLIC BLOOD PRESSURE: 130 MMHG | DIASTOLIC BLOOD PRESSURE: 90 MMHG | RESPIRATION RATE: 16 BRPM | HEIGHT: 62 IN | OXYGEN SATURATION: 95 % | TEMPERATURE: 99 F | HEART RATE: 70 BPM | WEIGHT: 174.6 LBS

## 2020-07-07 DIAGNOSIS — Z12.39 BREAST CANCER SCREENING: ICD-10-CM

## 2020-07-07 DIAGNOSIS — Z11.4 SCREENING FOR HIV (HUMAN IMMUNODEFICIENCY VIRUS): ICD-10-CM

## 2020-07-07 DIAGNOSIS — E78.5 HYPERLIPIDEMIA WITH TARGET LDL LESS THAN 130: ICD-10-CM

## 2020-07-07 DIAGNOSIS — Z01.818 PREOP GENERAL PHYSICAL EXAM: ICD-10-CM

## 2020-07-07 DIAGNOSIS — Z12.11 SCREEN FOR COLON CANCER: ICD-10-CM

## 2020-07-07 DIAGNOSIS — I10 HYPERTENSION GOAL BP (BLOOD PRESSURE) < 140/90: ICD-10-CM

## 2020-07-07 DIAGNOSIS — M50.00 INTERVERTEBRAL CERVICAL DISC DISORDER WITH MYELOPATHY, CERVICAL REGION: Primary | ICD-10-CM

## 2020-07-07 DIAGNOSIS — Z11.59 ENCOUNTER FOR SCREENING FOR OTHER VIRAL DISEASES: ICD-10-CM

## 2020-07-07 LAB
ANION GAP SERPL CALCULATED.3IONS-SCNC: 4 MMOL/L (ref 3–14)
BUN SERPL-MCNC: 10 MG/DL (ref 7–30)
CALCIUM SERPL-MCNC: 9.3 MG/DL (ref 8.5–10.1)
CHLORIDE SERPL-SCNC: 102 MMOL/L (ref 94–109)
CHOLEST SERPL-MCNC: 203 MG/DL
CO2 SERPL-SCNC: 33 MMOL/L (ref 20–32)
CREAT SERPL-MCNC: 0.71 MG/DL (ref 0.52–1.04)
CREAT UR-MCNC: 18 MG/DL
GFR SERPL CREATININE-BSD FRML MDRD: >90 ML/MIN/{1.73_M2}
GLUCOSE SERPL-MCNC: 107 MG/DL (ref 70–99)
HDLC SERPL-MCNC: 49 MG/DL
LDLC SERPL CALC-MCNC: 100 MG/DL
MICROALBUMIN UR-MCNC: 5 MG/L
MICROALBUMIN/CREAT UR: 28.49 MG/G CR (ref 0–25)
NONHDLC SERPL-MCNC: 154 MG/DL
POTASSIUM SERPL-SCNC: 3.6 MMOL/L (ref 3.4–5.3)
SODIUM SERPL-SCNC: 139 MMOL/L (ref 133–144)
TRIGL SERPL-MCNC: 269 MG/DL

## 2020-07-07 PROCEDURE — 80048 BASIC METABOLIC PNL TOTAL CA: CPT | Performed by: PHYSICIAN ASSISTANT

## 2020-07-07 PROCEDURE — 80061 LIPID PANEL: CPT | Performed by: PHYSICIAN ASSISTANT

## 2020-07-07 PROCEDURE — 82043 UR ALBUMIN QUANTITATIVE: CPT | Performed by: PHYSICIAN ASSISTANT

## 2020-07-07 PROCEDURE — 99215 OFFICE O/P EST HI 40 MIN: CPT | Performed by: PHYSICIAN ASSISTANT

## 2020-07-07 PROCEDURE — 36415 COLL VENOUS BLD VENIPUNCTURE: CPT | Performed by: PHYSICIAN ASSISTANT

## 2020-07-07 PROCEDURE — U0003 INFECTIOUS AGENT DETECTION BY NUCLEIC ACID (DNA OR RNA); SEVERE ACUTE RESPIRATORY SYNDROME CORONAVIRUS 2 (SARS-COV-2) (CORONAVIRUS DISEASE [COVID-19]), AMPLIFIED PROBE TECHNIQUE, MAKING USE OF HIGH THROUGHPUT TECHNOLOGIES AS DESCRIBED BY CMS-2020-01-R: HCPCS | Performed by: ANESTHESIOLOGY

## 2020-07-07 ASSESSMENT — MIFFLIN-ST. JEOR: SCORE: 1318.48

## 2020-07-08 LAB
SARS-COV-2 RNA SPEC QL NAA+PROBE: NOT DETECTED
SPECIMEN SOURCE: NORMAL

## 2020-07-09 ENCOUNTER — HOSPITAL ENCOUNTER (OUTPATIENT)
Facility: CLINIC | Age: 60
Discharge: HOME | End: 2020-07-09
Attending: ANESTHESIOLOGY | Admitting: ANESTHESIOLOGY
Payer: COMMERCIAL

## 2020-07-09 ENCOUNTER — ANESTHESIA (OUTPATIENT)
Dept: SURGERY | Facility: CLINIC | Age: 60
End: 2020-07-09
Payer: COMMERCIAL

## 2020-07-09 ENCOUNTER — ANESTHESIA EVENT (OUTPATIENT)
Dept: SURGERY | Facility: CLINIC | Age: 60
End: 2020-07-09
Payer: COMMERCIAL

## 2020-07-09 ENCOUNTER — HOSPITAL ENCOUNTER (OUTPATIENT)
Dept: GENERAL RADIOLOGY | Facility: CLINIC | Age: 60
End: 2020-07-09
Attending: ANESTHESIOLOGY | Admitting: ANESTHESIOLOGY
Payer: COMMERCIAL

## 2020-07-09 VITALS
SYSTOLIC BLOOD PRESSURE: 135 MMHG | WEIGHT: 174 LBS | OXYGEN SATURATION: 96 % | BODY MASS INDEX: 32.02 KG/M2 | RESPIRATION RATE: 16 BRPM | HEIGHT: 62 IN | TEMPERATURE: 98.4 F | DIASTOLIC BLOOD PRESSURE: 79 MMHG

## 2020-07-09 DIAGNOSIS — M54.16 LUMBAR RADICULOPATHY: ICD-10-CM

## 2020-07-09 PROCEDURE — 64483 NJX AA&/STRD TFRM EPI L/S 1: CPT | Mod: LT | Performed by: ANESTHESIOLOGY

## 2020-07-09 PROCEDURE — 25000125 ZZHC RX 250: Performed by: NURSE ANESTHETIST, CERTIFIED REGISTERED

## 2020-07-09 PROCEDURE — 25000128 H RX IP 250 OP 636: Performed by: ANESTHESIOLOGY

## 2020-07-09 PROCEDURE — 40000277 XR SURGERY CARM FLUORO LESS THAN 5 MIN W STILLS: Mod: TC

## 2020-07-09 PROCEDURE — 37000008 ZZH ANESTHESIA TECHNICAL FEE, 1ST 30 MIN: Performed by: ANESTHESIOLOGY

## 2020-07-09 PROCEDURE — 64484 NJX AA&/STRD TFRM EPI L/S EA: CPT

## 2020-07-09 PROCEDURE — 64484 NJX AA&/STRD TFRM EPI L/S EA: CPT | Mod: LT | Performed by: ANESTHESIOLOGY

## 2020-07-09 PROCEDURE — 62323 NJX INTERLAMINAR LMBR/SAC: CPT | Performed by: ANESTHESIOLOGY

## 2020-07-09 RX ORDER — TRIAMCINOLONE ACETONIDE 40 MG/ML
INJECTION, SUSPENSION INTRA-ARTICULAR; INTRAMUSCULAR PRN
Status: DISCONTINUED | OUTPATIENT
Start: 2020-07-09 | End: 2020-07-09 | Stop reason: HOSPADM

## 2020-07-09 RX ORDER — IOPAMIDOL 612 MG/ML
INJECTION, SOLUTION INTRATHECAL PRN
Status: DISCONTINUED | OUTPATIENT
Start: 2020-07-09 | End: 2020-07-09 | Stop reason: HOSPADM

## 2020-07-09 RX ORDER — LIDOCAINE 40 MG/G
CREAM TOPICAL
Status: DISCONTINUED | OUTPATIENT
Start: 2020-07-09 | End: 2020-07-09 | Stop reason: HOSPADM

## 2020-07-09 RX ADMIN — LIDOCAINE HYDROCHLORIDE 1 ML: 10 INJECTION, SOLUTION EPIDURAL; INFILTRATION; INTRACAUDAL; PERINEURAL at 07:50

## 2020-07-09 ASSESSMENT — MIFFLIN-ST. JEOR: SCORE: 1315.69

## 2020-07-09 NOTE — OP NOTE
PRIMARY PROBLEM: Low back pain and left leg pain    PROCEDURE: Left L5-S1 and left S1  Transforaminal Epidural Steroid Injections with fluoroscopic guidance and contrast.     PROCEDURE DETAILS: After written informed consent was obtained from the patient, the patient was escorted to the procedure room.  The patient was placed in the prone position.  A  time out  was conducted to verify patient identity, procedure to be performed, side, site, allergies and any special requirements.  The skin over the thoracolumbar region was prepped and draped in normal sterile fashion. Fluoroscopy was used to identify the neural foramen in AP view and the skin was anesthetized with 2 mL of 1% lidocaine with bicarbonate buffer. A 25-gauge Quincke spinal needle was advanced through this location and advanced under fluoroscopic guidance towards the neural foramen.  The target zone was the 6 o clock position of the left L5 pedicle.   Prior to entering the foramen, the depth of the needle was gauged with a lateral view on fluoroscopy. While still in a lateral view, the needle was slowly advanced to avoid injury to the spinal nerve.  Then, in the oblique view (approximately 28 degrees), after negative aspiration, 1.5 mL of Omnipaque contrast dye was injected revealing epidural spread without evidence of intravascular or intrathecal spread.  Then placed through the left S1 foramen another 25-gauge Quincke needle.  In the AP direction at the S1 nerve root level contrast was injected which showed proper spread over the S1 nerve root all the way up into the central epidural space covering the L5-S1 interspace and up to the L4-5 interspace.  Then a 2.5cc solution of 20 mg of Triamcinolone in 2 mL of  Preservative-Free saline was slowly injected into the epidural space at each segment.  After injection of the medication, as the needle tip was withdrawn, it was flushed with local anesthetic.   The patient was monitored with blood pressure and  pulse oximetry machines with the assistance of an RN throughout the procedure.  The patient was alert and responsive to questions throughout the procedure.   The patient tolerated the procedure well and was observed in the post-procedural area.  The patient was dismissed without apparent complications.     DIAGNOSIS:  1.  Left S1 radiculopathy secondary to L5-S1 disc protrusion and degeneration  2.  Left lateral recess stenosis L4-5    PLAN:  1. Performed left L5-S1 and left S1  transforaminal epidural steroid injections.  2. The patient was instructed to follow-up per Dr. Middleton's instructions.      Abilio Middleton MD  Diplomate of the American Board of Anesthesiology, Pain Medicine

## 2020-07-09 NOTE — DISCHARGE INSTRUCTIONS
Home Care Instructions                Procedure: Epidural injection or joint injection    Activity:    Rest today    Do not work today    Resume normal activity tomorrow    Pain:    You may experience soreness at the injection site for 1 to 3 days.    You may use an ice pack for 20 minutes every 2 hours for the first 24 hours    You may use a heating pad after the first 24 hours    You may use Tylenol  (acetaminophen) every 4 hours or other pain medicines as directed by your physician    Safety  Sedation medicine, if given may remain active for many hours.    It is important for the next 24 hours that you do not:    Drive a car    Operate machines or power tools    Consume alcohol, including beer    Sign any important papers or legal documents    You may experience numbness radiating into your legs or arms, (depending on the procedure location)  This numbness may last several hours.  Until the numb sensation returns to normal please use caution in walking, climbing stairs, stepping out of your vehicle, etc.    Common side effects of steroids:  Not everyone will experience corticosteroid side effects. If side effects are experienced they will gradually subside in the 7-10 day period following an injection.    Most common side effects include:    Flushed face and/or chest    Feeling of warmth, particularly in face but could be overall feeling of warmth    Increased blood sugar in diabetic patients    Menstrual irregularities may occur.  If taking hormone based birth control an alternate method of birth control is recommended    Sleep disturbances and/or mood swings are possible    Leg cramps    Please contact us if you have:  Severe pain   Fever more than 101.5 degrees Fahrenheit  Signs of infection (redness, swelling or drainage)      If you have questions during normal business hours (8am-5pm Monday-Friday) contact the [facility] at [phone]. If you need help after hours, we recommend that you go to a  hospital emergency room or dial 911.

## 2020-07-09 NOTE — ANESTHESIA PREPROCEDURE EVALUATION
Anesthesia Pre-Procedure Evaluation    Patient: Janice Ulloa   MRN: [id number] : 1960          Preoperative Diagnosis: Lumbar radiculopathy [M54.16]    Procedure(s):  NINJECTION, SPINE, LUMBAR, 4-5 EPIDURAL TRANSLAMINAR    Past Medical History:   Diagnosis Date     Carpal tunnel syndrome      Intervertebral cervical disc disorder with myelopathy, cervical region     C6 herniation with right C6 radiculopathy     Migraine, unspecified, without mention of intractable migraine without mention of status migrainosus      Perimenopausal     irreg      Personal history of tobacco use, presenting hazards to health      Past Surgical History:   Procedure Laterality Date     C DISK SURG,ANTER,CERVICAL,SINGLE LVL  10/02/2007    C5-C6 anterior cervical diskectomy & fusion w/plate.     C NONSPECIFIC PROCEDURE      Bilateral inguinal hernia repairs     C NONSPECIFIC PROCEDURE      Oral surgery x2     COLONOSCOPY  1/10/2014    Procedure: COMBINED COLONOSCOPY, SINGLE BIOPSY/POLYPECTOMY BY BIOPSY;  colonoscopy with polypectomy by forceps;  Surgeon: Chevy Yang MD;  Location:  GI     HC REVISE MEDIAN N/CARPAL TUNNEL SURG  2004     HC REVISE MEDIAN N/CARPAL TUNNEL SURG  06    Left     HC TREATMENT INCOMPLETE  SURG, ANY TRIMESTER      D&C after miscarriage       Anesthesia Evaluation     . Pt has had prior anesthetic. Type: MAC and General    No history of anesthetic complications          ROS/MED HX    ENT/Pulmonary:     (+), recent URI resolved pneumonia: . .    Neurologic:       Cardiovascular:     (+) hypertension----. : . . . :. . Previous cardiac testing Echodate:5-4-56bzatdrh:Reading Physician  Reading Date  Result Priority   Varinder Nolan MD  [phone]  2018      Narrative & Impression     [id number]  [facility]  [id number]  [id number]^DOUG^SABINO^           [facility]  Echocardiography Laboratory  [address] [place] [address]        Name:  DANG YUEN  MRN: [id number]  : 1960  Study Date: 2018 09:09 AM  Age: 57 yrs  Gender: Female  Patient Location: [facility]  Reason For Study: , Family history of premature CAD, Left-sided chest wall  pain  History: Family History,HTN,ex-smoker  Ordering Physician: SABINO CAMACHO  Referring Physician: SABINO CAMACHO  Performed By: Varinder Robertson     BSA: 1.8 m2  Height: 61 in  Weight: 171 lb  HR: 70  BP: 130/64 mmHg  _____________________________________________________________________________  __        Procedure  Stress Echo Complete. Contrast Optison.  _____________________________________________________________________________  __        Interpretation Summary  Normal resting heart rate and blood pressure. With exercise the patient became  dizzy with near syncope leading to early termination. Heart rate response is  blunted with the patient achieving only 80% of predicted maximum.  Very poor exercise capacity for age  Resting ECG shows diffuse nonspecific ST-T wave abnormalities. With exercise  there are increased PVCs but no ventricular tachycardia. No ischemic changes  with exercise.  Normal resting LV function. Post exercise the LV size appears to decrease.  Most areas appear to augment but the lateral wall appears unchanged without  deterioration or improvement. Also, exercise images were obtained with heart  rates in the 90s. Ischemia cannot be excluded due to failure to reach target  heart rate.  Inconclusive stress echo due to failure to reach target heart rate. Cannot  exclude lateral wall ischemia.  No hemodynamically significant valvular abnormalities on 2D or color flow  imaging.  _____________________________________________________________________________  __     Stress  Exercise was stopped due to fatigue.  There was a normal BP response to exercise.  The patient exhibited no chest pain during exercise.  Target Heart Rate was not achieved due to fatigue.  Arrhythmia induced during  stress: frequent PVC's.  The EKG portion of this stress test was negative for inducible ischemia (see  echo results below).  The visual ejection fraction is estimated at 60-65%.  At the time of post exercise images the heart rate had dropped into the 90s.  Global LV function appears slightly better. Most regions appear to improve but  the lateral wall is essentially unchanged after exercise without augmentation  or deterioration.  There were stress-induced wall motion abnormalities observed.     Baseline  The baseline ECG displays normal sinus rhythm.  The baseline ECG displays diffuse abnormal ST segments.  Normal left ventricular function and wall motion at rest.  The visual ejection fraction is estimated at 55-60%.     Stress Results   Protocol:  [place] [facility] NUBIA        Maximum Predicted HR:   162 bpm         Target HR: 138 bpm               % Maximum Predicted HR: 80 %                Stage  DurationHeart Rate  BP            Comment                    (mm:ss)   (bpm)            Stage 1   3:00     121    144/64            Stage 2   0:51     130      /   [id number] FAC:BELOW/DUKE -3           RECOVERYR  8:43      80    128/62                Stress Duration:   3:51 mm:ss        Recovery Time: 8:43 mm:ss          Maximum Stress HR: 130 bpm           METS:          5     _____________________________________________________________________________  __  MMode/2D Measurements & Calculations  LVIDd: 5.5 cm                    _____________________________________________________________________________  __        Report approved by: Min Dill 05/07/2018 12:43 PM       date: results:ECG reviewed date:4-20-18 results:Sinus  Rhythm   -  Nonspecific T-abnormality.    date: results:          METS/Exercise Tolerance:     Hematologic:  - neg hematologic  ROS       Musculoskeletal: Comment: CLBP        GI/Hepatic:  - neg GI/hepatic ROS      (-) GERD   Renal/Genitourinary:  - ROS Renal section negative       Endo:     (+)  "Obesity, .      Psychiatric:  - neg psychiatric ROS       Infectious Disease:  - neg infectious disease ROS       Malignancy:      - no malignancy   Other:    - neg other ROS                      Physical Exam  Normal systems: cardiovascular, pulmonary and dental    Airway   Mallampati: II  TM distance: >3 FB  Neck ROM: full    Dental     Cardiovascular   Rhythm and rate: regular and normal      Pulmonary    breath sounds clear to auscultation            Lab Results   Component Value Date    WBC 5.1 03/18/2019    HGB 13.3 03/18/2019    HCT 40.1 03/18/2019     03/18/2019    SED 31 (H) 07/08/2009     07/07/2020    POTASSIUM 3.6 07/07/2020    CHLORIDE 102 07/07/2020    CO2 33 (H) 07/07/2020    BUN 10 07/07/2020    CR 0.71 07/07/2020     (H) 07/07/2020    BILLY 9.3 07/07/2020    ALBUMIN 3.8 03/18/2019    PROTTOTAL 7.7 03/18/2019    ALT 29 03/18/2019    AST 13 03/18/2019    ALKPHOS 67 03/18/2019    BILITOTAL 0.3 03/18/2019    TSH 2.98 03/15/2018    T4 0.97 09/04/2013    HCG Negative 06/05/2006       Preop Vitals  BP Readings from Last 3 Encounters:   07/07/20 (!) 130/90   06/23/20 (!) 182/101   03/09/20 132/82    Pulse Readings from Last 3 Encounters:   07/07/20 70   06/23/20 68   03/09/20 68      Resp Readings from Last 3 Encounters:   07/07/20 16   06/23/20 16   03/09/20 16    SpO2 Readings from Last 3 Encounters:   07/07/20 95%   06/23/20 95%   03/09/20 96%      Temp Readings from Last 1 Encounters:   07/07/20 99  F (37.2  C) (Temporal)    Ht Readings from Last 1 Encounters:   07/07/20 1.58 m (5' 2.21\")      Wt Readings from Last 1 Encounters:   07/07/20 79.2 kg (174 lb 9.6 oz)    Estimated body mass index is 31.72 kg/m  as calculated from the following:    Height as of 7/7/20: 1.58 m (5' 2.21\").    Weight as of 7/7/20: 79.2 kg (174 lb 9.6 oz).       Anesthesia Plan      History & Physical Review  History and physical reviewed and following examination; no interval change.    ASA Status:  2 .    NPO " Status:  > 8 hours    Plan for MAC with Intravenous and Propofol induction. Maintenance will be TIVA.  Reason for MAC:  Deep or markedly invasive procedure (G8)           Postoperative Care      Consents  Anesthetic plan, risks, benefits and alternatives discussed with:  Patient.  Use of blood products discussed: No .   .                 LUKE Alexandra CRNA

## 2020-07-09 NOTE — ANESTHESIA POSTPROCEDURE EVALUATION
Patient: Janice Ulloa    Procedure(s):  NINJECTION, SPINE, LUMBAR, 5 Sacral 1 and Sacral 1 Left    Diagnosis:Lumbar radiculopathy [M54.16]  Diagnosis Additional Information: No value filed.    Anesthesia Type:  MAC    Note:  Anesthesia Post Evaluation    Patient location during evaluation: Phase 2  Patient participation: Able to fully participate in evaluation  Level of consciousness: awake  Pain management: adequate  Airway patency: patent  Cardiovascular status: acceptable and hemodynamically stable  Respiratory status: acceptable, room air and nonlabored ventilation  Hydration status: stable  PONV: none     Anesthetic complications: None    Comments: Patient was happy with the anesthesia care received and no anesthesia related complications were noted.  I will follow up with the patient again if it is needed.        Last vitals:  Vitals:    07/09/20 0722   BP: (!) 179/89   Resp: 16   Temp: 98.4  F (36.9  C)   SpO2: 96%         Electronically Signed By: LUKE Alexandra CRNA  July 9, 2020  8:40 AM

## 2020-07-09 NOTE — ANESTHESIA CARE TRANSFER NOTE
Patient: Janice Ulloa    Procedure(s):  NINJECTION, SPINE, LUMBAR, 5 Sacral 1 and Sacral 1 Left    Diagnosis: Lumbar radiculopathy [M54.16]  Diagnosis Additional Information: No value filed.    Anesthesia Type:   MAC     Note:  Airway :Nasal Cannula  Patient transferred to:Phase II  Handoff Report: Identifed the Patient, Identified the Reponsible Provider, Reviewed the pertinent medical history, Discussed the surgical course, Reviewed Intra-OP anesthesia mangement and issues during anesthesia, Set expectations for post-procedure period and Allowed opportunity for questions and acknowledgement of understanding      Vitals: (Last set prior to Anesthesia Care Transfer)    CRNA VITALS  7/9/2020 0742 - 7/9/2020 0829      7/9/2020             Resp Rate (observed):  (!) 4                Electronically Signed By: LUKE Alexandra CRNA  July 9, 2020  8:29 AM

## 2020-09-03 NOTE — PROGRESS NOTES
"Janice Ulloa is a 59 year old female who is being evaluated via a billable telephone visit.      The patient has been notified of following:     \"This telephone visit will be conducted via a call between you and your physician/provider. We have found that certain health care needs can be provided without the need for a physical exam.  This service lets us provide the care you need with a short phone conversation.  If a prescription is necessary we can send it directly to your pharmacy.  If lab work is needed we can place an order for that and you can then stop by our lab to have the test done at a later time.    Telephone visits are billed at different rates depending on your insurance coverage. During this emergency period, for some insurers they may be billed the same as an in-person visit.  Please reach out to your insurance provider with any questions.    If during the course of the call the physician/provider feels a telephone visit is not appropriate, you will not be charged for this service.\"    Patient has given verbal consent for Telephone visit?  Yes    What phone number would you like to be contacted at? [phone]    How would you like to obtain your AVS? Mail a copy    Subjective     Janice Ulloa is a 59 year old female who presents via phone visit today for the following health issues:    HPI    Hyperlipidemia Follow-Up      Are you regularly taking any medication or supplement to lower your cholesterol?   Yes- atorvastatin    Are you having muscle aches or other side effects that you think could be caused by your cholesterol lowering medication?  No    Hypertension Follow-up      Do you check your blood pressure regularly outside of the clinic? Yes     Are you following a low salt diet? Yes    Are your blood pressures ever more than 140 on the top number (systolic) OR more   than 90 on the bottom number (diastolic), for example 140/90? No      How many servings of fruits and vegetables do you " eat daily?  0-1    On average, how many sweetened beverages do you drink each day (Examples: soda, juice, sweet tea, etc.  Do NOT count diet or artificially sweetened beverages)?   0    How many days per week do you exercise enough to make your heart beat faster? 3 or less    How many minutes a day do you exercise enough to make your heart beat faster? 9 or less    How many days per week do you miss taking your medication? 0         Review of Systems   CONSTITUTIONAL: NEGATIVE for fever, chills, change in weight  ENT/MOUTH: NEGATIVE for ear, mouth and throat problems  RESP: NEGATIVE for significant cough or SOB  CV: NEGATIVE for chest pain, palpitations or peripheral edema  GI: NEGATIVE for nausea, abdominal pain, heartburn, or change in bowel habits  MUSCULOSKELETAL: Her injection went well the pain has resolved down her leg but her leg still feels very numb.  She wonders what she should do about this.       Objective          Vitals:  No vitals were obtained today due to virtual visit.    healthy, alert and no distress  PSYCH: Alert and oriented times 3; coherent speech, normal   rate and volume, able to articulate logical thoughts, able   to abstract reason, no tangential thoughts, no hallucinations   or delusions  Her affect is normal and pleasant  RESP: No cough, no audible wheezing, able to talk in full sentences  Remainder of exam unable to be completed due to telephone visits    No results found for any visits on 09/09/20.        Assessment/Plan:    Assessment & Plan     Essential hypertension with goal blood pressure less than 140/90  Presumed to be at goal, blood pressure in July was normal  - amLODIPine (NORVASC) 5 MG tablet; Take 1 tablet (5 mg) by mouth daily  - lisinopril-hydrochlorothiazide (ZESTORETIC) 20-12.5 MG tablet; TAKE TWO TABLETS BY MOUTH EVERY DAY  - propranolol ER (INDERAL LA) 120 MG 24 hr capsule; TAKE TWO CAPSULES BY MOUTH EVERY DAY    Neck pain  Patient finds this med helpful to help her  "relax so that she can sleep at night and ease her pain  - tiZANidine (ZANAFLEX) 4 MG tablet; TAKE ONE TO TWO TABLETS AS NEEDED FOR MUSCLE SPASM at bedtime     BMI:   Estimated body mass index is 31.62 kg/m  as calculated from the following:    Height as of 7/9/20: 1.58 m (5' 2.2\").    Weight as of 7/9/20: 78.9 kg (174 lb).   Unable to assess at virtual visit    Patient will contact her neurosurgeon that she has seen previously        Return in about 6 months (around 3/9/2021) for BP Recheck, Lab Work, lipids.    Anisha Alfaro PA-C  [facility]    Phone call duration:  8 minutes                "

## 2020-09-09 ENCOUNTER — VIRTUAL VISIT (OUTPATIENT)
Dept: FAMILY MEDICINE | Facility: OTHER | Age: 60
End: 2020-09-09
Payer: COMMERCIAL

## 2020-09-09 DIAGNOSIS — M54.2 NECK PAIN: ICD-10-CM

## 2020-09-09 DIAGNOSIS — I10 ESSENTIAL HYPERTENSION WITH GOAL BLOOD PRESSURE LESS THAN 140/90: ICD-10-CM

## 2020-09-09 PROCEDURE — 99213 OFFICE O/P EST LOW 20 MIN: CPT | Mod: 95 | Performed by: PHYSICIAN ASSISTANT

## 2020-09-09 RX ORDER — LISINOPRIL AND HYDROCHLOROTHIAZIDE 12.5; 2 MG/1; MG/1
TABLET ORAL
Qty: 180 TABLET | Refills: 1 | Status: SHIPPED | OUTPATIENT
Start: 2020-09-09 | End: 2021-03-16

## 2020-09-09 RX ORDER — PROPRANOLOL HYDROCHLORIDE 120 MG/1
CAPSULE, EXTENDED RELEASE ORAL
Qty: 180 CAPSULE | Refills: 1 | Status: SHIPPED | OUTPATIENT
Start: 2020-09-09 | End: 2021-03-16

## 2020-09-09 RX ORDER — AMLODIPINE BESYLATE 5 MG/1
5 TABLET ORAL DAILY
Qty: 90 TABLET | Refills: 1 | Status: SHIPPED | OUTPATIENT
Start: 2020-09-09 | End: 2021-03-16

## 2020-11-15 NOTE — PROGRESS NOTES
Outpatient Physical Therapy Discharge Note     Patient: Janice Ulloa  : 1960    Beginning/End Dates of Reporting Period:  3/24/2020 to 2020  Janice has been seen for a total of 1 visit overall.    Referring Provider: Anisha Alfaro PAC    Therapy Diagnosis: Mechanical low back pain with left lower extremity radicular symptoms.      Client Self Report: Janice is referred with left low back and lower extremity pain and numbness, back is feeling better, leg is worsening.     Objective Measurements:  Objective Measure: BELÉN  Details: 31%, 3/  Objective Measure: Frequency of PREP     Objective Measure: Effect of PREP     Objective Measure: Symptoms upon awakening     Objective Measure: Frequency of distal symptoms.                Outcome Measures (most recent score):  Gerardo STarT Sub-Score (Q5-9): 3  Gerardo STarT Total Score (all 9): 7  Oswestry Score (%): 34 %    Goals:  Goal Identifier Symptoms   Goal Description Janice will use strategies learned in P.T. to decrease the intensity of her symptoms by 50% to help reach a personal goal of symptom relief.    Target Date 20   Date Met      Progress:     Goal Identifier Strength   Goal Description Janice will demonstrate left lower extremity strength to be 5/5 allowing for her to return to her active lifestyle.    Target Date 20   Date Met      Progress:     Goal Identifier Function   Goal Description Janice will improve her BELÉN by 30% indicating improved overall function, she will return to work without restrictions.    Target Date 20   Date Met      Progress:     Progress Toward Goals:   Not assessed this period.    Plan:  Discharge from therapy.    Discharge: Yes    Reason for Discharge:  Visits were cancelled due to Covid19, care was never re-established    Equipment Issued: None    Discharge Plan: Other services: Received lumbar injection.    Thank you for this referral.    Lupis Rosenbaum P.T.

## 2020-12-14 ENCOUNTER — ALLIED HEALTH/NURSE VISIT (OUTPATIENT)
Dept: FAMILY MEDICINE | Facility: OTHER | Age: 60
End: 2020-12-14
Payer: COMMERCIAL

## 2020-12-14 DIAGNOSIS — Z12.4 SCREENING FOR MALIGNANT NEOPLASM OF CERVIX: ICD-10-CM

## 2020-12-14 DIAGNOSIS — Z12.11 SCREEN FOR COLON CANCER: ICD-10-CM

## 2020-12-14 DIAGNOSIS — Z23 NEED FOR VACCINATION: Primary | ICD-10-CM

## 2020-12-14 DIAGNOSIS — Z23 NEED FOR PROPHYLACTIC VACCINATION AND INOCULATION AGAINST INFLUENZA: ICD-10-CM

## 2020-12-14 DIAGNOSIS — Z11.4 SCREENING FOR HIV (HUMAN IMMUNODEFICIENCY VIRUS): ICD-10-CM

## 2020-12-14 PROCEDURE — 90682 RIV4 VACC RECOMBINANT DNA IM: CPT

## 2020-12-14 PROCEDURE — 90750 HZV VACC RECOMBINANT IM: CPT

## 2020-12-14 PROCEDURE — 90732 PPSV23 VACC 2 YRS+ SUBQ/IM: CPT

## 2020-12-14 PROCEDURE — 90471 IMMUNIZATION ADMIN: CPT

## 2020-12-14 PROCEDURE — 90472 IMMUNIZATION ADMIN EACH ADD: CPT

## 2020-12-14 NOTE — PROGRESS NOTES
Prior to immunization administration, verified patients identity using patient s name and date of birth. Please see Immunization Activity for additional information.     Screening Questionnaire for Adult Immunization    Are you sick today?   No   Do you have allergies to medications, food, a vaccine component or latex?   No   Have you ever had a serious reaction after receiving a vaccination?   No   Do you have a long-term health problem with heart, lung, kidney, or metabolic disease (e.g., diabetes), asthma, a blood disorder, no spleen, complement component deficiency, a cochlear implant, or a spinal fluid leak?  Are you on long-term aspirin therapy?   No   Do you have cancer, leukemia, HIV/AIDS, or any other immune system problem?   No   Do you have a parent, brother, or sister with an immune system problem?   No   In the past 3 months, have you taken medications that affect  your immune system, such as prednisone, other steroids, or anticancer drugs; drugs for the treatment of rheumatoid arthritis, Crohn s disease, or psoriasis; or have you had radiation treatments?   No   Have you had a seizure, or a brain or other nervous system problem?   No   During the past year, have you received a transfusion of blood or blood    products, or been given immune (gamma) globulin or antiviral drug?   No   For women: Are you pregnant or is there a chance you could become       pregnant during the next month?   No   Have you received any vaccinations in the past 4 weeks?   No     Immunization questionnaire answers were all negative.        Per orders of Dr. Alfaro, injection of Flu, Shingles, Pneumo given by Zoila Santoyo CMA. Patient instructed to remain in clinic for 15 minutes afterwards, and to report any adverse reaction to me immediately.       Screening performed by Zoila Santoyo CMA on 12/14/2020 at 10:43 AM.

## 2021-02-24 DIAGNOSIS — M54.2 NECK PAIN: ICD-10-CM

## 2021-02-25 NOTE — TELEPHONE ENCOUNTER
Pending Prescriptions:                       Disp   Refills    tiZANidine (ZANAFLEX) 4 MG tablet [Pharmac*90 tab*3        Sig: TAKE 1 TO 2 TABLETS BY MOUTH AS NEEDED FOR MUSCLE           SPASM AT BEDTIME    Routing refill request to provider for review/approval because:  Drug not on the [facility] refill protocol

## 2021-03-14 DIAGNOSIS — I10 ESSENTIAL HYPERTENSION WITH GOAL BLOOD PRESSURE LESS THAN 140/90: ICD-10-CM

## 2021-03-14 DIAGNOSIS — E78.5 HYPERLIPIDEMIA WITH TARGET LDL LESS THAN 130: ICD-10-CM

## 2021-03-16 RX ORDER — ATORVASTATIN CALCIUM 20 MG/1
TABLET, FILM COATED ORAL
Qty: 90 TABLET | Refills: 0 | Status: SHIPPED | OUTPATIENT
Start: 2021-03-16 | End: 2021-03-31

## 2021-03-16 RX ORDER — PROPRANOLOL HYDROCHLORIDE 120 MG/1
CAPSULE, EXTENDED RELEASE ORAL
Qty: 180 CAPSULE | Refills: 0 | Status: SHIPPED | OUTPATIENT
Start: 2021-03-16 | End: 2021-03-31

## 2021-03-16 RX ORDER — LISINOPRIL AND HYDROCHLOROTHIAZIDE 12.5; 2 MG/1; MG/1
TABLET ORAL
Qty: 180 TABLET | Refills: 0 | Status: SHIPPED | OUTPATIENT
Start: 2021-03-16 | End: 2021-03-31

## 2021-03-16 RX ORDER — AMLODIPINE BESYLATE 5 MG/1
TABLET ORAL
Qty: 90 TABLET | Refills: 0 | Status: SHIPPED | OUTPATIENT
Start: 2021-03-16 | End: 2021-03-31

## 2021-03-16 NOTE — TELEPHONE ENCOUNTER
Pending Prescriptions:                       Disp   Refills    propranolol ER (INDERAL LA) 120 MG 24 hr *180 ca*0            Sig: TAKE TWO CAPSULES BY MOUTH EVERY DAY    lisinopril-hydrochlorothiazide (ZESTORETI*180 ta*0            Sig: TAKE TWO TABLETS BY MOUTH EVERY DAY    atorvastatin (LIPITOR) 20 MG tablet [Phar*90 tab*0            Sig: TAKE ONE TABLET BY MOUTH ONCE DAILY    amLODIPine (NORVASC) 5 MG tablet [Pharmac*90 tab*0            Sig: TAKE ONE TABLET BY MOUTH ONCE DAILY    Medication is being filled for 1 time maira refill only due to:  Patient is due for medication follow up    Please call and help schedule.  Thank you!    Claudette Royal RN on 3/16/2021 at 1:29 PM

## 2021-03-25 NOTE — PROGRESS NOTES
"Janice is a 60 year old who is being evaluated via a billable telephone visit.      What phone number would you like to be contacted at? [phone]  How would you like to obtain your AVS? Mail a copy    Assessment & Plan     Essential hypertension with goal blood pressure less than 140/90  Needs vitals updated, labs were ordered to be done at that appointment as well  - amLODIPine (NORVASC) 5 MG tablet; Take 1 tablet (5 mg) by mouth daily  - lisinopril-hydrochlorothiazide (ZESTORETIC) 20-12.5 MG tablet; TAKE TWO TABLETS BY MOUTH EVERY DAY  - propranolol ER (INDERAL LA) 120 MG 24 hr capsule; TAKE TWO CAPSULES BY MOUTH EVERY DAY  - **Comprehensive metabolic panel FUTURE anytime; Future  Med refill for 6 months  Hyperlipidemia with target LDL less than 130  At goal, labs are due  - atorvastatin (LIPITOR) 20 MG tablet; Take 1 tablet (20 mg) by mouth daily  - **Comprehensive metabolic panel FUTURE anytime; Future  - Lipid panel reflex to direct LDL Fasting; Future    Lumbar radiculopathy  As symptoms did not improve with injection we will refer to neurosurgery to discuss her next steps  - NEUROSURGERY REFERRAL    Bulging of lumbar intervertebral disc  As above  - NEUROSURGERY REFERRAL    Allergic rhinitis and conjunctivitis-she may use Claritin and an over-the-counter allergy eyedrop  Encounter for screening mammogram for breast cancer  scheduled  - MA SCREENING DIGITAL BILAT - Future  (s+30); Future    Screen for colon cancer  Declined scheduling at this time    Screening for malignant neoplasm of cervix  Declined scheduling at this time               BMI:   Estimated body mass index is 31.62 kg/m  as calculated from the following:    Height as of 7/9/20: 1.58 m (5' 2.2\").    Weight as of 7/9/20: 78.9 kg (174 lb).   Not discussed at virtual visit        Return in about 6 months (around 9/30/2021), or if symptoms worsen or fail to improve, for Physical Exam.    Anisha Alfaro PA-C  [facility] " DHAVAL Camacho is a 60 year old who presents for the following health issues     HPI     Patient still having pain in her left leg and watery eyes.   Her watery eyes she believes are from allergies.  She is had a lot of runny nose and sneezing now that the snow was melted.  She wonders what she can take for both her her allergy symptoms in her eyes and for her nose.    Her left leg pain due to her pinched nerve that she is had for over a year now did not improve with her injection as she was hoping.  She wishes she would have picked the surgical option.  She would be interested in seeing a specialist if she does not have to go all the way into the [facility] to do so.    Hyperlipidemia Follow-Up      Are you regularly taking any medication or supplement to lower your cholesterol?   Yes- Lipitor    Are you having muscle aches or other side effects that you think could be caused by your cholesterol lowering medication?  No    Hypertension Follow-up      Do you check your blood pressure regularly outside of the clinic? Yes does not check this routinely    Are you following a low salt diet? Yes    Are your blood pressures ever more than 140 on the top number (systolic) OR more   than 90 on the bottom number (diastolic), for example 140/90? Yes the systolic of her last blood pressure was in the 150s.  She does not remember the diastolic          Review of Systems   CONSTITUTIONAL: NEGATIVE for fever, chills, change in weight  ENT/MOUTH: NEGATIVE for ear, mouth and throat problems  RESP: NEGATIVE for significant cough or SOB  CV: NEGATIVE for chest pain, palpitations or peripheral edema  GI: NEGATIVE for nausea, abdominal pain, heartburn, or change in bowel habits  MS-continued back painhealthy, alert and no distress  PSYCHIATRIC: NEGATIVE for changes in mood or affect      Objective           Vitals:  No vitals were obtained today due to virtual visit.    Physical Exam   healthy, alert and no distress  PSYCH:  Alert and oriented times 3; coherent speech, normal   rate and volume, able to articulate logical thoughts, able   to abstract reason, no tangential thoughts, no hallucinations   or delusions  Her affect is normal and pleasant  RESP: No cough, no audible wheezing, able to talk in full sentences  Remainder of exam unable to be completed due to telephone visits    Orders Only on 07/07/2020   Component Date Value Ref Range Status     COVID-19 Virus PCR to [facility] - So* 07/07/2020 Nasopharyngeal   Final     COVID-19 Virus PCR to [facility] - Re* 07/07/2020 Not Detected   Final    Comment: Collection of multiple specimens from the same patient may be necessary to   detect the virus. The possibility of a false negative should be considered if   the patient's recent exposure or clinical presentation suggests 2019 nCOV   infection and diagnostic tests for other causes of illness are negative.   Repeat testing may be considered in this setting.  Viral RNA was extracted via a validated method and subsequently underwent   single step reverse transcriptase-real time polymerase chain reaction using   primers to the CDC specified N1,N2 gene targets of CoV2 and human RNP as an   internal control.  A negative result does not rule out the presence of real-time PCR inhibitors   in the specimen or COVID-19 RNA in concentrations below the limit of detection   of the assay. The possibility of a false negative should be considered if the   patients recent exposure or clinical presentation suggests COVID-19.   Additional testing or repeat testing requires consultation with the   laborator                           y.  Nasopharyngeal specimen is the preferred choice for swab-based SARS CoV2   testing. When collection of a nasopharyngeal swab is not possible the   following are acceptable alternatives:  an oropharyngeal (OP) specimen collected by a healthcare professional, or a   nasal mid-turbinate (NMT) swab collected by a healthcare  professional or by   onsite self-collection (using a flocked tapered swab), or an anterior nares   specimen collected by a healthcare professional or by onsite self-collection   (using a round foam swab). (Centers for Disease Control)  Testing performed by [facility], Room [address], [address] [place] [address]. This test was developed and its   performance characteristics determined by the [facility]. It has not been cleared or approved by the FDA.  The laboratory is regulated under the Clinical Laboratory Improvement   Amendments of 1988 (CLIA-88) as qualified to perform high-complexity testin                           g.   This test is used for clinical purposes. It should not be regarded as   investigational or for research.                   Phone call duration: 9 minutes

## 2021-03-31 ENCOUNTER — VIRTUAL VISIT (OUTPATIENT)
Dept: FAMILY MEDICINE | Facility: OTHER | Age: 61
End: 2021-03-31
Payer: COMMERCIAL

## 2021-03-31 DIAGNOSIS — Z12.31 ENCOUNTER FOR SCREENING MAMMOGRAM FOR BREAST CANCER: ICD-10-CM

## 2021-03-31 DIAGNOSIS — E78.5 HYPERLIPIDEMIA WITH TARGET LDL LESS THAN 130: ICD-10-CM

## 2021-03-31 DIAGNOSIS — M54.16 LUMBAR RADICULOPATHY: Primary | ICD-10-CM

## 2021-03-31 DIAGNOSIS — Z12.4 SCREENING FOR MALIGNANT NEOPLASM OF CERVIX: ICD-10-CM

## 2021-03-31 DIAGNOSIS — M51.369 BULGING OF LUMBAR INTERVERTEBRAL DISC: ICD-10-CM

## 2021-03-31 DIAGNOSIS — I10 ESSENTIAL HYPERTENSION WITH GOAL BLOOD PRESSURE LESS THAN 140/90: ICD-10-CM

## 2021-03-31 DIAGNOSIS — Z12.11 SCREEN FOR COLON CANCER: ICD-10-CM

## 2021-03-31 PROCEDURE — 99214 OFFICE O/P EST MOD 30 MIN: CPT | Mod: 95 | Performed by: PHYSICIAN ASSISTANT

## 2021-03-31 RX ORDER — PROPRANOLOL HYDROCHLORIDE 120 MG/1
CAPSULE, EXTENDED RELEASE ORAL
Qty: 180 CAPSULE | Refills: 1 | Status: SHIPPED | OUTPATIENT
Start: 2021-03-31 | End: 2022-01-07

## 2021-03-31 RX ORDER — AMLODIPINE BESYLATE 5 MG/1
5 TABLET ORAL DAILY
Qty: 90 TABLET | Refills: 1 | Status: SHIPPED | OUTPATIENT
Start: 2021-03-31 | End: 2022-01-07

## 2021-03-31 RX ORDER — LISINOPRIL AND HYDROCHLOROTHIAZIDE 12.5; 2 MG/1; MG/1
TABLET ORAL
Qty: 180 TABLET | Refills: 1 | Status: SHIPPED | OUTPATIENT
Start: 2021-03-31 | End: 2022-01-07

## 2021-03-31 RX ORDER — ATORVASTATIN CALCIUM 20 MG/1
20 TABLET, FILM COATED ORAL DAILY
Qty: 90 TABLET | Refills: 3 | Status: SHIPPED | OUTPATIENT
Start: 2021-03-31 | End: 2021-05-27

## 2021-03-31 ASSESSMENT — ANXIETY QUESTIONNAIRES
IF YOU CHECKED OFF ANY PROBLEMS ON THIS QUESTIONNAIRE, HOW DIFFICULT HAVE THESE PROBLEMS MADE IT FOR YOU TO DO YOUR WORK, TAKE CARE OF THINGS AT HOME, OR GET ALONG WITH OTHER PEOPLE: NOT DIFFICULT AT ALL
6. BECOMING EASILY ANNOYED OR IRRITABLE: NOT AT ALL
2. NOT BEING ABLE TO STOP OR CONTROL WORRYING: SEVERAL DAYS
3. WORRYING TOO MUCH ABOUT DIFFERENT THINGS: SEVERAL DAYS
GAD7 TOTAL SCORE: 3
1. FEELING NERVOUS, ANXIOUS, OR ON EDGE: NOT AT ALL
7. FEELING AFRAID AS IF SOMETHING AWFUL MIGHT HAPPEN: SEVERAL DAYS
5. BEING SO RESTLESS THAT IT IS HARD TO SIT STILL: NOT AT ALL

## 2021-03-31 ASSESSMENT — PATIENT HEALTH QUESTIONNAIRE - PHQ9
SUM OF ALL RESPONSES TO PHQ QUESTIONS 1-9: 2
5. POOR APPETITE OR OVEREATING: NOT AT ALL

## 2021-04-01 ENCOUNTER — TELEPHONE (OUTPATIENT)
Dept: NEUROSURGERY | Facility: CLINIC | Age: 61
End: 2021-04-01

## 2021-04-01 ASSESSMENT — ANXIETY QUESTIONNAIRES: GAD7 TOTAL SCORE: 3

## 2021-04-14 ENCOUNTER — TELEPHONE (OUTPATIENT)
Dept: NEUROSURGERY | Facility: OTHER | Age: 61
End: 2021-04-14

## 2021-04-14 NOTE — TELEPHONE ENCOUNTER
3rd attempt to schedule with the [place] office. TC when able to schedule an appointment.    Report received from Sierra Campuzano RN and reviewed fetal monitor tracing from her shift.

## 2021-04-23 ENCOUNTER — ALLIED HEALTH/NURSE VISIT (OUTPATIENT)
Dept: FAMILY MEDICINE | Facility: CLINIC | Age: 61
End: 2021-04-23
Payer: COMMERCIAL

## 2021-04-23 ENCOUNTER — TELEPHONE (OUTPATIENT)
Dept: FAMILY MEDICINE | Facility: OTHER | Age: 61
End: 2021-04-23

## 2021-04-23 ENCOUNTER — HOSPITAL ENCOUNTER (OUTPATIENT)
Dept: MAMMOGRAPHY | Facility: CLINIC | Age: 61
Discharge: HOME | End: 2021-04-23
Attending: PHYSICIAN ASSISTANT | Admitting: PHYSICIAN ASSISTANT
Payer: COMMERCIAL

## 2021-04-23 VITALS — DIASTOLIC BLOOD PRESSURE: 88 MMHG | HEART RATE: 64 BPM | SYSTOLIC BLOOD PRESSURE: 140 MMHG

## 2021-04-23 DIAGNOSIS — E78.5 HYPERLIPIDEMIA WITH TARGET LDL LESS THAN 130: ICD-10-CM

## 2021-04-23 DIAGNOSIS — I10 ESSENTIAL HYPERTENSION WITH GOAL BLOOD PRESSURE LESS THAN 140/90: Primary | ICD-10-CM

## 2021-04-23 DIAGNOSIS — Z12.31 ENCOUNTER FOR SCREENING MAMMOGRAM FOR BREAST CANCER: ICD-10-CM

## 2021-04-23 DIAGNOSIS — E87.6 HYPOKALEMIA: Primary | ICD-10-CM

## 2021-04-23 DIAGNOSIS — I10 ESSENTIAL HYPERTENSION WITH GOAL BLOOD PRESSURE LESS THAN 140/90: ICD-10-CM

## 2021-04-23 LAB
ALBUMIN SERPL-MCNC: 4.2 G/DL (ref 3.4–5)
ALP SERPL-CCNC: 72 U/L (ref 40–150)
ALT SERPL W P-5'-P-CCNC: 35 U/L (ref 0–50)
ANION GAP SERPL CALCULATED.3IONS-SCNC: 4 MMOL/L (ref 3–14)
AST SERPL W P-5'-P-CCNC: 19 U/L (ref 0–45)
BILIRUB SERPL-MCNC: 0.4 MG/DL (ref 0.2–1.3)
BUN SERPL-MCNC: 10 MG/DL (ref 7–30)
CALCIUM SERPL-MCNC: 9.4 MG/DL (ref 8.5–10.1)
CHLORIDE SERPL-SCNC: 102 MMOL/L (ref 94–109)
CHOLEST SERPL-MCNC: 213 MG/DL
CO2 SERPL-SCNC: 32 MMOL/L (ref 20–32)
CREAT SERPL-MCNC: 0.59 MG/DL (ref 0.52–1.04)
GFR SERPL CREATININE-BSD FRML MDRD: >90 ML/MIN/{1.73_M2}
GLUCOSE SERPL-MCNC: 118 MG/DL (ref 70–99)
HDLC SERPL-MCNC: 54 MG/DL
LDLC SERPL CALC-MCNC: 120 MG/DL
NONHDLC SERPL-MCNC: 159 MG/DL
POTASSIUM SERPL-SCNC: 3 MMOL/L (ref 3.4–5.3)
PROT SERPL-MCNC: 8.2 G/DL (ref 6.8–8.8)
SODIUM SERPL-SCNC: 138 MMOL/L (ref 133–144)
TRIGL SERPL-MCNC: 197 MG/DL

## 2021-04-23 PROCEDURE — 36415 COLL VENOUS BLD VENIPUNCTURE: CPT | Performed by: PHYSICIAN ASSISTANT

## 2021-04-23 PROCEDURE — 80061 LIPID PANEL: CPT | Performed by: PHYSICIAN ASSISTANT

## 2021-04-23 PROCEDURE — 77063 BREAST TOMOSYNTHESIS BI: CPT

## 2021-04-23 PROCEDURE — 80053 COMPREHEN METABOLIC PANEL: CPT | Performed by: PHYSICIAN ASSISTANT

## 2021-04-23 PROCEDURE — 99207 PR NO CHARGE NURSE ONLY: CPT

## 2021-04-23 RX ORDER — POTASSIUM CHLORIDE 750 MG/1
10 TABLET, EXTENDED RELEASE ORAL 2 TIMES DAILY
Qty: 30 TABLET | Refills: 1 | Status: SHIPPED | OUTPATIENT
Start: 2021-04-23 | End: 2021-06-11

## 2021-04-23 NOTE — TELEPHONE ENCOUNTER
Please call pt and mail a copy of her labs--- Your LDL (bad) cholesterol has increased a bit but your triglycerides are better.  We will continue your current dosage of atorvastatin but also eating a diet low in saturated fats, cholesterol, sugar, and alcohol  as well as exercising on a regular basis to improve these results.   Your potassium is low, I would like to start you on a potassium supplement to improve this.  The potassium is low because of the water pill you are on to lower your blood pressure.  Your kidney and liver function tests are normal.  Your blood sugar is also getting high, please improve your diet, exercise and lose weight in order to prevent developing diabetes.  We will need to recheck your potassium with a lab only appointment in 7-10 days, lets recheck your blood pressure at that time too since it was a little rut when it was checked today.  Anisha Alfaro PA-C

## 2021-04-23 NOTE — PROGRESS NOTES
Janice Ulloa is a 60 year old patient who comes in today for a Blood Pressure check.    Initial BP: 144/92 P:66  Second BP: 140/88 P:64    Disposition: results routed to provider

## 2021-04-23 NOTE — LETTER
[facility]  [address]  [place] [address]  [phone]        April 26, 2021    Janice Ulloa  [address] [address]              Dear Janice Ulloa      Your LDL (bad) cholesterol has increased a bit but your triglycerides are better.  We will continue your current dosage of atorvastatin but also eating a diet low in saturated fats, cholesterol, sugar, and alcohol  as well as exercising on a regular basis to improve these results.   Your potassium is low, I would like to start you on a potassium supplement to improve this.  The potassium is low because of the water pill you are on to lower your blood pressure.  Your kidney and liver function tests are normal.  Your blood sugar is also getting high, please improve your diet, exercise and lose weight in order to prevent developing diabetes.  We will need to recheck your potassium with a lab only appointment in 7-10 days, lets recheck your blood pressure at that time too since it was a little rut when it was checked today.      Anisha Alfaro PA-C

## 2021-04-28 NOTE — PROGRESS NOTES
"Janice is a 60 year old who is being evaluated via a billable telephone visit.      What phone number would you like to be contacted at? [phone]  How would you like to obtain your AVS? Mail a copy    Assessment & Plan     Hyperlipidemia with target LDL less than 130  We will increase atorvastatin to 40 mg, discussed the fact that she should alert me if she has increased body aches and pains.  We will recheck lipids and liver in 8 to 12 weeks  - atorvastatin (LIPITOR) 40 MG tablet; Take 1 tablet (40 mg) by mouth daily    Hypokalemia  We will recheck this as she did have low potassium a few weeks ago as well  - **Potassium FUTURE anytime; Future    Elevated blood sugar  We will add an A1c she is considering upcoming back surgery and we need to rule out diabetes prior to her preop  - **A1C FUTURE anytime; Future             BMI:   Estimated body mass index is 31.11 kg/m  as calculated from the following:    Height as of 4/29/21: 1.575 m (5' 2\").    Weight as of 4/29/21: 77.2 kg (170 lb 1.6 oz).   Not discussed at virtual visit        Return in about 1 week (around 5/12/2021) for Lab Work.    Anisha Alfaro PA-C  [facility]    Leigh Camacho is a 60 year old who presents for the following health issues     HPI     Hyperlipidemia Follow-Up      Are you regularly taking any medication or supplement to lower your cholesterol?   Yes- atorvastatin    Are you having muscle aches or other side effects that you think could be caused by your cholesterol lowering medication?  No    Hypertension Follow-up      Do you check your blood pressure regularly outside of the clinic? Yes     Are you following a low salt diet? Yes    Are your blood pressures ever more than 140 on the top number (systolic) OR more   than 90 on the bottom number (diastolic), for example 140/90? Yes      Pt scheduled to discuss test results    Review of Systems   Constitutional, HEENT, cardiovascular, pulmonary, gi and gu " systems are negative, except as otherwise noted.      Objective           Vitals:  No vitals were obtained today due to virtual visit.    Physical Exam   healthy, alert and no distress  PSYCH: Alert and oriented times 3; coherent speech, normal   rate and volume, able to articulate logical thoughts, able   to abstract reason, no tangential thoughts, no hallucinations   or delusions  Her affect is normal and pleasant  RESP: No cough, no audible wheezing, able to talk in full sentences  Remainder of exam unable to be completed due to telephone visits    Orders Only on 04/23/2021   Component Date Value Ref Range Status     Cholesterol 04/23/2021 213* <200 mg/dL Final    Desirable:       <200 mg/dl     Triglycerides 04/23/2021 197* <150 mg/dL Final    Comment: Borderline high:  150-199 mg/dl  High:             200-499 mg/dl  Very high:       >499 mg/dl  Fasting specimen       HDL Cholesterol 04/23/2021 54  >49 mg/dL Final     LDL Cholesterol Calculated 04/23/2021 120* <100 mg/dL Final    Comment: Above desirable:  100-129 mg/dl  Borderline High:  130-159 mg/dL  High:             160-189 mg/dL  Very high:       >189 mg/dl       Non HDL Cholesterol 04/23/2021 159* <130 mg/dL Final    Comment: Above Desirable:  130-159 mg/dl  Borderline high:  160-189 mg/dl  High:             190-219 mg/dl  Very high:       >219 mg/dl       Sodium 04/23/2021 138  133 - 144 mmol/L Final     Potassium 04/23/2021 3.0* 3.4 - 5.3 mmol/L Final     Chloride 04/23/2021 102  94 - 109 mmol/L Final     Carbon Dioxide 04/23/2021 32  20 - 32 mmol/L Final     Anion Gap 04/23/2021 4  3 - 14 mmol/L Final     Glucose 04/23/2021 118* 70 - 99 mg/dL Final    Fasting specimen     Urea Nitrogen 04/23/2021 10  7 - 30 mg/dL Final     Creatinine 04/23/2021 0.59  0.52 - 1.04 mg/dL Final     GFR Estimate 04/23/2021 >90  >60 mL/min/[1.73_m2] Final    Comment: Non  GFR Calc  Starting 12/18/2018, serum creatinine based estimated GFR (eGFR) will be    calculated using the Chronic Kidney Disease Epidemiology Collaboration   (CKD-EPI) equation.       GFR Estimate If Black 04/23/2021 >90  >60 mL/min/[1.73_m2] Final    Comment:  GFR Calc  Starting 12/18/2018, serum creatinine based estimated GFR (eGFR) will be   calculated using the Chronic Kidney Disease Epidemiology Collaboration   (CKD-EPI) equation.       Calcium 04/23/2021 9.4  8.5 - 10.1 mg/dL Final     Bilirubin Total 04/23/2021 0.4  0.2 - 1.3 mg/dL Final     Albumin 04/23/2021 4.2  3.4 - 5.0 g/dL Final     Protein Total 04/23/2021 8.2  6.8 - 8.8 g/dL Final     Alkaline Phosphatase 04/23/2021 72  40 - 150 U/L Final     ALT 04/23/2021 35  0 - 50 U/L Final     AST 04/23/2021 19  0 - 45 U/L Final               Phone call duration: 9 minutes

## 2021-04-29 ENCOUNTER — OFFICE VISIT (OUTPATIENT)
Dept: NEUROSURGERY | Facility: CLINIC | Age: 61
End: 2021-04-29
Attending: PHYSICIAN ASSISTANT
Payer: COMMERCIAL

## 2021-04-29 VITALS
DIASTOLIC BLOOD PRESSURE: 82 MMHG | BODY MASS INDEX: 31.3 KG/M2 | TEMPERATURE: 98.9 F | HEIGHT: 62 IN | SYSTOLIC BLOOD PRESSURE: 136 MMHG | WEIGHT: 170.1 LBS

## 2021-04-29 DIAGNOSIS — M54.16 LUMBAR RADICULOPATHY: Primary | ICD-10-CM

## 2021-04-29 PROCEDURE — 99213 OFFICE O/P EST LOW 20 MIN: CPT | Performed by: PHYSICIAN ASSISTANT

## 2021-04-29 ASSESSMENT — MIFFLIN-ST. JEOR: SCORE: 1289.82

## 2021-04-29 NOTE — PROGRESS NOTES
Neurosurgery Clinic  Neurosurgery followup:    HPI: 60-year-old female following up today in regards to low back and left leg pain.  She continues with pain down the lateral aspect of her left thigh and calf, into the lateral 3 toes of her left foot.  She did receive an injection in July of last year, with no symptomatic improvement.  She also has some numbness and tingling down this region.  She has not had recent physical therapy.  Exam:  Constitutional:  Alert, well nourished, NAD.  HEENT: Normocephalic, atraumatic.   Pulm:  Without shortness of breath   CV:  No pitting edema of BLE.      Neurological:  Awake  Alert  Oriented x 3  Motor exam:        IP Q DF PF EHL  R   5  5   5   5    5  L   5  5   5   5    5     Reflexes are 2+ in the patellar and Achilles. There is no clonus. Downgoing Babinski.      Able to spontaneously move L/E bilaterally  Sensation intact throughout all L/E dermatomes       Imaging: Review of her lumbar spine MRI does show moderate central stenosis at L4-5, due to broad-based disc bulging and facet arthropathy.    A/P:  Radicular left leg pain  L4-5 central stenosis    We did have a discussion regarding her history and symptoms.  She has had an injection in July 2020, with no symptomatic improvement.  Her MRI does show central stenosis at L4-5, due to broad-based disc bulging and facet arthropathy.  She has not had recent physical therapy, so we will get her set up with that.  After she is able to do a couple weeks of PT, I encouraged her to follow-up with Dr. Orozco to discuss further treatment options, including possible surgical options.  She voiced agreement and understanding.        Trevin Barragan PA-C  [facility] Neurosurgery  [address]  [address] [place] [address]    Tel [phone]  Pager [phone]

## 2021-04-29 NOTE — LETTER
4/29/2021         RE: Janice Ulloa  [address]  [place] [address]        Dear Colleague,    Thank you for referring your patient, Janice Ulloa, to the [facility]UROS[facility]GERY [facility]INIC [place]. Please see a copy of my visit note below.    Neurosurgery Clinic  Neurosurgery followup:    HPI: 60-year-old female following up today in regards to low back and left leg pain.  She continues with pain down the lateral aspect of her left thigh and calf, into the lateral 3 toes of her left foot.  She did receive an injection in July of last year, with no symptomatic improvement.  She also has some numbness and tingling down this region.  She has not had recent physical therapy.  Exam:  Constitutional:  Alert, well nourished, NAD.  HEENT: Normocephalic, atraumatic.   Pulm:  Without shortness of breath   CV:  No pitting edema of BLE.      Neurological:  Awake  Alert  Oriented x 3  Motor exam:        IP Q DF PF EHL  R   5  5   5   5    5  L   5  5   5   5    5     Reflexes are 2+ in the patellar and Achilles. There is no clonus. Downgoing Babinski.      Able to spontaneously move L/E bilaterally  Sensation intact throughout all L/E dermatomes       Imaging: Review of her lumbar spine MRI does show moderate central stenosis at L4-5, due to broad-based disc bulging and facet arthropathy.    A/P:  Radicular left leg pain  L4-5 central stenosis    We did have a discussion regarding her history and symptoms.  She has had an injection in July 2020, with no symptomatic improvement.  Her MRI does show central stenosis at L4-5, due to broad-based disc bulging and facet arthropathy.  She has not had recent physical therapy, so we will get her set up with that.  After she is able to do a couple weeks of PT, I encouraged her to follow-up with Dr. Orozco to discuss further treatment options, including possible surgical options.  She voiced agreement and understanding.        Trevin Barragan PA-C  [facility]  Neurosurgery  [facility]  [address]  [address]  [place] [address]    Tel [phone]  Pager [phone]        Again, thank you for allowing me to participate in the care of your patient.        Sincerely,        Trevin Barragan PA-C

## 2021-05-05 ENCOUNTER — VIRTUAL VISIT (OUTPATIENT)
Dept: FAMILY MEDICINE | Facility: OTHER | Age: 61
End: 2021-05-05
Payer: COMMERCIAL

## 2021-05-05 DIAGNOSIS — R73.9 ELEVATED BLOOD SUGAR: ICD-10-CM

## 2021-05-05 DIAGNOSIS — E78.5 HYPERLIPIDEMIA WITH TARGET LDL LESS THAN 130: Primary | ICD-10-CM

## 2021-05-05 DIAGNOSIS — E87.6 HYPOKALEMIA: ICD-10-CM

## 2021-05-05 PROCEDURE — 99214 OFFICE O/P EST MOD 30 MIN: CPT | Mod: 95 | Performed by: PHYSICIAN ASSISTANT

## 2021-05-05 RX ORDER — ATORVASTATIN CALCIUM 40 MG/1
40 TABLET, FILM COATED ORAL DAILY
Qty: 90 TABLET | Refills: 3 | Status: SHIPPED | OUTPATIENT
Start: 2021-05-05 | End: 2022-01-12

## 2021-05-07 DIAGNOSIS — R73.9 ELEVATED BLOOD SUGAR: ICD-10-CM

## 2021-05-07 DIAGNOSIS — E87.6 HYPOKALEMIA: ICD-10-CM

## 2021-05-07 LAB
HBA1C MFR BLD: 5.9 % (ref 0–5.6)
POTASSIUM SERPL-SCNC: 3.2 MMOL/L (ref 3.4–5.3)

## 2021-05-07 PROCEDURE — 84132 ASSAY OF SERUM POTASSIUM: CPT | Performed by: PHYSICIAN ASSISTANT

## 2021-05-07 PROCEDURE — 36415 COLL VENOUS BLD VENIPUNCTURE: CPT | Performed by: PHYSICIAN ASSISTANT

## 2021-05-07 PROCEDURE — 83036 HEMOGLOBIN GLYCOSYLATED A1C: CPT | Performed by: PHYSICIAN ASSISTANT

## 2021-05-10 DIAGNOSIS — E87.6 HYPOKALEMIA: ICD-10-CM

## 2021-05-10 RX ORDER — POTASSIUM CHLORIDE 750 MG/1
TABLET, EXTENDED RELEASE ORAL
Qty: 90 TABLET | Refills: 1 | Status: CANCELLED | OUTPATIENT
Start: 2021-05-10

## 2021-05-10 NOTE — TELEPHONE ENCOUNTER
Please call patient, her hemoglobin A1c is 5.9, this is high however thankfully not to the point of being diagnosed with diabetes.  Have her improve her diet to eat less carbohydrates such as bread, pasta, potatoes and other starchy vegetables and more green leafy vegetables and other types of nonstarchy vegetables and fruits.  Her potassium has improved but is still slightly low, lets have her increase her potassium supplement dosage to 2 pills in the morning and 1 pill in the evening. What pharrmacy can I send the changes to? Lets check potassium again in 2 weeks lab eduardo Alfaro PA-C

## 2021-05-13 RX ORDER — POTASSIUM CHLORIDE 750 MG/1
TABLET, EXTENDED RELEASE ORAL
Qty: 30 TABLET | Refills: 1 | Status: CANCELLED | OUTPATIENT
Start: 2021-05-13

## 2021-05-14 NOTE — TELEPHONE ENCOUNTER
Left message for patient to return call, when call is returned, please see NP note below.  Debbie Salazar MA

## 2021-05-14 NOTE — TELEPHONE ENCOUNTER
Notified patient of message below, she wanted Anisha that she did not get the Rx for Potassium that was sent in 04/23.  She will call the pharmacy and start taking it as it was originally prescribed unless Anisha thinks the dose should be different, please advise.

## 2021-05-17 ENCOUNTER — HOSPITAL ENCOUNTER (OUTPATIENT)
Dept: PHYSICAL THERAPY | Facility: CLINIC | Age: 61
Setting detail: THERAPIES SERIES
End: 2021-05-17
Attending: PHYSICIAN ASSISTANT
Payer: COMMERCIAL

## 2021-05-17 DIAGNOSIS — M54.16 LUMBAR RADICULOPATHY: ICD-10-CM

## 2021-05-17 PROCEDURE — 97110 THERAPEUTIC EXERCISES: CPT | Mod: GP | Performed by: PHYSICAL THERAPIST

## 2021-05-17 PROCEDURE — 97112 NEUROMUSCULAR REEDUCATION: CPT | Mod: GP | Performed by: PHYSICAL THERAPIST

## 2021-05-17 PROCEDURE — 97162 PT EVAL MOD COMPLEX 30 MIN: CPT | Mod: GP | Performed by: PHYSICAL THERAPIST

## 2021-05-17 NOTE — PROGRESS NOTES
[facility]          OUTPATIENT PHYSICAL THERAPY ORTHOPEDIC EVALUATION  PLAN OF TREATMENT FOR OUTPATIENT REHABILITATION  (COMPLETE FOR INITIAL CLAIMS ONLY)  Patient's Last Name, First Name, M.I.  YOB: 1960  Jeremy Ulloaa  MAT    Provider s Name:  [facility]   Medical Record No.  [id number]   Start of Care Date:  05/17/21   Onset Date:  02/27/20   Type:     _X__PT   ___OT   ___SLP Medical Diagnosis:   Lumbar radiculopathy     PT Diagnosis:  mechanical low back pain with left lower extremity pain/referral, lumbar radiculopathy   Visits from SOC:  1      _________________________________________________________________________________  Plan of Treatment/Functional Goals:  ROM, strengthening, stretching, neuromuscular re-education  manual therapy if needed, back care education  Electrical stimulation, Ultrasound, Traction  if needed  Goals  Goal Identifier: Pain  Goal Description: Pt will note a decrease in average LBP and L LE pain from a 6/10 to a 3-4/10 or less so pt can meet goal of sx reduction with work and adl's  Target Date: 06/17/21    Goal Identifier: Function  Goal Description: pt will note a decrease in pain and carry over to improved functional level as measured by BELÉN score decrease from 31/11% to 23% or less  Target Date: 06/28/21    Therapy Frequency:  1 time/week  Predicted Duration of Therapy Intervention:  12 weeks, decrease as able    Issac Ye, PT                 I CERTIFY THE NEED FOR THESE SERVICES FURNISHED UNDER        THIS PLAN OF TREATMENT AND WHILE UNDER MY CARE     (Physician co-signature of this document indicates review and certification of the therapy plan).                       Certification Date From:  05/17/21   Certification Date To:  08/15/21    Referring Provider:  TAYE Edwards    Initial Assessment        See Epic Evaluation Start of Care Date: 05/17/21

## 2021-05-26 ENCOUNTER — HOSPITAL ENCOUNTER (OUTPATIENT)
Dept: PHYSICAL THERAPY | Facility: CLINIC | Age: 61
Setting detail: THERAPIES SERIES
End: 2021-05-26
Attending: PHYSICIAN ASSISTANT
Payer: COMMERCIAL

## 2021-05-26 PROCEDURE — 97110 THERAPEUTIC EXERCISES: CPT | Mod: GP | Performed by: PHYSICAL THERAPIST

## 2021-05-26 PROCEDURE — 97530 THERAPEUTIC ACTIVITIES: CPT | Mod: GP | Performed by: PHYSICAL THERAPIST

## 2021-05-27 ENCOUNTER — OFFICE VISIT (OUTPATIENT)
Dept: NEUROSURGERY | Facility: CLINIC | Age: 61
End: 2021-05-27
Payer: COMMERCIAL

## 2021-05-27 VITALS
DIASTOLIC BLOOD PRESSURE: 78 MMHG | HEART RATE: 73 BPM | SYSTOLIC BLOOD PRESSURE: 159 MMHG | WEIGHT: 170 LBS | BODY MASS INDEX: 31.09 KG/M2

## 2021-05-27 DIAGNOSIS — Z01.818 PREOP TESTING: ICD-10-CM

## 2021-05-27 DIAGNOSIS — M54.16 LUMBAR RADICULOPATHY: Primary | ICD-10-CM

## 2021-05-27 PROCEDURE — 99214 OFFICE O/P EST MOD 30 MIN: CPT | Performed by: NEUROLOGICAL SURGERY

## 2021-05-27 ASSESSMENT — PAIN SCALES - GENERAL: PAINLEVEL: MODERATE PAIN (5)

## 2021-05-27 NOTE — LETTER
5/27/2021         RE: Janice Ulloa  [address]  [place] [address]        Dear Colleague,    Thank you for referring your patient, Janice Ulloa, to the [facility] NEUROSURGERY CLINIC [place]. Please see a copy of my visit note below.    Neurosurgery Clinic  Neurosurgery followup:    HPI: 60-year-old female following up today in regards to low back and left leg pain.  She continues with pain down the lateral aspect of her left thigh and calf, into the lateral 3 toes of her left foot.  She did receive an injection in July of last year, with no symptomatic improvement.  She also has some numbness and tingling down this region.  She has not had recent physical therapy.    Returns for follow up.  Continued severe LLE pain as described above.  Exam:  Constitutional:  Alert, well nourished, NAD.  HEENT: Normocephalic, atraumatic.   Pulm:  Without shortness of breath   CV:  No pitting edema of BLE.      Neurological:  Awake  Alert  Oriented x 3  Motor exam:        IP Q DF PF EHL  R   5  5   5   5    5  L   5  5   5   5    5     Reflexes are 2+ in the patellar and Achilles. There is no clonus. Downgoing Babinski.      Able to spontaneously move L/E bilaterally  Left leg numbness into the calf and foot       Imaging: Review of her lumbar spine MRI does show moderate central stenosis at L4-5, due to broad-based disc bulging and facet arthropathy.    A/P:  Radicular left leg pain  L4-5 central stenosis  L5-S1 disc herniation    Will plan for MIS left L4-5 bilateral decompression and L5-S1 microdiscectomy  Risks and benefits discussed    Reviewed pre- and post-operative instructions as outlined in the After Visit Summary/Patient Instructions with patient.   Surgery folder was given to patient    Patient Education Topic: Procedure with Dr. Orozco     Learner(s): Patient    Knowledge Level: Basic    Readiness to Learn: Ready    Method:  Verbal explanation and Written material     Outcome: Able to  verbalize instructions    Barriers to Learning: No barrier    Covid Testing: patient educated they will need to have a test for the novel Coronavirus, COVID-19.The test needs to be completed within 4 days (96) hours of surgery. Order Placed.no, patient would like surgery in  and [facility] will place order.    Scheduling Number: [phone]    Patient had the opportunity for questions to be answered. Advised Patient to call clinic with any questions/concerns. Gave patient antibacterial soap for pre-surgery skin preparation.           Again, thank you for allowing me to participate in the care of your patient.        Sincerely,        James Orozco MD

## 2021-05-27 NOTE — PROGRESS NOTES
Reviewed pre- and post-operative instructions as outlined in the After Visit Summary/Patient Instructions with patient.   Surgery folder was given to patient    Patient Education Topic: Procedure with Dr. Orozco     Learner(s): Patient    Knowledge Level: Basic    Readiness to Learn: Ready    Method:  Verbal explanation and Written material     Outcome: Able to verbalize instructions    Barriers to Learning: No barrier    Covid Testing: patient educated they will need to have a test for the novel Coronavirus, COVID-19.The test needs to be completed within 4 days (96) hours of surgery. Order Placed.no, patient would like surgery in  and [facility] will place order.    Scheduling Number: [phone]    Patient had the opportunity for questions to be answered. Advised Patient to call clinic with any questions/concerns. Gave patient antibacterial soap for pre-surgery skin preparation.

## 2021-05-27 NOTE — PROGRESS NOTES
Neurosurgery Clinic  Neurosurgery followup:    HPI: 60-year-old female following up today in regards to low back and left leg pain.  She continues with pain down the lateral aspect of her left thigh and calf, into the lateral 3 toes of her left foot.  She did receive an injection in July of last year, with no symptomatic improvement.  She also has some numbness and tingling down this region.  She has not had recent physical therapy.    Returns for follow up.  Continued severe LLE pain as described above.  Exam:  Constitutional:  Alert, well nourished, NAD.  HEENT: Normocephalic, atraumatic.   Pulm:  Without shortness of breath   CV:  No pitting edema of BLE.      Neurological:  Awake  Alert  Oriented x 3  Motor exam:        IP Q DF PF EHL  R   5  5   5   5    5  L   5  5   5   5    5     Reflexes are 2+ in the patellar and Achilles. There is no clonus. Downgoing Babinski.      Able to spontaneously move L/E bilaterally  Left leg numbness into the calf and foot       Imaging: Review of her lumbar spine MRI does show moderate central stenosis at L4-5, due to broad-based disc bulging and facet arthropathy.    A/P:  Radicular left leg pain  L4-5 central stenosis  L5-S1 disc herniation    Will plan for MIS left L4-5 bilateral decompression and L5-S1 microdiscectomy  Risks and benefits discussed

## 2021-05-27 NOTE — PATIENT INSTRUCTIONS
Patient Instructions    Surgery scheduled at [facility] for L5-S1 Microdiscectomy left L4-5 bilateral decompression with Dr. Orozco    Pre-Operative    Surgical risks: blood clots in the leg or lung, problems urinating, nerve damage, drainage from the incision, infection,stiffness    Pre-operative physical with primary care physician within 30 days of surgical date.     Likely same day procedure with discharge home day of surgery, may stay for 23 hour observation hospitalization for monitoring.     Smoking Cessation: You are advised to quit smoking immediately through recovery to help with healing and reduce risk of complications. Printout given.      Shower procedure  o Please shower with antimicrobial soap the night before surgery and morning of surgery. Please refer to showering instruction sheet in folder.    Eating/Drinking  o Stop all solid foods 8 hours before surgery.  o Keep drinking clear liquids until 4 hours before surgery  - Clear liquids include water, clear juice, black coffee, or clear tea without milk, Gatorade, clear soda.     Medications  o Hold Aspirin, NSAIDs (Advil/Ibuprofen, Indocin, Naproxen,Nuprin,Relafen/Nabumetone, Diclofenac,Meloxicam, Aleve, Celebrex) x 7 days prior to surgical date  o You can take Tylenol (Acetaminophen) for pain, 1000 mg  - Do not exceed 3,000 mg per day   o Any other medications prescribed, please discuss with your primary care provider at your pre-operative physical     As part of preparation for your upcoming procedure you are required to have a test for the novel Coronavirus, COVID-19  o  If surgery is scheduled in [place], a nurse will call you about 1 week prior to surgery to assist in scheduling. If surgery is scheduled at [facility], please call the drive-up testing number to schedule your appointment. The test needs to be completed within 4 days (96) hours of surgery.   o Scheduling Number: [phone]   o You may NOT receive the  COVID-19 vaccine 72 hours before or after surgery.    Post-Operative    Pain Management    Dealing with pain  o As your body heals, you might feel a stabbing, burning, or aching pain. You may also have some numbness.  o Everyone feels pain differently, we may ask you to rate your pain using a pain scale. This will let us know how much pain you feel.   o Keep in mind that medicine won't take away all of your pain. It helps to try other ways to relax and ease pain.     Things to help with pain  o After surgery, we will give you medicine for your pain. These medications work well, but they can make you drowsy, itchy, or sick to your stomach. If we give you narcotics for pain, try to take the pills with food.   o For mild to moderate pain, you can take medication such as Tylenol. These can be used with narcotics, but make sure that your narcotic does not contain Tylenol.   - Do NOT drive while taking narcotic pain medication  - Do NOT drink alcohol while using any pain medication  o You can utilize ice as needed (no longer than 20 minutes at one time)    For refills, please call a few days before you run out.    You may resume taking NSAIDs (ex. Ibuprofen, aleve, naproxen) 2 weeks after surgery as it may cause bleeding and interfere with bone healing     Incision Care  o No submerging incision in water such as pools, hot tubs, baths for at least 8 weeks or until incision is healed  o It is okay to shower, just pat the incision dry   o Remove dressing as instructed upon discharge  o Watch for signs of infection  - Redness, swelling, warmth, drainage, and fever of 101 degrees or higher  - [facility] [phone]    Bowel Care  o Many people have constipation (hard stools) after surgery.  To help prevent constipation: Drink plenty of fluid (8-10 glasses/day); Eat more fiber, such as whole grain bread, bran cereal, and fruits and vegetables; Stay active by walking; Over the counter stool softener may also help.       Activity Restrictions  o For the first 4-6 weeks, no lifting > 10 pounds, limited bending, twisting, or overhead reaching.  o Take stairs in moderation     Walking: Walking is the best way to start exercise after surgery. Take short frequent walks. You may gradually increase the distance as tolerated. If you feel pain, decrease your activity, but DO NOT stop walking. Walking will help you gain strength and prevent muscle soreness and spasms.   o Avoid bed rest and prolonged sitting for longer than 30 minutes (change positions frequently while awake)  o No contact sports until after follow up visit  o No high impact activities such as; running/jogging, snowmobile or 4 moy riding or any other recreational vehicles  o Please call the clinic if you develop any of the following symptoms:  - Swelling and/or warmth in one or both legs  - Pain or tenderness in your leg, ankle, foot, or arm   - Red or discolored skin     Post-Op Follow Up Appointments    2 week incision check with RN    6 week post op visit with Physican Assistant or Nurse Practitioner     3 months post op with Physical Assistant or Nurse Practitioner     Please call to schedule follow up appointment at [phone]    Resources    If you are currently employed, you will need to be off work for 2-4 weeks for post op recovery and healing.    Please fax any FMLA/short term disability paperwork to [phone]    You may call our clinic when you'd like to return to work and we can provide a work letter    To allow staff adequate time to complete paperwork, please send as soon as possible   LAURA Soares  --    [facility]  Phone: [phone]  Fax: [phone]

## 2021-06-01 DIAGNOSIS — M54.16 LUMBAR RADICULOPATHY: Primary | ICD-10-CM

## 2021-06-01 DIAGNOSIS — Z11.59 ENCOUNTER FOR SCREENING FOR OTHER VIRAL DISEASES: ICD-10-CM

## 2021-06-07 ENCOUNTER — HOSPITAL ENCOUNTER (OUTPATIENT)
Dept: PHYSICAL THERAPY | Facility: CLINIC | Age: 61
Setting detail: THERAPIES SERIES
End: 2021-06-07
Attending: PHYSICIAN ASSISTANT
Payer: COMMERCIAL

## 2021-06-07 PROCEDURE — 97530 THERAPEUTIC ACTIVITIES: CPT | Mod: GP | Performed by: PHYSICAL THERAPIST

## 2021-06-07 PROCEDURE — 97110 THERAPEUTIC EXERCISES: CPT | Mod: GP | Performed by: PHYSICAL THERAPIST

## 2021-06-07 PROCEDURE — 97014 ELECTRIC STIMULATION THERAPY: CPT | Mod: GP | Performed by: PHYSICAL THERAPIST

## 2021-06-09 ENCOUNTER — TELEPHONE (OUTPATIENT)
Dept: FAMILY MEDICINE | Facility: OTHER | Age: 61
End: 2021-06-09

## 2021-06-09 ENCOUNTER — OFFICE VISIT (OUTPATIENT)
Dept: FAMILY MEDICINE | Facility: OTHER | Age: 61
End: 2021-06-09
Payer: COMMERCIAL

## 2021-06-09 VITALS
HEART RATE: 73 BPM | TEMPERATURE: 97.3 F | WEIGHT: 169 LBS | HEIGHT: 61 IN | SYSTOLIC BLOOD PRESSURE: 150 MMHG | DIASTOLIC BLOOD PRESSURE: 88 MMHG | BODY MASS INDEX: 31.91 KG/M2 | OXYGEN SATURATION: 97 %

## 2021-06-09 DIAGNOSIS — E87.6 HYPOKALEMIA: ICD-10-CM

## 2021-06-09 DIAGNOSIS — I10 ESSENTIAL HYPERTENSION WITH GOAL BLOOD PRESSURE LESS THAN 140/90: ICD-10-CM

## 2021-06-09 DIAGNOSIS — E78.5 HYPERLIPIDEMIA WITH TARGET LDL LESS THAN 130: ICD-10-CM

## 2021-06-09 DIAGNOSIS — Z71.89 ADVANCED CARE PLANNING/COUNSELING DISCUSSION: ICD-10-CM

## 2021-06-09 DIAGNOSIS — M51.369 BULGING OF LUMBAR INTERVERTEBRAL DISC: ICD-10-CM

## 2021-06-09 DIAGNOSIS — M54.16 LUMBAR RADICULOPATHY: ICD-10-CM

## 2021-06-09 DIAGNOSIS — R23.8 SKIN IRRITATION: ICD-10-CM

## 2021-06-09 DIAGNOSIS — Z01.818 PREOP GENERAL PHYSICAL EXAM: Primary | ICD-10-CM

## 2021-06-09 LAB
ANION GAP SERPL CALCULATED.3IONS-SCNC: 3 MMOL/L (ref 3–14)
BUN SERPL-MCNC: 13 MG/DL (ref 7–30)
CALCIUM SERPL-MCNC: 9.6 MG/DL (ref 8.5–10.1)
CHLORIDE SERPL-SCNC: 99 MMOL/L (ref 94–109)
CO2 SERPL-SCNC: 34 MMOL/L (ref 20–32)
CREAT SERPL-MCNC: 0.69 MG/DL (ref 0.52–1.04)
ERYTHROCYTE [DISTWIDTH] IN BLOOD BY AUTOMATED COUNT: 13.5 % (ref 10–15)
GFR SERPL CREATININE-BSD FRML MDRD: >90 ML/MIN/{1.73_M2}
GLUCOSE SERPL-MCNC: 98 MG/DL (ref 70–99)
HCT VFR BLD AUTO: 43.4 % (ref 35–47)
HGB BLD-MCNC: 14.2 G/DL (ref 11.7–15.7)
MCH RBC QN AUTO: 28.2 PG (ref 26.5–33)
MCHC RBC AUTO-ENTMCNC: 32.7 G/DL (ref 31.5–36.5)
MCV RBC AUTO: 86 FL (ref 78–100)
PLATELET # BLD AUTO: 255 10E9/L (ref 150–450)
POTASSIUM SERPL-SCNC: 4 MMOL/L (ref 3.4–5.3)
RBC # BLD AUTO: 5.04 10E12/L (ref 3.8–5.2)
SODIUM SERPL-SCNC: 136 MMOL/L (ref 133–144)
WBC # BLD AUTO: 5.6 10E9/L (ref 4–11)

## 2021-06-09 PROCEDURE — 36415 COLL VENOUS BLD VENIPUNCTURE: CPT | Performed by: PHYSICIAN ASSISTANT

## 2021-06-09 PROCEDURE — 99214 OFFICE O/P EST MOD 30 MIN: CPT | Performed by: PHYSICIAN ASSISTANT

## 2021-06-09 PROCEDURE — 80048 BASIC METABOLIC PNL TOTAL CA: CPT | Performed by: PHYSICIAN ASSISTANT

## 2021-06-09 PROCEDURE — 85027 COMPLETE CBC AUTOMATED: CPT | Performed by: PHYSICIAN ASSISTANT

## 2021-06-09 PROCEDURE — 93000 ELECTROCARDIOGRAM COMPLETE: CPT | Performed by: PHYSICIAN ASSISTANT

## 2021-06-09 RX ORDER — TRIAMCINOLONE ACETONIDE 1 MG/G
OINTMENT TOPICAL 2 TIMES DAILY
Qty: 15 G | Refills: 0 | Status: SHIPPED | OUTPATIENT
Start: 2021-06-09 | End: 2022-04-06

## 2021-06-09 ASSESSMENT — MIFFLIN-ST. JEOR: SCORE: 1274.34

## 2021-06-09 NOTE — H&P (VIEW-ONLY)
[address]  [facility] [address]  Phone: [phone]  Primary Provider: Anisha Alfaro  Pre-op Performing Provider: SHANNEN MCCABE    PREOPERATIVE EVALUATION:  Today's date: 6/9/2021    Janice Ulloa is a 60 year old female who presents for a preoperative evaluation.    Surgical Information:  Surgery/Procedure: Minimally invasive Left Lumbar 4 to lumbar 5 bilateral decompression, Lumbar 5 to Sacral 1 microdiscectomy  Surgery Location: [facility]  Surgeon: Dr. Orozco  Surgery Date: 6/16/21  Time of Surgery: 8:30 am  Where patient plans to recover: At home with family  Fax number for surgical facility: Note does not need to be faxed, will be available electronically in Epic.    Type of Anesthesia Anticipated: General    Assessment & Plan     The proposed surgical procedure is considered INTERMEDIATE risk.    Preop general physical exam  Lumbar radiculopathy  Bulging of lumbar intervertebral disc    Essential hypertension with goal blood pressure less than 140/90  BP is stable from what it has been  Continue her current medications    - EKG 12-lead complete w/read - Clinics  - CBC with platelets  - Basic metabolic panel    Hypokalemia  Has been corrected.     Hyperlipidemia with target LDL less than 130    Skin irritation  Mild irritation scattered papule, recommended topical steroid PRN.   - triamcinolone (KENALOG) 0.1 % external ointment; Apply topically 2 times daily    Advanced Care Directive - discussed given form     Risks and Recommendations:  The patient has the following additional risks and recommendations for perioperative complications:   - No identified additional risk factors other than previously addressed    Medication Instructions:  Patient is to take all scheduled medications on the day of surgery EXCEPT for modifications listed below:  HOLD Lisinopril-HCTZ morning of procedure   HOLD aspirin, NSAIDs a week prior to procedure.      RECOMMENDATION:  APPROVAL GIVEN to proceed with proposed procedure, without further diagnostic evaluation.      Subjective     HPI related to upcoming procedure: Has been having issues with low back pain for about 15 months. She stepped over something and twisted and then felt pain in her back, it has progressed into pain in her left lower extremity as well.       Preop Questions 6/9/2021   1. Have you ever had a heart attack or stroke? No   2. Have you ever had surgery on your heart or blood vessels, such as a stent placement, a coronary artery bypass, or surgery on an artery in your head, neck, heart, or legs? No   3. Do you have chest pain with activity? No   4. Do you have a history of  heart failure? No   5. Do you currently have a cold, bronchitis or symptoms of other infection? No   6. Do you have a cough, shortness of breath, or wheezing? No   7. Do you or anyone in your family have previous history of blood clots? No   8. Do you or does anyone in your family have a serious bleeding problem such as prolonged bleeding following surgeries or cuts? No   9. Have you ever had problems with anemia or been told to take iron pills? No   10. Have you had any abnormal blood loss such as black, tarry or bloody stools, or abnormal vaginal bleeding? No   11. Have you ever had a blood transfusion? No   12. Are you willing to have a blood transfusion if it is medically needed before, during, or after your surgery? Yes   13. Have you or any of your relatives ever had problems with anesthesia? No   14. Do you have sleep apnea, excessive snoring or daytime drowsiness? No   15. Do you have any artifical heart valves or other implanted medical devices like a pacemaker, defibrillator, or continuous glucose monitor? No   16. Do you have artificial joints? No   17. Are you allergic to latex? No     Health Care Directive:  Patient does not have a Health Care Directive or Living Will: Discussed advance care planning with  patient; information given to patient to review.    Preoperative Review of :   reviewed - Unable to review,  report not generating      Status of Chronic Conditions:  See problem list for active medical problems.  Problems all longstanding and stable, except as noted/documented.  See ROS for pertinent symptoms related to these conditions.    HYPERLIPIDEMIA - Patient has a long history of significant Hyperlipidemia requiring medication for treatment with recent good control. Patient reports no problems or side effects with the medication.     HYPERTENSION - Patient has longstanding history of HTN , currently denies any symptoms referable to elevated blood pressure. Specifically denies chest pain, palpitations, dyspnea, orthopnea, PND or peripheral edema. Blood pressure readings have not been in normal range. Current medication regimen is as listed below. Patient denies any side effects of medication.       Review of Systems  Constitutional, neuro, ENT, endocrine, pulmonary, cardiac, gastrointestinal, genitourinary, musculoskeletal, integument and psychiatric systems are negative, except as otherwise noted.    Patient Active Problem List    Diagnosis Date Noted     Lumbar radiculopathy 06/01/2021     Priority: Medium     Added automatically from request for surgery [id number]       Family history of premature coronary heart disease 05/08/2018     Priority: Medium     Adjustment disorder with mixed anxiety and depressed mood 04/11/2017     Priority: Medium     Health Care Home 11/18/2016     Priority: Medium     Date:  11-18-16  Status:  Accepted       Pneumonia 01/16/2015     Priority: Medium     Sepsis (H) 01/16/2015     Priority: Medium     Problem list name updated by automated process. Provider to review       Acute and chronic respiratory failure with hypoxia (H) 01/16/2015     Priority: Medium     Hypokalemia 01/16/2015     Priority: Medium     DVT prophylaxis 01/16/2015     Priority: Medium     Advanced  directives, counseling/discussion 01/16/2015     Priority: Medium     Hypertension goal BP (blood pressure) < 140/90 11/18/2013     Priority: Medium     Neck muscle spasm 12/06/2011     Priority: Medium     Microscopic hematuria 07/08/2011     Priority: Medium     Needs FU       Anemia 06/15/2011     Priority: Medium     Perimenopausal      Priority: Medium     irreg 2011       Hyperlipidemia with target LDL less than 130 07/01/2010     Priority: Medium     Diagnosis updated by automated process. Provider to review and confirm.       Hypercholesteremia 06/10/2010     Priority: Medium     Intervertebral cervical disc disorder with myelopathy, cervical region      Priority: Medium     C6 herniation with right C6 radiculopathy       Headache 07/10/2009     Priority: Medium     Problem list name updated by automated process. Provider to review       Neck pain 07/10/2009     Priority: Medium     Carpal tunnel syndrome 06/03/2006     Priority: Medium     Migraine 02/08/2006     Priority: Medium     Problem list name updated by automated process. Provider to review        Past Medical History:   Diagnosis Date     Carpal tunnel syndrome      Intervertebral cervical disc disorder with myelopathy, cervical region 2007    C6 herniation with right C6 radiculopathy     Migraine, unspecified, without mention of intractable migraine without mention of status migrainosus      Perimenopausal     irreg 2011     Personal history of tobacco use, presenting hazards to health      Past Surgical History:   Procedure Laterality Date     C DISK SURG,ANTER,CERVICAL,SINGLE LVL  10/02/2007    C5-C6 anterior cervical diskectomy & fusion w/plate.     COLONOSCOPY  1/10/2014    Procedure: COMBINED COLONOSCOPY, SINGLE BIOPSY/POLYPECTOMY BY BIOPSY;  colonoscopy with polypectomy by forceps;  Surgeon: Chevy Yang MD;  Location:  GI     HC REVISE MEDIAN N/CARPAL TUNNEL SURG  05/24/2004     HC REVISE MEDIAN N/CARPAL TUNNEL SURG  06/05/06    Left      HC TREATMENT INCOMPLETE  SURG, ANY TRIMESTER      D&C after miscarriage     INJECT EPIDURAL LUMBAR Left 2020    Procedure: INJECTION, SPINE, LUMBAR, 5 Sacral 1 and Sacral 1 Left;  Surgeon: Abilio Middleton MD;  Location: [facility] OR     ZZC NONSPECIFIC PROCEDURE      Bilateral inguinal hernia repairs     ZZC NONSPECIFIC PROCEDURE      Oral surgery x2     Current Outpatient Medications   Medication Sig Dispense Refill     amLODIPine (NORVASC) 5 MG tablet Take 1 tablet (5 mg) by mouth daily 90 tablet 1     ASPIRIN 81 MG PO TABS ONE DAILY  3     atorvastatin (LIPITOR) 40 MG tablet Take 1 tablet (40 mg) by mouth daily 90 tablet 3     IBUPROFEN PO Take 400 mg by mouth daily as needed        lisinopril-hydrochlorothiazide (ZESTORETIC) 20-12.5 MG tablet TAKE TWO TABLETS BY MOUTH EVERY  tablet 1     Multiple Vitamin (MULTI-VITAMIN DAILY PO)        potassium chloride ER (K-TAB/KLOR-CON) 10 MEQ CR tablet Take 1 tablet (10 mEq) by mouth 2 times daily 30 tablet 1     propranolol ER (INDERAL LA) 120 MG 24 hr capsule TAKE TWO CAPSULES BY MOUTH EVERY  capsule 1     tiZANidine (ZANAFLEX) 4 MG tablet TAKE 1 TO 2 TABLETS BY MOUTH AS NEEDED FOR MUSCLE SPASM AT BEDTIME 90 tablet 3     triamcinolone (KENALOG) 0.1 % external ointment Apply topically 2 times daily 15 g 0       Allergies   Allergen Reactions     No Known Drug Allergies         Social History     Tobacco Use     Smoking status: Former Smoker     Packs/day: 1.50     Years: 20.00     Pack years: 30.00     Types: Cigarettes     Quit date: 10/17/2007     Years since quittin.6     Smokeless tobacco: Never Used   Substance Use Topics     Alcohol use: Yes     Comment: rare     Family History   Problem Relation Age of Onset     Cancer Mother         parotid     Heart Disease Father 47        MI     Depression Daughter      Hypertension Sister      History   Drug Use No         Objective     BP (!) 150/88   Pulse 73   Temp 97.3  F (36.3  C) (Temporal)   " Ht 1.558 m (5' 1.34\")   Wt 76.7 kg (169 lb)   LMP 08/19/2013 (Approximate)   SpO2 97%   BMI 31.58 kg/m      Physical Exam    GENERAL APPEARANCE: healthy, alert and no distress     EYES: EOMI, PERRL     HENT: ear canals and TM's normal and nose and mouth without ulcers or lesions     NECK: no adenopathy, no asymmetry, masses, or scars and thyroid normal to palpation     RESP: lungs clear to auscultation - no rales, rhonchi or wheezes     CV: regular rates and rhythm, normal S1 S2, no S3 or S4 and no murmur, click or rub     ABDOMEN:  soft, nontender, no HSM or masses and bowel sounds normal     MS: extremities normal- no gross deformities noted, no evidence of inflammation in joints, FROM in all extremities.     SKIN: no suspicious lesions, right shoulder scattered erythematous papules.      NEURO: Normal strength and tone, sensory exam grossly normal, mentation intact and speech normal     PSYCH: mentation appears normal. and affect normal/bright     LYMPHATICS: No cervical adenopathy    Recent Labs   Lab Test 05/07/21  0938 04/23/21  0824 07/07/20  1035   NA  --  138 139   POTASSIUM 3.2* 3.0* 3.6   CR  --  0.59 0.71   A1C 5.9*  --   --         Diagnostics:  Recent Results (from the past 24 hour(s))   CBC with platelets    Collection Time: 06/09/21 11:22 AM   Result Value Ref Range    WBC 5.6 4.0 - 11.0 10e9/L    RBC Count 5.04 3.8 - 5.2 10e12/L    Hemoglobin 14.2 11.7 - 15.7 g/dL    Hematocrit 43.4 35.0 - 47.0 %    MCV 86 78 - 100 fl    MCH 28.2 26.5 - 33.0 pg    MCHC 32.7 31.5 - 36.5 g/dL    RDW 13.5 10.0 - 15.0 %    Platelet Count 255 150 - 450 10e9/L   Basic metabolic panel    Collection Time: 06/09/21 11:22 AM   Result Value Ref Range    Sodium 136 133 - 144 mmol/L    Potassium 4.0 3.4 - 5.3 mmol/L    Chloride 99 94 - 109 mmol/L    Carbon Dioxide 34 (H) 20 - 32 mmol/L    Anion Gap 3 3 - 14 mmol/L    Glucose 98 70 - 99 mg/dL    Urea Nitrogen 13 7 - 30 mg/dL    Creatinine 0.69 0.52 - 1.04 mg/dL    GFR " Estimate >90 >60 mL/min/[1.73_m2]    GFR Estimate If Black >90 >60 mL/min/[1.73_m2]    Calcium 9.6 8.5 - 10.1 mg/dL      EKG: appears normal, NSR, normal axis, normal intervals, no acute ST/T changes c/w ischemia, no LVH by voltage criteria    Revised Cardiac Risk Index (RCRI):  The patient has the following serious cardiovascular risks for perioperative complications:   - No serious cardiac risks = 0 points     RCRI Interpretation: 0 points: Class I (very low risk - 0.4% complication rate)           Signed Electronically by: Keith Sen PA-C  Copy of this evaluation report is provided to requesting physician.

## 2021-06-09 NOTE — PROGRESS NOTES
[address]  [facility] [address]  Phone: [phone]  Primary Provider: Anisha Alfaro  Pre-op Performing Provider: SHANNEN MCCABE    PREOPERATIVE EVALUATION:  Today's date: 6/9/2021    Janice Ulloa is a 60 year old female who presents for a preoperative evaluation.    Surgical Information:  Surgery/Procedure: Minimally invasive Left Lumbar 4 to lumbar 5 bilateral decompression, Lumbar 5 to Sacral 1 microdiscectomy  Surgery Location: [facility]  Surgeon: Dr. Orozco  Surgery Date: 6/16/21  Time of Surgery: 8:30 am  Where patient plans to recover: At home with family  Fax number for surgical facility: Note does not need to be faxed, will be available electronically in Epic.    Type of Anesthesia Anticipated: General    Assessment & Plan     The proposed surgical procedure is considered INTERMEDIATE risk.    Preop general physical exam  Lumbar radiculopathy  Bulging of lumbar intervertebral disc    Essential hypertension with goal blood pressure less than 140/90  BP is stable from what it has been  Continue her current medications    - EKG 12-lead complete w/read - Clinics  - CBC with platelets  - Basic metabolic panel    Hypokalemia  Has been corrected.     Hyperlipidemia with target LDL less than 130    Skin irritation  Mild irritation scattered papule, recommended topical steroid PRN.   - triamcinolone (KENALOG) 0.1 % external ointment; Apply topically 2 times daily    Advanced Care Directive - discussed given form     Risks and Recommendations:  The patient has the following additional risks and recommendations for perioperative complications:   - No identified additional risk factors other than previously addressed    Medication Instructions:  Patient is to take all scheduled medications on the day of surgery EXCEPT for modifications listed below:  HOLD Lisinopril-HCTZ morning of procedure   HOLD aspirin, NSAIDs a week prior to procedure.      RECOMMENDATION:  APPROVAL GIVEN to proceed with proposed procedure, without further diagnostic evaluation.      Subjective     HPI related to upcoming procedure: Has been having issues with low back pain for about 15 months. She stepped over something and twisted and then felt pain in her back, it has progressed into pain in her left lower extremity as well.       Preop Questions 6/9/2021   1. Have you ever had a heart attack or stroke? No   2. Have you ever had surgery on your heart or blood vessels, such as a stent placement, a coronary artery bypass, or surgery on an artery in your head, neck, heart, or legs? No   3. Do you have chest pain with activity? No   4. Do you have a history of  heart failure? No   5. Do you currently have a cold, bronchitis or symptoms of other infection? No   6. Do you have a cough, shortness of breath, or wheezing? No   7. Do you or anyone in your family have previous history of blood clots? No   8. Do you or does anyone in your family have a serious bleeding problem such as prolonged bleeding following surgeries or cuts? No   9. Have you ever had problems with anemia or been told to take iron pills? No   10. Have you had any abnormal blood loss such as black, tarry or bloody stools, or abnormal vaginal bleeding? No   11. Have you ever had a blood transfusion? No   12. Are you willing to have a blood transfusion if it is medically needed before, during, or after your surgery? Yes   13. Have you or any of your relatives ever had problems with anesthesia? No   14. Do you have sleep apnea, excessive snoring or daytime drowsiness? No   15. Do you have any artifical heart valves or other implanted medical devices like a pacemaker, defibrillator, or continuous glucose monitor? No   16. Do you have artificial joints? No   17. Are you allergic to latex? No     Health Care Directive:  Patient does not have a Health Care Directive or Living Will: Discussed advance care planning with  patient; information given to patient to review.    Preoperative Review of :   reviewed - Unable to review,  report not generating      Status of Chronic Conditions:  See problem list for active medical problems.  Problems all longstanding and stable, except as noted/documented.  See ROS for pertinent symptoms related to these conditions.    HYPERLIPIDEMIA - Patient has a long history of significant Hyperlipidemia requiring medication for treatment with recent good control. Patient reports no problems or side effects with the medication.     HYPERTENSION - Patient has longstanding history of HTN , currently denies any symptoms referable to elevated blood pressure. Specifically denies chest pain, palpitations, dyspnea, orthopnea, PND or peripheral edema. Blood pressure readings have not been in normal range. Current medication regimen is as listed below. Patient denies any side effects of medication.       Review of Systems  Constitutional, neuro, ENT, endocrine, pulmonary, cardiac, gastrointestinal, genitourinary, musculoskeletal, integument and psychiatric systems are negative, except as otherwise noted.    Patient Active Problem List    Diagnosis Date Noted     Lumbar radiculopathy 06/01/2021     Priority: Medium     Added automatically from request for surgery [id number]       Family history of premature coronary heart disease 05/08/2018     Priority: Medium     Adjustment disorder with mixed anxiety and depressed mood 04/11/2017     Priority: Medium     Health Care Home 11/18/2016     Priority: Medium     Date:  11-18-16  Status:  Accepted       Pneumonia 01/16/2015     Priority: Medium     Sepsis (H) 01/16/2015     Priority: Medium     Problem list name updated by automated process. Provider to review       Acute and chronic respiratory failure with hypoxia (H) 01/16/2015     Priority: Medium     Hypokalemia 01/16/2015     Priority: Medium     DVT prophylaxis 01/16/2015     Priority: Medium     Advanced  directives, counseling/discussion 01/16/2015     Priority: Medium     Hypertension goal BP (blood pressure) < 140/90 11/18/2013     Priority: Medium     Neck muscle spasm 12/06/2011     Priority: Medium     Microscopic hematuria 07/08/2011     Priority: Medium     Needs FU       Anemia 06/15/2011     Priority: Medium     Perimenopausal      Priority: Medium     irreg 2011       Hyperlipidemia with target LDL less than 130 07/01/2010     Priority: Medium     Diagnosis updated by automated process. Provider to review and confirm.       Hypercholesteremia 06/10/2010     Priority: Medium     Intervertebral cervical disc disorder with myelopathy, cervical region      Priority: Medium     C6 herniation with right C6 radiculopathy       Headache 07/10/2009     Priority: Medium     Problem list name updated by automated process. Provider to review       Neck pain 07/10/2009     Priority: Medium     Carpal tunnel syndrome 06/03/2006     Priority: Medium     Migraine 02/08/2006     Priority: Medium     Problem list name updated by automated process. Provider to review        Past Medical History:   Diagnosis Date     Carpal tunnel syndrome      Intervertebral cervical disc disorder with myelopathy, cervical region 2007    C6 herniation with right C6 radiculopathy     Migraine, unspecified, without mention of intractable migraine without mention of status migrainosus      Perimenopausal     irreg 2011     Personal history of tobacco use, presenting hazards to health      Past Surgical History:   Procedure Laterality Date     C DISK SURG,ANTER,CERVICAL,SINGLE LVL  10/02/2007    C5-C6 anterior cervical diskectomy & fusion w/plate.     COLONOSCOPY  1/10/2014    Procedure: COMBINED COLONOSCOPY, SINGLE BIOPSY/POLYPECTOMY BY BIOPSY;  colonoscopy with polypectomy by forceps;  Surgeon: Chevy Yang MD;  Location:  GI     HC REVISE MEDIAN N/CARPAL TUNNEL SURG  05/24/2004     HC REVISE MEDIAN N/CARPAL TUNNEL SURG  06/05/06    Left      HC TREATMENT INCOMPLETE  SURG, ANY TRIMESTER      D&C after miscarriage     INJECT EPIDURAL LUMBAR Left 2020    Procedure: INJECTION, SPINE, LUMBAR, 5 Sacral 1 and Sacral 1 Left;  Surgeon: Abilio Middleton MD;  Location: [facility]     ZZC NONSPECIFIC PROCEDURE      Bilateral inguinal hernia repairs     ZZC NONSPECIFIC PROCEDURE      Oral surgery x2     Current Outpatient Medications   Medication Sig Dispense Refill     amLODIPine (NORVASC) 5 MG tablet Take 1 tablet (5 mg) by mouth daily 90 tablet 1     ASPIRIN 81 MG PO TABS ONE DAILY  3     atorvastatin (LIPITOR) 40 MG tablet Take 1 tablet (40 mg) by mouth daily 90 tablet 3     IBUPROFEN PO Take 400 mg by mouth daily as needed        lisinopril-hydrochlorothiazide (ZESTORETIC) 20-12.5 MG tablet TAKE TWO TABLETS BY MOUTH EVERY  tablet 1     Multiple Vitamin (MULTI-VITAMIN DAILY PO)        potassium chloride ER (K-TAB/KLOR-CON) 10 MEQ CR tablet Take 1 tablet (10 mEq) by mouth 2 times daily 30 tablet 1     propranolol ER (INDERAL LA) 120 MG 24 hr capsule TAKE TWO CAPSULES BY MOUTH EVERY  capsule 1     tiZANidine (ZANAFLEX) 4 MG tablet TAKE 1 TO 2 TABLETS BY MOUTH AS NEEDED FOR MUSCLE SPASM AT BEDTIME 90 tablet 3     triamcinolone (KENALOG) 0.1 % external ointment Apply topically 2 times daily 15 g 0       Allergies   Allergen Reactions     No Known Drug Allergies         Social History     Tobacco Use     Smoking status: Former Smoker     Packs/day: 1.50     Years: 20.00     Pack years: 30.00     Types: Cigarettes     Quit date: 10/17/2007     Years since quittin.6     Smokeless tobacco: Never Used   Substance Use Topics     Alcohol use: Yes     Comment: rare     Family History   Problem Relation Age of Onset     Cancer Mother         parotid     Heart Disease Father 47        MI     Depression Daughter      Hypertension Sister      History   Drug Use No         Objective     BP (!) 150/88   Pulse 73   Temp 97.3  F (36.3  C) (Temporal)   " Ht 1.558 m (5' 1.34\")   Wt 76.7 kg (169 lb)   LMP 08/19/2013 (Approximate)   SpO2 97%   BMI 31.58 kg/m      Physical Exam    GENERAL APPEARANCE: healthy, alert and no distress     EYES: EOMI, PERRL     HENT: ear canals and TM's normal and nose and mouth without ulcers or lesions     NECK: no adenopathy, no asymmetry, masses, or scars and thyroid normal to palpation     RESP: lungs clear to auscultation - no rales, rhonchi or wheezes     CV: regular rates and rhythm, normal S1 S2, no S3 or S4 and no murmur, click or rub     ABDOMEN:  soft, nontender, no HSM or masses and bowel sounds normal     MS: extremities normal- no gross deformities noted, no evidence of inflammation in joints, FROM in all extremities.     SKIN: no suspicious lesions, right shoulder scattered erythematous papules.      NEURO: Normal strength and tone, sensory exam grossly normal, mentation intact and speech normal     PSYCH: mentation appears normal. and affect normal/bright     LYMPHATICS: No cervical adenopathy    Recent Labs   Lab Test 05/07/21  0938 04/23/21  0824 07/07/20  1035   NA  --  138 139   POTASSIUM 3.2* 3.0* 3.6   CR  --  0.59 0.71   A1C 5.9*  --   --         Diagnostics:  Recent Results (from the past 24 hour(s))   CBC with platelets    Collection Time: 06/09/21 11:22 AM   Result Value Ref Range    WBC 5.6 4.0 - 11.0 10e9/L    RBC Count 5.04 3.8 - 5.2 10e12/L    Hemoglobin 14.2 11.7 - 15.7 g/dL    Hematocrit 43.4 35.0 - 47.0 %    MCV 86 78 - 100 fl    MCH 28.2 26.5 - 33.0 pg    MCHC 32.7 31.5 - 36.5 g/dL    RDW 13.5 10.0 - 15.0 %    Platelet Count 255 150 - 450 10e9/L   Basic metabolic panel    Collection Time: 06/09/21 11:22 AM   Result Value Ref Range    Sodium 136 133 - 144 mmol/L    Potassium 4.0 3.4 - 5.3 mmol/L    Chloride 99 94 - 109 mmol/L    Carbon Dioxide 34 (H) 20 - 32 mmol/L    Anion Gap 3 3 - 14 mmol/L    Glucose 98 70 - 99 mg/dL    Urea Nitrogen 13 7 - 30 mg/dL    Creatinine 0.69 0.52 - 1.04 mg/dL    GFR " Estimate >90 >60 mL/min/[1.73_m2]    GFR Estimate If Black >90 >60 mL/min/[1.73_m2]    Calcium 9.6 8.5 - 10.1 mg/dL      EKG: appears normal, NSR, normal axis, normal intervals, no acute ST/T changes c/w ischemia, no LVH by voltage criteria    Revised Cardiac Risk Index (RCRI):  The patient has the following serious cardiovascular risks for perioperative complications:   - No serious cardiac risks = 0 points     RCRI Interpretation: 0 points: Class I (very low risk - 0.4% complication rate)           Signed Electronically by: Keith Sen PA-C  Copy of this evaluation report is provided to requesting physician.

## 2021-06-09 NOTE — PATIENT INSTRUCTIONS
Morning of your procedure DO NOT take Lisinopril-Hydrochlorothiazide medication.  Ok to take your other medications other than the Aspirin which you have already stopped per surgeon  Preparing for Your Surgery  Getting started  A nurse will call you to review your health history and instructions. They will give you an arrival time based on your scheduled surgery time.  Please be ready to share the following:    Your doctor's clinic name and phone number    Your medical, surgical and anesthesia history    A list of allergies and sensitivities    A list of medicines, including herbal treatments and over-the-counter drugs    Whether the patient has a legal guardian (ask how to send us the papers in advance)  If you have a child who's having surgery, please ask for a copy of Preparing for Your Child's Surgery.    Preparing for surgery    Within 30 days of surgery: Have a pre-op exam (sometimes called an H&P, or History and Physical). This can be done at a clinic or pre-operative center.  ? If you're having a , you may not need this exam. Talk to your care team    At your pre-op exam, talk to your care team about all medicines you take. If you need to stop any medicines before surgery, ask when to start taking them again.  ? We do this for your safety. Many medicines can make you bleed too much during surgery. Some change how well surgery (anesthesia) drugs work.    Call your insurance company to let them know you're having surgery. (If you don't have insurance, call [phone].)    Call your clinic if there's any change in your health. This includes signs of a cold or flu (sore throat, runny nose, cough, rash, fever). It also includes a scrape or scratch near the surgery site.    If you have questions on the day of surgery, call your hospital or surgery center.  Eating and drinking guidelines  For your safety: Unless your surgeon tells you otherwise, follow the guidelines below.    Eat and drink as usual  until 8 hours before surgery. After that, no food or milk.    Drink clear liquids until 2 hours before surgery. These are liquids you can see through, like water, Gatorade and Propel Water. You may also have black coffee and tea (no cream or milk).    Nothing by mouth within 2 hours of surgery. This includes gum, candy and breath mints.    If you drink, stop drinking alcohol the night before surgery.    If your care team tells you to take medicine on the morning of surgery, it's okay to take it with a sip of water.  Preventing infection    Shower or bathe the night before and morning of your surgery. Follow the instructions your clinic gave you. (If no instructions, use regular soap.)    Don't shave or clip hair near your surgery site. We'll remove the hair if needed.    Don't smoke or vape the morning of surgery. You may chew nicotine gum up to 2 hours before surgery. A nicotine patch is okay.  ? Note: Some surgeries require you to completely quit smoking and nicotine. Check with your surgeon.    Your care team will make every effort to keep you safe from infection. We will:  ? Clean our hands often with soap and water (or an alcohol-based hand rub).  ? Clean the skin at your surgery site with a special soap that kills germs.  ? Give you a special gown to keep you warm. (Cold raises the risk of infection.)  ? Wear special hair covers, masks, gowns and gloves during surgery.  ? Give antibiotic medicine, if prescribed. Not all surgeries need antibiotics.  What to bring on the day of surgery    Photo ID and insurance card    Copy of your health care directive, if you have one    Glasses and hearing aides (bring cases)  ? You can't wear contacts during surgery    Inhaler and eye drops, if you use them (tell us about these when you arrive)    CPAP machine or breathing device, if you use them    A few personal items, if spending the night    If you have . . .  ? A pacemaker or ICD (cardiac defibrillator): Bring the ID  card.  ? An implanted stimulator: Bring the remote control.  ? A legal guardian: Bring a copy of the certified (court-stamped) guardianship papers.  Please remove any jewelry, including body piercings. Leave jewelry and other valuables at home.  If you're going home the day of surgery  Important: If you don't follow the rules below, we must cancel your surgery.     Arrange for someone to drive you home after surgery. You may not drive, take a taxi or take public transportation by yourself (unless you'll have local anesthesia only).    Arrange for a responsible adult to stay with you overnight. If you don't, we may keep you in the hospital overnight, and you may need to pay the costs yourself.  Questions?   If you have any questions for your care team, list them here: _________________________________________________________________________________________________________________________________________________________________________________________________________________________________________________________________________________________________________________________  For informational purposes only. Not to replace the advice of your health care provider. Copyright   2003, 2019 [employer] [facility]. All rights reserved. Clinically reviewed by Juliette Lorenzo MD. [employer] [id number] - REV 4/20.

## 2021-06-09 NOTE — TELEPHONE ENCOUNTER
Lm for patient to call us back    Please call patient. Her potassium is now normal. Blood count normal.     Keith Sen PA-C

## 2021-06-11 DIAGNOSIS — E87.6 HYPOKALEMIA: ICD-10-CM

## 2021-06-11 RX ORDER — POTASSIUM CHLORIDE 750 MG/1
TABLET, EXTENDED RELEASE ORAL
Qty: 30 TABLET | Refills: 1 | Status: SHIPPED | OUTPATIENT
Start: 2021-06-11 | End: 2021-07-05

## 2021-06-11 NOTE — TELEPHONE ENCOUNTER
Prescription approved per [facility] Refill Protocol.  Yusef Alarcon RN, BSN  [place] [facility]  June 11, 2021

## 2021-06-13 DIAGNOSIS — Z11.59 ENCOUNTER FOR SCREENING FOR OTHER VIRAL DISEASES: ICD-10-CM

## 2021-06-13 LAB
SARS-COV-2 RNA RESP QL NAA+PROBE: NORMAL
SPECIMEN SOURCE: NORMAL

## 2021-06-13 PROCEDURE — U0003 INFECTIOUS AGENT DETECTION BY NUCLEIC ACID (DNA OR RNA); SEVERE ACUTE RESPIRATORY SYNDROME CORONAVIRUS 2 (SARS-COV-2) (CORONAVIRUS DISEASE [COVID-19]), AMPLIFIED PROBE TECHNIQUE, MAKING USE OF HIGH THROUGHPUT TECHNOLOGIES AS DESCRIBED BY CMS-2020-01-R: HCPCS | Performed by: NEUROLOGICAL SURGERY

## 2021-06-13 PROCEDURE — U0005 INFEC AGEN DETEC AMPLI PROBE: HCPCS | Performed by: NEUROLOGICAL SURGERY

## 2021-06-14 LAB
LABORATORY COMMENT REPORT: NORMAL
SARS-COV-2 RNA RESP QL NAA+PROBE: NEGATIVE
SPECIMEN SOURCE: NORMAL

## 2021-06-16 ENCOUNTER — HOSPITAL ENCOUNTER (OUTPATIENT)
Dept: GENERAL RADIOLOGY | Facility: CLINIC | Age: 61
End: 2021-06-16
Attending: NEUROLOGICAL SURGERY | Admitting: NEUROLOGICAL SURGERY
Payer: COMMERCIAL

## 2021-06-16 ENCOUNTER — HOSPITAL ENCOUNTER (OUTPATIENT)
Facility: CLINIC | Age: 61
Discharge: HOME | End: 2021-06-16
Attending: NEUROLOGICAL SURGERY | Admitting: NEUROLOGICAL SURGERY
Payer: COMMERCIAL

## 2021-06-16 ENCOUNTER — ANESTHESIA EVENT (OUTPATIENT)
Dept: SURGERY | Facility: CLINIC | Age: 61
End: 2021-06-16
Payer: COMMERCIAL

## 2021-06-16 ENCOUNTER — ANESTHESIA (OUTPATIENT)
Dept: SURGERY | Facility: CLINIC | Age: 61
End: 2021-06-16
Payer: COMMERCIAL

## 2021-06-16 VITALS
HEART RATE: 77 BPM | TEMPERATURE: 98.2 F | RESPIRATION RATE: 16 BRPM | OXYGEN SATURATION: 93 % | DIASTOLIC BLOOD PRESSURE: 80 MMHG | SYSTOLIC BLOOD PRESSURE: 148 MMHG

## 2021-06-16 DIAGNOSIS — M54.16 LUMBAR RADICULOPATHY: ICD-10-CM

## 2021-06-16 DIAGNOSIS — Z98.890 S/P LUMBAR MICRODISCECTOMY: Primary | ICD-10-CM

## 2021-06-16 PROCEDURE — 250N000011 HC RX IP 250 OP 636: Performed by: NURSE ANESTHETIST, CERTIFIED REGISTERED

## 2021-06-16 PROCEDURE — 710N000012 HC RECOVERY PHASE 2, PER MINUTE: Performed by: NEUROLOGICAL SURGERY

## 2021-06-16 PROCEDURE — 63047 LAM FACETEC & FORAMOT LUMBAR: CPT | Mod: AS | Performed by: PHYSICIAN ASSISTANT

## 2021-06-16 PROCEDURE — 63030 LAMOT DCMPRN NRV RT 1 LMBR: CPT | Mod: AS | Performed by: PHYSICIAN ASSISTANT

## 2021-06-16 PROCEDURE — 271N000001 HC OR GENERAL SUPPLY NON-STERILE: Performed by: NEUROLOGICAL SURGERY

## 2021-06-16 PROCEDURE — 999N000141 HC STATISTIC PRE-PROCEDURE NURSING ASSESSMENT: Performed by: NEUROLOGICAL SURGERY

## 2021-06-16 PROCEDURE — 370N000017 HC ANESTHESIA TECHNICAL FEE, PER MIN: Performed by: NEUROLOGICAL SURGERY

## 2021-06-16 PROCEDURE — 250N000026 HC DESFLURANE, PER MIN: Performed by: NEUROLOGICAL SURGERY

## 2021-06-16 PROCEDURE — 999N000179 XR SURGERY CARM FLUORO LESS THAN 5 MIN W STILLS: Mod: TC

## 2021-06-16 PROCEDURE — C1894 INTRO/SHEATH, NON-LASER: HCPCS | Performed by: NEUROLOGICAL SURGERY

## 2021-06-16 PROCEDURE — 250N000009 HC RX 250: Performed by: NURSE ANESTHETIST, CERTIFIED REGISTERED

## 2021-06-16 PROCEDURE — 710N000010 HC RECOVERY PHASE 1, LEVEL 2, PER MIN: Performed by: NEUROLOGICAL SURGERY

## 2021-06-16 PROCEDURE — 250N000013 HC RX MED GY IP 250 OP 250 PS 637: Performed by: PHYSICIAN ASSISTANT

## 2021-06-16 PROCEDURE — 250N000013 HC RX MED GY IP 250 OP 250 PS 637: Performed by: NURSE ANESTHETIST, CERTIFIED REGISTERED

## 2021-06-16 PROCEDURE — 272N000001 HC OR GENERAL SUPPLY STERILE: Performed by: NEUROLOGICAL SURGERY

## 2021-06-16 PROCEDURE — 360N000084 HC SURGERY LEVEL 4 W/ FLUORO, PER MIN: Performed by: NEUROLOGICAL SURGERY

## 2021-06-16 PROCEDURE — 250N000011 HC RX IP 250 OP 636: Performed by: NEUROLOGICAL SURGERY

## 2021-06-16 PROCEDURE — 258N000003 HC RX IP 258 OP 636: Performed by: PAIN MEDICINE

## 2021-06-16 PROCEDURE — 63047 LAM FACETEC & FORAMOT LUMBAR: CPT | Performed by: NEUROLOGICAL SURGERY

## 2021-06-16 PROCEDURE — 63030 LAMOT DCMPRN NRV RT 1 LMBR: CPT | Mod: 59 | Performed by: NEUROLOGICAL SURGERY

## 2021-06-16 PROCEDURE — 250N000009 HC RX 250: Performed by: NEUROLOGICAL SURGERY

## 2021-06-16 PROCEDURE — 250N000011 HC RX IP 250 OP 636: Performed by: PHYSICIAN ASSISTANT

## 2021-06-16 RX ORDER — FENTANYL CITRATE 50 UG/ML
25-50 INJECTION, SOLUTION INTRAMUSCULAR; INTRAVENOUS
Status: DISCONTINUED | OUTPATIENT
Start: 2021-06-16 | End: 2021-06-16 | Stop reason: HOSPADM

## 2021-06-16 RX ORDER — DEXAMETHASONE SODIUM PHOSPHATE 10 MG/ML
INJECTION, SOLUTION INTRAMUSCULAR; INTRAVENOUS PRN
Status: DISCONTINUED | OUTPATIENT
Start: 2021-06-16 | End: 2021-06-16

## 2021-06-16 RX ORDER — PROPOFOL 10 MG/ML
INJECTION, EMULSION INTRAVENOUS PRN
Status: DISCONTINUED | OUTPATIENT
Start: 2021-06-16 | End: 2021-06-16

## 2021-06-16 RX ORDER — LABETALOL HYDROCHLORIDE 5 MG/ML
10 INJECTION, SOLUTION INTRAVENOUS
Status: DISCONTINUED | OUTPATIENT
Start: 2021-06-16 | End: 2021-06-16 | Stop reason: HOSPADM

## 2021-06-16 RX ORDER — ONDANSETRON 2 MG/ML
4 INJECTION INTRAMUSCULAR; INTRAVENOUS EVERY 30 MIN PRN
Status: DISCONTINUED | OUTPATIENT
Start: 2021-06-16 | End: 2021-06-16 | Stop reason: HOSPADM

## 2021-06-16 RX ORDER — EPHEDRINE SULFATE 50 MG/ML
INJECTION, SOLUTION INTRAMUSCULAR; INTRAVENOUS; SUBCUTANEOUS PRN
Status: DISCONTINUED | OUTPATIENT
Start: 2021-06-16 | End: 2021-06-16

## 2021-06-16 RX ORDER — ONDANSETRON 4 MG/1
4 TABLET, ORALLY DISINTEGRATING ORAL
Status: DISCONTINUED | OUTPATIENT
Start: 2021-06-16 | End: 2021-06-16 | Stop reason: HOSPADM

## 2021-06-16 RX ORDER — AMOXICILLIN 250 MG
1-2 CAPSULE ORAL 2 TIMES DAILY
Qty: 30 TABLET | Refills: 0 | Status: SHIPPED | OUTPATIENT
Start: 2021-06-16 | End: 2023-03-07

## 2021-06-16 RX ORDER — NALOXONE HYDROCHLORIDE 0.4 MG/ML
0.4 INJECTION, SOLUTION INTRAMUSCULAR; INTRAVENOUS; SUBCUTANEOUS
Status: DISCONTINUED | OUTPATIENT
Start: 2021-06-16 | End: 2021-06-16 | Stop reason: HOSPADM

## 2021-06-16 RX ORDER — ACETAMINOPHEN 325 MG/1
975 TABLET ORAL ONCE
Status: COMPLETED | OUTPATIENT
Start: 2021-06-16 | End: 2021-06-16

## 2021-06-16 RX ORDER — TIZANIDINE 2 MG/1
2-4 TABLET ORAL 3 TIMES DAILY
Qty: 60 TABLET | Refills: 1 | Status: SHIPPED | OUTPATIENT
Start: 2021-06-16 | End: 2022-03-23

## 2021-06-16 RX ORDER — LIDOCAINE 40 MG/G
CREAM TOPICAL
Status: DISCONTINUED | OUTPATIENT
Start: 2021-06-16 | End: 2021-06-16 | Stop reason: HOSPADM

## 2021-06-16 RX ORDER — CEFAZOLIN SODIUM 2 G/100ML
2 INJECTION, SOLUTION INTRAVENOUS SEE ADMIN INSTRUCTIONS
Status: DISCONTINUED | OUTPATIENT
Start: 2021-06-16 | End: 2021-06-16 | Stop reason: HOSPADM

## 2021-06-16 RX ORDER — OXYCODONE HYDROCHLORIDE 5 MG/1
5-10 TABLET ORAL EVERY 4 HOURS PRN
Qty: 20 TABLET | Refills: 0 | Status: SHIPPED | OUTPATIENT
Start: 2021-06-16 | End: 2022-01-12

## 2021-06-16 RX ORDER — GABAPENTIN 300 MG/1
300 CAPSULE ORAL
Status: COMPLETED | OUTPATIENT
Start: 2021-06-16 | End: 2021-06-16

## 2021-06-16 RX ORDER — NALOXONE HYDROCHLORIDE 0.4 MG/ML
0.2 INJECTION, SOLUTION INTRAMUSCULAR; INTRAVENOUS; SUBCUTANEOUS
Status: DISCONTINUED | OUTPATIENT
Start: 2021-06-16 | End: 2021-06-16 | Stop reason: HOSPADM

## 2021-06-16 RX ORDER — CEFAZOLIN SODIUM 2 G/100ML
2 INJECTION, SOLUTION INTRAVENOUS
Status: DISCONTINUED | OUTPATIENT
Start: 2021-06-16 | End: 2021-06-16 | Stop reason: HOSPADM

## 2021-06-16 RX ORDER — METHOCARBAMOL 750 MG/1
750 TABLET, FILM COATED ORAL
Status: DISCONTINUED | OUTPATIENT
Start: 2021-06-16 | End: 2021-06-16 | Stop reason: HOSPADM

## 2021-06-16 RX ORDER — ALBUTEROL SULFATE 0.83 MG/ML
2.5 SOLUTION RESPIRATORY (INHALATION) EVERY 4 HOURS PRN
Status: DISCONTINUED | OUTPATIENT
Start: 2021-06-16 | End: 2021-06-16 | Stop reason: HOSPADM

## 2021-06-16 RX ORDER — OXYCODONE HYDROCHLORIDE 5 MG/1
5-10 TABLET ORAL
Status: DISCONTINUED | OUTPATIENT
Start: 2021-06-16 | End: 2021-06-16 | Stop reason: HOSPADM

## 2021-06-16 RX ORDER — SODIUM CHLORIDE, SODIUM LACTATE, POTASSIUM CHLORIDE, CALCIUM CHLORIDE 600; 310; 30; 20 MG/100ML; MG/100ML; MG/100ML; MG/100ML
INJECTION, SOLUTION INTRAVENOUS CONTINUOUS
Status: DISCONTINUED | OUTPATIENT
Start: 2021-06-16 | End: 2021-06-16 | Stop reason: HOSPADM

## 2021-06-16 RX ORDER — LIDOCAINE HYDROCHLORIDE 20 MG/ML
INJECTION, SOLUTION INFILTRATION; PERINEURAL PRN
Status: DISCONTINUED | OUTPATIENT
Start: 2021-06-16 | End: 2021-06-16

## 2021-06-16 RX ORDER — HYDROMORPHONE HYDROCHLORIDE 1 MG/ML
.3-.5 INJECTION, SOLUTION INTRAMUSCULAR; INTRAVENOUS; SUBCUTANEOUS EVERY 10 MIN PRN
Status: DISCONTINUED | OUTPATIENT
Start: 2021-06-16 | End: 2021-06-16 | Stop reason: HOSPADM

## 2021-06-16 RX ORDER — ONDANSETRON 2 MG/ML
INJECTION INTRAMUSCULAR; INTRAVENOUS PRN
Status: DISCONTINUED | OUTPATIENT
Start: 2021-06-16 | End: 2021-06-16

## 2021-06-16 RX ORDER — HYDROXYZINE HYDROCHLORIDE 25 MG/1
25 TABLET, FILM COATED ORAL
Status: DISCONTINUED | OUTPATIENT
Start: 2021-06-16 | End: 2021-06-16 | Stop reason: HOSPADM

## 2021-06-16 RX ORDER — HYDROXYZINE HYDROCHLORIDE 10 MG/1
10 TABLET, FILM COATED ORAL
Status: DISCONTINUED | OUTPATIENT
Start: 2021-06-16 | End: 2021-06-16 | Stop reason: HOSPADM

## 2021-06-16 RX ORDER — CALCIUM CARBONATE 500 MG/1
500 TABLET, CHEWABLE ORAL DAILY PRN
Status: DISCONTINUED | OUTPATIENT
Start: 2021-06-16 | End: 2021-06-16 | Stop reason: HOSPADM

## 2021-06-16 RX ORDER — HYDRALAZINE HYDROCHLORIDE 20 MG/ML
2.5-5 INJECTION INTRAMUSCULAR; INTRAVENOUS EVERY 10 MIN PRN
Status: DISCONTINUED | OUTPATIENT
Start: 2021-06-16 | End: 2021-06-16 | Stop reason: HOSPADM

## 2021-06-16 RX ORDER — ONDANSETRON 4 MG/1
4 TABLET, ORALLY DISINTEGRATING ORAL EVERY 30 MIN PRN
Status: DISCONTINUED | OUTPATIENT
Start: 2021-06-16 | End: 2021-06-16 | Stop reason: HOSPADM

## 2021-06-16 RX ORDER — MEPERIDINE HYDROCHLORIDE 50 MG/ML
12.5 INJECTION INTRAMUSCULAR; INTRAVENOUS; SUBCUTANEOUS
Status: DISCONTINUED | OUTPATIENT
Start: 2021-06-16 | End: 2021-06-16 | Stop reason: HOSPADM

## 2021-06-16 RX ORDER — DIMENHYDRINATE 50 MG/ML
25 INJECTION, SOLUTION INTRAMUSCULAR; INTRAVENOUS
Status: DISCONTINUED | OUTPATIENT
Start: 2021-06-16 | End: 2021-06-16 | Stop reason: HOSPADM

## 2021-06-16 RX ORDER — FENTANYL CITRATE 50 UG/ML
INJECTION, SOLUTION INTRAMUSCULAR; INTRAVENOUS PRN
Status: DISCONTINUED | OUTPATIENT
Start: 2021-06-16 | End: 2021-06-16

## 2021-06-16 RX ADMIN — HYDROMORPHONE HYDROCHLORIDE 0.5 MG: 1 INJECTION, SOLUTION INTRAMUSCULAR; INTRAVENOUS; SUBCUTANEOUS at 09:02

## 2021-06-16 RX ADMIN — SUGAMMADEX 200 MG: 100 INJECTION, SOLUTION INTRAVENOUS at 10:32

## 2021-06-16 RX ADMIN — PROPOFOL 150 MG: 10 INJECTION, EMULSION INTRAVENOUS at 08:50

## 2021-06-16 RX ADMIN — Medication 5 MG: at 09:34

## 2021-06-16 RX ADMIN — DEXAMETHASONE SODIUM PHOSPHATE 10 MG: 10 INJECTION, SOLUTION INTRAMUSCULAR; INTRAVENOUS at 09:04

## 2021-06-16 RX ADMIN — GABAPENTIN 300 MG: 300 CAPSULE ORAL at 07:19

## 2021-06-16 RX ADMIN — Medication 5 MG: at 09:44

## 2021-06-16 RX ADMIN — MIDAZOLAM 2 MG: 1 INJECTION INTRAMUSCULAR; INTRAVENOUS at 08:39

## 2021-06-16 RX ADMIN — ONDANSETRON 4 MG: 2 INJECTION INTRAMUSCULAR; INTRAVENOUS at 09:04

## 2021-06-16 RX ADMIN — FENTANYL CITRATE 50 MCG: 50 INJECTION, SOLUTION INTRAMUSCULAR; INTRAVENOUS at 08:50

## 2021-06-16 RX ADMIN — CALCIUM CARBONATE (ANTACID) CHEW TAB 500 MG 500 MG: 500 CHEW TAB at 14:14

## 2021-06-16 RX ADMIN — LIDOCAINE HYDROCHLORIDE 60 MG: 20 INJECTION, SOLUTION INFILTRATION; PERINEURAL at 08:50

## 2021-06-16 RX ADMIN — SODIUM CHLORIDE, POTASSIUM CHLORIDE, SODIUM LACTATE AND CALCIUM CHLORIDE: 600; 310; 30; 20 INJECTION, SOLUTION INTRAVENOUS at 08:30

## 2021-06-16 RX ADMIN — Medication 5 MG: at 09:59

## 2021-06-16 RX ADMIN — CEFAZOLIN SODIUM 2 G: 2 INJECTION, SOLUTION INTRAVENOUS at 08:49

## 2021-06-16 RX ADMIN — ONDANSETRON 4 MG: 2 INJECTION INTRAMUSCULAR; INTRAVENOUS at 13:40

## 2021-06-16 RX ADMIN — ROCURONIUM BROMIDE 50 MG: 10 INJECTION INTRAVENOUS at 08:50

## 2021-06-16 RX ADMIN — Medication 5 MG: at 09:14

## 2021-06-16 RX ADMIN — SODIUM CHLORIDE, POTASSIUM CHLORIDE, SODIUM LACTATE AND CALCIUM CHLORIDE: 600; 310; 30; 20 INJECTION, SOLUTION INTRAVENOUS at 10:07

## 2021-06-16 RX ADMIN — FENTANYL CITRATE 50 MCG: 50 INJECTION, SOLUTION INTRAMUSCULAR; INTRAVENOUS at 08:39

## 2021-06-16 RX ADMIN — ACETAMINOPHEN 975 MG: 325 TABLET, FILM COATED ORAL at 07:19

## 2021-06-16 ASSESSMENT — LIFESTYLE VARIABLES: TOBACCO_USE: 1

## 2021-06-16 NOTE — ANESTHESIA POSTPROCEDURE EVALUATION
Patient: Janice Ulloa    Procedure(s):  Minimally invasive Left Lumbar 4 to lumbar 5 bilateral decompression  Lumbar 5 to Sacral 1 microdiscectomy,Left    Diagnosis:Lumbar radiculopathy [M54.16]  Diagnosis Additional Information: No value filed.    Anesthesia Type:  General    Note:  Disposition: Outpatient   Postop Pain Control: Uneventful            Sign Out: Well controlled pain   PONV: No   Neuro/Psych: Uneventful            Sign Out: Acceptable/Baseline neuro status   Airway/Respiratory: Uneventful            Sign Out: Acceptable/Baseline resp. status   CV/Hemodynamics: Uneventful            Sign Out: Acceptable CV status   Other NRE: NONE   DID A NON-ROUTINE EVENT OCCUR? No    Event details/Postop Comments:  Pt was happy with anesthesia care.  No complications.  I will follow up with the pt if needed.           Last vitals:  Vitals:    06/16/21 1215 06/16/21 1230 06/16/21 1245   BP: 109/75 130/68 131/84   Pulse: 105 72 75   Resp:      Temp:      SpO2:  92% 91%       Last vitals prior to Anesthesia Care Transfer:  CRNA VITALS  6/16/2021 1018 - 6/16/2021 1118      6/16/2021             Pulse:  78    SpO2:  96 %    Resp Rate (observed):  12          Electronically Signed By: LUKE Calix CRNA  June 16, 2021  1:08 PM

## 2021-06-16 NOTE — ANESTHESIA PREPROCEDURE EVALUATION
Anesthesia Pre-Procedure Evaluation    Patient: Janice Ulloa   MRN: [id number] : 1960        Preoperative Diagnosis: Lumbar radiculopathy [M54.16]   Procedure : Procedure(s):  Minimally invasive Left Lumbar 4 to lumbar 5 bilateral decompression  Lumbar 5 to Sacral 1 microdiscectomy     Past Medical History:   Diagnosis Date     Carpal tunnel syndrome      Intervertebral cervical disc disorder with myelopathy, cervical region     C6 herniation with right C6 radiculopathy     Migraine, unspecified, without mention of intractable migraine without mention of status migrainosus      Perimenopausal     irreg      Personal history of tobacco use, presenting hazards to health       Past Surgical History:   Procedure Laterality Date     C DISK SURG,ANTER,CERVICAL,SINGLE LVL  10/02/2007    C5-C6 anterior cervical diskectomy & fusion w/plate.     COLONOSCOPY  1/10/2014    Procedure: COMBINED COLONOSCOPY, SINGLE BIOPSY/POLYPECTOMY BY BIOPSY;  colonoscopy with polypectomy by forceps;  Surgeon: Chevy Yang MD;  Location:  GI     HC REVISE MEDIAN N/CARPAL TUNNEL SURG  2004     HC REVISE MEDIAN N/CARPAL TUNNEL SURG  06    Left     HC TREATMENT INCOMPLETE  SURG, ANY TRIMESTER      D&C after miscarriage     INJECT EPIDURAL LUMBAR Left 2020    Procedure: INJECTION, SPINE, LUMBAR, 5 Sacral 1 and Sacral 1 Left;  Surgeon: Abilio Middleton MD;  Location: [facility]     ZZC NONSPECIFIC PROCEDURE      Bilateral inguinal hernia repairs     ZZC NONSPECIFIC PROCEDURE      Oral surgery x2      Allergies   Allergen Reactions     No Known Drug Allergies       Social History     Tobacco Use     Smoking status: Former Smoker     Packs/day: 1.50     Years: 20.00     Pack years: 30.00     Types: Cigarettes     Quit date: 10/17/2007     Years since quittin.6     Smokeless tobacco: Never Used   Substance Use Topics     Alcohol use: Yes     Comment: rare      Wt Readings from Last 1 Encounters:   21  76.7 kg (169 lb)        Anesthesia Evaluation            ROS/MED HX  ENT/Pulmonary:     (+) tobacco use, Past use,     Neurologic:     (+) migraines,     Cardiovascular:     (+) Dyslipidemia hypertension-----    METS/Exercise Tolerance:     Hematologic:     (+) History of blood clots, pt is not anticoagulated, anemia,     Musculoskeletal: Comment: Lumbar radiculopathy      GI/Hepatic:  - neg GI/hepatic ROS     Renal/Genitourinary:  - neg Renal ROS     Endo:  - neg endo ROS     Psychiatric/Substance Use:     (+) psychiatric history anxiety and depression     Infectious Disease:  - neg infectious disease ROS     Malignancy:  - neg malignancy ROS     Other:            Physical Exam    Airway        Mallampati: II   TM distance: > 3 FB   Neck ROM: full   Mouth opening: > 3 cm    Respiratory Devices and Support         Dental  no notable dental history         Cardiovascular   cardiovascular exam normal          Pulmonary   pulmonary exam normal                OUTSIDE LABS:  CBC:   Lab Results   Component Value Date    WBC 5.6 06/09/2021    WBC 5.1 03/18/2019    HGB 14.2 06/09/2021    HGB 13.3 03/18/2019    HCT 43.4 06/09/2021    HCT 40.1 03/18/2019     06/09/2021     03/18/2019     BMP:   Lab Results   Component Value Date     06/09/2021     04/23/2021    POTASSIUM 4.0 06/09/2021    POTASSIUM 3.2 (L) 05/07/2021    CHLORIDE 99 06/09/2021    CHLORIDE 102 04/23/2021    CO2 34 (H) 06/09/2021    CO2 32 04/23/2021    BUN 13 06/09/2021    BUN 10 04/23/2021    CR 0.69 06/09/2021    CR 0.59 04/23/2021    GLC 98 06/09/2021     (H) 04/23/2021     COAGS: No results found for: PTT, INR, FIBR  POC:   Lab Results   Component Value Date    HCG Negative 06/05/2006     HEPATIC:   Lab Results   Component Value Date    ALBUMIN 4.2 04/23/2021    PROTTOTAL 8.2 04/23/2021    ALT 35 04/23/2021    AST 19 04/23/2021    ALKPHOS 72 04/23/2021    BILITOTAL 0.4 04/23/2021     OTHER:   Lab Results   Component Value  Date    LACT 1.2 01/16/2015    A1C 5.9 (H) 05/07/2021    BILLY 9.6 06/09/2021    TSH 2.98 03/15/2018    T4 0.97 09/04/2013    SED 31 (H) 07/08/2009       Anesthesia Plan    ASA Status:  2   NPO Status:  NPO Appropriate    Anesthesia Type: General.   Induction: Intravenous, Propofol.   Maintenance: Balanced.        Consents    Anesthesia Plan(s) and associated risks, benefits, and realistic alternatives discussed. Questions answered and patient/representative(s) expressed understanding.     - Discussed with:  Patient      - Extended Intubation/Ventilatory Support Discussed: No.      - Patient is DNR/DNI Status: No    Use of blood products discussed: No .     Postoperative Care    Pain management: IV analgesics, Oral pain medications.   PONV prophylaxis: Ondansetron (or other 5HT-3), Dexamethasone or Solumedrol     Comments:    The risks and benefits of anesthesia, and the alternatives where applicable, have been discussed with the patient, and they wish to proceed.            LUKE Calix CRNA

## 2021-06-16 NOTE — OP NOTE
DATE OF PROCEDURE:  6/16/2021      SURGEON:  James Orozco MD       ASSISTANT:  Trevin Barragan PA-C  Note: Trevin Barragan was present for and assisted with the entire surgery and his/her role as an assistant was crucial for aid in positioning, exposure, suctioning, retraction and closure.       PREOPERATIVE DIAGNOSIS:  Lumbar stenosis and radiculopathy.       POSTOPERATIVE DIAGNOSIS:  Lumbar stenosis and radiculopathy.       PROCEDURES:   1.  Minimally invasive L4-L5 bilateral laminectomy and medial facetectomy for decompression of stenosis.   2.  Minimally invasive L5-S1 laminotomy and medial facetectomy for microdiscectomy.   3.  Use of Medtronic METRx minimally invasive dilating tube system.   4.  Use of intraoperative microscope and fluoroscopy.       ESTIMATED BLOOD LOSS:  25 mL.       INDICATIONS:  60-year-old female with severe left leg pain, L4-5 stenosis and left L5-S1 disc herniation.  She underwent extensive nonoperative management with failure to improve.  Risks, benefits, indications and alternatives were discussed with the patient and her family in detail.  All of their questions were answered, and they wished to proceed with surgery.       DESCRIPTION OF SURGERY:  The patient was positioned prone on a Randolph frame.  Sterile prepping and draping procedures performed.  Antibiotics were administered and timeout was performed.  A 10 blade was used to perform a left paramedian incision and a Medtronic METRx minimally invasive dilating tube system was initially used to dock on the left L4-L5 hemilamina.  The high speed drill was used to drill the ipsilateral hemilamina, the base of the spinous process and the contralateral hemilamina.  Kerrison rongeurs were used to remove the ligamentum flavum was good decompression of the thecal sac and the bilateral nerve roots.  Medial facetectomy was performed on the with decompression of the ipsilateral traversing root.  Subsequently, the dilating tube system was used to  redock on the left hemilamina of L5-S1.  High speed drill was used to perform laminotomies and medial facetectomy.  The Kerrison rongeur was used to remove the ligamentum flavum with decompression of the thecal sac and nerve roots.  The pituitary rongeurs were used to perform microdiscectomy.  Antibiotic irrigation was performed, hemostasis was achieved.  The fascia was closed with 0 Vicryl sutures.  The dermal layer was closed with 2-0 Vicryl sutures and the skin was closed with a running subcuticular stitch.  There were no intraprocedural complications.

## 2021-06-16 NOTE — BRIEF OP NOTE
[facility]    Brief Operative Note    Pre-operative diagnosis: Lumbar radiculopathy [M54.16]  Post-operative diagnosis Same as pre-operative diagnosis    Procedure: Procedure(s):  Minimally invasive Left Lumbar 4 to lumbar 5 bilateral decompression  Lumbar 5 to Sacral 1 microdiscectomy  Surgeon: Surgeon(s) and Role:     * James Orozco MD - Primary     * Trevin Barragan PA-C - Assisting  Anesthesia: General   Estimated blood loss: 9 mL  Drains: None  Specimens: * No specimens in log *  Findings:   None.  Complications: None.  Implants: * No implants in log *      Trevin GARCIA [facility]  [address] [address]    Tel [phone]  Pager [phone]

## 2021-06-16 NOTE — DISCHARGE INSTRUCTIONS
[facility] Same-Day [facility]   Adult Discharge Orders & Instructions     For 24 hours after surgery    1. Get plenty of rest.  A responsible adult must stay with you for at least 24 hours after you leave the hospital.   2. Do not drive or use heavy equipment.  If you have weakness or tingling, don't drive or use heavy equipment until this feeling goes away.  3. Do not drink alcohol.  4. Avoid strenuous or risky activities.  Ask for help when climbing stairs.   5. You may feel lightheaded.  If so, sit for a few minutes before standing.  Have someone help you get up.   6. You may have a slight fever. Call the doctor if your fever is over 100 F (37.7 C) (taken under the tongue) or lasts longer than 24 hours.  7. You may have a dry mouth, a sore throat, muscle aches or trouble sleeping.  These should go away after 24 hours.  8. Do not make important or legal decisions.  We don t expect you to have any problems from the surgery or treatment you had today. Just in case, here s what to do if you have pain, upset stomach (nausea), bleeding or infection:  Pain:  Take medicines your doctor has prescribed or over-the-counter medicine they have suggested. Resting and using ice packs can help, too. For surgery on an arm or leg, raise it on a pillow to ease swelling. Call your doctor if these methods don t work.  Copyright Branden Blackman, Licensed under [employer]  Upset stomach (nausea):  Take anti-nausea medicine approved by your doctor. Drink clear liquids like apple juice, ginger ale, broth or 7-Up. Be sure to drink enough fluids. Rest can help, too. Move to normal foods when you re ready.   Bleeding:  In the first 24 hours, you may see a little blood on your dressing, about the size of a quarter. You don t need to worry about this much blood, but if the blood spot keeps getting bigger:    Put pressure on the wound if you can, AND    Call your doctor.  Copyright Pix[employer], Licensed under CC[employer]  International  Fever/Infection: Please call your doctor if you have any of these signs:    Redness    Swelling    Wound feels warm    Pain gets worse    Bad-smelling fluid leaks from wound    Fever or chills  Call your doctor for any of the followin.  It has been over 8 to 10 hours since surgery and you are still not able to urinate (pass water).    2.  Headache for over 24 hours.        Nurse advice line: [phone]

## 2021-06-16 NOTE — ANESTHESIA CARE TRANSFER NOTE
Patient: Janice Ulloa    Procedure(s):  Minimally invasive Left Lumbar 4 to lumbar 5 bilateral decompression  Lumbar 5 to Sacral 1 microdiscectomy    Diagnosis: Lumbar radiculopathy [M54.16]  Diagnosis Additional Information: No value filed.    Anesthesia Type:   General     Note:    Oropharynx: oropharynx clear of all foreign objects and spontaneously breathing  Level of Consciousness: drowsy  Oxygen Supplementation: face mask    Independent Airway: airway patency satisfactory and stable  Dentition: dentition unchanged  Vital Signs Stable: post-procedure vital signs reviewed and stable  Report to RN Given: handoff report given  Patient transferred to: PACU    Handoff Report: Identifed the Patient, Identified the Reponsible Provider, Reviewed the pertinent medical history, Discussed the surgical course, Reviewed Intra-OP anesthesia mangement and issues during anesthesia, Set expectations for post-procedure period and Allowed opportunity for questions and acknowledgement of understanding      Vitals: (Last set prior to Anesthesia Care Transfer)  CRNA VITALS  6/16/2021 1018 - 6/16/2021 1052      6/16/2021             Pulse:  78    SpO2:  96 %    Resp Rate (observed):  12        Electronically Signed By: LUKE Calix CRNA  June 16, 2021  10:52 AM

## 2021-06-16 NOTE — ANESTHESIA PROCEDURE NOTES
Airway       Patient location during procedure: OR  Staff -        Performed By: CRNA  Consent for Airway        Urgency: elective  Indications and Patient Condition       Indications for airway management: natalia-procedural       Induction type:intravenous       Mask difficulty assessment: 1 - vent by mask    Final Airway Details       Final airway type: endotracheal airway       Successful airway: ETT - single  Endotracheal Airway Details        ETT size (mm): 7.0       Cuffed: yes       Successful intubation technique: video laryngoscopy (previous cervical surgery reason for glidescope)       VL Blade Size: Glidescope 4       Grade View of Cords: 1 (head remained neutral during intubation )       Adjucts: stylet       Position: Right       Measured from: lips       Bite block used: Oral Airway    Post intubation assessment        Placement verified by: capnometry, equal breath sounds and chest rise        Number of attempts at approach: 1       Number of other approaches attempted: 0       Secured with: plastic tape       Ease of procedure: easy       Dentition: Intact and Unchanged

## 2021-06-16 NOTE — OR NURSING
Pt complaining of a headache.  Pt states she has not had a migraine for years but states it feels similar.  Pain in left side of head, wrapping front to back.  She did not have any caffeine today and usually drinks diet coke every morning.  Pt provided diet cola and resting.  Will re-evaluate.

## 2021-06-16 NOTE — OR NURSING
"Pt up to bathroom assist x2.  Pt more steady than previous attempt but for safety should continue with assistance.  Pt was able to void.  Pt complained of worsening migraine but once lying down pt stated it was better.  Pt states \"I just want to rest.\"  "

## 2021-06-17 ENCOUNTER — TELEPHONE (OUTPATIENT)
Dept: NEUROSURGERY | Facility: CLINIC | Age: 61
End: 2021-06-17

## 2021-06-17 NOTE — TELEPHONE ENCOUNTER
Post-Op Call    DOS: 6/16/21  Surgery:   Minimally invasive Left Lumbar 4 to lumbar 5 bilateral decompression Bilateral General   Lumbar 5 to Sacral 1 microdiscectomy, Left         Surgeon: Dr. Orozco    Called patient for post-operative follow-up.   Attempted to reach out to patient, no answer. Left voice message for patient to call clinic back to further discuss if there are any questions or concerns.   
Yes

## 2021-07-01 ENCOUNTER — OFFICE VISIT (OUTPATIENT)
Dept: NEUROSURGERY | Facility: CLINIC | Age: 61
End: 2021-07-01
Payer: COMMERCIAL

## 2021-07-01 VITALS
RESPIRATION RATE: 16 BRPM | WEIGHT: 167.9 LBS | HEART RATE: 76 BPM | HEIGHT: 62 IN | OXYGEN SATURATION: 96 % | SYSTOLIC BLOOD PRESSURE: 164 MMHG | TEMPERATURE: 98.2 F | DIASTOLIC BLOOD PRESSURE: 88 MMHG | BODY MASS INDEX: 30.9 KG/M2

## 2021-07-01 DIAGNOSIS — M54.16 LUMBAR RADICULOPATHY: Primary | ICD-10-CM

## 2021-07-01 PROCEDURE — 99207 PR NO CHARGE NURSE ONLY: CPT

## 2021-07-01 ASSESSMENT — MIFFLIN-ST. JEOR: SCORE: 1279.84

## 2021-07-01 ASSESSMENT — PAIN SCALES - GENERAL: PAINLEVEL: SEVERE PAIN (6)

## 2021-07-01 NOTE — PROGRESS NOTES
Post-op Nurse Visit:    Patient presents today s/p L4-5 bilateral decomopression, L5-S1 left microdiscectomy on 6/16/21 per the order of Dr. Orozco.    Pain/Neuro Assessment  5/10 to low back and buttocks described as numbness. This has improved slightly since surgery. Writer compared and assessed strength to BLE and found to be equally strong.     Pain Relief Measures:  Oxycodone: at most 1 tab per day  Tylenol: none  tizanidine: 1-2 tabs per day  Ice: yes, daily     Incision  Steri-strips present. Writer removed.    Incision inspected. Edges well-approximated. Redness noted under steri-strips. Once steri-strips removed, redness improved to just red spots surrounding incision. Patient was scratching incision quite a bit and broke skin to a spot 2 cm from left lower incision surrounding skin. Applied a dab of bacitracin to this isolated spot (not on incision) and band-aid. Band-aid to be removed by patient tomorrow, then shower water to run directly over incision and surrounding skin. Emphasized to patient the importance of not applying ANYTHING additional to incision or surrounding skin. No swelling, drainage, or warmth noted.     Photo taken immediately after tape removal. Surrounding skin redness reduced within a few minutes after URVASHI.      Assessment  Activity: following restrictions  Patient is walking frequently without difficulty.   Not following the activity limitations completely. Patient says she doesn't remember anything after surgery and wasn't sure what the activity limitations were. Writer reviewed and patient agreed to comply.    BP elevated -patient to address BP with PCP.    Patient's appetite is normal  Bowel/bladder problems? No  Taking stool softeners? Yes     Refills given at this appointment? No  Return to work discussed at this appointment? Yes. Patient works at [employer] in [place] as a . She reports heavy lifting. She will check with her employer in regards to starting  light-duty work at 4-6 weeks post-op.    All of patient's questions addressed today. She was instructed to call with any additional questions/concerns.     Instructions for Patient    If steri-strips were applied today, they will fall off in 7-10 days. Do not to pull them off.     Keep your incision clean and dry at all times.     Ok to shower. No baths, swimming, or submerging in water until incision is well healed.      No lifting greater than 10-15 pounds. No bending, twisting, or overhead reaching.    Walking: Walking is the best way to start exercise after surgery. Take short frequent walks. You may gradually increase the distance as tolerated. If you feel pain, decrease your activity, but DO NOT stop walking. Walking will help you gain strength and prevent muscle soreness and spasms.     Weaning from narcotic pain medications    When it is time, start weaning by extending the time between doses.     For example, if you're taking 2 tabs every 4 hours, spread it out to 2 tabs over 4.5, 5, 6 hours.     At that point you can certainly cut down to 1 tab, then wean to an as needed basis until completely done with them.    Medication refills: call our clinic 2-3 business days before you are out of medication. A nurse will call you back to obtain a pain assessment.     Encouraged icing for at least 1-2 times throughout the day for 20-30 minutes at a time. Avoid heat to the incision area.     Don't take more than 3000mg of Acetaminophen in 24 hours    Avoid NSAIDs (ex: ibuprofen/Advil) until given clearance (likely at the 6-week post-op appointment).    Taking stool softeners regularly can reduce constipation commonly caused by narcotic pain medications .    Call the clinic (or Emergency Room) if you develop:    New pain    Infection (Redness, Drainage, Swelling, Fever)    Injury     Go to the Emergency Room if sudden onset of severe headache, weakness, confusion, change in level of consciousness, pain, or loss of  movement.    Post-operative appointments    6 week post op, 3 months post op  Fany RICHTER RN  [facility]  [facility]  [address]  [place] [address]  Telephone:  [phone]   Fax:  [phone]

## 2021-07-01 NOTE — PATIENT INSTRUCTIONS
Instructions for Patient    If steri-strips were applied today, they will fall off in 7-10 days. Do not to pull them off.     Keep your incision clean and dry at all times.     Ok to shower. No baths, swimming, or submerging in water until incision is well healed.      No lifting greater than 10-15 pounds. No bending, twisting, or overhead reaching.    Walking: Walking is the best way to start exercise after surgery. Take short frequent walks. You may gradually increase the distance as tolerated. If you feel pain, decrease your activity, but DO NOT stop walking. Walking will help you gain strength and prevent muscle soreness and spasms.     Weaning from narcotic pain medications    When it is time, start weaning by extending the time between doses.     For example, if you're taking 2 tabs every 4 hours, spread it out to 2 tabs over 4.5, 5, 6 hours.     At that point you can certainly cut down to 1 tab, then wean to an as needed basis until completely done with them.    Medication refills: call our clinic 2-3 business days before you are out of medication. A nurse will call you back to obtain a pain assessment.     Encouraged icing for at least 1-2 times throughout the day for 20-30 minutes at a time. Avoid heat to the incision area.     Don't take more than 3000mg of Acetaminophen in 24 hours    Avoid NSAIDs (ex: ibuprofen/Advil) until given clearance (likely at the 6-week post-op appointment).    Taking stool softeners regularly can reduce constipation commonly caused by narcotic pain medications .    Call the clinic (or Emergency Room) if you develop:    New pain    Infection (Redness, Drainage, Swelling, Fever)    Injury     Go to the Emergency Room if sudden onset of severe headache, weakness, confusion, change in level of consciousness, pain, or loss of movement.    Post-operative appointments    6 week post op, 3 months post op  Fany RICHTER RN  [facility]  [facility]  [place] [address]  [place] [address]  Telephone:  [phone]   Fax:  [phone]

## 2021-07-05 DIAGNOSIS — E87.6 HYPOKALEMIA: ICD-10-CM

## 2021-07-05 RX ORDER — POTASSIUM CHLORIDE 750 MG/1
TABLET, EXTENDED RELEASE ORAL
Qty: 30 TABLET | Refills: 1 | Status: SHIPPED | OUTPATIENT
Start: 2021-07-05 | End: 2021-08-26

## 2021-07-08 ENCOUNTER — TELEPHONE (OUTPATIENT)
Dept: NEUROSURGERY | Facility: CLINIC | Age: 61
End: 2021-07-08

## 2021-07-08 NOTE — TELEPHONE ENCOUNTER
Monitoring call placed to follow up on concerns from last week's visit. No answer. Will try again later.  Fany Rendon RN on 7/8/2021 at 1:56 PM

## 2021-07-08 NOTE — TELEPHONE ENCOUNTER
Monitoring call:  Writer called the patient to check-in on the areas of concern from last week.  Left leg n/t still the same. Not better or worse.  She started following her activity limitations an will be going back to work next week with the restrictions in place.  The incision does not have signs of infections from what her son reports. I recommend next time her son looks, to ask him if the incision is red, swollen, draining, warm, or open. Patient agrees but hasn't noticed any drainage or change in sensation to the area.    Fany Rendon RN on 7/8/2021 at 5:12 PM

## 2021-07-14 NOTE — PROGRESS NOTES
Outpatient Physical Therapy Discharge Note     Patient: Janice Ulloa  : 1960    Beginning/End Dates of Reporting Period:  21 to 21 (pt seen for 3 PT visits in that time, 1 no show and 1 cancel on 21)    Referring Provider: TAYE Edwards Diagnosis: mechanical low back pain with left lower extremity pain/referral, lumbar radiculopathy     Client Self Report: Will get surgery on her back 21. Pt did mow lawn on rider mower and L LE was pretty sore after. Did not get a decrease in sx after lumbar injection so will procede with low back surgery next week.     Objective Measurements:21  Objective Measure: Average LBP and L LE pain (at eval on 5/17/21 6/10)  Details: Today low back 5/10, low back 7/10  Objective Measure: Frequency of pain (constant at eval with LBP and L LE pain into L foot)  Details: still constant  Objective Measure: number of pain relieving exercises per day (PREP)  Details: 2  Objective Measure: effect of PREP  Details: will decrease LBP and L LE sx  Objective Measure: Gerardo (2,5, medium at eval)  Details: likley unchanged  Objective Measure: BELÉN (31.11% at eval on 21)  Details: likely unchanged      Goals:  Goal Identifier Pain   Goal Description Pt will note a decrease in average LBP and L LE pain from a 6/10 to a 3-4/10 or less so pt can meet goal of sx reduction with work and adl's   Target Date 21   Date Met      Progress (detail required for progress note): did not make significant progress toward goal     Goal Identifier Function   Goal Description pt will note a decrease in pain and carry over to improved functional level as measured by BELÉN score decrease from 31/11% to 23% or less   Target Date 21   Date Met      Progress (detail required for progress note): did not make significant progress toward goal       Plan:  Discharge from therapy.    Discharge:    Reason for Discharge: No further expectation of progress.  Patient chooses to  discontinue therapy. Pt underwent back surgery on 6/16/21.    Equipment Issued: none    Discharge Plan: pt to follow orders from surgeon.

## 2021-08-02 ENCOUNTER — OFFICE VISIT (OUTPATIENT)
Dept: NEUROSURGERY | Facility: CLINIC | Age: 61
End: 2021-08-02
Payer: COMMERCIAL

## 2021-08-02 VITALS
DIASTOLIC BLOOD PRESSURE: 74 MMHG | HEIGHT: 62 IN | BODY MASS INDEX: 31.5 KG/M2 | WEIGHT: 171.2 LBS | TEMPERATURE: 97.7 F | SYSTOLIC BLOOD PRESSURE: 124 MMHG

## 2021-08-02 DIAGNOSIS — M54.16 LUMBAR RADICULOPATHY: Primary | ICD-10-CM

## 2021-08-02 PROCEDURE — 99024 POSTOP FOLLOW-UP VISIT: CPT | Performed by: PHYSICIAN ASSISTANT

## 2021-08-02 ASSESSMENT — MIFFLIN-ST. JEOR: SCORE: 1294.81

## 2021-08-02 NOTE — PROGRESS NOTES
Neurosurgery Clinic  Neurosurgery followup:    HPI: 6 weeks status post bilateral L4-5 minimally invasive microdiscectomy and decompression.  She is doing well, but does have some residual numbness down the lateral aspect of her left thigh and calf, into the lateral 3 toes on her left foot.  The burning pain has improved.      Exam:  Constitutional:  Alert, well nourished, NAD.  HEENT: Normocephalic, atraumatic.   Pulm:  Without shortness of breath   CV:  No pitting edema of BLE.      Neurological:  Awake  Alert  Oriented x 3  Motor exam:        IP Q DF PF EHL  R   5  5   5   5    5  L   5  5   5   5    5     Reflexes are 2+ in the patellar and Achilles. There is no clonus. Downgoing Babinski.      Able to spontaneously move L/E bilaterally  Sensation intact throughout all L/E dermatomes     Incision: healing well        A/P:    6 weeks status post bilateral L4-5 minimally invasive microdiscectomy and decompression.  She is doing well.  She does have some residual numbness in the lateral aspect of her left thigh and calf.  She understands it may take some time to improve.  She will continue to gradually increase activity.  We will see her back at her 3-month follow-up.  She voiced agreement and understanding.      Trevin Barragan PA-C  [facility] [place] [address]    Tel [phone]  Pager [phone]      The use of Dragon/[employer] dictation services may have been used to construct the content in this note; any grammatical or spelling errors are non-intentional. Please contact the author of this note directly if you are in need of any clarification.

## 2021-08-02 NOTE — LETTER
"    8/2/2021         RE: Janice Ulloa  [address]  [facility] [address]        Dear Colleague,    Thank you for referring your patient, Janice Ulloa, to the [facility] NEUROSURGERY CLINIC [place]. Please see a copy of my visit note below.    Janice Ulloa is a 60 year old female who presents for:  No chief complaint on file.       Initial Vitals:  /74   Temp 97.7  F (36.5  C) (Temporal)   Ht 5' 2\" (1.575 m)   Wt 171 lb 3.2 oz (77.7 kg)   LMP 08/19/2013 (Approximate)   BMI 31.31 kg/m   Estimated body mass index is 31.31 kg/m  as calculated from the following:    Height as of this encounter: 5' 2\" (1.575 m).    Weight as of this encounter: 171 lb 3.2 oz (77.7 kg).. Body surface area is 1.84 meters squared. BP completed using cuff size: regular  Data Unavailable    Nursing Comments:     Allyson Oropeza [facility]      Neurosurgery Clinic  Neurosurgery followup:    HPI: 6 weeks status post bilateral L4-5 minimally invasive microdiscectomy and decompression.  She is doing well, but does have some residual numbness down the lateral aspect of her left thigh and calf, into the lateral 3 toes on her left foot.  The burning pain has improved.      Exam:  Constitutional:  Alert, well nourished, NAD.  HEENT: Normocephalic, atraumatic.   Pulm:  Without shortness of breath   CV:  No pitting edema of BLE.      Neurological:  Awake  Alert  Oriented x 3  Motor exam:        IP Q DF PF EHL  R   5  5   5   5    5  L   5  5   5   5    5     Reflexes are 2+ in the patellar and Achilles. There is no clonus. Downgoing Babinski.      Able to spontaneously move L/E bilaterally  Sensation intact throughout all L/E dermatomes     Incision: healing well        A/P:    6 weeks status post bilateral L4-5 minimally invasive microdiscectomy and decompression.  She is doing well.  She does have some residual numbness in the lateral aspect of her left thigh and calf.  She understands it may take some time to improve.  She " will continue to gradually increase activity.  We will see her back at her 3-month follow-up.  She voiced agreement and understanding.      Trevin Barragan PA-C  [facility] [place] [address]    Tel [phone]  Pager [phone]      The use of Dragon/[employer] Softwareation services may have been used to construct the content in this note; any grammatical or spelling errors are non-intentional. Please contact the author of this note directly if you are in need of any clarification.        Again, thank you for allowing me to participate in the care of your patient.        Sincerely,        Trevin Barragan PA-C

## 2021-08-02 NOTE — PROGRESS NOTES
"Janice Ulloa is a 60 year old female who presents for:  No chief complaint on file.       Initial Vitals:  /74   Temp 97.7  F (36.5  C) (Temporal)   Ht 5' 2\" (1.575 m)   Wt 171 lb 3.2 oz (77.7 kg)   LMP 08/19/2013 (Approximate)   BMI 31.31 kg/m   Estimated body mass index is 31.31 kg/m  as calculated from the following:    Height as of this encounter: 5' 2\" (1.575 m).    Weight as of this encounter: 171 lb 3.2 oz (77.7 kg).. Body surface area is 1.84 meters squared. BP completed using cuff size: regular  Data Unavailable    Nursing Comments:     Allyson Oropeza CMA    "

## 2021-08-20 DIAGNOSIS — M54.2 NECK PAIN: ICD-10-CM

## 2021-08-20 NOTE — TELEPHONE ENCOUNTER
Pending Prescriptions:                       Disp   Refills    tiZANidine (ZANAFLEX) 4 MG tablet [Pharmac*90 tab*3        Sig: TAKE ONE TO TWO TABLETS BY MOUTH EVERY DAY AS NEEDED           FOR MUSCLE SPASMS AT BEDTIME        Routing refill request to provider for review/approval because:  Drug not on the [facility] refill protocol     KARMEN HindsN, RN, PHN  [place]  August 20, 2021

## 2021-08-25 DIAGNOSIS — E87.6 HYPOKALEMIA: ICD-10-CM

## 2021-08-26 RX ORDER — POTASSIUM CHLORIDE 750 MG/1
TABLET, EXTENDED RELEASE ORAL
Qty: 30 TABLET | Refills: 1 | Status: SHIPPED | OUTPATIENT
Start: 2021-08-26 | End: 2021-11-09

## 2021-09-17 ENCOUNTER — OFFICE VISIT (OUTPATIENT)
Dept: NEUROSURGERY | Facility: CLINIC | Age: 61
End: 2021-09-17
Payer: COMMERCIAL

## 2021-09-17 VITALS
HEIGHT: 62 IN | WEIGHT: 171 LBS | DIASTOLIC BLOOD PRESSURE: 80 MMHG | TEMPERATURE: 98.1 F | BODY MASS INDEX: 31.47 KG/M2 | SYSTOLIC BLOOD PRESSURE: 136 MMHG

## 2021-09-17 DIAGNOSIS — Z98.890 S/P LUMBAR MICRODISCECTOMY: Primary | ICD-10-CM

## 2021-09-17 PROCEDURE — 99024 POSTOP FOLLOW-UP VISIT: CPT | Performed by: PHYSICIAN ASSISTANT

## 2021-09-17 ASSESSMENT — MIFFLIN-ST. JEOR: SCORE: 1288.9

## 2021-09-17 NOTE — LETTER
9/17/2021         RE: Janice Ulloa  [address]   [place] [address]        Dear Colleague,    Thank you for referring your patient, Janice Ulloa, to the [facility] NEUROSURGERY CLINIC [place]. Please see a copy of my visit note below.    Neurosurgery Clinic  Neurosurgery followup:    HPI: Janice is 12 weeks out from bilateral L4-5 MIS decompression and discectomy.  She is doing very well.  She has trace numbness remaining in her lower extremities, primarily in the left foot.  She is very happy with her progress.      Exam:  Constitutional:  Alert, well nourished, NAD.  HEENT: Normocephalic, atraumatic.   Pulm:  Without shortness of breath   CV:  No pitting edema of BLE.      Neurological:  Awake  Alert  Oriented x 3  Motor exam:        IP Q DF PF EHL  R   5  5   5   5    5  L   5  5   5   5    5     Reflexes are 2+ in the patellar and Achilles. There is no clonus. Downgoing Babinski.      Able to spontaneously move L/E bilaterally  Sensation intact throughout all L/E dermatomes     Incision: Well-healed    A/P:    Bilateral L4-5 MIS decompression and discectomy, 12 weeks postop.  She continues to do very well.  Her  Leg pain has completely resolved.  She does have some residual numbness and tingling in her left foot, which also continues to slowly improve.  She is returning to work on a normal basis.  We did answer all of her questions.  She is certainly welcome to contact us with any new or worsening symptoms.  She voiced agreement and understanding.      - Call the clinic at [phone] for increasing redness, swelling or pus draining from the incision, increased pain or any other questions and concerns.      Trevin Barragan PA-C  [facility] Neurosurgery  [address]  [address] [place] [address]    Tel [phone]  Pager [phone]      The use of Dragon/[employer] dictation services may have been used to construct the content in this  "note; any grammatical or spelling errors are non-intentional. Please contact the author of this note directly if you are in need of any clarification.      Janice Ulloa is a 61 year old female who presents for:  Chief Complaint   Patient presents with     RECHECK     12 week follow up        Initial Vitals:  /80   Temp 98.1  F (36.7  C) (Temporal)   Ht 1.575 m (5' 2\")   Wt 77.6 kg (171 lb)   LMP 08/19/2013 (Approximate)   BMI 31.28 kg/m   Estimated body mass index is 31.28 kg/m  as calculated from the following:    Height as of this encounter: 1.575 m (5' 2\").    Weight as of this encounter: 77.6 kg (171 lb).. Body surface area is 1.84 meters squared. BP completed using cuff size: regular  Data Unavailable    Nursing Comments:     Alfonso Charles MA        Again, thank you for allowing me to participate in the care of your patient.        Sincerely,        Trevin Barragan PA-C    "

## 2021-09-17 NOTE — PROGRESS NOTES
Neurosurgery Clinic  Neurosurgery followup:    HPI: Janice is 12 weeks out from bilateral L4-5 MIS decompression and discectomy.  She is doing very well.  She has trace numbness remaining in her lower extremities, primarily in the left foot.  She is very happy with her progress.      Exam:  Constitutional:  Alert, well nourished, NAD.  HEENT: Normocephalic, atraumatic.   Pulm:  Without shortness of breath   CV:  No pitting edema of BLE.      Neurological:  Awake  Alert  Oriented x 3  Motor exam:        IP Q DF PF EHL  R   5  5   5   5    5  L   5  5   5   5    5     Reflexes are 2+ in the patellar and Achilles. There is no clonus. Downgoing Babinski.      Able to spontaneously move L/E bilaterally  Sensation intact throughout all L/E dermatomes     Incision: Well-healed    A/P:    Bilateral L4-5 MIS decompression and discectomy, 12 weeks postop.  She continues to do very well.  Her  Leg pain has completely resolved.  She does have some residual numbness and tingling in her left foot, which also continues to slowly improve.  She is returning to work on a normal basis.  We did answer all of her questions.  She is certainly welcome to contact us with any new or worsening symptoms.  She voiced agreement and understanding.      - Call the clinic at [phone] for increasing redness, swelling or pus draining from the incision, increased pain or any other questions and concerns.      Trevin Barragan PA-C  [facility] Neurosurgery  [address] [address]    Tel [phone]  Pager [phone]      The use of Dragon/[employer]ation services may have been used to construct the content in this note; any grammatical or spelling errors are non-intentional. Please contact the author of this note directly if you are in need of any clarification.

## 2021-09-26 DIAGNOSIS — M54.2 NECK PAIN: ICD-10-CM

## 2021-09-27 NOTE — TELEPHONE ENCOUNTER
Pending Prescriptions:                       Disp   Refills    tiZANidine (ZANAFLEX) 4 MG tablet [Pharmac*90 tab*0        Sig: TAKE ONE TO TWO TABLETS BY MOUTH EVERY DAY AS NEEDED           FOR MUSCLE SPASMS AT BEDTIME    Routing refill request to provider for review/approval because:  Drug not on the [facility] refill protocol

## 2021-10-11 ENCOUNTER — TELEPHONE (OUTPATIENT)
Dept: FAMILY MEDICINE | Facility: OTHER | Age: 61
End: 2021-10-11
Payer: COMMERCIAL

## 2021-10-11 NOTE — TELEPHONE ENCOUNTER
Patient Quality Outreach Summary      Summary:    Patient is due/failing the following:   Physical, PAP and Colonoscopy     Type of outreach:    phone no answer or voicemail.    Questions for provider review:    None                                                                                                                    Radha Rios CMA       Chart routed to Care Team.

## 2021-11-07 DIAGNOSIS — E87.6 HYPOKALEMIA: ICD-10-CM

## 2021-11-09 RX ORDER — POTASSIUM CHLORIDE 750 MG/1
TABLET, EXTENDED RELEASE ORAL
Qty: 30 TABLET | Refills: 5 | Status: SHIPPED | OUTPATIENT
Start: 2021-11-09 | End: 2022-01-12

## 2021-11-09 NOTE — TELEPHONE ENCOUNTER
Prescription approved per [facility] Refill Protocol.  Claudette Royal RN on 11/9/2021 at 4:09 PM

## 2021-12-13 DIAGNOSIS — M54.2 NECK PAIN: ICD-10-CM

## 2021-12-15 NOTE — TELEPHONE ENCOUNTER
Pending Prescriptions:                       Disp   Refills    tiZANidine (ZANAFLEX) 4 MG tablet [Pharmac*90 tab*1        Sig: TAKE ONE TO TWO TABLETS BY MOUTH EVERY DAY AS NEEDED           FOR MUSCLE SPASMS AT BEDTIME    Routing refill request to provider for review/approval because:  Drug not on the [facility] refill protocol   tiZANidine (ZANAFLEX) 4 MG tablet 90 tablet 1 9/27/2021  No   Sig: TAKE ONE TO TWO TABLETS BY MOUTH EVERY DAY AS NEEDED FOR MUSCLE SPASMS AT BEDTIME   Sent to pharmacy as: tiZANidine HCl 4 MG Oral Tablet (ZANAFLEX)   Class: E-Prescribe   Order: [id number]   E-Prescribing Status: Receipt confirmed by pharmacy (9/27/2021  4:05 PM CDT)

## 2022-01-06 DIAGNOSIS — I10 ESSENTIAL HYPERTENSION WITH GOAL BLOOD PRESSURE LESS THAN 140/90: ICD-10-CM

## 2022-01-07 RX ORDER — LISINOPRIL AND HYDROCHLOROTHIAZIDE 12.5; 2 MG/1; MG/1
TABLET ORAL
Qty: 180 TABLET | Refills: 0 | Status: SHIPPED | OUTPATIENT
Start: 2022-01-07 | End: 2022-01-12

## 2022-01-07 RX ORDER — AMLODIPINE BESYLATE 5 MG/1
TABLET ORAL
Qty: 90 TABLET | Refills: 0 | Status: SHIPPED | OUTPATIENT
Start: 2022-01-07 | End: 2022-01-12

## 2022-01-07 RX ORDER — PROPRANOLOL HYDROCHLORIDE 120 MG/1
CAPSULE, EXTENDED RELEASE ORAL
Qty: 180 CAPSULE | Refills: 0 | Status: SHIPPED | OUTPATIENT
Start: 2022-01-07 | End: 2022-01-12

## 2022-01-07 NOTE — TELEPHONE ENCOUNTER
Eveline  Next 5 appointments (look out 90 days)    Adria 10, 2022  2:40 PM  (Arrive by 2:20 PM)  Provider Visit with Anisha Alfaro PA-C  [facility] ([facility] [place] ) [address]  [facility] [address]  [phone]

## 2022-01-12 ENCOUNTER — VIRTUAL VISIT (OUTPATIENT)
Dept: FAMILY MEDICINE | Facility: OTHER | Age: 62
End: 2022-01-12
Payer: COMMERCIAL

## 2022-01-12 DIAGNOSIS — M54.2 NECK PAIN: ICD-10-CM

## 2022-01-12 DIAGNOSIS — E78.5 HYPERLIPIDEMIA WITH TARGET LDL LESS THAN 130: ICD-10-CM

## 2022-01-12 DIAGNOSIS — I10 ESSENTIAL HYPERTENSION WITH GOAL BLOOD PRESSURE LESS THAN 140/90: ICD-10-CM

## 2022-01-12 DIAGNOSIS — J01.90 ACUTE NON-RECURRENT SINUSITIS, UNSPECIFIED LOCATION: Primary | ICD-10-CM

## 2022-01-12 DIAGNOSIS — E87.6 HYPOKALEMIA: ICD-10-CM

## 2022-01-12 PROCEDURE — 99214 OFFICE O/P EST MOD 30 MIN: CPT | Mod: 95 | Performed by: PHYSICIAN ASSISTANT

## 2022-01-12 RX ORDER — POTASSIUM CHLORIDE 750 MG/1
10 TABLET, EXTENDED RELEASE ORAL 2 TIMES DAILY
Qty: 180 TABLET | Refills: 1 | Status: SHIPPED | OUTPATIENT
Start: 2022-01-12 | End: 2022-04-06

## 2022-01-12 RX ORDER — ATORVASTATIN CALCIUM 40 MG/1
40 TABLET, FILM COATED ORAL DAILY
Qty: 90 TABLET | Refills: 3 | Status: SHIPPED | OUTPATIENT
Start: 2022-01-12 | End: 2023-01-06

## 2022-01-12 RX ORDER — LISINOPRIL AND HYDROCHLOROTHIAZIDE 12.5; 2 MG/1; MG/1
TABLET ORAL
Qty: 180 TABLET | Refills: 3 | Status: SHIPPED | OUTPATIENT
Start: 2022-01-12 | End: 2023-03-07

## 2022-01-12 RX ORDER — AMLODIPINE BESYLATE 5 MG/1
5 TABLET ORAL DAILY
Qty: 90 TABLET | Refills: 3 | Status: SHIPPED | OUTPATIENT
Start: 2022-01-12 | End: 2023-03-07

## 2022-01-12 RX ORDER — AMOXICILLIN 875 MG
875 TABLET ORAL 2 TIMES DAILY
Qty: 20 TABLET | Refills: 0 | Status: SHIPPED | OUTPATIENT
Start: 2022-01-12 | End: 2022-01-22

## 2022-01-12 RX ORDER — PROPRANOLOL HYDROCHLORIDE 120 MG/1
CAPSULE, EXTENDED RELEASE ORAL
Qty: 180 CAPSULE | Refills: 3 | Status: SHIPPED | OUTPATIENT
Start: 2022-01-12 | End: 2023-03-07

## 2022-01-12 NOTE — PROGRESS NOTES
"Janice is a 61 year old who is being evaluated via a billable telephone visit.      What phone number would you like to be contacted at? [phone]  How would you like to obtain your AVS? Mail a copy    Assessment & Plan     Essential hypertension with goal blood pressure less than 140/90  Patient will come in for in person visit in the near future for physical including vitals  - amLODIPine (NORVASC) 5 MG tablet; Take 1 tablet (5 mg) by mouth daily  - lisinopril-hydrochlorothiazide (ZESTORETIC) 20-12.5 MG tablet; TAKE TWO TABLETS BY MOUTH EVERY DAY  - propranolol ER (INDERAL LA) 120 MG 24 hr capsule; TAKE TWO CAPSULES BY MOUTH EVERY DAY    Hyperlipidemia with target LDL less than 130  Meds refilled, she will be due for labs in April, likely will do fasting labs at her upcoming appointment  - atorvastatin (LIPITOR) 40 MG tablet; Take 1 tablet (40 mg) by mouth daily    Hypokalemia  Need to update labs, she has been using this med infrequently  - potassium chloride ER (K-TAB/KLOR-CON) 10 MEQ CR tablet; Take 1 tablet (10 mEq) by mouth 2 times daily    Neck pain  Patient still uses nightly  - tiZANidine (ZANAFLEX) 4 MG tablet; TAKE ONE TO TWO TABLETS BY MOUTH EVERY DAY AS NEEDED FOR MUSCLE SPASMS AT BEDTIME    Acute non-recurrent sinusitis, unspecified location  New diagnosis, use over-the-counter meds as needed  - amoxicillin (AMOXIL) 875 MG tablet; Take 1 tablet (875 mg) by mouth 2 times daily for 10 days             BMI:   Estimated body mass index is 31.28 kg/m  as calculated from the following:    Height as of 9/17/21: 1.575 m (5' 2\").    Weight as of 9/17/21: 77.6 kg (171 lb).   Not discussed on virtual visit        Return in about 3 months (around 4/12/2022) for Physical Exam, Lab Work.    Anisha Alfaro PA-C  [facility]    Leigh Camacho is a 61 year old who presents for the following health issues     HPI     Hyperlipidemia Follow-Up      Are you regularly taking any medication or " supplement to lower your cholesterol?   Yes- atorvastatin    Are you having muscle aches or other side effects that you think could be caused by your cholesterol lowering medication?  No    Hypertension Follow-up      Do you check your blood pressure regularly outside of the clinic? Yes   blood pressures at home are in the 130s over 70s    Are you following a low salt diet? Yes    Are your blood pressures ever more than 140 on the top number (systolic) OR more   than 90 on the bottom number (diastolic), for example 140/90? No      How many servings of fruits and vegetables do you eat daily?  0-1    On average, how many sweetened beverages do you drink each day (Examples: soda, juice, sweet tea, etc.  Do NOT count diet or artificially sweetened beverages)?   2    How many days per week do you exercise enough to make your heart beat faster? 7    How many minutes a day do you exercise enough to make your heart beat faster? 60 or more    How many days per week do you miss taking your medication? 0    Her back pain has improved she still has some leg numbness but it is much better.  She has noticed for 3 weeks she has had facial pain and pressure, ear pain postnasal drip and recently her teeth have started to hurt worse on the right side.  She is not concerned with COVID, she has had no taste alteration or smell alteration and is not having fevers.  She does not want to do a COVID test and has not been vaccinated    She does want to come in for an in person visit in the near future she is due for a physical    Review of Systems   Constitutional, HEENT, cardiovascular, pulmonary, gi and gu systems are negative, except as otherwise noted.      Objective           Vitals:  No vitals were obtained today due to virtual visit.    Physical Exam   healthy, alert and no distress  PSYCH: Alert and oriented times 3; coherent speech, normal   rate and volume, able to articulate logical thoughts, able   to abstract reason, no  tangential thoughts, no hallucinations   or delusions  Her affect is normal and pleasant  RESP: No cough, no audible wheezing, able to talk in full sentences  Remainder of exam unable to be completed due to telephone visits    Orders Only on 06/13/2021   Component Date Value Ref Range Status     COVID-19 Virus PCR to [facility] - So* 06/13/2021 Nasopharyngeal   Final     COVID-19 Virus PCR to [facility] - Re* 06/13/2021 Test received-See reflex to IDDL test SARS CoV2 (COVID-19) Virus RT-PCR   Final     SARS-CoV-2 Virus Specimen Source 06/13/2021 Nasopharyngeal   Final     SARS-CoV-2 PCR Result 06/13/2021 NEGATIVE   Final    SARS-CoV2 (COVID-19) RNA not detected, presumed negative.     SARS-CoV-2 PCR Comment 06/13/2021    Final                    Value:Testing was performed using the Aptima SARS-CoV-2 Assay on the [employer] Instrument System.   Additional information about this Emergency Use Authorization (EUA) assay can be found via   the Lab Guide.      Comment: This test should be ordered for the detection of SARS-CoV-2 in individuals who   meet SARS-CoV-2 clinical and/or epidemiological criteria. Test performance is   unknown in asymptomatic patients.  This test is for in vitro diagnostic use under the FDA EUA for laboratories   certified under CLIA to perform high complexity testing. This test has not   been FDA cleared or approved.  A negative result does not rule out the presence of PCR inhibitors in the   specimen or target RNA in concentration below the limit of detection for the   assay. The possibility of a false negative should be considered if the   patient's recent exposure or clinical presentation suggests COVID-19.  This test was validated by the [facility] Infectious Diseases   Diagnostic Laboratory. This laboratory is certified under the Clinical   Laboratory Improvement Amendments of 1988 (CLIA-88) as qualified to perform   high complexity laboratory testing.                   Phone call duration: 8  minutes

## 2022-03-23 ENCOUNTER — TELEPHONE (OUTPATIENT)
Dept: FAMILY MEDICINE | Facility: OTHER | Age: 62
End: 2022-03-23
Payer: COMMERCIAL

## 2022-03-23 DIAGNOSIS — M54.2 NECK PAIN: ICD-10-CM

## 2022-03-23 NOTE — TELEPHONE ENCOUNTER
Patient calling for refill of tizanidine 4 mg. She thought this was going to be increased to 2-3 tablets per day (current rx is 1-2 tablets).   Routing to provider to fill and review dosing.     Yuni Shelton BSN, RN

## 2022-04-06 ENCOUNTER — OFFICE VISIT (OUTPATIENT)
Dept: FAMILY MEDICINE | Facility: OTHER | Age: 62
End: 2022-04-06
Payer: COMMERCIAL

## 2022-04-06 VITALS
RESPIRATION RATE: 18 BRPM | SYSTOLIC BLOOD PRESSURE: 136 MMHG | HEART RATE: 68 BPM | TEMPERATURE: 97.3 F | WEIGHT: 173.5 LBS | BODY MASS INDEX: 31.73 KG/M2 | OXYGEN SATURATION: 95 % | DIASTOLIC BLOOD PRESSURE: 82 MMHG

## 2022-04-06 DIAGNOSIS — Z23 NEED FOR VACCINATION: ICD-10-CM

## 2022-04-06 DIAGNOSIS — Z12.4 CERVICAL CANCER SCREENING: ICD-10-CM

## 2022-04-06 DIAGNOSIS — E78.5 HYPERLIPIDEMIA WITH TARGET LDL LESS THAN 130: ICD-10-CM

## 2022-04-06 DIAGNOSIS — Z12.11 SCREEN FOR COLON CANCER: ICD-10-CM

## 2022-04-06 DIAGNOSIS — E87.6 HYPOKALEMIA: ICD-10-CM

## 2022-04-06 DIAGNOSIS — Z13.220 SCREENING FOR HYPERLIPIDEMIA: ICD-10-CM

## 2022-04-06 DIAGNOSIS — I10 ESSENTIAL HYPERTENSION WITH GOAL BLOOD PRESSURE LESS THAN 140/90: ICD-10-CM

## 2022-04-06 DIAGNOSIS — M54.16 LUMBAR RADICULOPATHY: Primary | ICD-10-CM

## 2022-04-06 DIAGNOSIS — Z13.1 SCREENING FOR DIABETES MELLITUS: ICD-10-CM

## 2022-04-06 DIAGNOSIS — R73.9 ELEVATED BLOOD SUGAR: ICD-10-CM

## 2022-04-06 PROCEDURE — 99214 OFFICE O/P EST MOD 30 MIN: CPT | Mod: 25 | Performed by: PHYSICIAN ASSISTANT

## 2022-04-06 PROCEDURE — 90750 HZV VACC RECOMBINANT IM: CPT | Performed by: PHYSICIAN ASSISTANT

## 2022-04-06 PROCEDURE — 90471 IMMUNIZATION ADMIN: CPT | Performed by: PHYSICIAN ASSISTANT

## 2022-04-06 RX ORDER — POTASSIUM CHLORIDE 750 MG/1
10 TABLET, EXTENDED RELEASE ORAL 2 TIMES DAILY
Qty: 180 TABLET | Refills: 1 | Status: SHIPPED | OUTPATIENT
Start: 2022-04-06 | End: 2022-10-17

## 2022-04-06 ASSESSMENT — PAIN SCALES - GENERAL: PAINLEVEL: NO PAIN (0)

## 2022-04-06 NOTE — PROGRESS NOTES
"  Assessment & Plan     Hyperlipidemia with target LDL less than 130  Lipid is  up-to-date, CMP should be updated  - Comprehensive metabolic panel; Future    Essential hypertension with goal blood pressure less than 140/90  At goal, continue current meds  - Albumin Random Urine Quantitative with Creat Ratio; Future  - Comprehensive metabolic panel; Future    Need for vaccination  This will complete series  - ZOSTER VACCINE RECOMBINANT (Shingrix)    Hypokalemia  Patient will need refills before next appointment in 6 months  - potassium chloride ER (K-TAB/KLOR-CON) 10 MEQ CR tablet; Take 1 tablet (10 mEq) by mouth 2 times daily    Lumbar radiculopathy  Offered referral back to see Trevin.  She will do hamstring stretches and calf stretches as we demonstrated today she will increase her water and follow-up if needed    Screen for colon cancer    - Adult Gastro Ref - Procedure Only; Future    Screening for hyperlipidemia    - Lipid panel reflex to direct LDL Fasting; Future    Cervical cancer screening  Patient declined, she will make another appointment         BMI:   Estimated body mass index is 31.73 kg/m  as calculated from the following:    Height as of 9/17/21: 1.575 m (5' 2\").    Weight as of this encounter: 78.7 kg (173 lb 8 oz).   Weight management plan: Discussed healthy diet and exercise guidelines        No follow-ups on file.    SAURABH Sanderson [facility] LESA Camacho is a 61 year old who presents for the following health issues     History of Present Illness       Reason for visit:  Lower back  Symptom onset:  1-2 weeks ago  Symptoms include:  Numbness left leg   Symptom intensity:  Mild  Symptom progression:  Staying the same  Had these symptoms before:  Yes  What makes it worse:  Bending and walking   What makes it better:  Sitting with heat pad    She eats 0-1 servings of fruits and vegetables daily.She consumes 2 sweetened beverage(s) daily.She exercises with " enough effort to increase her heart rate 60 or more minutes per day.  She exercises with enough effort to increase her heart rate 3 or less days per week.   She is taking medications regularly.         She has been having some left hamstring and calf cramping in the last week.  She does not drink very much water and has not been stretching.  Her left leg continues to feel numb even after her procedure in June 2021.  She was told it may take 1 to 2 years to improve.  Some days she thinks it might be getting somewhat better other days she is not sure.  She does have a little bit of back pain that restarted after her hamstring spasm most recently.    Review of Systems   Constitutional, HEENT, cardiovascular, pulmonary, gi and gu systems are negative, except as otherwise noted.      Objective    /82   Pulse 68   Temp 97.3  F (36.3  C) (Temporal)   Resp 18   Wt 78.7 kg (173 lb 8 oz)   LMP 08/19/2013 (Approximate)   SpO2 95%   BMI 31.73 kg/m    Body mass index is 31.73 kg/m .  Physical Exam   GENERAL: healthy, alert and no distress  MS: no gross musculoskeletal defects noted, no edema  NEURO: Normal strength and tone, mentation intact and speech normal  BACK: no CVA tenderness, no paralumbar tenderness  PSYCH: mentation appears normal, affect normal/bright    Orders Only on 06/13/2021   Component Date Value Ref Range Status     COVID-19 Virus PCR to [facility] - So* 06/13/2021 Nasopharyngeal   Final     COVID-19 Virus PCR to [facility] - Re* 06/13/2021 Test received-See reflex to IDDL test SARS CoV2 (COVID-19) Virus RT-PCR   Final     SARS-CoV-2 Virus Specimen Source 06/13/2021 Nasopharyngeal   Final     SARS-CoV-2 PCR Result 06/13/2021 NEGATIVE   Final    SARS-CoV2 (COVID-19) RNA not detected, presumed negative.     SARS-CoV-2 PCR Comment 06/13/2021    Final                    Value:Testing was performed using the Aptima SARS-CoV-2 Assay on the [employer] Instrument System.   Additional information about this  Emergency Use Authorization (EUA) assay can be found via   the Lab Guide.      Comment: This test should be ordered for the detection of SARS-CoV-2 in individuals who   meet SARS-CoV-2 clinical and/or epidemiological criteria. Test performance is   unknown in asymptomatic patients.  This test is for in vitro diagnostic use under the FDA EUA for laboratories   certified under CLIA to perform high complexity testing. This test has not   been FDA cleared or approved.  A negative result does not rule out the presence of PCR inhibitors in the   specimen or target RNA in concentration below the limit of detection for the   assay. The possibility of a false negative should be considered if the   patient's recent exposure or clinical presentation suggests COVID-19.  This test was validated by the [facility] Infectious Diseases   Diagnostic Laboratory. This laboratory is certified under the Clinical   Laboratory Improvement Amendments of 1988 (CLIA-88) as qualified to perform   high complexity laboratory testing.         No results found for any visits on 04/06/22.

## 2022-04-08 ENCOUNTER — LAB (OUTPATIENT)
Dept: LAB | Facility: CLINIC | Age: 62
End: 2022-04-08
Payer: COMMERCIAL

## 2022-04-08 DIAGNOSIS — E78.5 HYPERLIPIDEMIA WITH TARGET LDL LESS THAN 130: ICD-10-CM

## 2022-04-08 DIAGNOSIS — I10 ESSENTIAL HYPERTENSION WITH GOAL BLOOD PRESSURE LESS THAN 140/90: ICD-10-CM

## 2022-04-08 DIAGNOSIS — Z13.220 SCREENING FOR HYPERLIPIDEMIA: ICD-10-CM

## 2022-04-08 LAB
ALBUMIN SERPL-MCNC: 3.8 G/DL (ref 3.4–5)
ALP SERPL-CCNC: 71 U/L (ref 40–150)
ALT SERPL W P-5'-P-CCNC: 35 U/L (ref 0–50)
ANION GAP SERPL CALCULATED.3IONS-SCNC: 3 MMOL/L (ref 3–14)
AST SERPL W P-5'-P-CCNC: 20 U/L (ref 0–45)
BILIRUB SERPL-MCNC: 0.6 MG/DL (ref 0.2–1.3)
BUN SERPL-MCNC: 11 MG/DL (ref 7–30)
CALCIUM SERPL-MCNC: 9.5 MG/DL (ref 8.5–10.1)
CHLORIDE BLD-SCNC: 101 MMOL/L (ref 94–109)
CHOLEST SERPL-MCNC: 146 MG/DL
CO2 SERPL-SCNC: 34 MMOL/L (ref 20–32)
CREAT SERPL-MCNC: 0.73 MG/DL (ref 0.52–1.04)
CREAT UR-MCNC: 98 MG/DL
FASTING STATUS PATIENT QL REPORTED: YES
GFR SERPL CREATININE-BSD FRML MDRD: >90 ML/MIN/1.73M2
GLUCOSE BLD-MCNC: 124 MG/DL (ref 70–99)
HDLC SERPL-MCNC: 46 MG/DL
LDLC SERPL CALC-MCNC: 66 MG/DL
MICROALBUMIN UR-MCNC: 16 MG/L
MICROALBUMIN/CREAT UR: 16.33 MG/G CR (ref 0–25)
NONHDLC SERPL-MCNC: 100 MG/DL
POTASSIUM BLD-SCNC: 3.4 MMOL/L (ref 3.4–5.3)
PROT SERPL-MCNC: 7.7 G/DL (ref 6.8–8.8)
SODIUM SERPL-SCNC: 138 MMOL/L (ref 133–144)
TRIGL SERPL-MCNC: 168 MG/DL

## 2022-04-08 PROCEDURE — 36415 COLL VENOUS BLD VENIPUNCTURE: CPT

## 2022-04-08 PROCEDURE — 80061 LIPID PANEL: CPT

## 2022-04-08 PROCEDURE — 80053 COMPREHEN METABOLIC PANEL: CPT

## 2022-04-08 PROCEDURE — 82043 UR ALBUMIN QUANTITATIVE: CPT

## 2022-04-11 ENCOUNTER — TELEPHONE (OUTPATIENT)
Dept: GASTROENTEROLOGY | Facility: CLINIC | Age: 62
End: 2022-04-11
Payer: COMMERCIAL

## 2022-04-11 NOTE — TELEPHONE ENCOUNTER
Screening Questions  BlueKIND OF PREP RedLOCATION [review exclusion criteria] GreenSEDATION TYPE  1. Have you had a positive covid test in the last 90 days? N     2. Do you have a legal guardian or medical Power of ?  Are you able to give consent for your medical care?N (Sedation review/consideration needed)    3. Are you active on mychart? N    4. What insurance is in the chart? [employer]     3.   Ordering/Referring Provider: Anisha Alfaro,     4. BMI 31.8  [BMI OVER 40-EXTENDED PREP]  If greater than 40 review exclusion criteria [PAC APPT IF @ [facility]]        5.  Respiratory Screening :  [If yes to any of the following HOSPITAL setting only]     Do you use daily home oxygen? N    Do you have mod to severe Obstructive Sleep Apnea? N  [OKAY @ [facility] SH [facility]]   Do you have Pulmonary Hypertension? N     Do you have UNCONTROLLED asthma? N        6.   Have you had a heart or lung transplant? N      7.   Are you currently on dialysis? N [ If yes, G-PREP & HOSPITAL setting only]     8.   Do you have chronic kidney disease? N [ If yes, G-PREP ]    9.   Have you had a stroke or Transient ischemic attack (TIA - aka  mini stroke ) within 6 months?  N (If yes, please review exclusion criteria)    10.   In the past 6 months, have you had any heart related issues including cardiomyopathy or heart attack? N           If yes, did it require cardiac stenting or other implantable device? N      11.   Do you have any implantable devices in your body (pacemaker, defib, LVAD)? N (If yes, please review exclusion criteria)    12.   Do you take nitroglycerin? N           If yes, how often? N  (if yes, HOSPITAL setting ONLY)    13.   Are you currently taking any blood thinners? N           [IF YES, INFORM PATIENT TO FOLLOW UP W/ ORDERING PROVIDER FOR BRIDGING INSTRUCTIONS]     14.   Do you have a diagnosis of diabetes? N   [ If yes, G-PREP ]    15.   [FEMALES] Are you currently pregnant? N    If yes, how many weeks? N    16.    Are you taking any prescription pain medications on a routine schedule?  N  [ If yes, EXTENDED PREP.] [If yes, MAC]    17.   Do you have any chemical dependencies such as alcohol, street drugs, or methadone?  N [If yes, MAC]    18.   Do you have any history of post-traumatic stress syndrome, severe anxiety or history of psychosis?  N  [If yes, MAC]    19.   Do you transfer independently?  Y    20.  On a regular basis do you go 3-5 days between bowel movements? N   [ If yes, EXTENDED PREP.]    21.   Preferred LOCAL Pharmacy for Pre Prescription Y  [facility] - [place], [place] - [facility] [address]      Scheduling Details      Caller : Janice  (Please ask for phone number if not scheduled by patient)    Type of Procedure Scheduled: Colon  Which Colonoscopy Prep was Sent?: M Prep  [facility] CF PATIENTS & [facility]'S PATIENTS NEEDS EXTENDED PREP  Surgeon: Ignacia  Date of Procedure: 5-26  Location:       Sedation Type: CS  Conscious Sedation- Needs  for 6 hours after the procedure  MAC/General-Needs  for 24 hours after procedure    Pre-op Required at [facility], [place], [place] and OR for MAC sedation: Y  (advise patient they will need a pre-op prior to procedure -)      Informed patient they will need an adult  Y  Cannot take any type of public or medical transportation alone    Pre-Procedure Covid test to be completed at [facility] or Externally: Y [facility] lab    Confirmed Nurse will call to complete assessment Y    Additional comments:

## 2022-05-01 DIAGNOSIS — Z11.59 ENCOUNTER FOR SCREENING FOR OTHER VIRAL DISEASES: Primary | ICD-10-CM

## 2022-05-23 ENCOUNTER — LAB (OUTPATIENT)
Dept: LAB | Facility: CLINIC | Age: 62
End: 2022-05-23
Payer: COMMERCIAL

## 2022-05-23 DIAGNOSIS — Z11.59 ENCOUNTER FOR SCREENING FOR OTHER VIRAL DISEASES: ICD-10-CM

## 2022-05-23 LAB — SARS-COV-2 RNA RESP QL NAA+PROBE: NEGATIVE

## 2022-05-23 PROCEDURE — U0005 INFEC AGEN DETEC AMPLI PROBE: HCPCS

## 2022-05-23 PROCEDURE — U0003 INFECTIOUS AGENT DETECTION BY NUCLEIC ACID (DNA OR RNA); SEVERE ACUTE RESPIRATORY SYNDROME CORONAVIRUS 2 (SARS-COV-2) (CORONAVIRUS DISEASE [COVID-19]), AMPLIFIED PROBE TECHNIQUE, MAKING USE OF HIGH THROUGHPUT TECHNOLOGIES AS DESCRIBED BY CMS-2020-01-R: HCPCS

## 2022-05-26 ENCOUNTER — HOSPITAL ENCOUNTER (OUTPATIENT)
Facility: CLINIC | Age: 62
Discharge: HOME | End: 2022-05-26
Attending: SURGERY | Admitting: SURGERY
Payer: COMMERCIAL

## 2022-05-26 ENCOUNTER — ANESTHESIA EVENT (OUTPATIENT)
Dept: GASTROENTEROLOGY | Facility: CLINIC | Age: 62
End: 2022-05-26
Payer: COMMERCIAL

## 2022-05-26 ENCOUNTER — ANESTHESIA (OUTPATIENT)
Dept: GASTROENTEROLOGY | Facility: CLINIC | Age: 62
End: 2022-05-26
Payer: COMMERCIAL

## 2022-05-26 VITALS
SYSTOLIC BLOOD PRESSURE: 111 MMHG | TEMPERATURE: 98.1 F | RESPIRATION RATE: 17 BRPM | HEART RATE: 79 BPM | DIASTOLIC BLOOD PRESSURE: 69 MMHG | OXYGEN SATURATION: 93 %

## 2022-05-26 LAB — COLONOSCOPY: NORMAL

## 2022-05-26 PROCEDURE — 370N000017 HC ANESTHESIA TECHNICAL FEE, PER MIN: Performed by: SURGERY

## 2022-05-26 PROCEDURE — 45378 DIAGNOSTIC COLONOSCOPY: CPT | Performed by: SURGERY

## 2022-05-26 PROCEDURE — 250N000011 HC RX IP 250 OP 636: Performed by: NURSE ANESTHETIST, CERTIFIED REGISTERED

## 2022-05-26 PROCEDURE — 250N000009 HC RX 250: Performed by: NURSE ANESTHETIST, CERTIFIED REGISTERED

## 2022-05-26 PROCEDURE — G0105 COLORECTAL SCRN; HI RISK IND: HCPCS | Performed by: SURGERY

## 2022-05-26 PROCEDURE — 258N000003 HC RX IP 258 OP 636: Performed by: NURSE ANESTHETIST, CERTIFIED REGISTERED

## 2022-05-26 RX ORDER — NALOXONE HYDROCHLORIDE 0.4 MG/ML
0.4 INJECTION, SOLUTION INTRAMUSCULAR; INTRAVENOUS; SUBCUTANEOUS
Status: DISCONTINUED | OUTPATIENT
Start: 2022-05-26 | End: 2022-05-26 | Stop reason: HOSPADM

## 2022-05-26 RX ORDER — PROPOFOL 10 MG/ML
INJECTION, EMULSION INTRAVENOUS CONTINUOUS PRN
Status: DISCONTINUED | OUTPATIENT
Start: 2022-05-26 | End: 2022-05-26

## 2022-05-26 RX ORDER — LIDOCAINE 40 MG/G
CREAM TOPICAL
Status: DISCONTINUED | OUTPATIENT
Start: 2022-05-26 | End: 2022-05-26

## 2022-05-26 RX ORDER — FLUMAZENIL 0.1 MG/ML
0.2 INJECTION, SOLUTION INTRAVENOUS
Status: DISCONTINUED | OUTPATIENT
Start: 2022-05-26 | End: 2022-05-26 | Stop reason: HOSPADM

## 2022-05-26 RX ORDER — NALOXONE HYDROCHLORIDE 0.4 MG/ML
0.2 INJECTION, SOLUTION INTRAMUSCULAR; INTRAVENOUS; SUBCUTANEOUS
Status: DISCONTINUED | OUTPATIENT
Start: 2022-05-26 | End: 2022-05-26 | Stop reason: HOSPADM

## 2022-05-26 RX ORDER — LIDOCAINE HYDROCHLORIDE 10 MG/ML
INJECTION, SOLUTION INFILTRATION; PERINEURAL PRN
Status: DISCONTINUED | OUTPATIENT
Start: 2022-05-26 | End: 2022-05-26

## 2022-05-26 RX ORDER — ONDANSETRON 4 MG/1
4 TABLET, ORALLY DISINTEGRATING ORAL EVERY 6 HOURS PRN
Status: DISCONTINUED | OUTPATIENT
Start: 2022-05-26 | End: 2022-05-26 | Stop reason: HOSPADM

## 2022-05-26 RX ORDER — PROPOFOL 10 MG/ML
INJECTION, EMULSION INTRAVENOUS PRN
Status: DISCONTINUED | OUTPATIENT
Start: 2022-05-26 | End: 2022-05-26

## 2022-05-26 RX ORDER — LIDOCAINE 40 MG/G
CREAM TOPICAL
Status: DISCONTINUED | OUTPATIENT
Start: 2022-05-26 | End: 2022-05-26 | Stop reason: HOSPADM

## 2022-05-26 RX ORDER — ONDANSETRON 2 MG/ML
4 INJECTION INTRAMUSCULAR; INTRAVENOUS EVERY 6 HOURS PRN
Status: DISCONTINUED | OUTPATIENT
Start: 2022-05-26 | End: 2022-05-26 | Stop reason: HOSPADM

## 2022-05-26 RX ORDER — SODIUM CHLORIDE, SODIUM LACTATE, POTASSIUM CHLORIDE, CALCIUM CHLORIDE 600; 310; 30; 20 MG/100ML; MG/100ML; MG/100ML; MG/100ML
INJECTION, SOLUTION INTRAVENOUS CONTINUOUS
Status: DISCONTINUED | OUTPATIENT
Start: 2022-05-26 | End: 2022-05-26 | Stop reason: HOSPADM

## 2022-05-26 RX ORDER — ONDANSETRON 2 MG/ML
4 INJECTION INTRAMUSCULAR; INTRAVENOUS
Status: DISCONTINUED | OUTPATIENT
Start: 2022-05-26 | End: 2022-05-26 | Stop reason: HOSPADM

## 2022-05-26 RX ORDER — PROCHLORPERAZINE MALEATE 5 MG
10 TABLET ORAL EVERY 6 HOURS PRN
Status: DISCONTINUED | OUTPATIENT
Start: 2022-05-26 | End: 2022-05-26 | Stop reason: HOSPADM

## 2022-05-26 RX ADMIN — PROPOFOL 50 MG: 10 INJECTION, EMULSION INTRAVENOUS at 09:44

## 2022-05-26 RX ADMIN — SODIUM CHLORIDE, POTASSIUM CHLORIDE, SODIUM LACTATE AND CALCIUM CHLORIDE: 600; 310; 30; 20 INJECTION, SOLUTION INTRAVENOUS at 09:38

## 2022-05-26 RX ADMIN — PROPOFOL 200 MCG/KG/MIN: 10 INJECTION, EMULSION INTRAVENOUS at 09:44

## 2022-05-26 RX ADMIN — PROPOFOL 50 MG: 10 INJECTION, EMULSION INTRAVENOUS at 09:45

## 2022-05-26 RX ADMIN — LIDOCAINE HYDROCHLORIDE 40 MG: 10 INJECTION, SOLUTION INFILTRATION; PERINEURAL at 09:43

## 2022-05-26 ASSESSMENT — LIFESTYLE VARIABLES: TOBACCO_USE: 1

## 2022-05-26 NOTE — ANESTHESIA CARE TRANSFER NOTE
Patient: Janice Ulloa    Procedure: Procedure(s):  COLONOSCOPY       Diagnosis: Screen for colon cancer [Z12.11]  Diagnosis Additional Information: No value filed.    Anesthesia Type:   MAC     Note:    Oropharynx: oropharynx clear of all foreign objects and spontaneously breathing  Level of Consciousness: drowsy  Oxygen Supplementation: face mask    Independent Airway: airway patency satisfactory and stable  Dentition: dentition unchanged  Vital Signs Stable: post-procedure vital signs reviewed and stable  Report to RN Given: handoff report given  Patient transferred to: Phase II    Handoff Report: Identifed the Patient, Identified the Reponsible Provider, Reviewed the pertinent medical history, Discussed the surgical course, Reviewed Intra-OP anesthesia mangement and issues during anesthesia, Set expectations for post-procedure period and Allowed opportunity for questions and acknowledgement of understanding      Vitals:  Vitals Value Taken Time   /71 05/26/22 1010   Temp     Pulse 75 05/26/22 1010   Resp 17 05/26/22 1010   SpO2 95 % 05/26/22 1016   Vitals shown include unvalidated device data.    Electronically Signed By: LUKE Marvin CRNA  May 26, 2022  10:16 AM

## 2022-05-26 NOTE — ANESTHESIA POSTPROCEDURE EVALUATION
Patient: Janice Ulloa    Procedure: Procedure(s):  COLONOSCOPY       Anesthesia Type:  MAC    Note:  Disposition: Outpatient   Postop Pain Control: Uneventful            Sign Out: Well controlled pain   PONV: No   Neuro/Psych: Uneventful            Sign Out: Acceptable/Baseline neuro status   Airway/Respiratory: Uneventful            Sign Out: Acceptable/Baseline resp. status   CV/Hemodynamics: Uneventful            Sign Out: Acceptable CV status   Other NRE: NONE   DID A NON-ROUTINE EVENT OCCUR? No    Event details/Postop Comments:  Pt was happy with anesthesia care.  No complications.  I will follow up with the pt if needed.           Last vitals:  Vitals Value Taken Time   /71 05/26/22 1010   Temp     Pulse 75 05/26/22 1010   Resp 17 05/26/22 1010   SpO2 95 % 05/26/22 1016   Vitals shown include unvalidated device data.    Electronically Signed By: LUKE Marvin CRNA  May 26, 2022  10:16 AM

## 2022-05-26 NOTE — H&P
Patient seen for Endoscopy    HPI:  Patient is a 61 year old female here for endoscopy. Not taking blood thinning medications. No MI or CVA history. No issues with previous sedation. No recent acute illness.    Review Of Systems    Skin: negative  Ears/Nose/Throat: negative  Respiratory: No shortness of breath, dyspnea on exertion, cough, or hemoptysis  Cardiovascular: negative  Gastrointestinal: negative  Genitourinary: negative  Musculoskeletal: negative  Neurologic: negative  Hematologic/Lymphatic/Immunologic: negative  Endocrine: negative      Past Medical History:   Diagnosis Date     Carpal tunnel syndrome      Intervertebral cervical disc disorder with myelopathy, cervical region     C6 herniation with right C6 radiculopathy     Migraine, unspecified, without mention of intractable migraine without mention of status migrainosus      Perimenopausal     irreg      Personal history of tobacco use, presenting hazards to health        Past Surgical History:   Procedure Laterality Date     COLONOSCOPY  1/10/2014    Procedure: COMBINED COLONOSCOPY, SINGLE BIOPSY/POLYPECTOMY BY BIOPSY;  colonoscopy with polypectomy by forceps;  Surgeon: Chevy Yang MD;  Location: [facility]     DECOMPRESSION LUMBAR MINIMALLY INVASIVE ONE LEVEL Bilateral 2021    Procedure: Minimally invasive Left Lumbar 4 to lumbar 5 bilateral decompression;  Surgeon: James Orozco MD;  Location: [facility]     DISCECTOMY LUMBAR MINIMALLY INVASIVE ONE LEVEL Left 2021    Procedure: Lumbar 5 to Sacral 1 microdiscectomy, Left;  Surgeon: James Orozco MD;  Location: [facility]     HC REVISE MEDIAN N/CARPAL TUNNEL SURG  2004     HC REVISE MEDIAN N/CARPAL TUNNEL SURG  06    Left     HC TREATMENT INCOMPLETE  SURG, ANY TRIMESTER      D&C after miscarriage     INJECT EPIDURAL LUMBAR Left 2020    Procedure: INJECTION, SPINE, LUMBAR, 5 Sacral 1 and Sacral 1 Left;  Surgeon: Abilio Middleton MD;  Location: [facility]     ZZC DISK  SURG,ANTER,CERVICAL,SINGLE LVL  10/02/2007    C5-C6 anterior cervical diskectomy & fusion w/plate.     ZZC NONSPECIFIC PROCEDURE      Bilateral inguinal hernia repairs     ZZC NONSPECIFIC PROCEDURE      Oral surgery x2       Family History   Problem Relation Age of Onset     Cancer Mother         parotid     Heart Disease Father 47        MI     Depression Daughter      Hypertension Sister        Social History     Socioeconomic History     Marital status:      Spouse name: Carlin     Number of children: 2     Years of education: 12     Highest education level: Not on file   Occupational History     Occupation:      Employer: [employer]   Tobacco Use     Smoking status: Former Smoker     Packs/day: 1.50     Years: 20.00     Pack years: 30.00     Types: Cigarettes     Quit date: 10/17/2007     Years since quittin.6     Smokeless tobacco: Never Used   Vaping Use     Vaping Use: Never used   Substance and Sexual Activity     Alcohol use: Not Currently     Drug use: No     Sexual activity: Not Currently     Partners: Male     Comment: not currently   Other Topics Concern      Service Not Asked     Blood Transfusions Not Asked     Caffeine Concern No     Occupational Exposure Not Asked     Hobby Hazards Not Asked     Sleep Concern No     Stress Concern No     Weight Concern No     Special Diet Not Asked     Back Care Not Asked     Exercise No     Bike Helmet Not Asked     Seat Belt Yes     Self-Exams Not Asked     Parent/sibling w/ CABG, MI or angioplasty before 65F 55M? Yes   Social History Narrative    Lives with spouse and son. No domestic violence issues.     Social Determinants of Health     Financial Resource Strain: Not on file   Food Insecurity: Not on file   Transportation Needs: Not on file   Physical Activity: Not on file   Stress: Not on file   Social Connections: Not on file   Intimate Partner Violence: Not on file   Housing Stability: Not on file       No current  outpatient medications on file.       Medications and history reviewed    Physical exam:  Vitals: BP (!) 163/85   Temp 98.1  F (36.7  C) (Oral)   Resp 17   LMP 08/19/2013 (Approximate)   SpO2 97%   BMI= There is no height or weight on file to calculate BMI.    Constitutional: Healthy, alert, non-distressed   Head: Normo-cephalic, atraumatic, no lesions, masses or tenderness   Cardiovascular: RRR, no new murmurs, +S1, +S2 heart sounds, no clicks, rubs or gallops   Respiratory: CTAB, no rales, rhonchi or wheezing, equal chest rise, good respiratory effort   Gastrointestinal: Soft, non-tender, non distended, no rebound rigidity or guarding, no masses or hernias palpated   : Deferred  Musculoskeletal: Moves all extremities, normal  strength, no deformities noted   Skin: No suspicious lesions or rashes   Psychiatric: Mentation appears normal, affect appropriate   Hematologic/Lymphatic/Immunologic: Normal cervical and supraclavicular lymph nodes   Patient able to get up on table without difficulty.    Labs show:  No results found for this or any previous visit (from the past 24 hour(s)).    Assessment: Endoscopy  Plan: Pt cleared for anesthesia for proposed procedure.    Jaren Verduzco DO

## 2022-05-26 NOTE — DISCHARGE INSTRUCTIONS
[facility]    Home Care Following Endoscopy          Activity:  You have just undergone an endoscopic procedure usually performed with conscious sedation.  Do not work or operate machinery (including a car) for at least 12 hours.    I encourage you to walk and attempt to pass this air as soon as possible.    Diet:  Return to the diet you were on before your procedure but eat lightly for the first 12-24 hours.  Drink plenty of water.  Resume any regular medications unless otherwise advised by your physician.  Please begin any new medication prescribed as a result of your procedure as directed by your physician.   If you had any biopsy or polyp removed please refrain from aspirin or aspirin products for 2 days.  If on Coumadin please restart as instructed by your physician.   Pain:  You may take Tylenol as needed for pain.  Expected Recovery:  You can expect some mild abdominal fullness and/or discomfort due to the air used to inflate your intestinal tract. It is also normal to have a mild sore throat after upper endoscopy.    Call Your Physician if You Have:    After Colonoscopy:  Worsening persisting abdominal pain which is worse with activity.  Fevers (>101 degrees F), chills or shakes.  Passage of continued blood with bowel movements.   Any questions or concerns about your recovery, please call [phone] or after hours [phone] Nurse Advice Line.    Follow-up Care:  IF You did have polyps/biopsy tissue sample(s) removed.  The polyps/biopsy tissue sample(s) will be sent to pathology.  You should receive letter in your My Chart from with your results within 1-2 weeks. If you do not participate in My Chart a physical letter will come in the mail in 2-3 weeks.  Please call if you have not received a notification of your results.  If asked to return to clinic please make an appointment 1 week after your procedure.  Call [phone].

## 2022-05-26 NOTE — ANESTHESIA PREPROCEDURE EVALUATION
Anesthesia Pre-Procedure Evaluation    Patient: Janice Ulloa   MRN: [id number] : 1960        Procedure : Procedure(s):  COLONOSCOPY          Past Medical History:   Diagnosis Date     Carpal tunnel syndrome      Intervertebral cervical disc disorder with myelopathy, cervical region     C6 herniation with right C6 radiculopathy     Migraine, unspecified, without mention of intractable migraine without mention of status migrainosus      Perimenopausal     irreg      Personal history of tobacco use, presenting hazards to health       Past Surgical History:   Procedure Laterality Date     COLONOSCOPY  1/10/2014    Procedure: COMBINED COLONOSCOPY, SINGLE BIOPSY/POLYPECTOMY BY BIOPSY;  colonoscopy with polypectomy by forceps;  Surgeon: Chevy Yang MD;  [facility]: [facility]     DECOMPRESSION LUMBAR MINIMALLY INVASIVE ONE LEVEL Bilateral 2021    Procedure: Minimally invasive Left Lumbar 4 to lumbar 5 bilateral decompression;  Surgeon: James Orozco MD;  Location: [facility]     DISCECTOMY LUMBAR MINIMALLY INVASIVE ONE LEVEL Left 2021    Procedure: Lumbar 5 to Sacral 1 microdiscectomy, Left;  Surgeon: James Orozco MD;  Location: [facility]     HC REVISE MEDIAN N/CARPAL TUNNEL SURG  2004     HC REVISE MEDIAN N/CARPAL TUNNEL SURG  06    Left     HC TREATMENT INCOMPLETE  SURG, ANY TRIMESTER      D&C after miscarriage     INJECT EPIDURAL LUMBAR Left 2020    Procedure: INJECTION, SPINE, LUMBAR, 5 Sacral 1 and Sacral 1 Left;  Surgeon: Abilio Middleton MD;  Location: [facility]     ZZC DISK SURG,ANTER,CERVICAL,SINGLE LVL  10/02/2007    C5-C6 anterior cervical diskectomy & fusion w/plate.     ZZC NONSPECIFIC PROCEDURE      Bilateral inguinal hernia repairs     ZZC NONSPECIFIC PROCEDURE      Oral surgery x2      Allergies   Allergen Reactions     No Known Drug Allergies       Social History     Tobacco Use     Smoking status: Former Smoker     Packs/day: 1.50     Years: 20.00     Pack  years: 30.00     Types: Cigarettes     Quit date: 10/17/2007     Years since quittin.6     Smokeless tobacco: Never Used   Substance Use Topics     Alcohol use: Not Currently      Wt Readings from Last 1 Encounters:   22 78.7 kg (173 lb 8 oz)        Anesthesia Evaluation   Pt has had prior anesthetic. Type: General.    No history of anesthetic complications       ROS/MED HX  ENT/Pulmonary:     (+) tobacco use, Past use,     Neurologic:     (+) migraines,     Cardiovascular:     (+) Dyslipidemia hypertension-----Previous cardiac testing   Echo: Date: Results:    Stress Test: Date: Results:    ECG Reviewed: Date: 21 Results:  SR  Cath: Date: Results:      METS/Exercise Tolerance:     Hematologic:     (+) History of blood clots, pt is not anticoagulated, anemia,     Musculoskeletal: Comment: Lumbar radiculopathy      GI/Hepatic:  - neg GI/hepatic ROS     Renal/Genitourinary:  - neg Renal ROS     Endo:  - neg endo ROS     Psychiatric/Substance Use:     (+) psychiatric history anxiety and depression     Infectious Disease:  - neg infectious disease ROS     Malignancy:  - neg malignancy ROS     Other:            Physical Exam    Airway        Mallampati: II   TM distance: > 3 FB   Neck ROM: full   Mouth opening: > 3 cm    Respiratory Devices and Support         Dental  no notable dental history         Cardiovascular   cardiovascular exam normal          Pulmonary   pulmonary exam normal                OUTSIDE LABS:  CBC:   Lab Results   Component Value Date    WBC 5.6 2021    WBC 5.1 2019    HGB 14.2 2021    HGB 13.3 2019    HCT 43.4 2021    HCT 40.1 2019     2021     2019     BMP:   Lab Results   Component Value Date     2022     2021    POTASSIUM 3.4 2022    POTASSIUM 4.0 2021    CHLORIDE 101 2022    CHLORIDE 99 2021    CO2 34 (H) 2022    CO2 34 (H) 2021    BUN 11 2022    BUN 13  06/09/2021    CR 0.73 04/08/2022    CR 0.69 06/09/2021     (H) 04/08/2022    GLC 98 06/09/2021     COAGS: No results found for: PTT, INR, FIBR  POC:   Lab Results   Component Value Date    HCG Negative 06/05/2006     HEPATIC:   Lab Results   Component Value Date    ALBUMIN 3.8 04/08/2022    PROTTOTAL 7.7 04/08/2022    ALT 35 04/08/2022    AST 20 04/08/2022    ALKPHOS 71 04/08/2022    BILITOTAL 0.6 04/08/2022     OTHER:   Lab Results   Component Value Date    LACT 1.2 01/16/2015    A1C 5.9 (H) 05/07/2021    BILLY 9.5 04/08/2022    TSH 2.98 03/15/2018    T4 0.97 09/04/2013    SED 31 (H) 07/08/2009       Anesthesia Plan    ASA Status:  2   NPO Status:  NPO Appropriate    Anesthesia Type: MAC.     - Reason for MAC: Deep or markedly invasive procedure (G8)   Induction: Intravenous.   Maintenance: TIVA.        Consents    Anesthesia Plan(s) and associated risks, benefits, and realistic alternatives discussed. Questions answered and patient/representative(s) expressed understanding.    - Discussed:     - Discussed with:  Patient      - Extended Intubation/Ventilatory Support Discussed: No.      - Patient is DNR/DNI Status: No    Use of blood products discussed: Yes.     - Discussed with: Patient.     - Consented: consented to blood products            Reason for refusal: other.     Postoperative Care    Pain management: Multi-modal analgesia.   PONV prophylaxis: Background Propofol Infusion     Comments:    Other Comments: The risks and benefits of anesthesia, and the alternatives where applicable, have been discussed with the patient, and they wish to proceed.            Bina Arechiga, LUKE CRNA

## 2022-08-15 DIAGNOSIS — M54.2 NECK PAIN: ICD-10-CM

## 2022-08-17 NOTE — TELEPHONE ENCOUNTER
Pending Prescriptions:                       Disp   Refills    tiZANidine (ZANAFLEX) 4 MG tablet [Pharmac*180 ta*1        Sig: TAKE ONE TABLET BY MOUTH THREE TIMES A DAY AS NEEDED           FOR MUSCLE SPASMS        Routing refill request to provider for review/approval because:  Drug not on the [facility] refill protocol

## 2022-10-14 DIAGNOSIS — E87.6 HYPOKALEMIA: ICD-10-CM

## 2022-10-17 RX ORDER — POTASSIUM CHLORIDE 750 MG/1
TABLET, EXTENDED RELEASE ORAL
Qty: 180 TABLET | Refills: 1 | Status: SHIPPED | OUTPATIENT
Start: 2022-10-17 | End: 2023-03-07

## 2022-10-17 NOTE — TELEPHONE ENCOUNTER
Pending Prescriptions:                       Disp   Refills    potassium chloride ER (KLOR-CON M) 10 MEQ *180 ta*1        Sig: TAKE ONE TABLET BY MOUTH TWICE A DAY    Routing refill request to provider for review/approval because:  Drug not active on patient's medication list

## 2022-11-05 DIAGNOSIS — M54.2 NECK PAIN: ICD-10-CM

## 2023-01-04 DIAGNOSIS — E78.5 HYPERLIPIDEMIA WITH TARGET LDL LESS THAN 130: ICD-10-CM

## 2023-01-04 DIAGNOSIS — M54.2 NECK PAIN: ICD-10-CM

## 2023-01-05 NOTE — TELEPHONE ENCOUNTER
Pending Prescriptions:                       Disp   Refills    atorvastatin (LIPITOR) 40 MG tablet [Pharm*90 tab*3        Sig: TAKE ONE TABLET BY MOUTH ONCE DAILY    tiZANidine (ZANAFLEX) 4 MG tablet [Pharmac*180 ta*0        Sig: TAKE ONE TABLET BY MOUTH THREE TIMES A DAY AS NEEDED           FOR MUSCLE SPASMS        Routing refill request to provider for review/approval because:  Drug not on the [facility] refill protocol

## 2023-01-06 RX ORDER — ATORVASTATIN CALCIUM 40 MG/1
TABLET, FILM COATED ORAL
Qty: 90 TABLET | Refills: 0 | Status: SHIPPED | OUTPATIENT
Start: 2023-01-06 | End: 2023-03-07

## 2023-03-07 ENCOUNTER — VIRTUAL VISIT (OUTPATIENT)
Dept: FAMILY MEDICINE | Facility: CLINIC | Age: 63
End: 2023-03-07
Payer: COMMERCIAL

## 2023-03-07 ENCOUNTER — TELEPHONE (OUTPATIENT)
Dept: FAMILY MEDICINE | Facility: CLINIC | Age: 63
End: 2023-03-07

## 2023-03-07 DIAGNOSIS — E78.5 HYPERLIPIDEMIA WITH TARGET LDL LESS THAN 130: ICD-10-CM

## 2023-03-07 DIAGNOSIS — Z13.0 SCREENING FOR DEFICIENCY ANEMIA: ICD-10-CM

## 2023-03-07 DIAGNOSIS — R73.9 ELEVATED BLOOD SUGAR: Primary | ICD-10-CM

## 2023-03-07 DIAGNOSIS — I10 ESSENTIAL HYPERTENSION WITH GOAL BLOOD PRESSURE LESS THAN 140/90: ICD-10-CM

## 2023-03-07 DIAGNOSIS — M54.2 NECK PAIN: ICD-10-CM

## 2023-03-07 DIAGNOSIS — E87.6 HYPOKALEMIA: ICD-10-CM

## 2023-03-07 PROCEDURE — 99441 PR PHYSICIAN TELEPHONE EVALUATION 5-10 MIN: CPT | Mod: 95 | Performed by: PHYSICIAN ASSISTANT

## 2023-03-07 RX ORDER — AMLODIPINE BESYLATE 5 MG/1
5 TABLET ORAL DAILY
Qty: 90 TABLET | Refills: 0 | Status: SHIPPED | OUTPATIENT
Start: 2023-03-07 | End: 2023-05-01

## 2023-03-07 RX ORDER — POTASSIUM CHLORIDE 750 MG/1
10 TABLET, EXTENDED RELEASE ORAL 2 TIMES DAILY
Qty: 180 TABLET | Refills: 0 | Status: SHIPPED | OUTPATIENT
Start: 2023-03-07 | End: 2023-05-01

## 2023-03-07 RX ORDER — ATORVASTATIN CALCIUM 40 MG/1
40 TABLET, FILM COATED ORAL DAILY
Qty: 90 TABLET | Refills: 0 | Status: SHIPPED | OUTPATIENT
Start: 2023-03-07 | End: 2023-05-01

## 2023-03-07 RX ORDER — PROPRANOLOL HYDROCHLORIDE 120 MG/1
CAPSULE, EXTENDED RELEASE ORAL
Qty: 180 CAPSULE | Refills: 0 | Status: SHIPPED | OUTPATIENT
Start: 2023-03-07 | End: 2023-05-01

## 2023-03-07 RX ORDER — LISINOPRIL AND HYDROCHLOROTHIAZIDE 12.5; 2 MG/1; MG/1
TABLET ORAL
Qty: 180 TABLET | Refills: 0 | Status: SHIPPED | OUTPATIENT
Start: 2023-03-07 | End: 2023-05-01

## 2023-03-07 ASSESSMENT — ANXIETY QUESTIONNAIRES
2. NOT BEING ABLE TO STOP OR CONTROL WORRYING: NOT AT ALL
GAD7 TOTAL SCORE: 0
4. TROUBLE RELAXING: NOT AT ALL
8. IF YOU CHECKED OFF ANY PROBLEMS, HOW DIFFICULT HAVE THESE MADE IT FOR YOU TO DO YOUR WORK, TAKE CARE OF THINGS AT HOME, OR GET ALONG WITH OTHER PEOPLE?: NOT DIFFICULT AT ALL
3. WORRYING TOO MUCH ABOUT DIFFERENT THINGS: NOT AT ALL
IF YOU CHECKED OFF ANY PROBLEMS ON THIS QUESTIONNAIRE, HOW DIFFICULT HAVE THESE PROBLEMS MADE IT FOR YOU TO DO YOUR WORK, TAKE CARE OF THINGS AT HOME, OR GET ALONG WITH OTHER PEOPLE: NOT DIFFICULT AT ALL
5. BEING SO RESTLESS THAT IT IS HARD TO SIT STILL: NOT AT ALL
7. FEELING AFRAID AS IF SOMETHING AWFUL MIGHT HAPPEN: NOT AT ALL
GAD7 TOTAL SCORE: 0
1. FEELING NERVOUS, ANXIOUS, OR ON EDGE: NOT AT ALL
7. FEELING AFRAID AS IF SOMETHING AWFUL MIGHT HAPPEN: NOT AT ALL
6. BECOMING EASILY ANNOYED OR IRRITABLE: NOT AT ALL
GAD7 TOTAL SCORE: 0

## 2023-03-07 ASSESSMENT — PATIENT HEALTH QUESTIONNAIRE - PHQ9
10. IF YOU CHECKED OFF ANY PROBLEMS, HOW DIFFICULT HAVE THESE PROBLEMS MADE IT FOR YOU TO DO YOUR WORK, TAKE CARE OF THINGS AT HOME, OR GET ALONG WITH OTHER PEOPLE: NOT DIFFICULT AT ALL
SUM OF ALL RESPONSES TO PHQ QUESTIONS 1-9: 1
SUM OF ALL RESPONSES TO PHQ QUESTIONS 1-9: 1

## 2023-03-07 NOTE — PROGRESS NOTES
Janice is a 62 year old who is being evaluated via a billable telephone visit.      What phone number would you like to be contacted at? [phone]  How would you like to obtain your AVS? Mail a copy    Distant Location (provider location):  On-site    Assessment & Plan   She is due for physical exam, this will be scheduled we will do fasting labs at the time of her appointment.  Essential hypertension with goal blood pressure less than 140/90  Continue current meds for now further refills after her face-to-face visit  - amLODIPine (NORVASC) 5 MG tablet; Take 1 tablet (5 mg) by mouth daily  - lisinopril-hydrochlorothiazide (ZESTORETIC) 20-12.5 MG tablet; TAKE TWO TABLETS BY MOUTH EVERY DAY  - propranolol ER (INDERAL LA) 120 MG 24 hr capsule; TAKE TWO CAPSULES BY MOUTH EVERY DAY  - Comprehensive metabolic panel; Future    Hyperlipidemia with target LDL less than 130  Refilling meds so she does not run out we will titrate dosage as needed after our next appointment  - atorvastatin (LIPITOR) 40 MG tablet; Take 1 tablet (40 mg) by mouth daily  - Lipid panel reflex to direct LDL Fasting; Future  - Comprehensive metabolic panel; Future    Hypokalemia  Future labs ordered titrate dosage as needed  - potassium chloride ER (KLOR-CON M) 10 MEQ CR tablet; Take 1 tablet (10 mEq) by mouth 2 times daily  - Comprehensive metabolic panel; Future    Neck pain  Patient uses as needed  - tiZANidine (ZANAFLEX) 4 MG tablet; Take 1 tablet (4 mg) by mouth 3 times daily as needed for muscle spasms    Elevated blood sugar  Due for hemoglobin A1c  - Hemoglobin A1c; Future    Screening for deficiency anemia    - CBC with platelets; Future          Return in about 2 months (around 5/7/2023) for Physical Exam.    Anisha Alfaro PA-C  [facility] ALLI Camacho is a 62 year old, presenting for the following health issues:  Medication Therapy Management (Medication management) and Medication Refill      History of  Present Illness       Reason for visit:  Med Check    She eats 0-1 servings of fruits and vegetables daily.She consumes 3 sweetened beverage(s) daily.She exercises with enough effort to increase her heart rate 10 to 19 minutes per day.  She exercises with enough effort to increase her heart rate 7 days per week.   She is taking medications regularly.    Today's PHQ-9         PHQ-9 Total Score: 1    PHQ-9 Q9 Thoughts of better off dead/self-harm past 2 weeks :   Not at all    How difficult have these problems made it for you to do your work, take care of things at home, or get along with other people: Not difficult at all  Today's CHELO-7 Score: 0     Patient needs her meds refilled.  She is due for a physical exam and Pap smear.  She prefers to wait on this until the spring.  She states she is feeling very well and is not having any issues or concerns but will likely run out of her meds before she is seen.  Her leg numbness is the same though she does not have back pain.  She is thankful for this.  She has no other questions or concerns    Review of Systems   Constitutional, HEENT, cardiovascular, pulmonary, gi and gu systems are negative, except as otherwise noted.      Objective           Vitals:  No vitals were obtained today due to virtual visit.    Physical Exam   healthy, alert and no distress  PSYCH: Alert and oriented times 3; coherent speech, normal   rate and volume, able to articulate logical thoughts, able   to abstract reason, no tangential thoughts, no hallucinations   or delusions  Her affect is normal and pleasant  RESP: No cough, no audible wheezing, able to talk in full sentences  Remainder of exam unable to be completed due to telephone visits    Admission on 05/26/2022, Discharged on 05/26/2022   Component Date Value Ref Range Status     COLONOSCOPY 05/26/2022    Final                    Value:[address]  [address]  _______________________________________________________________________________  Patient Name: Janice Ulloa            Procedure Date: 5/26/2022 9:41 AM  MRN: [id number]                       Account Number: [id number]  YOB: 1960               Admit Type: Outpatient  Age: 61                               Room: [place] [address]  Gender: Female                        Note Status: Finalized  Attending MD: BRYN PENNINGTON MD Total Sedation Time:   _______________________________________________________________________________     Procedure:             Colonoscopy  Indications:           High risk colon cancer surveillance: Personal history                          of colonic polyps  Providers:             BRYN PENNINGTON MD  Referring MD:            Medicines:             Monitored Anesthesia Care  Complications:         No immediate complications.  _______________________________________                          ________________________________________  Procedure:             Pre-Anesthesia Assessment:                         - Prior to the procedure, a History and Physical was                          performed, and patient medications, allergies and                          sensitivities were reviewed. The patient's tolerance                          of previous anesthesia was reviewed.                         - The risks and benefits of the procedure and the                          sedation options and risks were discussed with the                          patient. All questions were answered and informed                          consent was obtained.                         - After reviewing the risks and benefits, the patient                          was deemed in satisfactory condition to undergo the                          procedure.                         After obtaining informed consent, the colonoscope was                          passed under direct vision. Throughout the  procedu                          re,                          the patient's blood pressure, pulse, and oxygen                          saturations were monitored continuously. The                          Colonoscope was introduced through the anus and                          advanced to the cecum, identified by appendiceal                          orifice and ileocecal valve. The colonoscopy was                          performed without difficulty. The patient tolerated                          the procedure well. The quality of the bowel                          preparation was good.                                                                                   Findings:       The perianal and digital rectal examinations were normal.       The colon (entire examined portion) appeared normal.                                                                                   Impression:            - The entire examined colon is normal.                         - No specimens collected.  Recommendation:        - Dis                          charge patient to home.                         - Resume previous diet.                         - Continue present medications.                         - Repeat colonoscopy in 5 years for surveillance.                                                                                     __________________________  BRYN PENNINGTON MD  5/26/2022 9:54:44 AM  Number of Addenda: 0    Note Initiated On: 5/26/2022 9:41 AM                   Phone call duration: 7.5 minutes

## 2023-03-07 NOTE — TELEPHONE ENCOUNTER
Per virtual visit check out  Visit Disposition    Dispositions Check-out Note   0 Return in about 2 months (around 5/7/2023) for Physical Exam. Please call pt to  help pt schedule PE in may can wait to do labs at that appt, should have morning appt due to needing fasting labs     Kamryn Quigley CMA ([facility])

## 2023-03-07 NOTE — LETTER
March 9, 2023      Janice Ulloa  [address]  [place] [address]              Dear Janice,    Just wanted to remind you that you are due for your physical and fasting labs around or after 5/7/23.    Please give us a call to get this scheduled.    Your [facility] Team

## 2023-03-13 NOTE — ED NOTES
1450 Writer placed call to pt re: opening at workshop. Pt scheduled time for 3/15/23 at 1:00p.m.    Cayla Enrique, MSW, LCSW  3/13/2023     Patient up to the bathroom without assist. She is not short of breath while walking. There is some increase of pain with movement of the left arm.

## 2023-05-01 ENCOUNTER — OFFICE VISIT (OUTPATIENT)
Dept: FAMILY MEDICINE | Facility: CLINIC | Age: 63
End: 2023-05-01
Payer: COMMERCIAL

## 2023-05-01 VITALS
TEMPERATURE: 99.2 F | BODY MASS INDEX: 32.04 KG/M2 | WEIGHT: 169.7 LBS | SYSTOLIC BLOOD PRESSURE: 170 MMHG | HEIGHT: 61 IN | HEART RATE: 75 BPM | OXYGEN SATURATION: 96 % | DIASTOLIC BLOOD PRESSURE: 90 MMHG

## 2023-05-01 DIAGNOSIS — Z00.00 ROUTINE GENERAL MEDICAL EXAMINATION AT A HEALTH CARE FACILITY: Primary | ICD-10-CM

## 2023-05-01 DIAGNOSIS — Z12.31 VISIT FOR SCREENING MAMMOGRAM: ICD-10-CM

## 2023-05-01 DIAGNOSIS — Z13.0 SCREENING FOR DEFICIENCY ANEMIA: ICD-10-CM

## 2023-05-01 DIAGNOSIS — R73.9 ELEVATED BLOOD SUGAR: ICD-10-CM

## 2023-05-01 DIAGNOSIS — E87.6 HYPOKALEMIA: ICD-10-CM

## 2023-05-01 DIAGNOSIS — Z12.4 CERVICAL CANCER SCREENING: ICD-10-CM

## 2023-05-01 DIAGNOSIS — R35.0 URINARY FREQUENCY: ICD-10-CM

## 2023-05-01 DIAGNOSIS — E78.5 HYPERLIPIDEMIA WITH TARGET LDL LESS THAN 130: ICD-10-CM

## 2023-05-01 DIAGNOSIS — M54.2 NECK PAIN: ICD-10-CM

## 2023-05-01 DIAGNOSIS — I10 ESSENTIAL HYPERTENSION WITH GOAL BLOOD PRESSURE LESS THAN 140/90: ICD-10-CM

## 2023-05-01 LAB
ALBUMIN SERPL-MCNC: 4.4 G/DL (ref 3.4–5)
ALBUMIN UR-MCNC: NEGATIVE MG/DL
ALP SERPL-CCNC: 75 U/L (ref 40–150)
ALT SERPL W P-5'-P-CCNC: 38 U/L (ref 0–50)
ANION GAP SERPL CALCULATED.3IONS-SCNC: 6 MMOL/L (ref 3–14)
APPEARANCE UR: CLEAR
AST SERPL W P-5'-P-CCNC: 19 U/L (ref 0–45)
BILIRUB SERPL-MCNC: 0.5 MG/DL (ref 0.2–1.3)
BILIRUB UR QL STRIP: NEGATIVE
BUN SERPL-MCNC: 11 MG/DL (ref 7–30)
CALCIUM SERPL-MCNC: 10 MG/DL (ref 8.5–10.1)
CHLORIDE BLD-SCNC: 101 MMOL/L (ref 94–109)
CHOLEST SERPL-MCNC: 196 MG/DL
CO2 SERPL-SCNC: 31 MMOL/L (ref 20–32)
COLOR UR AUTO: YELLOW
CREAT SERPL-MCNC: 0.64 MG/DL (ref 0.52–1.04)
CREAT UR-MCNC: 17 MG/DL
ERYTHROCYTE [DISTWIDTH] IN BLOOD BY AUTOMATED COUNT: 12.7 % (ref 10–15)
FASTING STATUS PATIENT QL REPORTED: YES
GFR SERPL CREATININE-BSD FRML MDRD: >90 ML/MIN/1.73M2
GLUCOSE BLD-MCNC: 120 MG/DL (ref 70–99)
GLUCOSE UR STRIP-MCNC: NEGATIVE MG/DL
HBA1C MFR BLD: 6.1 % (ref 0–5.6)
HCT VFR BLD AUTO: 45.4 % (ref 35–47)
HDLC SERPL-MCNC: 55 MG/DL
HGB BLD-MCNC: 15 G/DL (ref 11.7–15.7)
HGB UR QL STRIP: NEGATIVE
KETONES UR STRIP-MCNC: NEGATIVE MG/DL
LDLC SERPL CALC-MCNC: 96 MG/DL
LEUKOCYTE ESTERASE UR QL STRIP: NEGATIVE
MCH RBC QN AUTO: 28.3 PG (ref 26.5–33)
MCHC RBC AUTO-ENTMCNC: 33 G/DL (ref 31.5–36.5)
MCV RBC AUTO: 86 FL (ref 78–100)
MICROALBUMIN UR-MCNC: 19 MG/L
MICROALBUMIN/CREAT UR: 111.76 MG/G CR (ref 0–25)
NITRATE UR QL: NEGATIVE
NONHDLC SERPL-MCNC: 141 MG/DL
PH UR STRIP: 7 [PH] (ref 5–7)
PLATELET # BLD AUTO: 245 10E3/UL (ref 150–450)
POTASSIUM BLD-SCNC: 3.7 MMOL/L (ref 3.4–5.3)
PROT SERPL-MCNC: 8.7 G/DL (ref 6.8–8.8)
RBC # BLD AUTO: 5.3 10E6/UL (ref 3.8–5.2)
SODIUM SERPL-SCNC: 138 MMOL/L (ref 133–144)
SP GR UR STRIP: 1.01 (ref 1–1.03)
TRIGL SERPL-MCNC: 226 MG/DL
UROBILINOGEN UR STRIP-ACNC: 0.2 E.U./DL
WBC # BLD AUTO: 6.7 10E3/UL (ref 4–11)

## 2023-05-01 PROCEDURE — 36415 COLL VENOUS BLD VENIPUNCTURE: CPT | Performed by: PHYSICIAN ASSISTANT

## 2023-05-01 PROCEDURE — G0145 SCR C/V CYTO,THINLAYER,RESCR: HCPCS | Performed by: PHYSICIAN ASSISTANT

## 2023-05-01 PROCEDURE — 82570 ASSAY OF URINE CREATININE: CPT | Performed by: PHYSICIAN ASSISTANT

## 2023-05-01 PROCEDURE — 99214 OFFICE O/P EST MOD 30 MIN: CPT | Mod: 25 | Performed by: PHYSICIAN ASSISTANT

## 2023-05-01 PROCEDURE — 83036 HEMOGLOBIN GLYCOSYLATED A1C: CPT | Performed by: PHYSICIAN ASSISTANT

## 2023-05-01 PROCEDURE — 87624 HPV HI-RISK TYP POOLED RSLT: CPT | Performed by: PHYSICIAN ASSISTANT

## 2023-05-01 PROCEDURE — 80053 COMPREHEN METABOLIC PANEL: CPT | Performed by: PHYSICIAN ASSISTANT

## 2023-05-01 PROCEDURE — 99396 PREV VISIT EST AGE 40-64: CPT | Mod: 25 | Performed by: PHYSICIAN ASSISTANT

## 2023-05-01 PROCEDURE — 82043 UR ALBUMIN QUANTITATIVE: CPT | Performed by: PHYSICIAN ASSISTANT

## 2023-05-01 PROCEDURE — 80061 LIPID PANEL: CPT | Performed by: PHYSICIAN ASSISTANT

## 2023-05-01 PROCEDURE — 81003 URINALYSIS AUTO W/O SCOPE: CPT | Performed by: PHYSICIAN ASSISTANT

## 2023-05-01 PROCEDURE — 85027 COMPLETE CBC AUTOMATED: CPT | Performed by: PHYSICIAN ASSISTANT

## 2023-05-01 RX ORDER — LISINOPRIL AND HYDROCHLOROTHIAZIDE 12.5; 2 MG/1; MG/1
TABLET ORAL
Qty: 180 TABLET | Refills: 3 | Status: SHIPPED | OUTPATIENT
Start: 2023-05-01 | End: 2024-01-31

## 2023-05-01 RX ORDER — POTASSIUM CHLORIDE 750 MG/1
10 TABLET, EXTENDED RELEASE ORAL 2 TIMES DAILY
Qty: 180 TABLET | Refills: 3 | Status: SHIPPED | OUTPATIENT
Start: 2023-05-01 | End: 2024-01-31

## 2023-05-01 RX ORDER — PROPRANOLOL HYDROCHLORIDE 120 MG/1
CAPSULE, EXTENDED RELEASE ORAL
Qty: 180 CAPSULE | Refills: 3 | Status: SHIPPED | OUTPATIENT
Start: 2023-05-01 | End: 2023-11-20 | Stop reason: ALTCHOICE

## 2023-05-01 RX ORDER — AMLODIPINE BESYLATE 10 MG/1
10 TABLET ORAL DAILY
Qty: 90 TABLET | Refills: 3 | Status: SHIPPED | OUTPATIENT
Start: 2023-05-01 | End: 2023-06-06 | Stop reason: SINTOL

## 2023-05-01 RX ORDER — ATORVASTATIN CALCIUM 40 MG/1
40 TABLET, FILM COATED ORAL DAILY
Qty: 90 TABLET | Refills: 3 | Status: SHIPPED | OUTPATIENT
Start: 2023-05-01 | End: 2024-04-08

## 2023-05-01 RX ORDER — AMLODIPINE BESYLATE 5 MG/1
5 TABLET ORAL DAILY
Qty: 90 TABLET | Refills: 3 | Status: SHIPPED | OUTPATIENT
Start: 2023-05-01 | End: 2023-05-01 | Stop reason: DRUGHIGH

## 2023-05-01 ASSESSMENT — ENCOUNTER SYMPTOMS
EYE PAIN: 0
HEMATOCHEZIA: 0
CONSTIPATION: 0
PALPITATIONS: 0
PARESTHESIAS: 0
DYSURIA: 0
FEVER: 0
WEAKNESS: 0
HEADACHES: 0
SORE THROAT: 0
MYALGIAS: 1
NERVOUS/ANXIOUS: 0
SHORTNESS OF BREATH: 0
ARTHRALGIAS: 0
BREAST MASS: 0
FREQUENCY: 0
DIARRHEA: 0
HEARTBURN: 1
HEMATURIA: 0
ABDOMINAL PAIN: 0
COUGH: 0
CHILLS: 0
JOINT SWELLING: 0
DIZZINESS: 0
NAUSEA: 0

## 2023-05-01 ASSESSMENT — PAIN SCALES - GENERAL: PAINLEVEL: NO PAIN (0)

## 2023-05-01 NOTE — PROGRESS NOTES
SUBJECTIVE:   CC: Janice is an 62 year old who presents for preventive health visit.       5/1/2023     8:30 AM   Additional Questions   Roomed by Gloria RUIZ   Accompanied by None     Healthy Habits:     Getting at least 3 servings of Calcium per day:  Yes    Bi-annual eye exam:  NO    Dental care twice a year:  NO    Sleep apnea or symptoms of sleep apnea:  None    Diet:  Other    Frequency of exercise:  2-3 days/week    Duration of exercise:  30-45 minutes    Taking medications regularly:  Yes    Medication side effects:  None    PHQ-2 Total Score: 1    Additional concerns today:  No          Today's PHQ-2 Score:       5/1/2023     8:34 AM   PHQ-2 ( 1999 [employer])   Q1: Little interest or pleasure in doing things 1   Q2: Feeling down, depressed or hopeless 0   PHQ-2 Score 1   Q1: Little interest or pleasure in doing things Several days   Q2: Feeling down, depressed or hopeless Not at all   PHQ-2 Score 1           Social History     Tobacco Use     Smoking status: Former     Packs/day: 1.50     Years: 20.00     Pack years: 30.00     Types: Cigarettes     Quit date: 10/17/2007     Years since quitting: 15.5     Smokeless tobacco: Never   Vaping Use     Vaping status: Never Used   Substance Use Topics     Alcohol use: Not Currently             5/1/2023     8:34 AM   Alcohol Use   Prescreen: >3 drinks/day or >7 drinks/week? No     Reviewed orders with patient.  Reviewed health maintenance and updated orders accordingly - Yes  Labs reviewed in EPIC  BP Readings from Last 3 Encounters:   05/01/23 (!) 170/90   05/26/22 111/69   04/06/22 136/82    Wt Readings from Last 3 Encounters:   05/01/23 77 kg (169 lb 11.2 oz)   04/06/22 78.7 kg (173 lb 8 oz)   09/17/21 77.6 kg (171 lb)                  Patient Active Problem List   Diagnosis     Migraine     Carpal tunnel syndrome     Headache     Neck pain     Hypercholesteremia     Intervertebral cervical disc disorder with myelopathy, cervical region     Hyperlipidemia with target  LDL less than 130     Perimenopausal     Anemia     Microscopic hematuria     Neck muscle spasm     Hypertension goal BP (blood pressure) < 140/90     Pneumonia     Sepsis (H)     Acute and chronic respiratory failure with hypoxia (H)     Hypokalemia     DVT prophylaxis     Advanced directives, counseling/discussion     Health Care Home     Adjustment disorder with mixed anxiety and depressed mood     Family history of premature coronary heart disease     Lumbar radiculopathy     Past Surgical History:   Procedure Laterality Date     COLONOSCOPY  1/10/2014    Procedure: COMBINED COLONOSCOPY, SINGLE BIOPSY/POLYPECTOMY BY BIOPSY;  colonoscopy with polypectomy by forceps;  Surgeon: Chevy Yang MD;  Location: [facility]     COLONOSCOPY N/A 2022    Procedure: COLONOSCOPY;  Surgeon: Jaren Verduzco DO;  Location: [facility]     DECOMPRESSION LUMBAR MINIMALLY INVASIVE ONE LEVEL Bilateral 2021    Procedure: Minimally invasive Left Lumbar 4 to lumbar 5 bilateral decompression;  Surgeon: James Orozco MD;  Location: [facility]     DISCECTOMY LUMBAR MINIMALLY INVASIVE ONE LEVEL Left 2021    Procedure: Lumbar 5 to Sacral 1 microdiscectomy, Left;  Surgeon: James Orozco MD;  Location: [facility]     HC REVISE MEDIAN N/CARPAL TUNNEL SURG  2004     HC REVISE MEDIAN N/CARPAL TUNNEL SURG  06    Left     HC TREATMENT INCOMPLETE  SURG, ANY TRIMESTER      D&C after miscarriage     INJECT EPIDURAL LUMBAR Left 2020    Procedure: INJECTION, SPINE, LUMBAR, 5 Sacral 1 and Sacral 1 Left;  Surgeon: Abilio Middleton MD;  Location: [facility]     ZZC DISK SURG,ANTER,CERVICAL,SINGLE LVL  10/02/2007    C5-C6 anterior cervical diskectomy & fusion w/plate.     Z NONSPECIFIC PROCEDURE      Bilateral inguinal hernia repairs     Z NONSPECIFIC PROCEDURE      Oral surgery x2       Social History     Tobacco Use     Smoking status: Former     Packs/day: 1.50     Years: 20.00     Pack years: 30.00     Types:  Cigarettes     Quit date: 10/17/2007     Years since quitting: 15.5     Smokeless tobacco: Never   Vaping Use     Vaping status: Never Used   Substance Use Topics     Alcohol use: Not Currently     Family History   Problem Relation Age of Onset     Cancer Mother         parotid     Heart Disease Father 47        MI     Depression Daughter      Hypertension Sister            Breast Cancer Screening:  Any new diagnosis of family breast, ovarian, or bowel cancer? No    FHS-7:        View : No data to display.                Mammogram Screening: Recommended mammography every 1-2 years with patient discussion and risk factor consideration  Pertinent mammograms are reviewed under the imaging tab.    History of abnormal Pap smear: NO - age 30-65 PAP every 5 years with negative HPV co-testing recommended  Last 3 Pap and HPV Results:       Latest Ref Rng & Units 3/10/2016     9:48 AM 3/10/2016    12:00 AM 8/21/2012     8:40 AM   PAP / HPV   PAP (Historical)   NIL   NIL     HPV 16 DNA NEG Negative       HPV 18 DNA NEG Negative       Other HR HPV NEG Negative             Latest Ref Rng & Units 3/10/2016     9:48 AM 3/10/2016    12:00 AM 8/21/2012     8:40 AM   PAP / HPV   PAP (Historical)   NIL   NIL     HPV 16 DNA NEG Negative       HPV 18 DNA NEG Negative       Other HR HPV NEG Negative         Reviewed and updated as needed this visit by clinical staff   Tobacco  Allergies  Meds              Reviewed and updated as needed this visit by Provider                 Hyperlipidemia Follow-Up      Are you regularly taking any medication or supplement to lower your cholesterol?   Yes- Atorvastatin    Are you having muscle aches or other side effects that you think could be caused by your cholesterol lowering medication?  No    Hypertension Follow-up      Do you check your blood pressure regularly outside of the clinic? No     Are you following a low salt diet? Yes    Are your blood pressures ever more than 140 on the top number  "(systolic) OR more   than 90 on the bottom number (diastolic), for example 140/90?     She continues to have swelling at the tip of her right index finger.  This has been ongoing for years.  It is not getting worse and is not particularly tender though she is aware of it when she is lifting something or doing something specifically with her index finger.    Review of Systems   Constitutional: Negative for chills and fever.   HENT: Negative for congestion, ear pain, hearing loss and sore throat.    Eyes: Negative for pain and visual disturbance.   Respiratory: Negative for cough and shortness of breath.    Cardiovascular: Positive for peripheral edema. Negative for chest pain and palpitations.   Gastrointestinal: Positive for heartburn. Negative for abdominal pain, constipation, diarrhea, hematochezia and nausea.   Breasts:  Negative for tenderness, breast mass and discharge.   Genitourinary: Positive for urgency. Negative for dysuria, frequency, genital sores, hematuria, pelvic pain, vaginal bleeding and vaginal discharge.   Musculoskeletal: Positive for myalgias. Negative for arthralgias and joint swelling.   Skin: Negative for rash.   Neurological: Negative for dizziness, weakness, headaches and paresthesias.   Psychiatric/Behavioral: Negative for mood changes. The patient is not nervous/anxious.    edema is stable -unchanged she continues to take her medications as prescribed  Heartburn- on occasion TUMS resolves it  urinary urgency- feels like she has to go all time  Left leg still numbness after procedure but back pain is better     OBJECTIVE:   BP (!) 170/90   Pulse 75   Temp 99.2  F (37.3  C) (Temporal)   Ht 1.555 m (5' 1.22\")   Wt 77 kg (169 lb 11.2 oz)   LMP 08/19/2013 (Approximate)   SpO2 96%   Breastfeeding No   BMI 31.83 kg/m    Physical Exam  GENERAL APPEARANCE: healthy, alert and no distress  EYES: Eyes grossly normal to inspection, PERRL and conjunctivae and sclerae normal  HENT: ear canals " and TM's normal, nose and mouth without ulcers or lesions, oropharynx clear and oral mucous membranes moist  NECK: no adenopathy, no asymmetry, masses, or scars and thyroid normal to palpation  RESP: lungs clear to auscultation - no rales, rhonchi or wheezes  CV: regular rate and rhythm, normal S1 S2, no S3 or S4, no murmur, click or rub, no peripheral edema and peripheral pulses strong  ABDOMEN: soft, nontender, no hepatosplenomegaly, no masses and bowel sounds normal   (female): normal female external genitalia, normal urethral meatus, vaginal mucosal atrophy noted, normal cervix, adnexae, and uterus without masses or abnormal discharge  MS: no musculoskeletal defects are noted and gait is age appropriate without ataxia  SKIN: no suspicious lesions or rashes  NEURO: Normal strength and tone, sensory exam grossly normal, mentation intact and speech normal  PSYCH: mentation appears normal and affect normal/bright    Diagnostic Test Results:  Labs reviewed in Epic  Results for orders placed or performed in visit on 05/01/23 (from the past 24 hour(s))   Albumin Random Urine Quantitative with Creat Ratio   Result Value Ref Range    Creatinine Urine mg/dL 17 mg/dL    Albumin Urine mg/L 19 mg/L    Albumin Urine mg/g Cr 111.76 (H) 0.00 - 25.00 mg/g Cr   UA Macroscopic with reflex to Microscopic and Culture    Specimen: Urine, Midstream   Result Value Ref Range    Color Urine Yellow Colorless, Straw, Light Yellow, Yellow    Appearance Urine Clear Clear    Glucose Urine Negative Negative mg/dL    Bilirubin Urine Negative Negative    Ketones Urine Negative Negative mg/dL    Specific Gravity Urine 1.015 1.003 - 1.035    Blood Urine Negative Negative    pH Urine 7.0 5.0 - 7.0    Protein Albumin Urine Negative Negative mg/dL    Urobilinogen Urine 0.2 0.2, 1.0 E.U./dL    Nitrite Urine Negative Negative    Leukocyte Esterase Urine Negative Negative    Narrative    Microscopic not indicated   CBC with platelets   Result Value  Ref Range    WBC Count 6.7 4.0 - 11.0 10e3/uL    RBC Count 5.30 (H) 3.80 - 5.20 10e6/uL    Hemoglobin 15.0 11.7 - 15.7 g/dL    Hematocrit 45.4 35.0 - 47.0 %    MCV 86 78 - 100 fL    MCH 28.3 26.5 - 33.0 pg    MCHC 33.0 31.5 - 36.5 g/dL    RDW 12.7 10.0 - 15.0 %    Platelet Count 245 150 - 450 10e3/uL   Lipid panel reflex to direct LDL Fasting   Result Value Ref Range    Cholesterol 196 <200 mg/dL    Triglycerides 226 (H) <150 mg/dL    Direct Measure HDL 55 >=50 mg/dL    LDL Cholesterol Calculated 96 <=100 mg/dL    Non HDL Cholesterol 141 (H) <130 mg/dL    Patient Fasting > 8hrs? Yes     Narrative    Cholesterol  Desirable:  <200 mg/dL    Triglycerides  Normal:  Less than 150 mg/dL  Borderline High:  150-199 mg/dL  High:  200-499 mg/dL  Very High:  Greater than or equal to 500 mg/dL    Direct Measure HDL  Female:  Greater than or equal to 50 mg/dL   Male:  Greater than or equal to 40 mg/dL    LDL Cholesterol  Desirable:  <100mg/dL  Above Desirable:  100-129 mg/dL   Borderline High:  130-159 mg/dL   High:  160-189 mg/dL   Very High:  >= 190 mg/dL    Non HDL Cholesterol  Desirable:  130 mg/dL  Above Desirable:  130-159 mg/dL  Borderline High:  160-189 mg/dL  High:  190-219 mg/dL  Very High:  Greater than or equal to 220 mg/dL   Comprehensive metabolic panel   Result Value Ref Range    Sodium 138 133 - 144 mmol/L    Potassium 3.7 3.4 - 5.3 mmol/L    Chloride 101 94 - 109 mmol/L    Carbon Dioxide (CO2) 31 20 - 32 mmol/L    Anion Gap 6 3 - 14 mmol/L    Urea Nitrogen 11 7 - 30 mg/dL    Creatinine 0.64 0.52 - 1.04 mg/dL    Calcium 10.0 8.5 - 10.1 mg/dL    Glucose 120 (H) 70 - 99 mg/dL    Alkaline Phosphatase 75 40 - 150 U/L    AST 19 0 - 45 U/L    ALT 38 0 - 50 U/L    Protein Total 8.7 6.8 - 8.8 g/dL    Albumin 4.4 3.4 - 5.0 g/dL    Bilirubin Total 0.5 0.2 - 1.3 mg/dL    GFR Estimate >90 >60 mL/min/1.73m2   Hemoglobin A1c   Result Value Ref Range    Hemoglobin A1C 6.1 (H) 0.0 - 5.6 %       ASSESSMENT/PLAN:   (Z00.00)  Routine general medical examination at a health care facility  (primary encounter diagnosis)  Comment: Recommend routine annual visits  Plan:     (Z12.4) Cervical cancer screening  Comment: Pending that should be her last Pap as long as it is normal  Plan: Pap Screen with HPV - recommended age 30 - 65         years            (Z12.31) Visit for screening mammogram  Comment:   Plan: MA SCREENING DIGITAL BILAT - Future  (s+30)            (E87.6) Hypokalemia  Comment: Await results titrate dosage as needed  Plan: potassium chloride ER (KLOR-CON M) 10 MEQ CR         tablet            (I10) Essential hypertension with goal blood pressure less than 140/90  Comment: Not to goal, will increase her amlodipine to 10 mg daily  Plan: Albumin Random Urine Quantitative with Creat         Ratio, propranolol ER (INDERAL LA) 120 MG 24 hr        capsule, lisinopril-hydrochlorothiazide         (ZESTORETIC) 20-12.5 MG tablet, amLODIPine         (NORVASC) 10 MG tablet, DISCONTINUED:         amLODIPine (NORVASC) 5 MG tablet        Some of her blood pressure elevation may be related to being anxious about having her Pap smear    (M54.2) Neck pain  Comment: Patient requesting refill of uses 2 times daily  Plan: tiZANidine (ZANAFLEX) 4 MG tablet            (E78.5) Hyperlipidemia with target LDL less than 130  Comment:   Plan: atorvastatin (LIPITOR) 40 MG tablet        Labs are up-to-date for now    (R35.0) Urinary frequency  Comment:   Plan: UA Macroscopic with reflex to Microscopic and         Culture        No sign of infection    (Z13.0) Screening for deficiency anemia  Comment:   Plan:     (R73.9) Elevated blood sugar  Comment:   Plan:     Patient has been advised of split billing requirements and indicates understanding: Yes      COUNSELING:  Reviewed preventive health counseling, as reflected in patient instructions        She reports that she quit smoking about 15 years ago. Her smoking use included cigarettes. She has a 30.00  pack-year smoking history. She has never used smokeless tobacco.      Anisha Alfaro PA-C  [facility]

## 2023-05-03 LAB
BKR LAB AP GYN ADEQUACY: NORMAL
BKR LAB AP GYN INTERPRETATION: NORMAL
BKR LAB AP HPV REFLEX: NORMAL
BKR LAB AP PREVIOUS ABNORMAL: NORMAL
PATH REPORT.COMMENTS IMP SPEC: NORMAL
PATH REPORT.COMMENTS IMP SPEC: NORMAL
PATH REPORT.RELEVANT HX SPEC: NORMAL

## 2023-05-05 LAB
HUMAN PAPILLOMA VIRUS 16 DNA: NEGATIVE
HUMAN PAPILLOMA VIRUS 18 DNA: NEGATIVE
HUMAN PAPILLOMA VIRUS FINAL DIAGNOSIS: NORMAL
HUMAN PAPILLOMA VIRUS OTHER HR: NEGATIVE

## 2023-05-15 ENCOUNTER — HOSPITAL ENCOUNTER (OUTPATIENT)
Dept: MAMMOGRAPHY | Facility: CLINIC | Age: 63
Discharge: HOME | End: 2023-05-15
Attending: PHYSICIAN ASSISTANT | Admitting: PHYSICIAN ASSISTANT
Payer: COMMERCIAL

## 2023-05-15 ENCOUNTER — ALLIED HEALTH/NURSE VISIT (OUTPATIENT)
Dept: FAMILY MEDICINE | Facility: CLINIC | Age: 63
End: 2023-05-15
Payer: COMMERCIAL

## 2023-05-15 VITALS — SYSTOLIC BLOOD PRESSURE: 148 MMHG | DIASTOLIC BLOOD PRESSURE: 82 MMHG

## 2023-05-15 DIAGNOSIS — I10 ESSENTIAL HYPERTENSION WITH GOAL BLOOD PRESSURE LESS THAN 140/90: Primary | ICD-10-CM

## 2023-05-15 DIAGNOSIS — Z12.31 VISIT FOR SCREENING MAMMOGRAM: ICD-10-CM

## 2023-05-15 PROCEDURE — 77067 SCR MAMMO BI INCL CAD: CPT

## 2023-05-15 PROCEDURE — 99207 PR NO CHARGE NURSE ONLY: CPT

## 2023-05-15 NOTE — PROGRESS NOTES
Janice Ulloa is a 62 year old patient who comes in today for a Blood Pressure check.  Initial BP:  BP (!) 148/82   LMP 08/19/2013 (Approximate)      Data Unavailable  Disposition: results routed to provider and BP elevated.  Triage RN notified, patient asked to wait  Patient is asymptomatic.     Amena Galindo MA 5/15/2023

## 2023-05-19 ENCOUNTER — NURSE TRIAGE (OUTPATIENT)
Dept: FAMILY MEDICINE | Facility: CLINIC | Age: 63
End: 2023-05-19
Payer: COMMERCIAL

## 2023-05-19 ENCOUNTER — HOSPITAL ENCOUNTER (EMERGENCY)
Facility: CLINIC | Age: 63
Discharge: HOME | End: 2023-05-19
Attending: EMERGENCY MEDICINE | Admitting: EMERGENCY MEDICINE
Payer: COMMERCIAL

## 2023-05-19 VITALS
WEIGHT: 170 LBS | BODY MASS INDEX: 31.89 KG/M2 | OXYGEN SATURATION: 94 % | TEMPERATURE: 97.8 F | DIASTOLIC BLOOD PRESSURE: 89 MMHG | RESPIRATION RATE: 18 BRPM | SYSTOLIC BLOOD PRESSURE: 154 MMHG | HEART RATE: 70 BPM

## 2023-05-19 DIAGNOSIS — R60.9 DEPENDENT EDEMA: ICD-10-CM

## 2023-05-19 LAB
ALBUMIN SERPL BCG-MCNC: 4.7 G/DL (ref 3.5–5.2)
ALP SERPL-CCNC: 81 U/L (ref 35–104)
ALT SERPL W P-5'-P-CCNC: 24 U/L (ref 10–35)
ANION GAP SERPL CALCULATED.3IONS-SCNC: 10 MMOL/L (ref 7–15)
AST SERPL W P-5'-P-CCNC: 21 U/L (ref 10–35)
BASOPHILS # BLD AUTO: 0 10E3/UL (ref 0–0.2)
BASOPHILS NFR BLD AUTO: 1 %
BILIRUB SERPL-MCNC: 0.4 MG/DL
BUN SERPL-MCNC: 12.9 MG/DL (ref 8–23)
CALCIUM SERPL-MCNC: 10.2 MG/DL (ref 8.8–10.2)
CHLORIDE SERPL-SCNC: 98 MMOL/L (ref 98–107)
CREAT SERPL-MCNC: 0.64 MG/DL (ref 0.51–0.95)
CRP SERPL-MCNC: <3 MG/L
DEPRECATED HCO3 PLAS-SCNC: 30 MMOL/L (ref 22–29)
EOSINOPHIL # BLD AUTO: 0.3 10E3/UL (ref 0–0.7)
EOSINOPHIL NFR BLD AUTO: 5 %
ERYTHROCYTE [DISTWIDTH] IN BLOOD BY AUTOMATED COUNT: 12.9 % (ref 10–15)
GFR SERPL CREATININE-BSD FRML MDRD: >90 ML/MIN/1.73M2
GLUCOSE SERPL-MCNC: 107 MG/DL (ref 70–99)
HCT VFR BLD AUTO: 41.9 % (ref 35–47)
HGB BLD-MCNC: 14.1 G/DL (ref 11.7–15.7)
IMM GRANULOCYTES # BLD: 0 10E3/UL
IMM GRANULOCYTES NFR BLD: 0 %
LYMPHOCYTES # BLD AUTO: 1.9 10E3/UL (ref 0.8–5.3)
LYMPHOCYTES NFR BLD AUTO: 34 %
MCH RBC QN AUTO: 28.5 PG (ref 26.5–33)
MCHC RBC AUTO-ENTMCNC: 33.7 G/DL (ref 31.5–36.5)
MCV RBC AUTO: 85 FL (ref 78–100)
MONOCYTES # BLD AUTO: 0.5 10E3/UL (ref 0–1.3)
MONOCYTES NFR BLD AUTO: 8 %
NEUTROPHILS # BLD AUTO: 3 10E3/UL (ref 1.6–8.3)
NEUTROPHILS NFR BLD AUTO: 52 %
NRBC # BLD AUTO: 0 10E3/UL
NRBC BLD AUTO-RTO: 0 /100
NT-PROBNP SERPL-MCNC: 117 PG/ML (ref 0–900)
PLATELET # BLD AUTO: 235 10E3/UL (ref 150–450)
POTASSIUM SERPL-SCNC: 3.4 MMOL/L (ref 3.4–5.3)
PROT SERPL-MCNC: 8.2 G/DL (ref 6.4–8.3)
RBC # BLD AUTO: 4.94 10E6/UL (ref 3.8–5.2)
SODIUM SERPL-SCNC: 138 MMOL/L (ref 136–145)
TROPONIN T SERPL HS-MCNC: <6 NG/L
TSH SERPL DL<=0.005 MIU/L-ACNC: 2.09 UIU/ML (ref 0.3–4.2)
WBC # BLD AUTO: 5.7 10E3/UL (ref 4–11)

## 2023-05-19 PROCEDURE — 93010 ELECTROCARDIOGRAM REPORT: CPT | Performed by: EMERGENCY MEDICINE

## 2023-05-19 PROCEDURE — 84484 ASSAY OF TROPONIN QUANT: CPT | Performed by: EMERGENCY MEDICINE

## 2023-05-19 PROCEDURE — 80053 COMPREHEN METABOLIC PANEL: CPT | Performed by: EMERGENCY MEDICINE

## 2023-05-19 PROCEDURE — 93005 ELECTROCARDIOGRAM TRACING: CPT | Performed by: EMERGENCY MEDICINE

## 2023-05-19 PROCEDURE — 83880 ASSAY OF NATRIURETIC PEPTIDE: CPT | Performed by: EMERGENCY MEDICINE

## 2023-05-19 PROCEDURE — 86140 C-REACTIVE PROTEIN: CPT | Performed by: EMERGENCY MEDICINE

## 2023-05-19 PROCEDURE — 85004 AUTOMATED DIFF WBC COUNT: CPT | Performed by: EMERGENCY MEDICINE

## 2023-05-19 PROCEDURE — 99284 EMERGENCY DEPT VISIT MOD MDM: CPT | Mod: 25 | Performed by: EMERGENCY MEDICINE

## 2023-05-19 PROCEDURE — 99284 EMERGENCY DEPT VISIT MOD MDM: CPT | Performed by: EMERGENCY MEDICINE

## 2023-05-19 PROCEDURE — 36415 COLL VENOUS BLD VENIPUNCTURE: CPT | Performed by: EMERGENCY MEDICINE

## 2023-05-19 PROCEDURE — 84443 ASSAY THYROID STIM HORMONE: CPT | Performed by: EMERGENCY MEDICINE

## 2023-05-19 RX ORDER — FUROSEMIDE 20 MG
20 TABLET ORAL DAILY
Qty: 10 TABLET | Refills: 0 | Status: SHIPPED | OUTPATIENT
Start: 2023-05-19 | End: 2023-06-06

## 2023-05-19 ASSESSMENT — ACTIVITIES OF DAILY LIVING (ADL)
ADLS_ACUITY_SCORE: 35
ADLS_ACUITY_SCORE: 33

## 2023-05-19 NOTE — ED PROVIDER NOTES
History     Chief Complaint   Patient presents with     Leg Swelling     HPI  Janice Ulloa is a 62 year old female who by leg swelling.  Patient has no cardiac history.  Did have a cardiac stress test 5 years ago that was incomplete because of her exercise intolerance, ability.  Her EF was estimated around 55 to 60% and there was no obvious wall motion abnormality.  No renal disease.  No thyroid disease.  Does not monitor salt in her diet.  Has had no dyspnea, orthopnea or PND.  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  No history of venous insufficiency/varicose veins.    Allergies:  Allergies   Allergen Reactions     No Known Drug Allergy        Problem List:    Patient Active Problem List    Diagnosis Date Noted     Lumbar radiculopathy 06/01/2021     Priority: Medium     Added automatically from request for surgery [id number]       Family history of premature coronary heart disease 05/08/2018     Priority: Medium     Adjustment disorder with mixed anxiety and depressed mood 04/11/2017     Priority: Medium     Health Care Home 11/18/2016     Priority: Medium     Date:  11-18-16  Status:  Accepted       Pneumonia 01/16/2015     Priority: Medium     Sepsis (H) 01/16/2015     Priority: Medium     Problem list name updated by automated process. Provider to review       Acute and chronic respiratory failure with hypoxia (H) 01/16/2015     Priority: Medium     Hypokalemia 01/16/2015     Priority: Medium     DVT prophylaxis 01/16/2015     Priority: Medium     Advanced directives, counseling/discussion 01/16/2015     Priority: Medium     Hypertension goal BP (blood pressure) < 140/90 11/18/2013     Priority: Medium     Neck muscle spasm 12/06/2011     Priority: Medium     Microscopic hematuria 07/08/2011     Priority: Medium     Needs FU       Anemia 06/15/2011     Priority: Medium     Perimenopausal      Priority: Medium     irreg 2011       Hyperlipidemia with target LDL less than 130 07/01/2010      Priority: Medium     Diagnosis updated by automated process. Provider to review and confirm.       Hypercholesteremia 06/10/2010     Priority: Medium     Intervertebral cervical disc disorder with myelopathy, cervical region      Priority: Medium     C6 herniation with right C6 radiculopathy       Headache 07/10/2009     Priority: Medium     Problem list name updated by automated process. Provider to review       Neck pain 07/10/2009     Priority: Medium     Carpal tunnel syndrome 2006     Priority: Medium     Migraine 2006     Priority: Medium     Problem list name updated by automated process. Provider to review          Past Medical History:    Past Medical History:   Diagnosis Date     Carpal tunnel syndrome      Intervertebral cervical disc disorder with myelopathy, cervical region      Migraine, unspecified, without mention of intractable migraine without mention of status migrainosus      Perimenopausal      Personal history of tobacco use, presenting hazards to health        Past Surgical History:    Past Surgical History:   Procedure Laterality Date     COLONOSCOPY  1/10/2014    Procedure: COMBINED COLONOSCOPY, SINGLE BIOPSY/POLYPECTOMY BY BIOPSY;  colonoscopy with polypectomy by forceps;  Surgeon: Chevy Yang MD;  Location: [facility]     COLONOSCOPY N/A 2022    Procedure: COLONOSCOPY;  Surgeon: Jaren Verduzco DO;  Location: [facility]     DECOMPRESSION LUMBAR MINIMALLY INVASIVE ONE LEVEL Bilateral 2021    Procedure: Minimally invasive Left Lumbar 4 to lumbar 5 bilateral decompression;  Surgeon: James Orozco MD;  Location: [facility]     DISCECTOMY LUMBAR MINIMALLY INVASIVE ONE LEVEL Left 2021    Procedure: Lumbar 5 to Sacral 1 microdiscectomy, Left;  Surgeon: James Orozco MD;  Location: [facility]     HC REVISE MEDIAN N/CARPAL TUNNEL SURG  2004     HC REVISE MEDIAN N/CARPAL TUNNEL SURG  06    Left     HC TREATMENT INCOMPLETE  SURG, ANY TRIMESTER       D&C after miscarriage     INJECT EPIDURAL LUMBAR Left 7/9/2020    Procedure: INJECTION, SPINE, LUMBAR, 5 Sacral 1 and Sacral 1 Left;  Surgeon: Abilio Middleton MD;  Location:  OR     [facility] DISK SURG,ANTER,CERVICAL,SINGLE LVL  10/02/2007    C5-C6 anterior cervical diskectomy & fusion w/plate.     [facility] NONSPECIFIC PROCEDURE      Bilateral inguinal hernia repairs     [facility] NONSPECIFIC PROCEDURE      Oral surgery x2       Family History:    Family History   Problem Relation Age of Onset     Cancer Mother         parotid     Heart Disease Father 47        MI     Depression Daughter      Hypertension Sister        Social History:  Marital Status:   [2]  Social History     Tobacco Use     Smoking status: Former     Packs/day: 1.50     Years: 20.00     Pack years: 30.00     Types: Cigarettes     Quit date: 10/17/2007     Years since quitting: 15.5     Smokeless tobacco: Never   Vaping Use     Vaping status: Never Used   Substance Use Topics     Alcohol use: Not Currently     Drug use: No        Medications:    furosemide (LASIX) 20 MG tablet  amLODIPine (NORVASC) 10 MG tablet  Aspirin 81 MG CAPS  atorvastatin (LIPITOR) 40 MG tablet  lisinopril-hydrochlorothiazide (ZESTORETIC) 20-12.5 MG tablet  Multiple Vitamin (MULTI-VITAMIN DAILY PO)  potassium chloride ER (KLOR-CON M) 10 MEQ CR tablet  propranolol ER (INDERAL LA) 120 MG 24 hr capsule  tiZANidine (ZANAFLEX) 4 MG tablet          Review of Systems   All other systems reviewed and are negative.      Physical Exam   BP: (!) 178/94  Pulse: 77  Temp: 97.8  F (36.6  C)  Resp: 18  Weight: 77.1 kg (170 lb)  SpO2: 94 %      Physical Exam  Vitals and nursing note reviewed.   Constitutional:       Appearance: She is not ill-appearing.   HENT:      Head: Normocephalic.      Nose: Nose normal.   Eyes:      Conjunctiva/sclera: Conjunctivae normal.   Neck:      Comments: Mild JVD at 30 degrees  Cardiovascular:      Rate and Rhythm: Normal rate.   Pulmonary:      Effort: Pulmonary  effort is normal.   Musculoskeletal:      Cervical back: Normal range of motion.      Right lower leg: Edema present.      Left lower leg: Edema present.      Comments: Dependent bilateral pedal edema and ankle edema that is pitting.  +2 of the tibia bilateral.   Skin:     General: Skin is warm.      Capillary Refill: Capillary refill takes less than 2 seconds.      Findings: No rash.   Neurological:      General: No focal deficit present.      Mental Status: She is alert and oriented to person, place, and time.   Psychiatric:         Mood and Affect: Mood normal.         Behavior: Behavior normal.         ED Course                 Procedures           EKG:  Interpretation by Issac Nunez DO.   Indication:  Sinus  Rhythm   Rate 75 bpm  No ectopy  Normal axis  -  Nonspecific T-abnormality.     ABNORMAL        Results for orders placed or performed during the hospital encounter of 05/19/23 (from the past 24 hour(s))   CBC with platelets differential    Narrative    The following orders were created for panel order CBC with platelets differential.  Procedure                               Abnormality         Status                     ---------                               -----------         ------                     CBC with platelets and d...[[id number]]                      Final result                 Please view results for these tests on the individual orders.   Comprehensive metabolic panel   Result Value Ref Range    Sodium 138 136 - 145 mmol/L    Potassium 3.4 3.4 - 5.3 mmol/L    Chloride 98 98 - 107 mmol/L    Carbon Dioxide (CO2) 30 (H) 22 - 29 mmol/L    Anion Gap 10 7 - 15 mmol/L    Urea Nitrogen 12.9 8.0 - 23.0 mg/dL    Creatinine 0.64 0.51 - 0.95 mg/dL    Calcium 10.2 8.8 - 10.2 mg/dL    Glucose 107 (H) 70 - 99 mg/dL    Alkaline Phosphatase 81 35 - 104 U/L    AST 21 10 - 35 U/L    ALT 24 10 - 35 U/L    Protein Total 8.2 6.4 - 8.3 g/dL    Albumin 4.7 3.5 - 5.2 g/dL    Bilirubin Total 0.4 <=1.2 mg/dL     GFR Estimate >90 >60 mL/min/1.73m2   Troponin T, High Sensitivity   Result Value Ref Range    Troponin T, High Sensitivity <6 <=14 ng/L   TSH with free T4 reflex   Result Value Ref Range    TSH 2.09 0.30 - 4.20 uIU/mL   Nt probnp inpatient (BNP)   Result Value Ref Range    N terminal Pro BNP Inpatient 117 0 - 900 pg/mL   CRP inflammation   Result Value Ref Range    CRP Inflammation <3.00 <5.00 mg/L   CBC with platelets and differential   Result Value Ref Range    WBC Count 5.7 4.0 - 11.0 10e3/uL    RBC Count 4.94 3.80 - 5.20 10e6/uL    Hemoglobin 14.1 11.7 - 15.7 g/dL    Hematocrit 41.9 35.0 - 47.0 %    MCV 85 78 - 100 fL    MCH 28.5 26.5 - 33.0 pg    MCHC 33.7 31.5 - 36.5 g/dL    RDW 12.9 10.0 - 15.0 %    Platelet Count 235 150 - 450 10e3/uL    % Neutrophils 52 %    % Lymphocytes 34 %    % Monocytes 8 %    % Eosinophils 5 %    % Basophils 1 %    % Immature Granulocytes 0 %    NRBCs per 100 WBC 0 <1 /100    Absolute Neutrophils 3.0 1.6 - 8.3 10e3/uL    Absolute Lymphocytes 1.9 0.8 - 5.3 10e3/uL    Absolute Monocytes 0.5 0.0 - 1.3 10e3/uL    Absolute Eosinophils 0.3 0.0 - 0.7 10e3/uL    Absolute Basophils 0.0 0.0 - 0.2 10e3/uL    Absolute Immature Granulocytes 0.0 <=0.4 10e3/uL    Absolute NRBCs 0.0 10e3/uL       Medications - No data to display    Assessments & Plan (with Medical Decision Making)  62-year-old female.  Presents with bilateral lower extremity edema.  Is from the pedal ankle and pretibial area.  +2 nonpitting.  No cardiovascular history.  Had a incomplete GXT echo stress 5 years ago because of inability to achieve adequate heart rate due to exercise limitations.  EF at that time was estimated around 55 to 60% with no wall motion abnormality.  She has had no symptoms to suggest CHF.  No orthopnea or PND.  No weight increase.  Does consume a fair amount of salt in her diet.  Is on Zestoretic for diuretic/hypertensive management.  Has been compliant.  Examination noted elevated blood pressure 178/94.   Temp 97.8.  Pulse 77.  O2 sats 94% on room air.  Lung bases were clear.  Did have mild JVD at 30 to 40 degrees incline.  Hepatomegaly.  Lower extremities showed no calf tenderness with negative Homans' sign.  Did have pitting pedal and ankle edema bilateral.  Be consistent with dependent edema.  Did not identify an acute CHF with BNP normal.  Thyroid functions were normal.  Renal functions normal.  Patient was advised to follow low sodium diet.  Gave her educational information.  Knee-high compression stockings to be fitted at the pharmacy.  Temporarily hold Zestoretic and start Lasix 20 mg daily for 10 days then may go back on her Zestoretic.     I have reviewed the nursing notes.    I have reviewed the findings, diagnosis, plan and need for follow up with the patient.          New Prescriptions    FUROSEMIDE (LASIX) 20 MG TABLET    Take 1 tablet (20 mg) by mouth daily for 10 days       Final diagnoses:   Dependent edema       5/19/2023   [facility] EMERGENCY DEPT     Issac Nunez, DO  05/19/23 1321       Issac Nunez, DO  05/24/23 0775

## 2023-05-19 NOTE — TELEPHONE ENCOUNTER
"Nurse Triage SBAR  Is this a 2nd Level Triage? NO    SITUATION:                                                    (Clearly and briefly define the situation)  Janice Ulloa is a 62 year old female     Patient transferred from  for triage. Patient reports she has an appointment on 6/6/23 for leg swelling and the swelling is getting worse and she is wondering if she can move up her appointment to next week.     BACKGROUND:                                                    (Provide clear, relevant background information that relates to the situation)  Current Medication:lisinopril-hydrochlorothiazide (ZESTORETIC) 20-12.5 MG tablet  amLODIPine (NORVASC) 10 MG tablet  propranolol ER (INDERAL LA) 120 MG 24 hr capsule  Hx: Hypertension  Last seen: 5/1/2023    Patient reports bilateral swelling in both legs, however left leg is more swollen than right leg. \"It feels like there is water in my ankles.\"  \"There is a rash on my ankles too\"- patient reports \"rash\" is not bumpy but endorses redness, tenderness, and itching.  Swelling from knees down, ankles the worst  Edema present- patient denies weeping  Patient reports when she tries to wear socks an indent forms \"even when I wear light or bigger socks.  Hard to get shoes on  Patient endorses ankles are tender tender    No history of blood clots per patient  No chest pain  No SOB  No Headache  No Blurry vison  No recent injuries    Patient reports swelling has been going on for two weeks but has gotten worse in the last few days.   History of elevated blood pressure    Elevating legs helps with discomfort but not swelling  NURSE ASSESSMENT:                                                    (A statement of your professional conclusion)    Patient should be seen now in an ED for assessment of symptoms to rule out DVT. Rn explained waiting until next week would not be safe due to possibility of DVT and patient should seek evaluation in ED today, leaving now. RN offered ADS " "as option and explained resource. Patient declined due to driving to [place]- \"I [place]'t drive in the cities.\" prefers to present to [facility] ED.    RN advised to leave not due to swelling duration and symptoms. Needs assessment today and not to wait. RN advised if any SOB, chest pain, blurry vision, headache develop to call 911. Patient verbalized understanding and all questions answered.     Patient appointment kept for 6/6/23 for ED follow up. Advised patient she can move up appointment as well. Patient verbalized understanding and all questions answered.       (See information below for more triage details.)  RECOMMENDATION(S) and PLAN:                                                    (What do you need from this individual?)  Protocol Recommended Disposition: Go To ED/UCC Now (Or To Office With PCP Approval)  Will comply with recommendation: yes    Routing to provider for recommendation on: N/A- patient presenting to [facility] ED.    Does the patient meet one of the following criteria for ADS visit consideration? 16+ years old, with an [facility] PCP     TIP  Providers, please consider if this condition is appropriate for management at one of our Acute and Diagnostic Services sites.     If patient is a good candidate, please use dotphrase <dot>triageresponse and select Refer to ADS to document.    Patient declines ADS referral option due to location.     Encourage to return call clinic triage nurse if further questions/concerns that may come up or if symptoms do not improve, worsen, or new symptoms develop.    NOTES: Disposition was determined by the first positive assessment question, therefore all previous assessment questions were negative.  Guideline used:System Protocol     NAN Thomas, RN  [facility] ~ Registered Nurse  Clinic Triage ~ [place] [facility] [place]  May 19, 2023      Reason for Disposition    Thigh, calf, or ankle swelling in both legs, but one side is definitely more swollen " (Exception: longstanding difference between legs)    Additional Information    Negative: Sounds like a life-threatening emergency to the triager    Negative: Chest pain    Negative: Small area of swelling and followed an insect bite to the area    Negative: Followed a knee injury    Negative: Ankle or foot injury    Negative: Pregnant with leg swelling or edema    Negative: SEVERE swelling (e.g., swelling extends above knee, entire leg is swollen, weeping fluid)    Negative: Thigh or calf pain and only 1 side and present > 1 hour    Negative: Thigh, calf, or ankle swelling in only one leg    Protocols used: LEG SWELLING AND EDEMA-A-OH

## 2023-05-19 NOTE — ED TRIAGE NOTES
Patient with bilateral leg swelling x 1 week.      Triage Assessment     Row Name 05/19/23 1047       Triage Assessment (Adult)    Airway WDL WDL       Respiratory WDL    Respiratory WDL WDL       Cardiac WDL    Cardiac WDL WDL

## 2023-05-19 NOTE — DISCHARGE INSTRUCTIONS
Lasix 20 mg daily for 10 days.  Take in the morning.  When to go to the pharmacy to  your medication have them measure and fit you for knee-high compression stockings.  These to be put on the morning and take it off when you go to bed at night.  Elevate your extremities when you can.  Reduce salt in your diet.  Avoid salt when cooking and at the dinner table.  Monitor content of food purchased for high sodium content.  While on your Lasix you may hold your Zestoretic(mild diuretic).  Once off of Lasix may restart

## 2023-06-06 ENCOUNTER — OFFICE VISIT (OUTPATIENT)
Dept: FAMILY MEDICINE | Facility: CLINIC | Age: 63
End: 2023-06-06
Payer: COMMERCIAL

## 2023-06-06 VITALS
TEMPERATURE: 98.1 F | OXYGEN SATURATION: 97 % | WEIGHT: 171 LBS | DIASTOLIC BLOOD PRESSURE: 80 MMHG | BODY MASS INDEX: 32.28 KG/M2 | SYSTOLIC BLOOD PRESSURE: 134 MMHG | HEIGHT: 61 IN | HEART RATE: 67 BPM | RESPIRATION RATE: 18 BRPM

## 2023-06-06 DIAGNOSIS — I10 ESSENTIAL HYPERTENSION WITH GOAL BLOOD PRESSURE LESS THAN 140/90: Primary | ICD-10-CM

## 2023-06-06 DIAGNOSIS — R60.0 PEDAL EDEMA: ICD-10-CM

## 2023-06-06 DIAGNOSIS — E87.6 HYPOKALEMIA: ICD-10-CM

## 2023-06-06 LAB — POTASSIUM SERPL-SCNC: 3.8 MMOL/L (ref 3.4–5.3)

## 2023-06-06 PROCEDURE — 99214 OFFICE O/P EST MOD 30 MIN: CPT | Performed by: PHYSICIAN ASSISTANT

## 2023-06-06 PROCEDURE — 84132 ASSAY OF SERUM POTASSIUM: CPT | Performed by: PHYSICIAN ASSISTANT

## 2023-06-06 PROCEDURE — 36415 COLL VENOUS BLD VENIPUNCTURE: CPT | Performed by: PHYSICIAN ASSISTANT

## 2023-06-06 RX ORDER — DILTIAZEM HYDROCHLORIDE 120 MG/1
120 CAPSULE, EXTENDED RELEASE ORAL DAILY
Status: CANCELLED | OUTPATIENT
Start: 2023-06-06

## 2023-06-06 RX ORDER — DILTIAZEM HYDROCHLORIDE 120 MG/1
120 CAPSULE, EXTENDED RELEASE ORAL DAILY
Qty: 30 CAPSULE | Refills: 1 | Status: SHIPPED | OUTPATIENT
Start: 2023-06-06 | End: 2023-07-03 | Stop reason: DRUGHIGH

## 2023-06-06 ASSESSMENT — PAIN SCALES - GENERAL: PAINLEVEL: NO PAIN (0)

## 2023-06-06 NOTE — PROGRESS NOTES
"  Assessment & Plan     Hypokalemia  We will await potassium and titrate dosage of supplement as needed  - Potassium; Future  - Potassium    Essential hypertension with goal blood pressure less than 140/90  Discontinue amlodipine and start diltiazem which is less likely to cause pedal edema  - diltiazem ER (DILT-XR) 120 MG 24 hr capsule; Take 1 capsule (120 mg) by mouth daily    Pedal edema  I think her high sodium diet and the fact that I increased her amlodipine from 5 to 10 mg in the few weeks before she had her increase in edema likely was a contributing factor.  She will avoid sodium in her diet, drink plenty of fluids elevate her legs and wear compression stockings  - Potassium; Future  - Potassium    We will recheck her blood pressure in 2 weeks to ensure her blood pressure is well controlled on her new medication, she will alert me at once if her pedal edema is returning       MED REC REQUIRED  Post Medication Reconciliation Status: discharge medications reconciled, continue medications without change  BMI:   Estimated body mass index is 32.05 kg/m  as calculated from the following:    Height as of this encounter: 1.556 m (5' 1.25\").    Weight as of this encounter: 77.6 kg (171 lb).   Weight management plan: Discussed healthy diet and exercise guidelines        Anisha Alfaro PA-C  [facility] ALLI Camacho is a 62 year old, presenting for the following health issues:  ER F/U        6/6/2023     7:55 AM   Additional Questions   Roomed by VE   Accompanied by SELF     History of Present Illness       Reason for visit:  My legs    She eats 0-1 servings of fruits and vegetables daily.She consumes 3 sweetened beverage(s) daily.She exercises with enough effort to increase her heart rate 9 or less minutes per day.  She exercises with enough effort to increase her heart rate 3 or less days per week. She is missing 7 dose(s) of medications per week.       ED/UC Followup:    Facility:  [facility]" [facility]  Date of visit: 05/19/2023  Reason for visit: Edema  Current Status: Improving but still having some swelling    She was seen by me on May 1, 2023.  Her blood pressure was still inadequately controlled we started increased her amlodipine from 5 mg to 10 mg.  About 1 week later patient states she started noticing pedal edema.  She does not follow a low-salt diet though has since started to do so since her ER visit.  Her swelling is much better she did hold the Zestoretic as instructed but now that she is off Lasix for 10 days she is back on Zestoretic once again.  She wears her compression stockings only when she works not the other days of the week.  She only works 2 days a week.  She states her calves feel very tight but better overall.  ER documentation reviewed.      Review of Systems   As documented above       Objective    /80 (BP Location: Left arm, Patient Position: Sitting, Cuff Size: Adult Regular)   Pulse 67   Temp 98.1  F (36.7  C) (Temporal)   Resp 18   Ht 1.556 m (5' 1.25\")   Wt 77.6 kg (171 lb)   LMP 08/19/2013 (Approximate)   SpO2 97%   Breastfeeding No   BMI 32.05 kg/m    Body mass index is 32.05 kg/m .  Physical Exam   GENERAL: healthy, alert and no distress  NECK: no adenopathy, no asymmetry, masses, or scars and thyroid normal to palpation  RESP: lungs clear to auscultation - no rales, rhonchi or wheezes  CV: regular rate and rhythm, normal S1 S2, no S3 or S4, no murmur, click or rub, no peripheral edema and peripheral pulses strong  ABDOMEN: soft, nontender, no hepatosplenomegaly, no masses and bowel sounds normal  MS: 1+ pitting edema to just beneath the knee    Admission on 05/19/2023, Discharged on 05/19/2023   Component Date Value Ref Range Status     Sodium 05/19/2023 138  136 - 145 mmol/L Final     Potassium 05/19/2023 3.4  3.4 - 5.3 mmol/L Final     Chloride 05/19/2023 98  98 - 107 mmol/L Final     Carbon Dioxide (CO2) 05/19/2023 30 (H)  22 " - 29 mmol/L Final     Anion Gap 05/19/2023 10  7 - 15 mmol/L Final     Urea Nitrogen 05/19/2023 12.9  8.0 - 23.0 mg/dL Final     Creatinine 05/19/2023 0.64  0.51 - 0.95 mg/dL Final     Calcium 05/19/2023 10.2  8.8 - 10.2 mg/dL Final     Glucose 05/19/2023 107 (H)  70 - 99 mg/dL Final     Alkaline Phosphatase 05/19/2023 81  35 - 104 U/L Final     AST 05/19/2023 21  10 - 35 U/L Final     ALT 05/19/2023 24  10 - 35 U/L Final     Protein Total 05/19/2023 8.2  6.4 - 8.3 g/dL Final     Albumin 05/19/2023 4.7  3.5 - 5.2 g/dL Final     Bilirubin Total 05/19/2023 0.4  <=1.2 mg/dL Final     GFR Estimate 05/19/2023 >90  >60 mL/min/1.73m2 Final    eGFR calculated using 2021 CKD-EPI equation.     Troponin T, High Sensitivity 05/19/2023 <6  <=14 ng/L Final    Either a High Sensitivity Troponin T baseline (0 hours) value = 100 ng/L, or an increase in High Sensitivity Troponin T = 7 ng/L at 2 hours compared to 0 hours (2-0 hours), suggests myocardial injury, and urgent clinical attention is required.    If the 2-0 hours increase is <7 ng/L, a High Sensitivity Troponin T result above gender-specific reference ranges warrants further evaluation.   Recommendations for further evaluation include correlation with clinical decision-making tool (e.g., HEART), a 3rd High Sensitivity Troponin T test 2 hours after the 2nd (a 20% change from baseline would represent concern), admission for observation, close PCC/cardiology follow-up, or urgent outpatient provocative testing.     TSH 05/19/2023 2.09  0.30 - 4.20 uIU/mL Final     N terminal Pro BNP Inpatient 05/19/2023 117  0 - 900 pg/mL Final    Reference range shown and results flagged as abnormal are suggested inpatient cut points for confirming diagnosis if CHF in an acute setting. Establishing a baseline value for each individual patient is useful for follow-up. An inpatient or emergency department NT-proPBNP <300 pg/mL effectively rules out acute CHF, with 99% negative predictive  value.    The outpatient non-acute reference range for ruling out CHF is:  0-125 pg/mL (age 18 to less than 75)  0-450 pg/mL (age 75 yrs and older)      CRP Inflammation 05/19/2023 <3.00  <5.00 mg/L Final     WBC Count 05/19/2023 5.7  4.0 - 11.0 10e3/uL Final     RBC Count 05/19/2023 4.94  3.80 - 5.20 10e6/uL Final     Hemoglobin 05/19/2023 14.1  11.7 - 15.7 g/dL Final     Hematocrit 05/19/2023 41.9  35.0 - 47.0 % Final     MCV 05/19/2023 85  78 - 100 fL Final     MCH 05/19/2023 28.5  26.5 - 33.0 pg Final     MCHC 05/19/2023 33.7  31.5 - 36.5 g/dL Final     RDW 05/19/2023 12.9  10.0 - 15.0 % Final     Platelet Count 05/19/2023 235  150 - 450 10e3/uL Final     % Neutrophils 05/19/2023 52  % Final     % Lymphocytes 05/19/2023 34  % Final     % Monocytes 05/19/2023 8  % Final     % Eosinophils 05/19/2023 5  % Final     % Basophils 05/19/2023 1  % Final     % Immature Granulocytes 05/19/2023 0  % Final     NRBCs per 100 WBC 05/19/2023 0  <1 /100 Final     Absolute Neutrophils 05/19/2023 3.0  1.6 - 8.3 10e3/uL Final     Absolute Lymphocytes 05/19/2023 1.9  0.8 - 5.3 10e3/uL Final     Absolute Monocytes 05/19/2023 0.5  0.0 - 1.3 10e3/uL Final     Absolute Eosinophils 05/19/2023 0.3  0.0 - 0.7 10e3/uL Final     Absolute Basophils 05/19/2023 0.0  0.0 - 0.2 10e3/uL Final     Absolute Immature Granulocytes 05/19/2023 0.0  <=0.4 10e3/uL Final     Absolute NRBCs 05/19/2023 0.0  10e3/uL Final     No results found for any visits on 06/06/23.

## 2023-06-07 ENCOUNTER — TELEPHONE (OUTPATIENT)
Dept: FAMILY MEDICINE | Facility: CLINIC | Age: 63
End: 2023-06-07
Payer: COMMERCIAL

## 2023-06-07 NOTE — TELEPHONE ENCOUNTER
Left message for patient to return call to clinic for results below:      Please inform pt- your potassium is normal.  Anisha Alfaro PA-C

## 2023-06-07 NOTE — TELEPHONE ENCOUNTER
Patient informed of potassium results.    No further questions or concerns at this time.    Michael Tomas, KARMENN, RN, PHN  [facility] ~ Registered Nurse  Clinic Triage ~ [place] [facility] [place]  June 7, 2023

## 2023-06-20 ENCOUNTER — ALLIED HEALTH/NURSE VISIT (OUTPATIENT)
Dept: FAMILY MEDICINE | Facility: CLINIC | Age: 63
End: 2023-06-20
Payer: COMMERCIAL

## 2023-06-20 VITALS — DIASTOLIC BLOOD PRESSURE: 82 MMHG | SYSTOLIC BLOOD PRESSURE: 142 MMHG

## 2023-06-20 DIAGNOSIS — I10 ESSENTIAL HYPERTENSION WITH GOAL BLOOD PRESSURE LESS THAN 140/90: Primary | ICD-10-CM

## 2023-06-20 PROCEDURE — 99207 PR NO CHARGE NURSE ONLY: CPT

## 2023-06-20 NOTE — PROGRESS NOTES
Janice Ulloa is a 62 year old patient who comes in today for a Blood Pressure check.  Initial BP:  BP (!) 142/82   LMP 08/19/2013 (Approximate)      Data Unavailable  Disposition: results routed to provider and BP elevated.  Triage RN notified, patient asked to wait    Patient is asymptomatic and will wait to hear from provider on next steps.   Amena Galindo MA 6/20/2023      Patient also was curious to why she hasnt heard about her mammo results.

## 2023-06-22 ENCOUNTER — TELEPHONE (OUTPATIENT)
Dept: FAMILY MEDICINE | Facility: CLINIC | Age: 63
End: 2023-06-22
Payer: COMMERCIAL

## 2023-06-22 NOTE — TELEPHONE ENCOUNTER
Left message for Janice to call triage line back to relay message from Anisha Alfaro.  See below    Please call patient, I saw her most recent blood pressure check.  It was not quite to goal.  She can do another recheck with nurse in 2 weeks or I can see her in the office in [place] in 2 weeks.  Please help her schedule whenever her preference is.  Also please ensure that the swelling in her ankles is improving, if not have speak to triage please  SAURABH Sanderson RN  [facility] ~ Registered Nurse  Clinic Triage ~ [place] & [place]  June 22, 2023

## 2023-06-22 NOTE — TELEPHONE ENCOUNTER
Please call patient, I saw her most recent blood pressure check.  It was not quite to goal.  She can do another recheck with nurse in 2 weeks or I can see her in the office in [place] in 2 weeks.  Please help her schedule whenever her preference is.  Also please ensure that the swelling in her ankles is improving, if not have speak to triage please  Anisha Alfaro PA-C

## 2023-06-23 NOTE — TELEPHONE ENCOUNTER
"RN Triage    Patient Contact    Attempt # 2    Was call answered?  No.  Left message on voicemail with information to call me back.    Upon patient call back please relay information below from provider and triage if indicated.     \"Please call patient, I saw her most recent blood pressure check.  It was not quite to goal.  She can do another recheck with nurse in 2 weeks or I can see her in the office in [place] in 2 weeks.  Please help her schedule whenever her preference is.  Also please ensure that the swelling in her ankles is improving, if not have speak to triage please  Anisha Alfaro PA-C\"    NAN Thomas, RN  [facility] ~ Registered Nurse  Clinic Triage ~ [place] & [place]  June 23, 2023    "

## 2023-06-23 NOTE — TELEPHONE ENCOUNTER
Patient returned call.   Provided patient with message from PCP noted below. She would like to follow up with PCP, appointment scheduled.   Patient reports an improvement in her ankle swelling. Not completely gone but improved.     Appointments in Next Year    Jul 03, 2023  9:00 AM  (Arrive by 8:40 AM)  Provider Visit with Anisha Alfaro PA-C  [facility] [place] ([facility] [place] ) [phone]        Yuni PERAZAN, RN  [facility]

## 2023-07-03 ENCOUNTER — OFFICE VISIT (OUTPATIENT)
Dept: FAMILY MEDICINE | Facility: CLINIC | Age: 63
End: 2023-07-03
Payer: COMMERCIAL

## 2023-07-03 VITALS
OXYGEN SATURATION: 95 % | SYSTOLIC BLOOD PRESSURE: 178 MMHG | DIASTOLIC BLOOD PRESSURE: 96 MMHG | HEIGHT: 61 IN | HEART RATE: 68 BPM | WEIGHT: 170.6 LBS | TEMPERATURE: 98.9 F | RESPIRATION RATE: 14 BRPM | BODY MASS INDEX: 32.21 KG/M2

## 2023-07-03 DIAGNOSIS — I10 ESSENTIAL HYPERTENSION WITH GOAL BLOOD PRESSURE LESS THAN 140/90: Primary | ICD-10-CM

## 2023-07-03 DIAGNOSIS — M19.049 ARTHRITIS OF FINGER: ICD-10-CM

## 2023-07-03 PROCEDURE — 99214 OFFICE O/P EST MOD 30 MIN: CPT | Performed by: PHYSICIAN ASSISTANT

## 2023-07-03 RX ORDER — DILTIAZEM HYDROCHLORIDE EXTENDED-RELEASE TABLETS 180 MG/1
180 TABLET, EXTENDED RELEASE ORAL DAILY
Qty: 30 TABLET | Refills: 1 | Status: SHIPPED | OUTPATIENT
Start: 2023-07-03 | End: 2023-08-10 | Stop reason: DRUGHIGH

## 2023-07-03 ASSESSMENT — PAIN SCALES - GENERAL: PAINLEVEL: NO PAIN (0)

## 2023-07-03 NOTE — PROGRESS NOTES
Assessment & Plan     Essential hypertension with goal blood pressure less than 140/90  Continue current dosages of lisinopril hydrochlorothiazide and propranolol we will increase the dosage of diltiazem from 1 20-1 80  - diltiazem ER (CARDIAZEM LA) 180 MG 24 hr tablet; Take 1 tablet (180 mg) by mouth daily  Recheck float nurse in 2 weeks  Arthritis of finger  She will try Voltaren topically, offered referral to hand specialist she declined for now          SAURABH Sanderson [facility] ALLI Camacho is a 62 year old, presenting for the following health issues:  Hypertension        7/3/2023     8:41 AM   Additional Questions   Roomed by BRITTANIK   Accompanied by self     History of Present Illness       Hypertension: She presents for follow up of hypertension.  She does not check blood pressure  regularly outside of the clinic. Outside blood pressures have been over 140/90. She follows a low salt diet.     She eats 0-1 servings of fruits and vegetables daily.She consumes 2 sweetened beverage(s) daily.She exercises with enough effort to increase her heart rate 30 to 60 minutes per day.  She exercises with enough effort to increase her heart rate 3 or less days per week.   She is taking medications regularly.     Blood pressures at home have been in the 140s over an unknown number diastolically.  Since stopping amlodipine on June 6  her pedal edema has improved markedly.  She is not having any chest pains or shortness of breath and was not previously.  She has been focusing on reducing the sodium in her diet as well.      Her right edematous inflamed and sore mainly at the PIP joint she wonders what she can take for this.  She is quite sure it is arthritis.    Review of Systems   Constitutional, HEENT, cardiovascular, pulmonary, gi and gu systems are negative, except as otherwise noted.      Objective    BP (!) 178/96 (BP Location: Left arm, Patient Position: Sitting, Cuff Size: Adult  "Regular)   Pulse 68   Temp 98.9  F (37.2  C) (Temporal)   Resp 14   Ht 1.556 m (5' 1.26\")   Wt 77.4 kg (170 lb 9.6 oz)   LMP 08/19/2013 (Approximate)   SpO2 95%   BMI 31.96 kg/m    Body mass index is 31.96 kg/m .  Physical Exam   GENERAL: healthy, alert and no distress  NECK: no adenopathy, no asymmetry, masses, or scars and thyroid normal to palpation  RESP: lungs clear to auscultation - no rales, rhonchi or wheezes  CV: regular rate and rhythm, normal S1 S2, no S3 or S4, no murmur, click or rub, no peripheral edema and peripheral pulses strong  ABDOMEN: soft, nontender, no hepatosplenomegaly, no masses and bowel sounds normal  MS: right index finger edematous at the PIP and DIP joints.  PSYCH: mentation appears normal, affect normal/bright    Office Visit on 06/06/2023   Component Date Value Ref Range Status     Potassium 06/06/2023 3.8  3.4 - 5.3 mmol/L Final                   "

## 2023-08-01 ENCOUNTER — ALLIED HEALTH/NURSE VISIT (OUTPATIENT)
Dept: FAMILY MEDICINE | Facility: CLINIC | Age: 63
End: 2023-08-01
Payer: COMMERCIAL

## 2023-08-01 VITALS — SYSTOLIC BLOOD PRESSURE: 160 MMHG | DIASTOLIC BLOOD PRESSURE: 98 MMHG

## 2023-08-01 DIAGNOSIS — I10 ESSENTIAL HYPERTENSION WITH GOAL BLOOD PRESSURE LESS THAN 140/90: Primary | ICD-10-CM

## 2023-08-01 PROCEDURE — 99207 PR NO CHARGE NURSE ONLY: CPT

## 2023-08-01 NOTE — PROGRESS NOTES
Janice Ulloa is a 62 year old patient who comes in today for a Blood Pressure check.  Initial BP:  BP (!) 160/98   LMP 08/19/2013 (Approximate)      Data Unavailable  Disposition: results routed to provider and BP elevated.  Triage RN notified, patient asked to wait    Patient is asymptomatic. She will wait to hear on next steps.     Amena Galindo MA 8/1/2023

## 2023-08-10 ENCOUNTER — TELEPHONE (OUTPATIENT)
Dept: FAMILY MEDICINE | Facility: CLINIC | Age: 63
End: 2023-08-10
Payer: COMMERCIAL

## 2023-08-10 RX ORDER — DILTIAZEM HYDROCHLORIDE EXTENDED-RELEASE TABLETS 240 MG/1
240 TABLET, EXTENDED RELEASE ORAL DAILY
Qty: 30 TABLET | Refills: 0 | Status: SHIPPED | OUTPATIENT
Start: 2023-08-10 | End: 2023-09-11

## 2023-08-10 NOTE — PROGRESS NOTES
Please call patient, we will further increase her diltiazem to 240 mg I have sent in a new prescription to [facility] in [place].  I would also like to see her again within 3 to 4 weeks to recheck her blood pressure.  Please help her schedule.  Also please ensure she is not getting swelling in her ankles from this medication.  Anisha Alfaro PA-C

## 2023-08-10 NOTE — TELEPHONE ENCOUNTER
Please call patient, we will further increase her diltiazem to 240 mg I have sent in a new prescription to [facility] in [place].  I would also like to see her again within 3 to 4 weeks to recheck her blood pressure.  Please help her schedule.  Also please ensure she is not getting swelling in her ankles from this medication.  Anisha Alfaro PA-C

## 2023-08-10 NOTE — TELEPHONE ENCOUNTER
Left message for Janice to call back triage nurse line.    Agnes Garcia RN  [facility] ~ Registered Nurse  Clinic Triage ~ [place] & [place]  August 10, 2023

## 2023-08-11 NOTE — TELEPHONE ENCOUNTER
Patient informed and scheduled for float.    Michael Tomas, KARMENN, RN, PHN  [facility] ~ Registered Nurse  Clinic Triage ~ [place] & [place]  August 11, 2023

## 2023-09-06 ENCOUNTER — ALLIED HEALTH/NURSE VISIT (OUTPATIENT)
Dept: FAMILY MEDICINE | Facility: CLINIC | Age: 63
End: 2023-09-06
Payer: COMMERCIAL

## 2023-09-06 ENCOUNTER — MEDICAL CORRESPONDENCE (OUTPATIENT)
Dept: HEALTH INFORMATION MANAGEMENT | Facility: CLINIC | Age: 63
End: 2023-09-06

## 2023-09-06 VITALS — DIASTOLIC BLOOD PRESSURE: 90 MMHG | SYSTOLIC BLOOD PRESSURE: 148 MMHG

## 2023-09-06 DIAGNOSIS — I10 ESSENTIAL HYPERTENSION WITH GOAL BLOOD PRESSURE LESS THAN 140/90: Primary | ICD-10-CM

## 2023-09-06 PROCEDURE — 99207 PR NO CHARGE NURSE ONLY: CPT

## 2023-09-06 NOTE — PROGRESS NOTES
Janice Ulloa is a 63 year old patient who comes in today for a Blood Pressure check.  Initial BP:  BP (!) 148/90   LMP 08/19/2013 (Approximate)      Data Unavailable  Disposition: results routed to provider and BP elevated.  Triage RN notified, patient asked to wait..    Patient was in and we read your last note the way we read it is she should have seen you not the float but we could be reading it wrong. Patient is now schedule for your next available 9/19/23 810 am. Her BP machine at home has been reading them normal. I have placed this in the scanning bin to go in her chart. They have been in the 130s over 70s -80s that last month at home. Patient is bring in her cuff to the appt to compare it to see if it is off. Please advise if this isnt  what you would like for patient to do. She will wait to her from you or your team if anything changes.  Thank you   Amena Galindo MA 9/6/2023

## 2023-09-10 DIAGNOSIS — I10 ESSENTIAL HYPERTENSION WITH GOAL BLOOD PRESSURE LESS THAN 140/90: ICD-10-CM

## 2023-09-10 DIAGNOSIS — M54.2 NECK PAIN: ICD-10-CM

## 2023-09-11 RX ORDER — DILTIAZEM HYDROCHLORIDE EXTENDED-RELEASE TABLETS 240 MG/1
240 TABLET, EXTENDED RELEASE ORAL DAILY
Qty: 30 TABLET | Refills: 0 | Status: SHIPPED | OUTPATIENT
Start: 2023-09-11 | End: 2023-09-19 | Stop reason: DRUGHIGH

## 2023-09-19 ENCOUNTER — ANCILLARY PROCEDURE (OUTPATIENT)
Dept: GENERAL RADIOLOGY | Facility: CLINIC | Age: 63
End: 2023-09-19
Attending: PHYSICIAN ASSISTANT
Payer: COMMERCIAL

## 2023-09-19 ENCOUNTER — OFFICE VISIT (OUTPATIENT)
Dept: FAMILY MEDICINE | Facility: CLINIC | Age: 63
End: 2023-09-19
Payer: COMMERCIAL

## 2023-09-19 ENCOUNTER — TELEPHONE (OUTPATIENT)
Dept: FAMILY MEDICINE | Facility: CLINIC | Age: 63
End: 2023-09-19

## 2023-09-19 VITALS
TEMPERATURE: 99.2 F | DIASTOLIC BLOOD PRESSURE: 102 MMHG | OXYGEN SATURATION: 98 % | HEART RATE: 69 BPM | SYSTOLIC BLOOD PRESSURE: 190 MMHG | BODY MASS INDEX: 32.28 KG/M2 | WEIGHT: 171 LBS | HEIGHT: 61 IN | RESPIRATION RATE: 16 BRPM

## 2023-09-19 DIAGNOSIS — I10 ESSENTIAL HYPERTENSION WITH GOAL BLOOD PRESSURE LESS THAN 140/90: Primary | ICD-10-CM

## 2023-09-19 DIAGNOSIS — Z23 NEED FOR VACCINATION: ICD-10-CM

## 2023-09-19 DIAGNOSIS — S69.92XA WRIST INJURY, LEFT, INITIAL ENCOUNTER: ICD-10-CM

## 2023-09-19 DIAGNOSIS — M79.644 PAIN OF FINGER OF RIGHT HAND: ICD-10-CM

## 2023-09-19 LAB
ANION GAP SERPL CALCULATED.3IONS-SCNC: 12 MMOL/L (ref 7–15)
BUN SERPL-MCNC: 12.5 MG/DL (ref 8–23)
CALCIUM SERPL-MCNC: 10.5 MG/DL (ref 8.8–10.2)
CHLORIDE SERPL-SCNC: 98 MMOL/L (ref 98–107)
CREAT SERPL-MCNC: 0.68 MG/DL (ref 0.51–0.95)
DEPRECATED HCO3 PLAS-SCNC: 32 MMOL/L (ref 22–29)
EGFRCR SERPLBLD CKD-EPI 2021: >90 ML/MIN/1.73M2
GLUCOSE SERPL-MCNC: 95 MG/DL (ref 70–99)
POTASSIUM SERPL-SCNC: 4.3 MMOL/L (ref 3.4–5.3)
SODIUM SERPL-SCNC: 142 MMOL/L (ref 136–145)

## 2023-09-19 PROCEDURE — 36415 COLL VENOUS BLD VENIPUNCTURE: CPT | Performed by: PHYSICIAN ASSISTANT

## 2023-09-19 PROCEDURE — 80048 BASIC METABOLIC PNL TOTAL CA: CPT | Performed by: PHYSICIAN ASSISTANT

## 2023-09-19 PROCEDURE — 73110 X-RAY EXAM OF WRIST: CPT | Mod: TC | Performed by: RADIOLOGY

## 2023-09-19 PROCEDURE — 90682 RIV4 VACC RECOMBINANT DNA IM: CPT | Performed by: PHYSICIAN ASSISTANT

## 2023-09-19 PROCEDURE — 90471 IMMUNIZATION ADMIN: CPT | Performed by: PHYSICIAN ASSISTANT

## 2023-09-19 PROCEDURE — 99214 OFFICE O/P EST MOD 30 MIN: CPT | Mod: 25 | Performed by: PHYSICIAN ASSISTANT

## 2023-09-19 RX ORDER — DILTIAZEM HYDROCHLORIDE EXTENDED-RELEASE TABLETS 300 MG/1
300 TABLET, EXTENDED RELEASE ORAL DAILY
Qty: 30 TABLET | Refills: 0 | Status: SHIPPED | OUTPATIENT
Start: 2023-09-19 | End: 2023-10-03 | Stop reason: DRUGHIGH

## 2023-09-19 ASSESSMENT — PATIENT HEALTH QUESTIONNAIRE - PHQ9
SUM OF ALL RESPONSES TO PHQ QUESTIONS 1-9: 3
SUM OF ALL RESPONSES TO PHQ QUESTIONS 1-9: 3
10. IF YOU CHECKED OFF ANY PROBLEMS, HOW DIFFICULT HAVE THESE PROBLEMS MADE IT FOR YOU TO DO YOUR WORK, TAKE CARE OF THINGS AT HOME, OR GET ALONG WITH OTHER PEOPLE: NOT DIFFICULT AT ALL

## 2023-09-19 ASSESSMENT — PAIN SCALES - GENERAL: PAINLEVEL: NO PAIN (0)

## 2023-09-19 NOTE — PROGRESS NOTES
Assessment & Plan     Essential hypertension with goal blood pressure less than 140/90  I gave patient the option of going to the [facility] to help us rapidly improve her blood pressure, patient has declined.  She will go to the ER at once if she is having any symptoms of elevated blood pressure as we discussed.  We will further increase her diltiazem.  It is possible her blood pressure is up a bit related to the pain from her wrist injury.  At her next appointment if blood pressure is still high would consider renal ultrasound and further encouraged sleep study though her symptoms of sleep apnea are less and I asked  - Basic metabolic panel  (Ca, Cl, CO2, Creat, Gluc, K, Na, BUN); Future  - diltiazem ER (CARDIAZEM LA) 300 MG 24 hr tablet; Take 1 tablet (300 mg) by mouth daily  - Basic metabolic panel  (Ca, Cl, CO2, Creat, Gluc, K, Na, BUN)    Wrist injury, left, initial encounter  No sign of fracture she will continue to wear the splint ice rest and if its not improving in the next week or 2 needs follow-up  - XR Wrist Left G/E 3 Views; Future    Need for vaccination  Updated  - INFLUENZA VACCINE 18-64Y (FLUBLOK)    Pain of right index -it is possible she has a cyst or some significant arthritic changes causing enlargement to the distal aspect of her thumb.  We will refer at this point, she has previously declined this               Anisha Alfaro PA-C  [facility] ALLI Camacho is a 63 year old, presenting for the following health issues:  Hypertension        9/19/2023     8:11 AM   Additional Questions   Roomed by Gloria RUIZ   Accompanied by None         9/19/2023     8:11 AM   Patient Reported Additional Medications   Patient reports taking the following new medications NA       History of Present Illness       Hypertension: She presents for follow up of hypertension.  She does check blood pressure  regularly outside of the clinic. Outside blood pressures have been over 140/90. She follows  "a low salt diet.     She eats 0-1 servings of fruits and vegetables daily.She consumes 3 sweetened beverage(s) daily.She exercises with enough effort to increase her heart rate 60 or more minutes per day.  She exercises with enough effort to increase her heart rate 6 days per week.   She is taking medications regularly.     Her blood pressures at home have actually been fairly well controlled.  Blood pressure was 148/90 at [place] with the MA in September 6.  Patient's blood pressures were around this time or the 120s over the 130s over 60s to 70s with her home monitor.  She forgot to bring it in today to check for its accuracy.  On July 3 we started her on diltiazem 120 mg on August 1 when her blood pressure was 160/98 we increase the diltiazem to 240.  She is feeling well she is not having chest pains, shortness of breath, headaches, dizziness or vision changes.  She does think her blood pressure may be high today because when they checked her blood pressure it was very painful because she fell and injured her left wrist about 2 weeks ago.  They rechecked her blood pressure on the other arm but it was still very elevated.  She states she fell because she got up at night and tripped in the darkness over something that the cat had knocked over.  She has been wrapping her wrist but it still very painful.  She would like an x-ray also, her right      Review of Systems   As documented above       Objective    BP (!) 190/102   Pulse 69   Temp 99.2  F (37.3  C) (Temporal)   Resp 16   Ht 1.56 m (5' 1.42\")   Wt 77.6 kg (171 lb)   LMP 08/19/2013 (Approximate)   SpO2 98%   BMI 31.87 kg/m    Body mass index is 31.87 kg/m .  Physical Exam   GENERAL: healthy, alert and no distress  NECK: no adenopathy, no asymmetry, masses, or scars and thyroid normal to palpation  RESP: lungs clear to auscultation - no rales, rhonchi or wheezes  CV: regular rate and rhythm, normal S1 S2, no S3 or S4, no murmur, click or rub, no " peripheral edema and peripheral pulses strong  ABDOMEN: soft, nontender, no hepatosplenomegaly, no masses and bowel sounds normal  MS: LUE exam shows reduced range of motion in all directions due to pain, tenderness over the distal ulna and distal radius and right index finger edematous at the PIP and DIP joints.   PSYCH: mentation appears normal, affect normal/bright  Results for orders placed or performed in visit on 09/19/23   XR Wrist Left G/E 3 Views     Status: None    Narrative    XR WRIST LEFT G/E 3 VIEWS 9/19/2023 9:14 AM     HISTORY: Pain after fall 10 days ago, unsure how she fell; Wrist  injury, left, initial encounter    COMPARISON: None.       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 Degenerative change of the DRUJ. No evidence for fracture.  Normal carpal alignment.    YAZAN KIRK MD         SYSTEM ID:  [id number]     [facility] pending

## 2023-09-19 NOTE — TELEPHONE ENCOUNTER
Left message for pt to return call, good news, there is no sign of a fracture on her wrist x-ray, she may continue to wear the splint we gave her for comfort this should slowly improve over the course of the next few weeks. She likely sprained her wrist in the fall.  If at any time it is getting worse or failing to improve please let us know.  Anisha Alfaro PA-C

## 2023-09-25 ENCOUNTER — ALLIED HEALTH/NURSE VISIT (OUTPATIENT)
Dept: FAMILY MEDICINE | Facility: CLINIC | Age: 63
End: 2023-09-25
Payer: COMMERCIAL

## 2023-09-25 VITALS — SYSTOLIC BLOOD PRESSURE: 160 MMHG | DIASTOLIC BLOOD PRESSURE: 90 MMHG

## 2023-09-25 DIAGNOSIS — I10 ESSENTIAL HYPERTENSION WITH GOAL BLOOD PRESSURE LESS THAN 140/90: Primary | ICD-10-CM

## 2023-09-25 PROCEDURE — 99207 PR NO CHARGE NURSE ONLY: CPT

## 2023-09-25 NOTE — PROGRESS NOTES
Janice Ulloa is a 63 year old patient who comes in today for a Blood Pressure check.  Initial BP:  BP (!) 160/90   LMP 08/19/2013 (Approximate)      Data Unavailable  Disposition: results routed to provider and BP elevated.  Triage RN notified, patient asked to wait.    Patient will wait to hear on next steps.     Amena Galindo MA 9/25/2023

## 2023-10-03 ENCOUNTER — TELEPHONE (OUTPATIENT)
Dept: CARDIOLOGY | Facility: CLINIC | Age: 63
End: 2023-10-03

## 2023-10-03 ENCOUNTER — OFFICE VISIT (OUTPATIENT)
Dept: FAMILY MEDICINE | Facility: CLINIC | Age: 63
End: 2023-10-03
Payer: COMMERCIAL

## 2023-10-03 VITALS
WEIGHT: 172 LBS | DIASTOLIC BLOOD PRESSURE: 92 MMHG | BODY MASS INDEX: 32.47 KG/M2 | HEIGHT: 61 IN | HEART RATE: 64 BPM | TEMPERATURE: 99.5 F | SYSTOLIC BLOOD PRESSURE: 172 MMHG | RESPIRATION RATE: 14 BRPM | OXYGEN SATURATION: 97 %

## 2023-10-03 DIAGNOSIS — I10 ESSENTIAL HYPERTENSION WITH GOAL BLOOD PRESSURE LESS THAN 140/90: Primary | ICD-10-CM

## 2023-10-03 LAB
ANION GAP SERPL CALCULATED.3IONS-SCNC: 11 MMOL/L (ref 7–15)
BUN SERPL-MCNC: 16.7 MG/DL (ref 8–23)
CALCIUM SERPL-MCNC: 10.2 MG/DL (ref 8.8–10.2)
CHLORIDE SERPL-SCNC: 100 MMOL/L (ref 98–107)
CREAT SERPL-MCNC: 0.7 MG/DL (ref 0.51–0.95)
DEPRECATED HCO3 PLAS-SCNC: 30 MMOL/L (ref 22–29)
EGFRCR SERPLBLD CKD-EPI 2021: >90 ML/MIN/1.73M2
GLUCOSE SERPL-MCNC: 107 MG/DL (ref 70–99)
POTASSIUM SERPL-SCNC: 3.9 MMOL/L (ref 3.4–5.3)
SODIUM SERPL-SCNC: 141 MMOL/L (ref 135–145)
TSH SERPL DL<=0.005 MIU/L-ACNC: 1.76 UIU/ML (ref 0.3–4.2)

## 2023-10-03 PROCEDURE — 99213 OFFICE O/P EST LOW 20 MIN: CPT | Performed by: PHYSICIAN ASSISTANT

## 2023-10-03 PROCEDURE — 36415 COLL VENOUS BLD VENIPUNCTURE: CPT | Performed by: PHYSICIAN ASSISTANT

## 2023-10-03 PROCEDURE — 84443 ASSAY THYROID STIM HORMONE: CPT | Performed by: PHYSICIAN ASSISTANT

## 2023-10-03 PROCEDURE — 80048 BASIC METABOLIC PNL TOTAL CA: CPT | Performed by: PHYSICIAN ASSISTANT

## 2023-10-03 RX ORDER — DILTIAZEM HYDROCHLORIDE EXTENDED-RELEASE TABLETS 360 MG/1
360 TABLET, EXTENDED RELEASE ORAL DAILY
Qty: 30 TABLET | Refills: 1 | Status: SHIPPED | OUTPATIENT
Start: 2023-10-03 | End: 2023-11-20

## 2023-10-03 ASSESSMENT — PAIN SCALES - GENERAL: PAINLEVEL: NO PAIN (0)

## 2023-10-03 NOTE — PROGRESS NOTES
Assessment & Plan     Essential hypertension with goal blood pressure less than 140/90  Poorly controlled, despite medication changes unable to get her to goal.  I am questioning sleep apnea though her symptoms do not support this fully, also considering renal artery stenosis, she also may have a degree of whitecoat syndrome since blood pressures at home tend to be better.  Since I am not having success in improving her hypertensive control will refer to cardiology for their input if she is not able to be seen in the next few weeks I will see her to recheck in 2 weeks otherwise she can follow-up with cardiology if she develops any symptoms of hypertensive urgency emergency she will report to the ER at once as we have discussed  - diltiazem ER (CARDIAZEM LA) 360 MG 24 hr tablet; Take 1 tablet (360 mg) by mouth daily  - Adult Cardiology [place] Referral; Future  - Basic metabolic panel  (Ca, Cl, CO2, Creat, Gluc, K, Na, BUN); Future  - TSH with free T4 reflex; Future  - Basic metabolic panel  (Ca, Cl, CO2, Creat, Gluc, K, Na, BUN)  - TSH with free T4 reflex      Index finger enlargement/cyst- she will call to schedule with ortho referral already placed  Recheck 2 weeks with myself or cardiology    Anisha Alfaro PA-C  [facility] ALLI Camacho is a 63 year old, presenting for the following health issues:  Hypertension        10/3/2023     8:57 AM   Additional Questions   Roomed by Gloria RUIZ   Accompanied by None         10/3/2023     8:57 AM   Patient Reported Additional Medications   Patient reports taking the following new medications NA       History of Present Illness       Hypertension: She presents for follow up of hypertension.  She does check blood pressure  regularly outside of the clinic. Outside blood pressures have been over 140/90. She follows a low salt diet.     She eats 0-1 servings of fruits and vegetables daily.She consumes 3 sweetened beverage(s) daily.She  "exercises with enough effort to increase her heart rate 30 to 60 minutes per day.  She exercises with enough effort to increase her heart rate 3 or less days per week. She is missing 7 dose(s) of medications per week.  She is not taking prescribed medications regularly due to cost of medication.     She is not having any side effects of her dosage increase.  She denies chest pains, shortness of breath vision disturbances or headaches that are atypical for her.  She is not certain whether or not she snores.  She does wake up feeling well rested in the morning.  She does wake up at night in order to use the bathroom but does not believe she is waking herself up snoring.  She has a new blood pressure cuff it is a wrist cuff.  When I check her blood pressure in the clinic it was 172/92 with her cuff was 188/109.  She does have an arm blood pressure monitor at home which is likely more accurate.  She is working to reduce sodium in her diet    Right index finger continues to be sore and getting larger.  I referred her to sports med for this but she never followed through on this    Review of Systems   Constitutional, HEENT, cardiovascular, pulmonary, gi and gu systems are negative, except as otherwise noted.      Objective    BP (!) 178/94   Pulse 64   Temp 99.5  F (37.5  C) (Temporal)   Resp 14   Ht 1.56 m (5' 1.42\")   Wt 78 kg (172 lb)   LMP 08/19/2013 (Approximate)   SpO2 97%   BMI 32.06 kg/m    Body mass index is 32.06 kg/m .  Physical Exam   GENERAL: healthy, alert and no distress  NECK: no adenopathy, no asymmetry, masses, or scars and thyroid normal to palpation  RESP: lungs clear to auscultation - no rales, rhonchi or wheezes  CV: regular rate and rhythm, normal S1 S2, no S3 or S4, no murmur, click or rub, no peripheral edema and peripheral pulses strong  ABDOMEN: soft, nontender, no hepatosplenomegaly, no masses and bowel sounds normal  MS: enlargement of her right index finger distally over the pad of " her finger tip  NEURO: Normal strength and tone, mentation intact and speech normal  PSYCH: mentation appears normal, affect normal/bright    Office Visit on 09/19/2023   Component Date Value Ref Range Status    Sodium 09/19/2023 142  136 - 145 mmol/L Final    Potassium 09/19/2023 4.3  3.4 - 5.3 mmol/L Final    Chloride 09/19/2023 98  98 - 107 mmol/L Final    Carbon Dioxide (CO2) 09/19/2023 32 (H)  22 - 29 mmol/L Final    Anion Gap 09/19/2023 12  7 - 15 mmol/L Final    Urea Nitrogen 09/19/2023 12.5  8.0 - 23.0 mg/dL Final    Creatinine 09/19/2023 0.68  0.51 - 0.95 mg/dL Final    Calcium 09/19/2023 10.5 (H)  8.8 - 10.2 mg/dL Final    Glucose 09/19/2023 95  70 - 99 mg/dL Final    GFR Estimate 09/19/2023 >90  >60 mL/min/1.73m2 Final     No results found for any visits on 10/03/23.

## 2023-10-03 NOTE — PATIENT INSTRUCTIONS
[phone] this is the number to place referral for sports med  Cardiology will call you to schedule

## 2023-10-03 NOTE — TELEPHONE ENCOUNTER
This encounter is being sent to inform the clinic that this patient has a referral from CARLOS Alfaro for the diagnoses of struggling to get good control of her HTN despite med adjustments  and has requested that this patient be seen within 2 weeks and/or with Cardiology.  Based on the availability of our provider(s), we are unable to accommodate this request.    Were all sites offered this patient?  Yes- pt prefers [facility]    Does scheduling algorithm request to schedule next available?  Patient has been scheduled for the first available opening with Dr. Avila on 11/30/23.  We have informed the patient that the clinic will review their referral and reach out if a sooner appointment is medically necessary.

## 2023-10-03 NOTE — TELEPHONE ENCOUNTER
has a open slot on 10/9/23 at 2:00. Routing to scheduling to please call pt. To offer slot.     Cassidy Gomez on 10/3/2023 at 2:54 PM

## 2023-10-09 ENCOUNTER — OFFICE VISIT (OUTPATIENT)
Dept: CARDIOLOGY | Facility: CLINIC | Age: 63
End: 2023-10-09
Attending: PHYSICIAN ASSISTANT
Payer: COMMERCIAL

## 2023-10-09 VITALS
RESPIRATION RATE: 18 BRPM | SYSTOLIC BLOOD PRESSURE: 186 MMHG | BODY MASS INDEX: 32.47 KG/M2 | DIASTOLIC BLOOD PRESSURE: 98 MMHG | HEART RATE: 64 BPM | OXYGEN SATURATION: 96 % | WEIGHT: 172 LBS | HEIGHT: 61 IN

## 2023-10-09 DIAGNOSIS — I10 ESSENTIAL HYPERTENSION WITH GOAL BLOOD PRESSURE LESS THAN 140/90: ICD-10-CM

## 2023-10-09 LAB — CORTIS SERPL-MCNC: 6.7 UG/DL

## 2023-10-09 PROCEDURE — 82088 ASSAY OF ALDOSTERONE: CPT | Performed by: INTERNAL MEDICINE

## 2023-10-09 PROCEDURE — 82533 TOTAL CORTISOL: CPT | Performed by: INTERNAL MEDICINE

## 2023-10-09 PROCEDURE — 84244 ASSAY OF RENIN: CPT | Mod: 90 | Performed by: INTERNAL MEDICINE

## 2023-10-09 PROCEDURE — 83835 ASSAY OF METANEPHRINES: CPT | Mod: 90 | Performed by: INTERNAL MEDICINE

## 2023-10-09 PROCEDURE — 36415 COLL VENOUS BLD VENIPUNCTURE: CPT | Performed by: INTERNAL MEDICINE

## 2023-10-09 PROCEDURE — 99000 SPECIMEN HANDLING OFFICE-LAB: CPT | Performed by: INTERNAL MEDICINE

## 2023-10-09 PROCEDURE — 99205 OFFICE O/P NEW HI 60 MIN: CPT | Mod: 25 | Performed by: INTERNAL MEDICINE

## 2023-10-09 ASSESSMENT — PAIN SCALES - GENERAL: PAINLEVEL: NO PAIN (0)

## 2023-10-09 NOTE — PATIENT INSTRUCTIONS
1. Urine and blood tabs today  2. Renal artery ultrasound  3. Echocardiogram   4. Follow up one month IRIS   5. If blood pressure still elevated would change propanolol to carvedilol

## 2023-10-09 NOTE — PROGRESS NOTES
Cardiology Consultation      HPI:     This is a 63 yr old here for evaluation of HTN. She's been having a diagnosis of HTN for many years. She has tried amlodipine in the past, was on lasix for edema and then stopped on amlodipine and leg edema improved, now off lasix. Then she was transitioned to dilt, hydrochlorothiazide lisinopril and has been for years on propanolol. SBP today 186/98 mmHg regardless of her medication regimen. Recently had diltiazem increased by PCP on Friday.     ROS reviewed. No chest pain. +shortness of breath on occasion. Trace edema, no orthopnea. No syncope, palpitations.     Has some urinary symptoms, feels urge to go but cannot fully void. No known bladder prolapse history, no fevers, chills,dysuria.     ASSESSMENT/PLAN:     1. HTN: suspect secondary cause given poor control on 4 drug regimen. On diltiazem, propanolol, hydrochlorothiazide and lisinopril. Agree with max diltiazem and if no control during follow up with PCP could transition to coreg from propanolol for better vasodilatory effect.   -plan secondary work up: renal artery ultrasound, echocardiogram, labs (urinary and serum metanephrines, cortisol, renin, etc). Thyroid function is wnl. If any abnormal hormones, referral to endocrine   -follow up with [facility] in one month  -recommend uro/gyn referral for urinary symptoms which are concerning, should d/w PCP     Total time spent on complex clinic patient visit is more than 65 minutes, which includes face-to-face patient evaluation and physical exam, review of imaging studies and laboratory data, counseling with patient, review of outside records, and documentation of patient encounter     Rylee Marie MD MSC    PAST MEDICAL HISTORY  Past Medical History:   Diagnosis Date    Carpal tunnel syndrome     Intervertebral cervical disc disorder with myelopathy, cervical region 2007    C6 herniation with right C6 radiculopathy    Migraine, unspecified, without mention of intractable  migraine without mention of status migrainosus     Perimenopausal     irreg     Personal history of tobacco use, presenting hazards to health        CURRENT MEDICATIONS  Current Outpatient Medications   Medication Sig Dispense Refill    Aspirin 81 MG CAPS       atorvastatin (LIPITOR) 40 MG tablet Take 1 tablet (40 mg) by mouth daily 90 tablet 3    diltiazem ER (CARDIAZEM LA) 360 MG 24 hr tablet Take 1 tablet (360 mg) by mouth daily 30 tablet 1    lisinopril-hydrochlorothiazide (ZESTORETIC) 20-12.5 MG tablet TAKE TWO TABLETS BY MOUTH EVERY  tablet 3    Multiple Vitamin (MULTI-VITAMIN DAILY PO)       potassium chloride ER (KLOR-CON M) 10 MEQ CR tablet Take 1 tablet (10 mEq) by mouth 2 times daily 180 tablet 3    propranolol ER (INDERAL LA) 120 MG 24 hr capsule TAKE TWO CAPSULES BY MOUTH EVERY  capsule 3    tiZANidine (ZANAFLEX) 4 MG tablet TAKE ONE TABLET BY MOUTH THREE TIMES A DAY AS NEEDED FOR MUSCLE SPASMS 180 tablet 1       PAST SURGICAL HISTORY:  Past Surgical History:   Procedure Laterality Date    COLONOSCOPY  1/10/2014    Procedure: COMBINED COLONOSCOPY, SINGLE BIOPSY/POLYPECTOMY BY BIOPSY;  colonoscopy with polypectomy by forceps;  Surgeon: Chevy Yang MD;  [facility]    COLONOSCOPY N/A 2022    Procedure: COLONOSCOPY;  Surgeon: Jaren Verduzco DO;  [facility]    DECOMPRESSION LUMBAR MINIMALLY INVASIVE ONE LEVEL Bilateral 2021    Procedure: Minimally invasive Left Lumbar 4 to lumbar 5 bilateral decompression;  Surgeon: James Orozco MD;  [facility]    DISCECTOMY LUMBAR MINIMALLY INVASIVE ONE LEVEL Left 2021    Procedure: Lumbar 5 to Sacral 1 microdiscectomy, Left;  Surgeon: James Orozco MD;  [facility]    HC REVISE MEDIAN N/CARPAL TUNNEL SURG  2004    HC REVISE MEDIAN N/CARPAL TUNNEL SURG  06    Left    HC TREATMENT INCOMPLETE  SURG, ANY TRIMESTER      D&C after miscarriage    INJECT EPIDURAL LUMBAR Left 2020     Procedure: INJECTION, SPINE, LUMBAR, 5 Sacral 1 and Sacral 1 Left;  Surgeon: Abilio Middleton MD;  [facility]    ZZC DISK SURG,ANTER,CERVICAL,SINGLE LVL  10/02/2007    C5-C6 anterior cervical diskectomy & fusion w/plate.    ZZC NONSPECIFIC PROCEDURE      Bilateral inguinal hernia repairs    ZZC NONSPECIFIC PROCEDURE      Oral surgery x2       ALLERGIES     Allergies   Allergen Reactions    No Known Drug Allergy        FAMILY HISTORY  Family History   Problem Relation Age of Onset    Cancer Mother         parotid    Heart Disease Father 47        MI    Depression Daughter     Hypertension Sister      SOCIAL HISTORY  Social History     Socioeconomic History    Marital status:      Spouse name: Carlin    Number of children: 2    Years of education: 12    Highest education level: Not on file   Occupational History    Occupation:      Employer: [employer]   Tobacco Use    Smoking status: Former     Packs/day: 1.50     Years: 20.00     Pack years: 30.00     Types: Cigarettes     Quit date: 10/17/2007     Years since quitting: 15.9     Passive exposure: Past    Smokeless tobacco: Never   Vaping Use    Vaping Use: Never used   Substance and Sexual Activity    Alcohol use: Not Currently    Drug use: No    Sexual activity: Not Currently     Partners: Male     Comment: not currently   Other Topics Concern     Service Not Asked    Blood Transfusions Not Asked    Caffeine Concern No    Occupational Exposure Not Asked    Hobby Hazards Not Asked    Sleep Concern No    Stress Concern No    Weight Concern No    Special Diet Not Asked    Back Care Not Asked    Exercise No    Bike Helmet Not Asked    Seat Belt Yes    Self-Exams Not Asked    Parent/sibling w/ CABG, MI or angioplasty before 65F 55M? Yes   Social History Narrative    Lives with spouse and son. No domestic violence issues.     Social Determinants of Health     Financial Resource Strain: Unknown (10/3/2023)    Financial Resource  Strain     Within the past 12 months, have you or your family members you live with been unable to get utilities (heat, electricity) when it was really needed?: Patient refused   Food Insecurity: Low Risk  (10/3/2023)    Food Insecurity     Within the past 12 months, did you worry that your food would run out before you got money to buy more?: No     Within the past 12 months, did the food you bought just not last and you didn t have money to get more?: No   Transportation Needs: Low Risk  (10/3/2023)    Transportation Needs     Within the past 12 months, has lack of transportation kept you from medical appointments, getting your medicines, non-medical meetings or appointments, work, or from getting things that you need?: No   Physical Activity: Not on file   Stress: Not on file   Social Connections: Not on file   Interpersonal Safety: Low Risk  (10/3/2023)    Interpersonal Safety     Do you feel physically and emotionally safe where you currently live?: Yes     Within the past 12 months, have you been hit, slapped, kicked or otherwise physically hurt by someone?: No     Within the past 12 months, have you been humiliated or emotionally abused in other ways by your partner or ex-partner?: No   Housing Stability: Unknown (10/3/2023)    Housing Stability     Do you have housing? : Patient refused     Are you worried about losing your housing?: Patient refused       ROS:   Constitutional: No fever, chills, or sweats. No weight gain/loss   ENT: No visual disturbance, ear ache, epistaxis, sore throat  Allergies/Immunologic: Negative  Respiratory: No cough, hemoptysia  Cardiovascular: As per HPI  GI: No nausea, vomiting, hematemesis, melena, or hematochezia  : No urinary frequency, dysuria, or hematuria  Integument: Negative  Psychiatric: Negative  Neuro: Negative  Endocrinology: Negative   Musculoskeletal: Negative  Vascular: No walking impairment, claudication, ischemic rest pain or nonhealing wounds    EXAM:  BP (!)  "186/98 (BP Location: Right arm, Patient Position: Sitting, Cuff Size: Adult Regular)   Pulse 64   Resp 18   Ht 1.56 m (5' 1.42\")   Wt 78 kg (172 lb)   LMP 08/19/2013 (Approximate)   SpO2 96%   BMI 32.06 kg/m    In general, the patient is a pleasant female in no apparent distress.    HEENT: NC/AT.  PERRLA.  EOMI.  Sclerae white, not injected.  Nares clear.  Pharynx without erythema or exudate.  Dentition intact.    Neck: No adenopathy.  No thyromegaly. Carotids +2/2 bilaterally without bruits.  No jugular venous distension.   Heart: RRR. Normal S1, S2 splits physiologically. No murmur, rub, click, or gallop. The PMI is in the 5th ICS in the midclavicular line. There is no heave.    Lungs: CTA.  No ronchi, wheezes, rales.  No dullness to percussion.   Abdomen: Soft, nontender, nondistended. No organomegaly. No AAA.  No bruits.   Extremities: No clubbing, cyanosis, or edema.  No wounds. No varicose veins signs of chronic venous insufficiency.   Vascular: No bruits are noted.    Labs:  LIPID RESULTS:  Lab Results   Component Value Date    CHOL 196 05/01/2023    CHOL 213 (H) 04/23/2021    HDL 55 05/01/2023    HDL 54 04/23/2021    LDL 96 05/01/2023     (H) 04/23/2021    TRIG 226 (H) 05/01/2023    TRIG 197 (H) 04/23/2021    CHOLHDLRATIO 4.6 11/13/2014    NHDL 141 (H) 05/01/2023    NHDL 159 (H) 04/23/2021       LIVER ENZYME RESULTS:  Lab Results   Component Value Date    AST 21 05/19/2023    AST 19 04/23/2021    ALT 24 05/19/2023    ALT 35 04/23/2021       CBC RESULTS:  Lab Results   Component Value Date    WBC 5.7 05/19/2023    WBC 5.6 06/09/2021    RBC 4.94 05/19/2023    RBC 5.04 06/09/2021    HGB 14.1 05/19/2023    HGB 14.2 06/09/2021    HCT 41.9 05/19/2023    HCT 43.4 06/09/2021    MCV 85 05/19/2023    MCV 86 06/09/2021    MCH 28.5 05/19/2023    MCH 28.2 06/09/2021    MCHC 33.7 05/19/2023    MCHC 32.7 06/09/2021    RDW 12.9 05/19/2023    RDW 13.5 06/09/2021     05/19/2023     06/09/2021 "       BMP RESULTS:  Lab Results   Component Value Date     10/03/2023     06/09/2021    POTASSIUM 3.9 10/03/2023    POTASSIUM 3.7 05/01/2023    POTASSIUM 4.0 06/09/2021    CHLORIDE 100 10/03/2023    CHLORIDE 101 05/01/2023    CHLORIDE 99 06/09/2021    CO2 30 (H) 10/03/2023    CO2 31 05/01/2023    CO2 34 (H) 06/09/2021    ANIONGAP 11 10/03/2023    ANIONGAP 6 05/01/2023    ANIONGAP 3 06/09/2021     (H) 10/03/2023     (H) 05/01/2023    GLC 98 06/09/2021    BUN 16.7 10/03/2023    BUN 11 05/01/2023    BUN 13 06/09/2021    CR 0.70 10/03/2023    CR 0.69 06/09/2021    GFRESTIMATED >90 10/03/2023    GFRESTIMATED >90 06/09/2021    GFRESTBLACK >90 06/09/2021    BILLY 10.2 10/03/2023    BILLY 9.6 06/09/2021        A1C RESULTS:  Lab Results   Component Value Date    A1C 6.1 (H) 05/01/2023    A1C 5.9 (H) 05/07/2021

## 2023-10-09 NOTE — LETTER
10/9/2023    Anisha Alfaro PA-C  [id number] [facility]  [place] [address]    RE: Janice Ulloa       Dear Colleague,     I had the pleasure of seeing Janice Ulloa in the [facility].      Cardiology Consultation      HPI:     This is a 63 yr old here for evaluation of HTN. She's been having a diagnosis of HTN for many years. She has tried amlodipine in the past, was on lasix for edema and then stopped on amlodipine and leg edema improved, now off lasix. Then she was transitioned to dilt, hydrochlorothiazide lisinopril and has been for years on propanolol. SBP today 186/98 mmHg regardless of her medication regimen. Recently had diltiazem increased by PCP on Friday.     ROS reviewed. No chest pain. +shortness of breath on occasion. Trace edema, no orthopnea. No syncope, palpitations.     Has some urinary symptoms, feels urge to go but cannot fully void. No known bladder prolapse history, no fevers, chills,dysuria.     ASSESSMENT/PLAN:     1. HTN: suspect secondary cause given poor control on 4 drug regimen. On diltiazem, propanolol, hydrochlorothiazide and lisinopril. Agree with max diltiazem and if no control during follow up with PCP could transition to coreg from propanolol for better vasodilatory effect.   -plan secondary work up: renal artery ultrasound, echocardiogram, labs (urinary and serum metanephrines, cortisol, renin, etc). Thyroid function is wnl. If any abnormal hormones, referral to endocrine   -follow up with [facility] in one month  -recommend uro/gyn referral for urinary symptoms which are concerning, should d/w PCP     Total time spent on complex clinic patient visit is more than 65 minutes, which includes face-to-face patient evaluation and physical exam, review of imaging studies and laboratory data, counseling with patient, review of outside records, and documentation of patient encounter     Rylee Marie MD MSC    PAST MEDICAL HISTORY  Past Medical History:   Diagnosis Date     Carpal tunnel syndrome     Intervertebral cervical disc disorder with myelopathy, cervical region 2007    C6 herniation with right C6 radiculopathy    Migraine, unspecified, without mention of intractable migraine without mention of status migrainosus     Perimenopausal     irreg 2011    Personal history of tobacco use, presenting hazards to health        CURRENT MEDICATIONS  Current Outpatient Medications   Medication Sig Dispense Refill    Aspirin 81 MG CAPS       atorvastatin (LIPITOR) 40 MG tablet Take 1 tablet (40 mg) by mouth daily 90 tablet 3    diltiazem ER (CARDIAZEM LA) 360 MG 24 hr tablet Take 1 tablet (360 mg) by mouth daily 30 tablet 1    lisinopril-hydrochlorothiazide (ZESTORETIC) 20-12.5 MG tablet TAKE TWO TABLETS BY MOUTH EVERY  tablet 3    Multiple Vitamin (MULTI-VITAMIN DAILY PO)       potassium chloride ER (KLOR-CON M) 10 MEQ CR tablet Take 1 tablet (10 mEq) by mouth 2 times daily 180 tablet 3    propranolol ER (INDERAL LA) 120 MG 24 hr capsule TAKE TWO CAPSULES BY MOUTH EVERY  capsule 3    tiZANidine (ZANAFLEX) 4 MG tablet TAKE ONE TABLET BY MOUTH THREE TIMES A DAY AS NEEDED FOR MUSCLE SPASMS 180 tablet 1       PAST SURGICAL HISTORY:  Past Surgical History:   Procedure Laterality Date    COLONOSCOPY  1/10/2014    Procedure: COMBINED COLONOSCOPY, SINGLE BIOPSY/POLYPECTOMY BY BIOPSY;  colonoscopy with polypectomy by forceps;  Surgeon: Chevy Yang MD;  [facility]    COLONOSCOPY N/A 5/26/2022    Procedure: COLONOSCOPY;  Surgeon: Jaren Verduzco DO;  [facility]    DECOMPRESSION LUMBAR MINIMALLY INVASIVE ONE LEVEL Bilateral 6/16/2021    Procedure: Minimally invasive Left Lumbar 4 to lumbar 5 bilateral decompression;  Surgeon: James Orozco MD;  [facility]    DISCECTOMY LUMBAR MINIMALLY INVASIVE ONE LEVEL Left 6/16/2021    Procedure: Lumbar 5 to Sacral 1 microdiscectomy, Left;  Surgeon: James Orozco MD;  [facility]    HC REVISE MEDIAN N/CARPAL  TUNNEL SURG  2004    HC REVISE MEDIAN N/CARPAL TUNNEL SURG  06    Left    HC TREATMENT INCOMPLETE  SURG, ANY TRIMESTER      D&C after miscarriage    INJECT EPIDURAL LUMBAR Left 2020    Procedure: INJECTION, SPINE, LUMBAR, 5 Sacral 1 and Sacral 1 Left;  Surgeon: Abilio Middleton MD;  [facility]    ZZC DISK SURG,ANTER,CERVICAL,SINGLE LVL  10/02/2007    C5-C6 anterior cervical diskectomy & fusion w/plate.    ZZC NONSPECIFIC PROCEDURE      Bilateral inguinal hernia repairs    ZZC NONSPECIFIC PROCEDURE      Oral surgery x2       ALLERGIES     Allergies   Allergen Reactions    No Known Drug Allergy        FAMILY HISTORY  Family History   Problem Relation Age of Onset    Cancer Mother         parotid    Heart Disease Father 47        MI    Depression Daughter     Hypertension Sister      SOCIAL HISTORY  Social History     Socioeconomic History    Marital status:      Spouse name: Carlin    Number of children: 2    Years of education: 12    Highest education level: Not on file   Occupational History    Occupation:      Employer: [employer]   Tobacco Use    Smoking status: Former     Packs/day: 1.50     Years: 20.00     Pack years: 30.00     Types: Cigarettes     Quit date: 10/17/2007     Years since quitting: 15.9     Passive exposure: Past    Smokeless tobacco: Never   Vaping Use    Vaping Use: Never used   Substance and Sexual Activity    Alcohol use: Not Currently    Drug use: No    Sexual activity: Not Currently     Partners: Male     Comment: not currently   Other Topics Concern     Service Not Asked    Blood Transfusions Not Asked    Caffeine Concern No    Occupational Exposure Not Asked    Hobby Hazards Not Asked    Sleep Concern No    Stress Concern No    Weight Concern No    Special Diet Not Asked    Back Care Not Asked    Exercise No    Bike Helmet Not Asked    Seat Belt Yes    Self-Exams Not Asked    Parent/sibling w/ CABG, MI or angioplasty before 65F  55M? Yes   Social History Narrative    Lives with spouse and son. No domestic violence issues.     Social Determinants of Health     Financial Resource Strain: Unknown (10/3/2023)    Financial Resource Strain     Within the past 12 months, have you or your family members you live with been unable to get utilities (heat, electricity) when it was really needed?: Patient refused   Food Insecurity: Low Risk  (10/3/2023)    Food Insecurity     Within the past 12 months, did you worry that your food would run out before you got money to buy more?: No     Within the past 12 months, did the food you bought just not last and you didn t have money to get more?: No   Transportation Needs: Low Risk  (10/3/2023)    Transportation Needs     Within the past 12 months, has lack of transportation kept you from medical appointments, getting your medicines, non-medical meetings or appointments, work, or from getting things that you need?: No   Physical Activity: Not on file   Stress: Not on file   Social Connections: Not on file   Interpersonal Safety: Low Risk  (10/3/2023)    Interpersonal Safety     Do you feel physically and emotionally safe where you currently live?: Yes     Within the past 12 months, have you been hit, slapped, kicked or otherwise physically hurt by someone?: No     Within the past 12 months, have you been humiliated or emotionally abused in other ways by your partner or ex-partner?: No   Housing Stability: Unknown (10/3/2023)    Housing Stability     Do you have housing? : Patient refused     Are you worried about losing your housing?: Patient refused       ROS:   Constitutional: No fever, chills, or sweats. No weight gain/loss   ENT: No visual disturbance, ear ache, epistaxis, sore throat  Allergies/Immunologic: Negative  Respiratory: No cough, hemoptysia  Cardiovascular: As per HPI  GI: No nausea, vomiting, hematemesis, melena, or hematochezia  : No urinary frequency, dysuria, or hematuria  Integument:  "Negative  Psychiatric: Negative  Neuro: Negative  Endocrinology: Negative   Musculoskeletal: Negative  Vascular: No walking impairment, claudication, ischemic rest pain or nonhealing wounds    EXAM:  BP (!) 186/98 (BP Location: Right arm, Patient Position: Sitting, Cuff Size: Adult Regular)   Pulse 64   Resp 18   Ht 1.56 m (5' 1.42\")   Wt 78 kg (172 lb)   LMP 08/19/2013 (Approximate)   SpO2 96%   BMI 32.06 kg/m    In general, the patient is a pleasant female in no apparent distress.    HEENT: NC/AT.  PERRLA.  EOMI.  Sclerae white, not injected.  Nares clear.  Pharynx without erythema or exudate.  Dentition intact.    Neck: No adenopathy.  No thyromegaly. Carotids +2/2 bilaterally without bruits.  No jugular venous distension.   Heart: RRR. Normal S1, S2 splits physiologically. No murmur, rub, click, or gallop. The PMI is in the 5th ICS in the midclavicular line. There is no heave.    Lungs: CTA.  No ronchi, wheezes, rales.  No dullness to percussion.   Abdomen: Soft, nontender, nondistended. No organomegaly. No AAA.  No bruits.   Extremities: No clubbing, cyanosis, or edema.  No wounds. No varicose veins signs of chronic venous insufficiency.   Vascular: No bruits are noted.    Labs:  LIPID RESULTS:  Lab Results   Component Value Date    CHOL 196 05/01/2023    CHOL 213 (H) 04/23/2021    HDL 55 05/01/2023    HDL 54 04/23/2021    LDL 96 05/01/2023     (H) 04/23/2021    TRIG 226 (H) 05/01/2023    TRIG 197 (H) 04/23/2021    CHOLHDLRATIO 4.6 11/13/2014    NHDL 141 (H) 05/01/2023    NHDL 159 (H) 04/23/2021       LIVER ENZYME RESULTS:  Lab Results   Component Value Date    AST 21 05/19/2023    AST 19 04/23/2021    ALT 24 05/19/2023    ALT 35 04/23/2021       CBC RESULTS:  Lab Results   Component Value Date    WBC 5.7 05/19/2023    WBC 5.6 06/09/2021    RBC 4.94 05/19/2023    RBC 5.04 06/09/2021    HGB 14.1 05/19/2023    HGB 14.2 06/09/2021    HCT 41.9 05/19/2023    HCT 43.4 06/09/2021    MCV 85 05/19/2023    " MCV 86 06/09/2021    MCH 28.5 05/19/2023    MCH 28.2 06/09/2021    MCHC 33.7 05/19/2023    MCHC 32.7 06/09/2021    RDW 12.9 05/19/2023    RDW 13.5 06/09/2021     05/19/2023     06/09/2021       BMP RESULTS:  Lab Results   Component Value Date     10/03/2023     06/09/2021    POTASSIUM 3.9 10/03/2023    POTASSIUM 3.7 05/01/2023    POTASSIUM 4.0 06/09/2021    CHLORIDE 100 10/03/2023    CHLORIDE 101 05/01/2023    CHLORIDE 99 06/09/2021    CO2 30 (H) 10/03/2023    CO2 31 05/01/2023    CO2 34 (H) 06/09/2021    ANIONGAP 11 10/03/2023    ANIONGAP 6 05/01/2023    ANIONGAP 3 06/09/2021     (H) 10/03/2023     (H) 05/01/2023    GLC 98 06/09/2021    BUN 16.7 10/03/2023    BUN 11 05/01/2023    BUN 13 06/09/2021    CR 0.70 10/03/2023    CR 0.69 06/09/2021    GFRESTIMATED >90 10/03/2023    GFRESTIMATED >90 06/09/2021    GFRESTBLACK >90 06/09/2021    BILLY 10.2 10/03/2023    BILLY 9.6 06/09/2021        A1C RESULTS:  Lab Results   Component Value Date    A1C 6.1 (H) 05/01/2023    A1C 5.9 (H) 05/07/2021       Thank you for allowing me to participate in the care of your patient.      Sincerely,     Rylee Marie MD     [facility]  cc:   Anisha Alfaro PA-C  [address] [address]

## 2023-10-10 LAB — ALDOST SERPL-MCNC: 13.9 NG/DL (ref 0–31)

## 2023-10-13 LAB
ANNOTATION COMMENT IMP: ABNORMAL
METANEPHS SERPL-SCNC: 0.19 NMOL/L
NORMETANEPHRINE SERPL-SCNC: 0.9 NMOL/L
RENIN PLAS-CCNC: 0.3 NG/ML/HR

## 2023-10-14 PROCEDURE — 83835 ASSAY OF METANEPHRINES: CPT | Mod: 90 | Performed by: INTERNAL MEDICINE

## 2023-10-14 PROCEDURE — 99000 SPECIMEN HANDLING OFFICE-LAB: CPT | Performed by: INTERNAL MEDICINE

## 2023-10-14 PROCEDURE — 82530 CORTISOL FREE: CPT | Mod: 90 | Performed by: INTERNAL MEDICINE

## 2023-10-15 LAB — ALDOST/RENIN PLAS-RTO: 46.3 {RATIO} (ref 0–25)

## 2023-10-16 ENCOUNTER — TELEPHONE (OUTPATIENT)
Dept: CARDIOLOGY | Facility: CLINIC | Age: 63
End: 2023-10-16
Payer: COMMERCIAL

## 2023-10-16 DIAGNOSIS — R68.89 ABNORMAL ENDOCRINE LABORATORY TEST FINDING: Primary | ICD-10-CM

## 2023-10-16 NOTE — TELEPHONE ENCOUNTER
10/9/23 OV with Dr. Marie   1. HTN: suspect secondary cause given poor control on 4 drug regimen. On diltiazem, propanolol, hydrochlorothiazide and lisinopril. Agree with max diltiazem and if no control during follow up with PCP could transition to coreg from propanolol for better vasodilatory effect.   -plan secondary work up: renal artery ultrasound, echocardiogram, labs (urinary and serum metanephrines, cortisol, renin, etc). Thyroid function is wnl. If any abnormal hormones, referral to endocrine   -follow up with [facility] in one month  -recommend uro/gyn referral for urinary symptoms which are concerning, should d/w PCP       Aldosterone Renin Ratio 46.3     Please see all labs under lab tab. Urine still inprocess.   Will route to Dr. Marie to review.

## 2023-10-19 LAB
ANNOTATION COMMENT IMP: NORMAL
CORTIS F 24H UR HPLC-MCNC: 7.31 UG/L
CORTIS F 24H UR-MRATE: 11 UG/D
CORTIS F/CREAT 24H UR: 12.82 UG/G CRT
CREAT 24H UR-MRATE: 855 MG/D
CREAT UR-MCNC: 57 MG/DL

## 2023-10-20 LAB
CREAT 24H UR-MRATE: 855 MG/D
CREAT UR-MCNC: 57 MG/DL
METANEPH 24H UR-MCNC: 56 UG/L
METANEPH 24H UR-MRATE: 84 UG/D
METANEPH+NORMETANEPH UR-IMP: ABNORMAL
METANEPH/CREAT 24H UR: 98 UG/G CRT
NORMETANEPHRINE 24H UR-MCNC: 238 UG/L
NORMETANEPHRINE 24H UR-MRATE: 357 UG/D
NORMETANEPHRINE/CREAT 24H UR: 418 UG/G CRT

## 2023-10-23 NOTE — TELEPHONE ENCOUNTER
Rylee Marie MD  You3 days ago     CF  Thanks. The aldosterone levels are high, and norepi levels borderline. Let's refer to endocrine first available as this may be causing the hypertension! Janice can also notify PCP to see if there are any additional thoughts with the levels as this is beyond my specialty. Happy to facilitate referral however.    Best,  Dr. Marie     Tried to call patient to review recommendations above. No answer. Left VM to call team 4 back with direct number.

## 2023-10-25 NOTE — TELEPHONE ENCOUNTER
Spoke with patient about lab results and Dr. Marie's recommendations. Patient verbalized understanding and agreed with plan of care. Referral placed. No further questions.

## 2023-11-16 ENCOUNTER — HOSPITAL ENCOUNTER (OUTPATIENT)
Dept: CARDIOLOGY | Facility: CLINIC | Age: 63
Discharge: HOME | End: 2023-11-16
Attending: INTERNAL MEDICINE | Admitting: INTERNAL MEDICINE
Payer: COMMERCIAL

## 2023-11-16 DIAGNOSIS — I10 ESSENTIAL HYPERTENSION WITH GOAL BLOOD PRESSURE LESS THAN 140/90: ICD-10-CM

## 2023-11-16 LAB — LVEF ECHO: NORMAL

## 2023-11-16 PROCEDURE — 93306 TTE W/DOPPLER COMPLETE: CPT

## 2023-11-16 PROCEDURE — 93306 TTE W/DOPPLER COMPLETE: CPT | Mod: 26 | Performed by: INTERNAL MEDICINE

## 2023-11-20 ENCOUNTER — OFFICE VISIT (OUTPATIENT)
Dept: FAMILY MEDICINE | Facility: CLINIC | Age: 63
End: 2023-11-20
Payer: COMMERCIAL

## 2023-11-20 ENCOUNTER — TELEPHONE (OUTPATIENT)
Dept: FAMILY MEDICINE | Facility: CLINIC | Age: 63
End: 2023-11-20

## 2023-11-20 VITALS
WEIGHT: 173.2 LBS | RESPIRATION RATE: 15 BRPM | SYSTOLIC BLOOD PRESSURE: 182 MMHG | OXYGEN SATURATION: 97 % | BODY MASS INDEX: 32.7 KG/M2 | HEIGHT: 61 IN | HEART RATE: 62 BPM | DIASTOLIC BLOOD PRESSURE: 88 MMHG | TEMPERATURE: 99.4 F

## 2023-11-20 DIAGNOSIS — E26.9 HIGH ALDOSTERONE TO RENIN RATIO (H): Primary | ICD-10-CM

## 2023-11-20 DIAGNOSIS — R35.0 URINARY FREQUENCY: ICD-10-CM

## 2023-11-20 DIAGNOSIS — I10 ESSENTIAL HYPERTENSION WITH GOAL BLOOD PRESSURE LESS THAN 140/90: ICD-10-CM

## 2023-11-20 LAB
ALBUMIN UR-MCNC: NEGATIVE MG/DL
APPEARANCE UR: ABNORMAL
BACTERIA #/AREA URNS HPF: ABNORMAL /HPF
BILIRUB UR QL STRIP: NEGATIVE
COLOR UR AUTO: YELLOW
GLUCOSE UR STRIP-MCNC: NEGATIVE MG/DL
HGB UR QL STRIP: NEGATIVE
KETONES UR STRIP-MCNC: NEGATIVE MG/DL
LEUKOCYTE ESTERASE UR QL STRIP: NEGATIVE
NITRATE UR QL: NEGATIVE
PH UR STRIP: 6.5 [PH] (ref 5–7)
RBC #/AREA URNS AUTO: ABNORMAL /HPF
SP GR UR STRIP: 1.01 (ref 1–1.03)
SQUAMOUS #/AREA URNS AUTO: ABNORMAL /LPF
UROBILINOGEN UR STRIP-ACNC: 0.2 E.U./DL
WBC #/AREA URNS AUTO: ABNORMAL /HPF

## 2023-11-20 PROCEDURE — 81001 URINALYSIS AUTO W/SCOPE: CPT | Performed by: PHYSICIAN ASSISTANT

## 2023-11-20 PROCEDURE — 99214 OFFICE O/P EST MOD 30 MIN: CPT | Performed by: PHYSICIAN ASSISTANT

## 2023-11-20 RX ORDER — DILTIAZEM HYDROCHLORIDE EXTENDED-RELEASE TABLETS 360 MG/1
360 TABLET, EXTENDED RELEASE ORAL DAILY
Qty: 90 TABLET | Refills: 1 | Status: SHIPPED | OUTPATIENT
Start: 2023-11-20 | End: 2024-05-21

## 2023-11-20 RX ORDER — CARVEDILOL 25 MG/1
25 TABLET ORAL 2 TIMES DAILY WITH MEALS
Qty: 60 TABLET | Refills: 0 | Status: SHIPPED | OUTPATIENT
Start: 2023-11-20 | End: 2023-11-30

## 2023-11-20 ASSESSMENT — PAIN SCALES - GENERAL: PAINLEVEL: NO PAIN (0)

## 2023-11-20 NOTE — TELEPHONE ENCOUNTER
Called and informed patient of results. No questions.     Yuni PERAZAN, RN  [facility]

## 2023-11-20 NOTE — TELEPHONE ENCOUNTER
----- Message from Linda Schaefer sent at 11/20/2023 11:44 AM CST -----    ----- Message -----  From: Anisha Alfaro PA-C  Sent: 11/20/2023  11:41 AM CST  To: [facility] Pool    Please inform patient of normal urinary results, she does not have an infection.  Anisha Alfaro PA-C

## 2023-11-20 NOTE — PATIENT INSTRUCTIONS
I will call the pharmacy to check on switching you from propranolol to carvedilol, please check the label carefully on the bottle for dosing and how to take the meds    Anisha Aflaro PA-C

## 2023-11-20 NOTE — PROGRESS NOTES
Assessment & Plan     Essential hypertension with goal blood pressure less than 140/90  Still not to goal, likely because she has a secondary reason for her uncontrolled hypertension.  I will transition her from propranolol to carvedilol per cardiology's recommendation.  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  She understands and will comply with going to see endocrinology  - Adult Endocrinology  Referral; Future    Urinary frequency  No sign of any infection currently will readdress as needed  - UA Macroscopic with reflex to Microscopic and Culture - Lab Collect; Future  - UA Macroscopic with reflex to Microscopic and Culture - Lab Collect  - UA Microscopic with Reflex to Culture          Anisha Alfaro PA-C  [facility] ALLI Camacho is a 63 year old, presenting for the following health issues:  Recheck Medication and Hypertension        11/20/2023     7:37 AM   Additional Questions   Roomed by Gloria RUIZ   Accompanied by None         11/20/2023     7:37 AM   Patient Reported Additional Medications   Patient reports taking the following new medications NA       History of Present Illness       Reason for visit:  Blood presure    She eats 0-1 servings of fruits and vegetables daily.She consumes 3 sweetened beverage(s) daily.She exercises with enough effort to increase her heart rate 60 or more minutes per day.  She exercises with enough effort to  "increase her heart rate 7 days per week. She is missing 7 dose(s) of medications per week.  She is not taking prescribed medications regularly due to other.           Hypertension Follow-up    Do you check your blood pressure regularly outside of the clinic? Yes   Are you following a low salt diet? Yes  Are your blood pressures ever more than 140 on the top number (systolic) OR more   than 90 on the bottom number (diastolic), for example 140/90? Yes    She was seen by Dr. Marie who worked her up for secondary causes to her uncontrolled hypertension despite 4 medications.  She does gave a high aldosterone renin ratio and was referred to endocrinology.  She has not made this appointment yet.  Unfortunately her  is having some health issues and it is not always clear to them when there is a message on their voicemail if the messages in reference to herself or her  so they typically do not call anybody back.  She does check her blood pressures at home sometimes her blood pressures are in the 120s over a number less than 90.  She does not recall exactly.  She is not having any chest pains, shortness of breath, or other neurologic symptoms.  She does have follow-up with cardiology at the end of the month.  In reviewing cardiology's note, she did comment that we could start her on Coreg instead of propranolol to see if that would give her any better benefit from a blood pressure control standpoint.  Patient is willing to try this.    Also, she feels like she is not completely emptying her urine.  She has some urinary frequency issues.  She does not have any dysuria as far she is aware.  Review of Systems   Constitutional, HEENT, cardiovascular, pulmonary, gi and gu systems are negative, except as otherwise noted.      Objective    BP (!) 182/88   Pulse 62   Temp 99.4  F (37.4  C) (Temporal)   Resp 15   Ht 1.557 m (5' 1.3\")   Wt 78.6 kg (173 lb 3.2 oz)   LMP 08/19/2013 (Approximate)   SpO2 97%   BMI " 32.41 kg/m    Body mass index is 32.41 kg/m .  Physical Exam   GENERAL: healthy, alert and no distress  NECK: no adenopathy, no asymmetry, masses, or scars and thyroid normal to palpation  RESP: lungs clear to auscultation - no rales, rhonchi or wheezes  CV: regular rate and rhythm, normal S1 S2, no S3 or S4, no murmur, click or rub, no peripheral edema and peripheral pulses strong  ABDOMEN: soft, nontender, no hepatosplenomegaly, no masses and bowel sounds normal  MS: no gross musculoskeletal defects noted, no edema  PSYCH: mentation appears normal, affect normal/bright    Results for orders placed or performed in visit on 11/20/23   UA Macroscopic with reflex to Microscopic and Culture - Lab Collect     Status: Abnormal    Specimen: Urine, Clean Catch   Result Value Ref Range    Color Urine Yellow Colorless, Straw, Light Yellow, Yellow    Appearance Urine Cloudy (A) Clear    Glucose Urine Negative Negative mg/dL    Bilirubin Urine Negative Negative    Ketones Urine Negative Negative mg/dL    Specific Gravity Urine 1.010 1.003 - 1.035    Blood Urine Negative Negative    pH Urine 6.5 5.0 - 7.0    Protein Albumin Urine Negative Negative mg/dL    Urobilinogen Urine 0.2 0.2, 1.0 E.U./dL    Nitrite Urine Negative Negative    Leukocyte Esterase Urine Negative Negative   UA Microscopic with Reflex to Culture     Status: Abnormal   Result Value Ref Range    Bacteria Urine Few (A) None Seen /HPF    RBC Urine 0-2 0-2 /HPF /HPF    WBC Urine 0-5 0-5 /HPF /HPF    Squamous Epithelials Urine Few (A) None Seen /LPF    Narrative    Urine Culture not indicated

## 2023-11-21 ENCOUNTER — TELEPHONE (OUTPATIENT)
Dept: ENDOCRINOLOGY | Facility: CLINIC | Age: 63
End: 2023-11-21
Payer: COMMERCIAL

## 2023-11-21 NOTE — TELEPHONE ENCOUNTER
Patient call:     Appointment type: New Endocrine   Provider: Izzy   Return date: see below   Speciality phone number: [phone]  Additional appointment(s) needed: N/A   Additional notes: LVM, no MyC, Letter sent x1   To schedulers:  please  offer to move forward  on the schedule to open non-call week ALANNAH space with Dr Magana, combining two 30 minute ALANNAH spaces or using new open ALANNAH  60 minutes space. Thanks  Rafia Marte MD  Endocrine Triage    Examples:    1/31 in person izzy pabon  2/7 in person izzy [facility]  2/28 virtual izzy [facility]    (Per Rosanna Pt is recommended to see [facility] location booked up as it is the pts preferred location)     Please note that the above appointment(s) will require manual scheduling as they are marked as ALANNAH and will not appear using auto search. Do not schedule the patient if another patient has already been scheduled in the requested appointment slot.     Mary Kraft on 11/21/2023 at 11:23 AM

## 2023-11-29 ENCOUNTER — HOSPITAL ENCOUNTER (OUTPATIENT)
Dept: ULTRASOUND IMAGING | Facility: CLINIC | Age: 63
Discharge: HOME | End: 2023-11-29
Attending: INTERNAL MEDICINE | Admitting: INTERNAL MEDICINE
Payer: COMMERCIAL

## 2023-11-29 DIAGNOSIS — I10 ESSENTIAL HYPERTENSION WITH GOAL BLOOD PRESSURE LESS THAN 140/90: ICD-10-CM

## 2023-11-29 PROCEDURE — 93975 VASCULAR STUDY: CPT

## 2023-11-30 ENCOUNTER — OFFICE VISIT (OUTPATIENT)
Dept: CARDIOLOGY | Facility: CLINIC | Age: 63
End: 2023-11-30
Payer: COMMERCIAL

## 2023-11-30 VITALS
BODY MASS INDEX: 32.28 KG/M2 | OXYGEN SATURATION: 96 % | HEART RATE: 68 BPM | HEIGHT: 61 IN | WEIGHT: 171 LBS | DIASTOLIC BLOOD PRESSURE: 84 MMHG | SYSTOLIC BLOOD PRESSURE: 138 MMHG

## 2023-11-30 DIAGNOSIS — I10 ESSENTIAL HYPERTENSION WITH GOAL BLOOD PRESSURE LESS THAN 140/90: Primary | ICD-10-CM

## 2023-11-30 DIAGNOSIS — R68.89 ABNORMAL ENDOCRINE LABORATORY TEST FINDING: ICD-10-CM

## 2023-11-30 PROCEDURE — 99214 OFFICE O/P EST MOD 30 MIN: CPT | Performed by: NURSE PRACTITIONER

## 2023-11-30 RX ORDER — CARVEDILOL 25 MG/1
25 TABLET ORAL 2 TIMES DAILY WITH MEALS
Qty: 180 TABLET | Refills: 3 | Status: SHIPPED | OUTPATIENT
Start: 2023-11-30

## 2023-11-30 ASSESSMENT — PAIN SCALES - GENERAL: PAINLEVEL: NO PAIN (0)

## 2023-11-30 NOTE — PROGRESS NOTES
~Cardiology Clinic Visit~    Primary Cardiologist: Dr. Marie  Reason for visit: BP follow up    History of Present Illness    This is a 63 yr old here for evaluation of HTN. She's been having a diagnosis of HTN for many years. She has tried amlodipine in the past, was on lasix for edema and then stopped on amlodipine and leg edema improved, now off lasix. Then she was transitioned to dilt, hydrochlorothiazide lisinopril and has been for years on propanolol. SBP today 186/98 mmHg regardless of her medication regimen. Recently had diltiazem increased by PCP on Friday.      She was recommended multiple tests for further evaluation, including laboratory work, renal artery ultrasound, and echocardiogram.  Recent thyroid function is WNL.    Diagnostics:    10/9/23 Labs:  TSH normal.  Renin aldosterone ratio elevated at 46.3  Serum normetanephrine elevated 0.90, serum metanephrine normal.  Serum cortisol normal; free urine cortisol normal.  24-hour urine normetanephrine elevated at 418, all others normal.    11/16/23 Echocardiogram: normal LV wall thickness with EF 55-60% and grade 1 early diastolic dysfunction.  No WMA, no significant valve disease.    11/29/23 Renal artery ultrasound: no evidence of MARSHA.    Interval 11/30/23    She is feeling well today.  She was recently started on Carvedilol for blood pressure management, and her numbers are under excellent control today.  /84, HR 68.  She has no associated symptoms.  __________________________________________________________________         Assessment and Impression:     1. HTN  - Excellent control today; pt notes improved numbers on home machine as well.  - Suspect secondary cause given poor control on 4 drug regimen.   - On diltiazem, hydrochlorothiazide and lisinopril. Recently switched from propanolol to Carvedilol.  - Renal artery ultrasound negative for MARSHA  - Echocardiogram stable.  - Labs as above.  Planning endocrine follow up.          Recommendations and Plan:     Continue current multi-drug BP regimen without changes today.  Follow up with Endocrine, as scheduled.  Continue monitoring BP at home and keep a log for future review.  Pt encouraged to call clinic if BP trends increase, and we can discuss further.  Follow up with Dr. Marie in 6-months to review BP control.  __________________________________________________________________    Thank you for the opportunity to participate in this pleasant patient's care.    We would be happy to see this patient sooner for any concerns in the meantime.    LUKE Subramanian, CNP   Nurse Practitioner  [facility]    Today's clinic visit entailed:  Review of the result(s) of each unique test - multiple labs reviewed, reviewed ancillary provider notes for ongoing recommendations, cardiac testing and imaging interpreted.  Prescription drug management  The level of medical decision making during this visit was of moderate complexity.    Orders this Visit:  Orders Placed This Encounter   Procedures    Follow-Up with Cardiology     Orders Placed This Encounter   Medications    carvedilol (COREG) 25 MG tablet     Sig: Take 1 tablet (25 mg) by mouth 2 times daily (with meals)     Dispense:  180 tablet     Refill:  3     Medications Discontinued During This Encounter   Medication Reason    carvedilol (COREG) 25 MG tablet Reorder (No AVS)     Encounter Diagnoses   Name Primary?    Essential hypertension with goal blood pressure less than 140/90 Yes    Abnormal endocrine laboratory test finding      CURRENT MEDICATIONS:  Current Outpatient Medications   Medication Sig Dispense Refill    Aspirin 81 MG CAPS       atorvastatin (LIPITOR) 40 MG tablet Take 1 tablet (40 mg) by mouth daily 90 tablet 3    carvedilol (COREG) 25 MG tablet Take 1 tablet (25 mg) by mouth 2 times daily (with meals) 180 tablet 3    diltiazem ER (CARDIAZEM LA) 360 MG 24 hr tablet Take 1 tablet (360 mg) by mouth daily 90  "tablet 1    lisinopril-hydrochlorothiazide (ZESTORETIC) 20-12.5 MG tablet TAKE TWO TABLETS BY MOUTH EVERY  tablet 3    Multiple Vitamin (MULTI-VITAMIN DAILY PO)       potassium chloride ER (KLOR-CON M) 10 MEQ CR tablet Take 1 tablet (10 mEq) by mouth 2 times daily 180 tablet 3    tiZANidine (ZANAFLEX) 4 MG tablet TAKE ONE TABLET BY MOUTH THREE TIMES A DAY AS NEEDED FOR MUSCLE SPASMS 180 tablet 1     ALLERGIES     Allergies   Allergen Reactions    No Known Drug Allergy      PAST MEDICAL, SURGICAL, FAMILY, SOCIAL HISTORY:  History was reviewed and updated as needed, see medical record.    Review of Systems:  A 10-point Review Of Systems is otherwise normal except for that which is noted in the HPI and interval summary.    Physical Exam:    Vitals: /84 (BP Location: Right arm, Patient Position: Sitting, Cuff Size: Adult Regular)   Pulse 68   Ht 1.557 m (5' 1.3\")   Wt 77.6 kg (171 lb)   LMP 08/19/2013 (Approximate)   SpO2 96%   BMI 32.00 kg/m    Constitutional: Appears stated age, well nourished, NAD.  Neck: Supple. Carotid pulses full and equal.   Respiratory: Non-labored. Lungs CTAB.  Cardiovascular: RRR, normal S1 and S2. No M/G/R.  No edema.  GI: Soft, non-distended, non-tender.  Musculoskeletal/Extremities: Symmetrical movement to all extremities.  Neurologic: No gross focal deficits. Alert, awake, and oriented to all spheres.  Psychiatric: Affect appropriate. Mentation normal.    Recent Lab Results:  LIPID RESULTS:  Lab Results   Component Value Date    CHOL 196 05/01/2023    CHOL 213 (H) 04/23/2021    HDL 55 05/01/2023    HDL 54 04/23/2021    LDL 96 05/01/2023     (H) 04/23/2021    TRIG 226 (H) 05/01/2023    TRIG 197 (H) 04/23/2021    CHOLHDLRATIO 4.6 11/13/2014     LIVER ENZYME RESULTS:  Lab Results   Component Value Date    AST 21 05/19/2023    AST 19 04/23/2021    ALT 24 05/19/2023    ALT 35 04/23/2021     CBC RESULTS:  Lab Results   Component Value Date    WBC 5.7 05/19/2023    WBC " "5.6 06/09/2021    RBC 4.94 05/19/2023    RBC 5.04 06/09/2021    HGB 14.1 05/19/2023    HGB 14.2 06/09/2021    HCT 41.9 05/19/2023    HCT 43.4 06/09/2021    MCV 85 05/19/2023    MCV 86 06/09/2021    MCH 28.5 05/19/2023    MCH 28.2 06/09/2021    MCHC 33.7 05/19/2023    MCHC 32.7 06/09/2021    RDW 12.9 05/19/2023    RDW 13.5 06/09/2021     05/19/2023     06/09/2021     BMP RESULTS:  Lab Results   Component Value Date     10/03/2023     06/09/2021    POTASSIUM 3.9 10/03/2023    POTASSIUM 3.7 05/01/2023    POTASSIUM 4.0 06/09/2021    CHLORIDE 100 10/03/2023    CHLORIDE 101 05/01/2023    CHLORIDE 99 06/09/2021    CO2 30 (H) 10/03/2023    CO2 31 05/01/2023    CO2 34 (H) 06/09/2021    ANIONGAP 11 10/03/2023    ANIONGAP 6 05/01/2023    ANIONGAP 3 06/09/2021     (H) 10/03/2023     (H) 05/01/2023    GLC 98 06/09/2021    BUN 16.7 10/03/2023    BUN 11 05/01/2023    BUN 13 06/09/2021    CR 0.70 10/03/2023    CR 0.69 06/09/2021    GFRESTIMATED >90 10/03/2023    GFRESTIMATED >90 06/09/2021    GFRESTBLACK >90 06/09/2021    BILLY 10.2 10/03/2023    BILLY 9.6 06/09/2021      A1C RESULTS:  Lab Results   Component Value Date    A1C 6.1 (H) 05/01/2023    A1C 5.9 (H) 05/07/2021     INR RESULTS:  No results found for: \"INR\"                  "

## 2023-11-30 NOTE — PATIENT INSTRUCTIONS
Thank you for your visit with the [facility] today.    Today's Summary     Continue to monitor blood pressure at home - keep a log, and if the numbers are trending up then call the clinic.  Follow up with Endocrine as scheduled.  Follow up in 6-months with Dr. Marie.  Please call 6-months in advance to schedule your appointment.    If you have questions or concerns, please do not hesitate to call my nursing support team at [phone].    Scheduling phone number: [phone]  [facility] Clinic Number: [phone]    It was a pleasure seeing you today.     LUKE Subramanian, CNP  Nurse Practitioner  [facility]  November 30, 2023  ________________________________________________________

## 2023-11-30 NOTE — LETTER
11/30/2023    Anisha Alfaro PA-C  [address]  [place] [address]    RE: Janice Ulloa       Dear Colleague,     I had the pleasure of seeing Janice Ulloa in the [facility].              ~Cardiology Clinic Visit~    Primary Cardiologist: Dr. Marie  Reason for visit: BP follow up    History of Present Illness    This is a 63 yr old here for evaluation of HTN. She's been having a diagnosis of HTN for many years. She has tried amlodipine in the past, was on lasix for edema and then stopped on amlodipine and leg edema improved, now off lasix. Then she was transitioned to dilt, hydrochlorothiazide lisinopril and has been for years on propanolol. SBP today 186/98 mmHg regardless of her medication regimen. Recently had diltiazem increased by PCP on Friday.      She was recommended multiple tests for further evaluation, including laboratory work, renal artery ultrasound, and echocardiogram.  Recent thyroid function is WNL.    Diagnostics:    10/9/23 Labs:  TSH normal.  Renin aldosterone ratio elevated at 46.3  Serum normetanephrine elevated 0.90, serum metanephrine normal.  Serum cortisol normal; free urine cortisol normal.  24-hour urine normetanephrine elevated at 418, all others normal.    11/16/23 Echocardiogram: normal LV wall thickness with EF 55-60% and grade 1 early diastolic dysfunction.  No WMA, no significant valve disease.    11/29/23 Renal artery ultrasound: no evidence of MARSHA.    Interval 11/30/23    She is feeling well today.  She was recently started on Carvedilol for blood pressure management, and her numbers are under excellent control today.  /84, HR 68.  She has no associated symptoms.  __________________________________________________________________         Assessment and Impression:     1. HTN  - Excellent control today; pt notes improved numbers on home machine as well.  - Suspect secondary cause given poor control on 4 drug regimen.   - On diltiazem,  hydrochlorothiazide and lisinopril. Recently switched from propanolol to Carvedilol.  - Renal artery ultrasound negative for MARSHA  - Echocardiogram stable.  - Labs as above.  Planning endocrine follow up.         Recommendations and Plan:     Continue current multi-drug BP regimen without changes today.  Follow up with Endocrine, as scheduled.  Continue monitoring BP at home and keep a log for future review.  Pt encouraged to call clinic if BP trends increase, and we can discuss further.  Follow up with Dr. Marie in 6-months to review BP control.  __________________________________________________________________    Thank you for the opportunity to participate in this pleasant patient's care.    We would be happy to see this patient sooner for any concerns in the meantime.    LUKE Subramanian, CNP   Nurse Practitioner  [facility]    Today's clinic visit entailed:  Review of the result(s) of each unique test - multiple labs reviewed, reviewed ancillary provider notes for ongoing recommendations, cardiac testing and imaging interpreted.  Prescription drug management  The level of medical decision making during this visit was of moderate complexity.    Orders this Visit:  Orders Placed This Encounter   Procedures    Follow-Up with Cardiology     Orders Placed This Encounter   Medications    carvedilol (COREG) 25 MG tablet     Sig: Take 1 tablet (25 mg) by mouth 2 times daily (with meals)     Dispense:  180 tablet     Refill:  3     Medications Discontinued During This Encounter   Medication Reason    carvedilol (COREG) 25 MG tablet Reorder (No AVS)     Encounter Diagnoses   Name Primary?    Essential hypertension with goal blood pressure less than 140/90 Yes    Abnormal endocrine laboratory test finding      CURRENT MEDICATIONS:  Current Outpatient Medications   Medication Sig Dispense Refill    Aspirin 81 MG CAPS       atorvastatin (LIPITOR) 40 MG tablet Take 1 tablet (40 mg) by mouth daily  "90 tablet 3    carvedilol (COREG) 25 MG tablet Take 1 tablet (25 mg) by mouth 2 times daily (with meals) 180 tablet 3    diltiazem ER (CARDIAZEM LA) 360 MG 24 hr tablet Take 1 tablet (360 mg) by mouth daily 90 tablet 1    lisinopril-hydrochlorothiazide (ZESTORETIC) 20-12.5 MG tablet TAKE TWO TABLETS BY MOUTH EVERY  tablet 3    Multiple Vitamin (MULTI-VITAMIN DAILY PO)       potassium chloride ER (KLOR-CON M) 10 MEQ CR tablet Take 1 tablet (10 mEq) by mouth 2 times daily 180 tablet 3    tiZANidine (ZANAFLEX) 4 MG tablet TAKE ONE TABLET BY MOUTH THREE TIMES A DAY AS NEEDED FOR MUSCLE SPASMS 180 tablet 1     ALLERGIES     Allergies   Allergen Reactions    No Known Drug Allergy      PAST MEDICAL, SURGICAL, FAMILY, SOCIAL HISTORY:  History was reviewed and updated as needed, see medical record.    Review of Systems:  A 10-point Review Of Systems is otherwise normal except for that which is noted in the HPI and interval summary.    Physical Exam:    Vitals: /84 (BP Location: Right arm, Patient Position: Sitting, Cuff Size: Adult Regular)   Pulse 68   Ht 1.557 m (5' 1.3\")   Wt 77.6 kg (171 lb)   LMP 08/19/2013 (Approximate)   SpO2 96%   BMI 32.00 kg/m    Constitutional: Appears stated age, well nourished, NAD.  Neck: Supple. Carotid pulses full and equal.   Respiratory: Non-labored. Lungs CTAB.  Cardiovascular: RRR, normal S1 and S2. No M/G/R.  No edema.  GI: Soft, non-distended, non-tender.  Musculoskeletal/Extremities: Symmetrical movement to all extremities.  Neurologic: No gross focal deficits. Alert, awake, and oriented to all spheres.  Psychiatric: Affect appropriate. Mentation normal.    Recent Lab Results:  LIPID RESULTS:  Lab Results   Component Value Date    CHOL 196 05/01/2023    CHOL 213 (H) 04/23/2021    HDL 55 05/01/2023    HDL 54 04/23/2021    LDL 96 05/01/2023     (H) 04/23/2021    TRIG 226 (H) 05/01/2023    TRIG 197 (H) 04/23/2021    CHOLHDLRATIO 4.6 11/13/2014     LIVER ENZYME " "RESULTS:  Lab Results   Component Value Date    AST 21 05/19/2023    AST 19 04/23/2021    ALT 24 05/19/2023    ALT 35 04/23/2021     CBC RESULTS:  Lab Results   Component Value Date    WBC 5.7 05/19/2023    WBC 5.6 06/09/2021    RBC 4.94 05/19/2023    RBC 5.04 06/09/2021    HGB 14.1 05/19/2023    HGB 14.2 06/09/2021    HCT 41.9 05/19/2023    HCT 43.4 06/09/2021    MCV 85 05/19/2023    MCV 86 06/09/2021    MCH 28.5 05/19/2023    MCH 28.2 06/09/2021    MCHC 33.7 05/19/2023    MCHC 32.7 06/09/2021    RDW 12.9 05/19/2023    RDW 13.5 06/09/2021     05/19/2023     06/09/2021     BMP RESULTS:  Lab Results   Component Value Date     10/03/2023     06/09/2021    POTASSIUM 3.9 10/03/2023    POTASSIUM 3.7 05/01/2023    POTASSIUM 4.0 06/09/2021    CHLORIDE 100 10/03/2023    CHLORIDE 101 05/01/2023    CHLORIDE 99 06/09/2021    CO2 30 (H) 10/03/2023    CO2 31 05/01/2023    CO2 34 (H) 06/09/2021    ANIONGAP 11 10/03/2023    ANIONGAP 6 05/01/2023    ANIONGAP 3 06/09/2021     (H) 10/03/2023     (H) 05/01/2023    GLC 98 06/09/2021    BUN 16.7 10/03/2023    BUN 11 05/01/2023    BUN 13 06/09/2021    CR 0.70 10/03/2023    CR 0.69 06/09/2021    GFRESTIMATED >90 10/03/2023    GFRESTIMATED >90 06/09/2021    GFRESTBLACK >90 06/09/2021    BILLY 10.2 10/03/2023    BILLY 9.6 06/09/2021      A1C RESULTS:  Lab Results   Component Value Date    A1C 6.1 (H) 05/01/2023    A1C 5.9 (H) 05/07/2021     INR RESULTS:  No results found for: \"INR\"      Thank you for allowing me to participate in the care of your patient.      Sincerely,     LUKE Subramanian [facility]  cc:   Rylee Marie MD  [address] [place] [id number]      "

## 2024-01-09 DIAGNOSIS — M54.2 NECK PAIN: ICD-10-CM

## 2024-01-10 NOTE — TELEPHONE ENCOUNTER
RECORDS RECEIVED FROM: INTERNAL/[facility]   DATE RECEIVED: 1/31/24   NOTES (FOR ALL VISITS) STATUS DETAILS   OFFICE NOTES from referring provider Internal 11/20/23 SABINO CAMACHO PA-C    OFFICE NOTES from other specialist Internal 11/30/23 OV IRIS GIBSON APRN CNP - HYPERTENSION   10/9/23 OV JOSÉ LUIS ANDREWS MD - HYPERTENSION   6/9/21 OV SHANNEN MCCABE PA-C - HYPERTENSION    MEDICATION LIST Internal    IMAGING      ULTRASOUND (HEAD/NECK) Internal 11/29/23 US RENAL   LABS     DIABETES: HBGA1C, CREATININE, FASTING LIPIDS, MICROALBUMIN URINE, POTASSIUM, TSH, T4    THYROID: TSH, T4, CBC, THYRODLONULIN, TOTAL T3, FREE T4, CALCITONIN, CEA Internal   5/1/23 CBC  5/1/23 HBGA1C  6/6/23 POTASSIUM  10/3/23 TSH W FREE T4

## 2024-01-31 ENCOUNTER — LAB (OUTPATIENT)
Dept: LAB | Facility: CLINIC | Age: 64
End: 2024-01-31
Payer: COMMERCIAL

## 2024-01-31 ENCOUNTER — PRE VISIT (OUTPATIENT)
Dept: ENDOCRINOLOGY | Facility: CLINIC | Age: 64
End: 2024-01-31

## 2024-01-31 ENCOUNTER — OFFICE VISIT (OUTPATIENT)
Dept: ENDOCRINOLOGY | Facility: CLINIC | Age: 64
End: 2024-01-31
Attending: PHYSICIAN ASSISTANT
Payer: COMMERCIAL

## 2024-01-31 VITALS
WEIGHT: 168 LBS | HEART RATE: 72 BPM | SYSTOLIC BLOOD PRESSURE: 174 MMHG | OXYGEN SATURATION: 97 % | DIASTOLIC BLOOD PRESSURE: 85 MMHG | BODY MASS INDEX: 31.72 KG/M2 | HEIGHT: 61 IN

## 2024-01-31 DIAGNOSIS — R79.89 ELEVATED PLASMA METANEPHRINES: ICD-10-CM

## 2024-01-31 DIAGNOSIS — E26.9 HIGH ALDOSTERONE TO RENIN RATIO (H): ICD-10-CM

## 2024-01-31 DIAGNOSIS — E26.09 PRIMARY ALDOSTERONISM (H): Primary | ICD-10-CM

## 2024-01-31 DIAGNOSIS — E26.09 PRIMARY ALDOSTERONISM (H): ICD-10-CM

## 2024-01-31 DIAGNOSIS — I1A.0 RESISTANT HYPERTENSION: ICD-10-CM

## 2024-01-31 LAB
ANION GAP SERPL CALCULATED.3IONS-SCNC: 12 MMOL/L (ref 7–15)
BUN SERPL-MCNC: 10.7 MG/DL (ref 8–23)
CALCIUM SERPL-MCNC: 10 MG/DL (ref 8.8–10.2)
CHLORIDE SERPL-SCNC: 101 MMOL/L (ref 98–107)
CREAT SERPL-MCNC: 0.63 MG/DL (ref 0.51–0.95)
DEPRECATED HCO3 PLAS-SCNC: 29 MMOL/L (ref 22–29)
EGFRCR SERPLBLD CKD-EPI 2021: >90 ML/MIN/1.73M2
GLUCOSE SERPL-MCNC: 98 MG/DL (ref 70–99)
POTASSIUM SERPL-SCNC: 3.4 MMOL/L (ref 3.4–5.3)
SODIUM SERPL-SCNC: 142 MMOL/L (ref 135–145)

## 2024-01-31 PROCEDURE — 84244 ASSAY OF RENIN: CPT | Mod: 90 | Performed by: PATHOLOGY

## 2024-01-31 PROCEDURE — 82088 ASSAY OF ALDOSTERONE: CPT | Performed by: INTERNAL MEDICINE

## 2024-01-31 PROCEDURE — 99205 OFFICE O/P NEW HI 60 MIN: CPT | Performed by: INTERNAL MEDICINE

## 2024-01-31 PROCEDURE — 83835 ASSAY OF METANEPHRINES: CPT | Mod: 90 | Performed by: PATHOLOGY

## 2024-01-31 PROCEDURE — 80048 BASIC METABOLIC PNL TOTAL CA: CPT | Performed by: INTERNAL MEDICINE

## 2024-01-31 PROCEDURE — 36415 COLL VENOUS BLD VENIPUNCTURE: CPT | Performed by: PATHOLOGY

## 2024-01-31 PROCEDURE — 99000 SPECIMEN HANDLING OFFICE-LAB: CPT | Performed by: PATHOLOGY

## 2024-01-31 RX ORDER — SPIRONOLACTONE 50 MG/1
50 TABLET, FILM COATED ORAL DAILY
Qty: 90 TABLET | Refills: 0 | Status: SHIPPED | OUTPATIENT
Start: 2024-01-31 | End: 2024-04-16

## 2024-01-31 RX ORDER — LISINOPRIL 20 MG/1
20 TABLET ORAL DAILY
Qty: 90 TABLET | Refills: 0 | Status: SHIPPED | OUTPATIENT
Start: 2024-01-31 | End: 2024-04-16

## 2024-01-31 RX ORDER — LISINOPRIL 20 MG/1
40 TABLET ORAL DAILY
Qty: 90 TABLET | Refills: 0 | Status: SHIPPED | OUTPATIENT
Start: 2024-01-31 | End: 2024-01-31

## 2024-01-31 ASSESSMENT — PAIN SCALES - GENERAL: PAINLEVEL: NO PAIN (0)

## 2024-01-31 NOTE — LETTER
1/31/2024       RE: Janice Ulloa  [address]  [place] [address]     Dear Colleague,    Thank you for referring your patient, Janice Ulloa, to the [facility] [place] at [facility]. Please see a copy of my visit note below.    Endocrinology Clinic Visit    Chief Complaint: Adrenal Problem     Information obtained from:Patient and grandson      Assessment/Treatment Plan:    Resistant hypertension  Hypokalemia on potassium supplement  High aldosterone renin ratio  Possible primary aldosteronism    Uncontrolled hypertension [no acute symptoms including chest pain, shortness of breath or vision changes] despite on hydrochlorothiazide, lisinopril, diltiazem and carvedilol to full dose in the setting of hypokalemia with aldosterone elevated at 13.9 with suppressed renin 0.3 [despite on lisinopril and diuretic] supports highly primary aldosteronism.  The workup and treatment modality discussed in detail with the patient.  Patient does not want to pursue surgical intervention irrespective of findings at this time therefore would prefer medical treatment.  Therefore, after confirming with the second blood draw today; we will proceed directly to CT adrenal and treatment with mineralocorticoid antagonist this.  Of note, patient's uncontrolled blood pressure therefore we will defer confirmatory testing.      Plan  Repeat aldosterone and renin activity  Normetanephrine was slightly elevated when it was checked at PCP office therefore we will repeat metanephrines  Proceed with CT adrenal  Start spironolactone 50 mg daily and stop hydrochlorothiazide and potassium supplement  Continue all other blood pressure medications  One week after starting spironolactone check blood work at [place]  Will plan on titrating up spironolactone as directed by blood pressure and potassium  Check blood pressure with a nurse visit at [place]  cl[facility]  Please go to the emergency room if you develop chest pain, shortness of breath, or vision changes    Test and/or medications prescribed today:  Orders Placed This Encounter   Procedures    CT Abdomen w/o & w Contrast    Metanephrines Plasma Free    Aldosterone    Renin activity    Potassium    Basic metabolic panel    Aldosterone Renin Ratio         Jeanne Magana MD  Staff Endocrinologist    Division of Endocrinology and Diabetes      Subjective:         HPI: Janice Ulloa is a 63 year old female with history of hypertension who is seen in consultation at Anisha Alfaro's request for elevated aldosterone to renin ratio.    Per report hypertension diagnosed about 5 years ago and patient is currently being treated with carvedilol 25 mg twice daily, diltiazem 360 mg daily, lisinopril 40 mg daily and hydrochlorothiazide 25 mg daily.  Despite all these medications blood pressure is not controlled.  She takes also potassium supplement for low potassium which was noted previously.  Reports weight is stable.  Other review of system negative today for chest pain, shortness of breath, vision changes.   Allergies   Allergen Reactions    No Known Drug Allergy        Current Outpatient Medications   Medication Sig Dispense Refill    Aspirin 81 MG CAPS       atorvastatin (LIPITOR) 40 MG tablet Take 1 tablet (40 mg) by mouth daily 90 tablet 3    carvedilol (COREG) 25 MG tablet Take 1 tablet (25 mg) by mouth 2 times daily (with meals) 180 tablet 3    diltiazem ER (CARDIAZEM LA) 360 MG 24 hr tablet Take 1 tablet (360 mg) by mouth daily 90 tablet 1    lisinopril (ZESTRIL) 20 MG tablet Take 1 tablet (20 mg) by mouth daily 90 tablet 0    Multiple Vitamin (MULTI-VITAMIN DAILY PO)       spironolactone (ALDACTONE) 50 MG tablet Take 1 tablet (50 mg) by mouth daily 90 tablet 0    tiZANidine (ZANAFLEX) 4 MG tablet Take 1 tablet (4 mg) by mouth 3 times daily as needed for muscle spasms 180 tablet 1       Review of Systems  "    11 point review system (Constitutional, HENT, Eyes, Respiratory, Cardiovascular, Gastrointestinal, Genitourinary, Musculoskeletal,Neurological, Psychiatric/Behavioural, Endocrine) is negative or is as per HPI above.  Pertinent to the visit past medical history, past surgical history, family and social history reviewed.    Objective:   BP (!) 174/85   Pulse 72   Ht 1.549 m (5' 1\")   Wt 76.2 kg (168 lb)   LMP 08/19/2013 (Approximate)   SpO2 97%   BMI 31.74 kg/m    Constitutional: Pleasant no acute cardiopulmonary distress.   EYES: anicteric, normal extra-ocular movements, no lid lag or retraction, is equal and reactive to light bilaterally.  HEENT: Mouth/Throat: Mucous membrane is moist. Oropharynx is clear.    Cardiovascular: RRR, S1, S2 normal.   Pulmonary/Chest: CTAB. No wheezing or rales.   Abdominal: +BS. Non tender to palpation.  Stretch marks: none.   Neurological: Alert and oriented.  No tremor and reflexes are symmetrical bilaterally and within the normal limits. Muscle strength 5/5.   Extremities: trace edema.  Psychological: appropriate mood and affect    In House Labs:   Lab Results   Component Value Date    A1C 6.1 05/01/2023    A1C 5.9 05/07/2021      Latest Ref Rng 10/9/2023  2:49 PM 10/14/2023  8:30 AM   ENDO ADRENAL LABS      Aldosterone 0.0 - 31.0 ng/dL 13.9     Cortisol Serum ug/dL 6.7     Cortisol Free Creat R UR mg/dL  57    Cortisol Free Creat R UR mg/dL  57    Cortisol Free Creat T  - 1400 mg/d  855    Cortisol Free Creat T  - 1400 mg/d  855    Cortisol Free Urine Random ug/L  7.31    Cortisol Free Urine <=45.0 ug/d  11.0    Cortisol ug/g creatinine ug/g CRT  12.82    Creatinine 0.51 - 0.95 mg/dL     Creatinine Urine mg/dL     Metanephrine 0.00 - 0.49 nmol/L 0.19     Metanephr 24 H ur/cr 0 - 300 ug/g CRT  98    Normetanephrine Urine per Volume ug/L  238    Normetanephrine 0.00 - 0.89 nmol/L 0.90 (H)     Potassium 3.4 - 5.3 mmol/L     Renin Activity ng/mL/hr 0.3     Sodium " 135 - 145 mmol/L        L  TSH   Date Value Ref Range Status   10/03/2023 1.76 0.30 - 4.20 uIU/mL Final   05/19/2023 2.09 0.30 - 4.20 uIU/mL Final   03/15/2018 2.98 0.40 - 4.00 mU/L Final   08/29/2017 2.82 0.40 - 4.00 mU/L Final   04/11/2017 2.01 0.40 - 4.00 mU/L Final   09/04/2013 5.25 (H) 0.4 - 5.0 mU/L Final   07/08/2009 2.57 0.4 - 5.0 mU/L Final     T4 Free   Date Value Ref Range Status   09/04/2013 0.97 0.70 - 1.85 ng/dL Final   04/04/2005 0.90 0.70 - 1.85 ng/dL Final       Creatinine   Date Value Ref Range Status   10/03/2023 0.70 0.51 - 0.95 mg/dL Final   06/09/2021 0.69 0.52 - 1.04 mg/dL Final   ]    This note has been dictated using voice recognition software.  As a result, there may be errors in the documentation that have gone undetected.  Please consider this when interpreting information in this documentation.    56 minutes spent by me on the date of the encounter doing chart review, counseling on workup and management of primary aldosteronism, coordination of care, history and exam, documentation and further activities per the note.  More than 75% spent directly face-to-face with the patient.      Jeanne Magana MD     Skin Substitute Text: The defect edges were debeveled with a #15 scalpel blade.  Given the location of the defect, shape of the defect and the proximity to free margins a skin substitute graft was deemed most appropriate.  The graft material was trimmed to fit the size of the defect. The graft was then placed in the primary defect and oriented appropriately.

## 2024-01-31 NOTE — PROGRESS NOTES
Endocrinology Clinic Visit    Chief Complaint: Adrenal Problem     Information obtained from:Patient and grandson      Assessment/Treatment Plan:    Resistant hypertension  Hypokalemia on potassium supplement  High aldosterone renin ratio  Possible primary aldosteronism    Uncontrolled hypertension [no acute symptoms including chest pain, shortness of breath or vision changes] despite on hydrochlorothiazide, lisinopril, diltiazem and carvedilol to full dose in the setting of hypokalemia with aldosterone elevated at 13.9 with suppressed renin 0.3 [despite on lisinopril and diuretic] supports highly primary aldosteronism.  The workup and treatment modality discussed in detail with the patient.  Patient does not want to pursue surgical intervention irrespective of findings at this time therefore would prefer medical treatment.  Therefore, after confirming with the second blood draw today; we will proceed directly to CT adrenal and treatment with mineralocorticoid antagonist this.  Of note, patient's uncontrolled blood pressure therefore we will defer confirmatory testing.      Plan  Repeat aldosterone and renin activity  Normetanephrine was slightly elevated when it was checked at PCP office therefore we will repeat metanephrines  Proceed with CT adrenal  Start spironolactone 50 mg daily and stop hydrochlorothiazide and potassium supplement  Continue all other blood pressure medications  One week after starting spironolactone check blood work at [place]  Will plan on titrating up spironolactone as directed by blood pressure and potassium  Check blood pressure with a [facility] visit at [facility]  Please go to the emergency room if you develop chest pain, shortness of breath, or vision changes    Test and/or medications prescribed today:  Orders Placed This Encounter   Procedures    CT Abdomen w/o & w Contrast    Metanephrines Plasma Free    Aldosterone    Renin activity    Potassium    Basic metabolic panel     Aldosterone Renin Ratio         Jeanne Magana MD  Staff Endocrinologist    Division of Endocrinology and Diabetes      Subjective:         HPI: Janice Ulloa is a 63 year old female with history of hypertension who is seen in consultation at Anisha Alfaro's request for elevated aldosterone to renin ratio.    Per report hypertension diagnosed about 5 years ago and patient is currently being treated with carvedilol 25 mg twice daily, diltiazem 360 mg daily, lisinopril 40 mg daily and hydrochlorothiazide 25 mg daily.  Despite all these medications blood pressure is not controlled.  She takes also potassium supplement for low potassium which was noted previously.  Reports weight is stable.  Other review of system negative today for chest pain, shortness of breath, vision changes.   Allergies   Allergen Reactions    No Known Drug Allergy        Current Outpatient Medications   Medication Sig Dispense Refill    Aspirin 81 MG CAPS       atorvastatin (LIPITOR) 40 MG tablet Take 1 tablet (40 mg) by mouth daily 90 tablet 3    carvedilol (COREG) 25 MG tablet Take 1 tablet (25 mg) by mouth 2 times daily (with meals) 180 tablet 3    diltiazem ER (CARDIAZEM LA) 360 MG 24 hr tablet Take 1 tablet (360 mg) by mouth daily 90 tablet 1    lisinopril (ZESTRIL) 20 MG tablet Take 1 tablet (20 mg) by mouth daily 90 tablet 0    Multiple Vitamin (MULTI-VITAMIN DAILY PO)       spironolactone (ALDACTONE) 50 MG tablet Take 1 tablet (50 mg) by mouth daily 90 tablet 0    tiZANidine (ZANAFLEX) 4 MG tablet Take 1 tablet (4 mg) by mouth 3 times daily as needed for muscle spasms 180 tablet 1       Review of Systems     11 point review system (Constitutional, HENT, Eyes, Respiratory, Cardiovascular, Gastrointestinal, Genitourinary, Musculoskeletal,Neurological, Psychiatric/Behavioural, Endocrine) is negative or is as per HPI above.  Pertinent to the visit past medical history, past surgical history, family and social history  "reviewed.    Objective:   BP (!) 174/85   Pulse 72   Ht 1.549 m (5' 1\")   Wt 76.2 kg (168 lb)   LMP 08/19/2013 (Approximate)   SpO2 97%   BMI 31.74 kg/m    Constitutional: Pleasant no acute cardiopulmonary distress.   EYES: anicteric, normal extra-ocular movements, no lid lag or retraction, is equal and reactive to light bilaterally.  HEENT: Mouth/Throat: Mucous membrane is moist. Oropharynx is clear.    Cardiovascular: RRR, S1, S2 normal.   Pulmonary/Chest: CTAB. No wheezing or rales.   Abdominal: +BS. Non tender to palpation.  Stretch marks: none.   Neurological: Alert and oriented.  No tremor and reflexes are symmetrical bilaterally and within the normal limits. Muscle strength 5/5.   Extremities: trace edema.  Psychological: appropriate mood and affect    In House Labs:   Lab Results   Component Value Date    A1C 6.1 05/01/2023    A1C 5.9 05/07/2021      Latest Ref Rng 10/9/2023  2:49 PM 10/14/2023  8:30 AM   ENDO ADRENAL LABS      Aldosterone 0.0 - 31.0 ng/dL 13.9     Cortisol Serum ug/dL 6.7     Cortisol Free Creat R UR mg/dL  57    Cortisol Free Creat R UR mg/dL  57    Cortisol Free Creat T  - 1400 mg/d  855    Cortisol Free Creat T  - 1400 mg/d  855    Cortisol Free Urine Random ug/L  7.31    Cortisol Free Urine <=45.0 ug/d  11.0    Cortisol ug/g creatinine ug/g CRT  12.82    Creatinine 0.51 - 0.95 mg/dL     Creatinine Urine mg/dL     Metanephrine 0.00 - 0.49 nmol/L 0.19     Metanephr 24 H ur/cr 0 - 300 ug/g CRT  98    Normetanephrine Urine per Volume ug/L  238    Normetanephrine 0.00 - 0.89 nmol/L 0.90 (H)     Potassium 3.4 - 5.3 mmol/L     Renin Activity ng/mL/hr 0.3     Sodium 135 - 145 mmol/L        L  TSH   Date Value Ref Range Status   10/03/2023 1.76 0.30 - 4.20 uIU/mL Final   05/19/2023 2.09 0.30 - 4.20 uIU/mL Final   03/15/2018 2.98 0.40 - 4.00 mU/L Final   08/29/2017 2.82 0.40 - 4.00 mU/L Final   04/11/2017 2.01 0.40 - 4.00 mU/L Final   09/04/2013 5.25 (H) 0.4 - 5.0 mU/L Final "   07/08/2009 2.57 0.4 - 5.0 mU/L Final     T4 Free   Date Value Ref Range Status   09/04/2013 0.97 0.70 - 1.85 ng/dL Final   04/04/2005 0.90 0.70 - 1.85 ng/dL Final       Creatinine   Date Value Ref Range Status   10/03/2023 0.70 0.51 - 0.95 mg/dL Final   06/09/2021 0.69 0.52 - 1.04 mg/dL Final   ]    This note has been dictated using voice recognition software.  As a result, there may be errors in the documentation that have gone undetected.  Please consider this when interpreting information in this documentation.    56 minutes spent by me on the date of the encounter doing chart review, counseling on workup and management of primary aldosteronism, coordination of care, history and exam, documentation and further activities per the note.  More than 75% spent directly face-to-face with the patient.

## 2024-01-31 NOTE — PATIENT INSTRUCTIONS
Janice,     Please do blood work today and schedule CT scan.     Please stop the following blood pressure medications:   TAKE TWO TABLETS BY MOUTH EVERY  tablet 3 ordered 05/01/2023 01/31/2024 -- -- --            []    Take 1 tablet (10 mEq) by mouth 2 times daily 180 tablet 3            Start the following medications:       spironolactone (ALDACTONE) 50 MG tablet Take 1 tablet (50 mg) by mouth daily 90 tablet 0 ordered 01/31/2024 -- -- -- --         Summary: Take 1 tablet (50 mg) by mouth daily, Disp-90 tablet, R-0, E-Prescribe  Guidelines: Start Date & Time: 01/31/2024Pharmacy: [facility] [place] - [address]/Sold: 01/31/2024 (O)Ordered On: 01/31/2024ReportAdh: Dx Associated: Long-term: Dose, Route, Frequency: 50 mg, Oral, DAILY             Change  Adjust Sig  Reorder  Discontinue  Order Details: Take 1 tablet (50 mg) by mouth daily Dispense: 90 tablet, Refills: 0 ordered  Authorized By: Jeanne Magana MD         lisinopril (ZESTRIL) 20 MG tablet Take 1 tablet (20 mg) by mouth daily 90 tablet 0 ordered 01/31/2024 -- -- -- --         Summary: Take 1 tablet (20 mg) by mouth daily, Disp-90 tablet, R-0, E-Prescribe  Guidelines: Start Date & Time: 01/31/2024Pharmacy: [facility], [place] - [address]/Sold: 01/31/2024 (O)Ordered On: 01/31/2024ReportAdh: Dx Associated: Long-term: Dose, Route, Frequency: 20 mg, Oral, DAILY             Change  Adjust Sig  Reorder  Discontinue  Order Details: Take 1 tablet (20 mg) by mouth daily Dispense: 90 tablet, Refills: 0 ordered  Authorized By: Jeanne Magana MD     One week after starting this medication check blood work at [place]

## 2024-01-31 NOTE — Clinical Note
Blood pressure was not checked before patient was discharged.  Please be possible to arrange nurse visit for blood pressure checks at [facility] [she lives in similar manner].  Thank you for your follow-up.

## 2024-02-02 LAB — RENIN PLAS-CCNC: 0.2 NG/ML/HR

## 2024-02-03 LAB
ANNOTATION COMMENT IMP: NORMAL
METANEPHS SERPL-SCNC: 0.15 NMOL/L
NORMETANEPHRINE SERPL-SCNC: 0.79 NMOL/L

## 2024-02-06 LAB
ALDOST SERPL-MCNC: 11.7 NG/DL (ref 0–31)
ALDOST/RENIN PLAS-RTO: 58.5 {RATIO} (ref 0–25)

## 2024-02-07 ENCOUNTER — HOSPITAL ENCOUNTER (OUTPATIENT)
Dept: CT IMAGING | Facility: CLINIC | Age: 64
Discharge: HOME | End: 2024-02-07
Attending: INTERNAL MEDICINE | Admitting: INTERNAL MEDICINE
Payer: COMMERCIAL

## 2024-02-07 DIAGNOSIS — E26.9 HIGH ALDOSTERONE TO RENIN RATIO (H): ICD-10-CM

## 2024-02-07 PROCEDURE — 250N000011 HC RX IP 250 OP 636: Performed by: INTERNAL MEDICINE

## 2024-02-07 PROCEDURE — 74178 CT ABD&PLV WO CNTR FLWD CNTR: CPT

## 2024-02-07 PROCEDURE — 250N000009 HC RX 250: Performed by: INTERNAL MEDICINE

## 2024-02-07 RX ORDER — IOPAMIDOL 755 MG/ML
500 INJECTION, SOLUTION INTRAVASCULAR ONCE
Status: COMPLETED | OUTPATIENT
Start: 2024-02-07 | End: 2024-02-07

## 2024-02-07 RX ADMIN — IOPAMIDOL 82 ML: 755 INJECTION, SOLUTION INTRAVENOUS at 15:35

## 2024-02-07 RX ADMIN — SODIUM CHLORIDE 60 ML: 9 INJECTION, SOLUTION INTRAVENOUS at 15:35

## 2024-02-12 ENCOUNTER — ALLIED HEALTH/NURSE VISIT (OUTPATIENT)
Dept: FAMILY MEDICINE | Facility: CLINIC | Age: 64
End: 2024-02-12
Payer: COMMERCIAL

## 2024-02-12 ENCOUNTER — LAB (OUTPATIENT)
Dept: LAB | Facility: CLINIC | Age: 64
End: 2024-02-12
Payer: COMMERCIAL

## 2024-02-12 VITALS — SYSTOLIC BLOOD PRESSURE: 128 MMHG | DIASTOLIC BLOOD PRESSURE: 82 MMHG

## 2024-02-12 DIAGNOSIS — I10 ESSENTIAL HYPERTENSION WITH GOAL BLOOD PRESSURE LESS THAN 140/90: Primary | ICD-10-CM

## 2024-02-12 DIAGNOSIS — E26.09 PRIMARY ALDOSTERONISM (H): ICD-10-CM

## 2024-02-12 LAB
ANION GAP SERPL CALCULATED.3IONS-SCNC: 10 MMOL/L (ref 7–15)
BUN SERPL-MCNC: 16.2 MG/DL (ref 8–23)
CALCIUM SERPL-MCNC: 10.2 MG/DL (ref 8.8–10.2)
CHLORIDE SERPL-SCNC: 100 MMOL/L (ref 98–107)
CREAT SERPL-MCNC: 0.85 MG/DL (ref 0.51–0.95)
DEPRECATED HCO3 PLAS-SCNC: 28 MMOL/L (ref 22–29)
EGFRCR SERPLBLD CKD-EPI 2021: 76 ML/MIN/1.73M2
GLUCOSE SERPL-MCNC: 98 MG/DL (ref 70–99)
POTASSIUM SERPL-SCNC: 4.3 MMOL/L (ref 3.4–5.3)
SODIUM SERPL-SCNC: 138 MMOL/L (ref 135–145)

## 2024-02-12 PROCEDURE — 36415 COLL VENOUS BLD VENIPUNCTURE: CPT

## 2024-02-12 PROCEDURE — 99207 PR NO CHARGE NURSE ONLY: CPT

## 2024-02-12 PROCEDURE — 80048 BASIC METABOLIC PNL TOTAL CA: CPT

## 2024-02-12 NOTE — RESULT ENCOUNTER NOTE
please call patient and inform the following.  No MyChart access.    Incidentally the CT scan showed bladder prolapse.  I recommend to follow-up with urology regarding this issue and we have placed a referral for that.  CT scan also showed incidentally showed atherosclerosis or clogging of arteries.  People with this finding needs to be on a medication called atorvastatin or Lipitor as you are currently taking and follow-up LDL cholesterol to be less than 70 desired.  Please follow-up with your cardiologist and primary care physician regarding this finding.    Regarding blood work results as below  You have primary aldosteronism and we started you on a medication called spironolactone at the time of your visit to treat this condition.  Your blood pressure improvement and potassium now back to the normal range suggests that this is a right medication for you.  Please continue spironolactone as prescribed.  Previously we stopped your potassium supplement.  Please note that you do not need potassium supplement any longer.  Your current potassium is within the normal limits without supplement.    Will further discuss with you at the time of your follow-up appointment.  Please let us know if any questions in the meantime.    Jeanne Magana MD

## 2024-02-12 NOTE — PROGRESS NOTES
Janice Ulloa is a 63 year old patient who comes in today for a Blood Pressure check.  Initial BP:  /82   LMP 08/19/2013 (Approximate)      Data Unavailable  Disposition: follow-up as previously indicated by provider and results routed to provider    Amena Galindo MA 2/12/2024

## 2024-02-12 NOTE — RESULT ENCOUNTER NOTE
please call patient and inform the following.  No MyChart access.    You have primary aldosteronism and we started you on a medication called spironolactone at the time of your visit to treat this condition.  Your blood pressure improvement and potassium now back to the normal range suggests that this is a right medication for you.  Please continue spironolactone as prescribed.  Previously we stopped your potassium supplement.  Please note that you do not need potassium supplement any longer.  Your current potassium is within the normal limits without supplement.    Will further discuss with you at the time of your follow-up appointment.  Please let us know if any questions in the meantime.    Jeanne Magana MD

## 2024-02-14 ENCOUNTER — TELEPHONE (OUTPATIENT)
Dept: ENDOCRINOLOGY | Facility: CLINIC | Age: 64
End: 2024-02-14
Payer: COMMERCIAL

## 2024-02-14 NOTE — TELEPHONE ENCOUNTER
Spoke w/ Pt: confirms understanding of review and recommendation from Dr Magana. No questions.   Anuja Thornton, RN on 2/14/2024 at 4:02 PM         RE    Message  Received: Today  Jeanne Magana MD  [facility] Triage-  Please call and inform patient  potassium looks good.   Plan: # Continue the new medication spironolactone.  # Do not take potassium supplement   Will further discuss with you at the time of your follow-up appointment.  Please let us know if any questions in the meantime.       Jeanne Magana MD

## 2024-02-14 NOTE — RESULT ENCOUNTER NOTE
Please call and inform patient  potassium looks good.   Plan: # Continue the new medication spironolactone.  # Do not take potassium supplement   Will further discuss with you at the time of your follow-up appointment.  Please let us know if any questions in the meantime.    Jeanne Magana MD

## 2024-04-07 DIAGNOSIS — I1A.0 RESISTANT HYPERTENSION: ICD-10-CM

## 2024-04-07 DIAGNOSIS — E26.09 PRIMARY ALDOSTERONISM (H): ICD-10-CM

## 2024-04-07 DIAGNOSIS — E78.5 HYPERLIPIDEMIA WITH TARGET LDL LESS THAN 130: ICD-10-CM

## 2024-04-08 RX ORDER — ATORVASTATIN CALCIUM 40 MG/1
40 TABLET, FILM COATED ORAL DAILY
Qty: 90 TABLET | Refills: 1 | Status: SHIPPED | OUTPATIENT
Start: 2024-04-08 | End: 2024-08-06

## 2024-04-11 DIAGNOSIS — M54.2 NECK PAIN: ICD-10-CM

## 2024-04-16 RX ORDER — SPIRONOLACTONE 50 MG/1
50 TABLET, FILM COATED ORAL DAILY
Qty: 90 TABLET | Refills: 2 | Status: SHIPPED | OUTPATIENT
Start: 2024-04-16 | End: 2024-04-23

## 2024-04-16 RX ORDER — LISINOPRIL 20 MG/1
20 TABLET ORAL DAILY
Qty: 90 TABLET | Refills: 2 | Status: SHIPPED | OUTPATIENT
Start: 2024-04-16 | End: 2024-04-23

## 2024-04-23 ENCOUNTER — OFFICE VISIT (OUTPATIENT)
Dept: ENDOCRINOLOGY | Facility: CLINIC | Age: 64
End: 2024-04-23
Payer: COMMERCIAL

## 2024-04-23 VITALS
DIASTOLIC BLOOD PRESSURE: 78 MMHG | SYSTOLIC BLOOD PRESSURE: 152 MMHG | BODY MASS INDEX: 32.12 KG/M2 | OXYGEN SATURATION: 97 % | WEIGHT: 170 LBS | HEART RATE: 63 BPM

## 2024-04-23 DIAGNOSIS — E26.09 PRIMARY ALDOSTERONISM (H): Primary | ICD-10-CM

## 2024-04-23 DIAGNOSIS — I1A.0 RESISTANT HYPERTENSION: ICD-10-CM

## 2024-04-23 LAB
ANION GAP SERPL CALCULATED.3IONS-SCNC: 12 MMOL/L (ref 7–15)
BUN SERPL-MCNC: 15.4 MG/DL (ref 8–23)
CALCIUM SERPL-MCNC: 10.1 MG/DL (ref 8.8–10.2)
CHLORIDE SERPL-SCNC: 100 MMOL/L (ref 98–107)
CREAT SERPL-MCNC: 0.76 MG/DL (ref 0.51–0.95)
DEPRECATED HCO3 PLAS-SCNC: 29 MMOL/L (ref 22–29)
EGFRCR SERPLBLD CKD-EPI 2021: 87 ML/MIN/1.73M2
GLUCOSE SERPL-MCNC: 79 MG/DL (ref 70–99)
POTASSIUM SERPL-SCNC: 4.5 MMOL/L (ref 3.4–5.3)
SODIUM SERPL-SCNC: 141 MMOL/L (ref 135–145)

## 2024-04-23 PROCEDURE — 84244 ASSAY OF RENIN: CPT | Mod: 90 | Performed by: INTERNAL MEDICINE

## 2024-04-23 PROCEDURE — 99000 SPECIMEN HANDLING OFFICE-LAB: CPT | Performed by: INTERNAL MEDICINE

## 2024-04-23 PROCEDURE — 80048 BASIC METABOLIC PNL TOTAL CA: CPT | Performed by: INTERNAL MEDICINE

## 2024-04-23 PROCEDURE — 99215 OFFICE O/P EST HI 40 MIN: CPT | Performed by: INTERNAL MEDICINE

## 2024-04-23 PROCEDURE — G2211 COMPLEX E/M VISIT ADD ON: HCPCS | Performed by: INTERNAL MEDICINE

## 2024-04-23 PROCEDURE — 36415 COLL VENOUS BLD VENIPUNCTURE: CPT | Performed by: INTERNAL MEDICINE

## 2024-04-23 RX ORDER — SPIRONOLACTONE 100 MG/1
100 TABLET, FILM COATED ORAL DAILY
Qty: 90 TABLET | Refills: 1 | Status: SHIPPED | OUTPATIENT
Start: 2024-04-23

## 2024-04-23 NOTE — LETTER
4/23/2024         RE: Janice Ulloa  [address]  [place] [address]        Dear Colleague,    Thank you for referring your patient, Janice Ulloa, to the [facility]. Please see a copy of my visit note below.    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  The workup and treatment modality discussed in detail with the patient.  Patient does not want to pursue surgical intervention irrespective of findings at this time therefore medical treatment pursued.     Started on Spironolactone 50 mg daily and discontinued hydrochlorothiazide, potassium supplement and diltiazem  Home /70's more than 85% of the time however her BP was high today @ 140/78 in the office. We will obtain home BP.       Plan  BP @ goal at home but high in clinic today. Potassium @ target of 4.5 and renin activity pending.   Metanephrines Normal  CT no adrenal nodule or mass  Increase Spironolactone to 100 mg daily and stop Lisinopril   Continue carvedilol   One week after increasing spironolactone check blood work at [place]  Check blood pressure with a nurse visit at [facility] in one week    Incidentally the CT scan showed bladder prolapse - offered referral to urology pt declined. Would follow up with PCP.  CT scan also showed incidentally showed atherosclerosis - currently on atorvastatin; target LDL less than 70.        Test and/or medications prescribed today:  Orders Placed This Encounter   Procedures     Basic metabolic panel     Renin activity         Jeanne Magana MD  Staff Endocrinologist    Division of Endocrinology and Diabetes      Subjective:         HPI: Janice Ulloa is a 63 year old female with history of hypertension who is seen in consultation at Anisha Alfaro's request for elevated aldosterone to renin ratio.    Per report hypertension diagnosed about 5 years ago and patient is currently being treated with carvedilol 25 mg twice daily, diltiazem 360 mg daily, lisinopril 40 mg daily and hydrochlorothiazide 25 mg daily.  Despite all these medications blood pressure is not controlled.  She takes also potassium supplement for low potassium which was noted previously.  Reports weight is stable.  Other review of system negative today for chest pain, shortness of breath, vision changes.   Allergies   Allergen Reactions     No Known Drug Allergy        Current Outpatient Medications   Medication Sig Dispense Refill     Aspirin 81 MG CAPS        atorvastatin (LIPITOR) 40 MG tablet TAKE ONE TABLET BY MOUTH ONCE DAILY 90 tablet 1     carvedilol (COREG) 25 MG tablet Take 1 tablet (25 mg) by mouth 2 times daily (with meals) 180 tablet 3     diltiazem ER (CARDIAZEM LA) 360 MG 24 hr tablet Take 1 tablet (360 mg) by mouth daily 90 tablet 1     lisinopril (ZESTRIL) 20 MG tablet Take 1 tablet (20 mg) by mouth daily 90 tablet 2     Multiple Vitamin (MULTI-VITAMIN DAILY PO)        spironolactone (ALDACTONE) 50 MG tablet Take 1 tablet (50 mg) by mouth daily 90 tablet 2     tiZANidine (ZANAFLEX) 4 MG tablet Take 1 tablet (4 mg) by mouth 3 times daily as needed for muscle spasms 180 tablet 1       Review of Systems     11 point review system (Constitutional, HENT, Eyes, Respiratory, Cardiovascular, Gastrointestinal, Genitourinary, Musculoskeletal,Neurological, Psychiatric/Behavioural, Endocrine) is negative or is as per HPI  above.  Pertinent to the visit past medical history, past surgical history, family and social history reviewed.    Objective:   BP (!) 140/78 (BP Location: Left arm, Patient Position: Sitting, Cuff Size: Adult Large)   Pulse 65   Wt 77.1 kg (170 lb)   LMP 08/19/2013 (Approximate)   SpO2 97%   BMI 32.12 kg/m    Constitutional: Pleasant no acute cardiopulmonary distress.   EYES: anicteric, normal extra-ocular movements, no lid lag or retraction, is equal and reactive to light bilaterally.  HEENT: Mouth/Throat: Mucous membrane is moist. Oropharynx is clear.    Cardiovascular: RRR, S1, S2 normal.   Pulmonary/Chest: CTAB. No wheezing or rales.   Abdominal: +BS. Non tender to palpation.  Stretch marks: none.   Neurological: Alert and oriented.  No tremor and reflexes are symmetrical bilaterally and within the normal limits. Muscle strength 5/5.   Extremities: trace edema.  Psychological: appropriate mood and affect    In House Labs:   Lab Results   Component Value Date    A1C 6.1 05/01/2023    A1C 5.9 05/07/2021     Component      Latest Ref Rng 4/23/2024  3:47 PM   Sodium      135 - 145 mmol/L 141    Potassium      3.4 - 5.3 mmol/L 4.5    Chloride      98 - 107 mmol/L 100    Carbon Dioxide (CO2)      22 - 29 mmol/L 29    Anion Gap      7 - 15 mmol/L 12    Urea Nitrogen      8.0 - 23.0 mg/dL 15.4    Creatinine      0.51 - 0.95 mg/dL 0.76    GFR Estimate      >60 mL/min/1.73m2 87    Calcium      8.8 - 10.2 mg/dL 10.1    Glucose      70 - 99 mg/dL 79        Component      Latest Ref Rng 1/31/2024  3:38 PM 2/12/2024  10:06 AM   Sodium      135 - 145 mmol/L  138    Potassium      3.4 - 5.3 mmol/L  4.3    Chloride      98 - 107 mmol/L  100    Carbon Dioxide (CO2)      22 - 29 mmol/L  28    Anion Gap      7 - 15 mmol/L  10    Urea Nitrogen      8.0 - 23.0 mg/dL  16.2    Creatinine      0.51 - 0.95 mg/dL  0.85    GFR Estimate      >60 mL/min/1.73m2  76    Calcium      8.8 - 10.2 mg/dL  10.2    Glucose      70 - 99 mg/dL  98     Metanephrine      0.00 - 0.49 nmol/L 0.15     Normetanephrine      0.00 - 0.89 nmol/L 0.79     Metanephrines Interpretation See Note     Aldosterone      0.0 - 31.0 ng/dL 11.7     Renin Activity      ng/mL/hr 0.2     Aldosterone Renin Ratio      0.0 - 25.0  58.5 (H)        Latest Ref Rng 10/9/2023  2:49 PM 10/14/2023  8:30 AM   ENDO ADRENAL LABS      Aldosterone 0.0 - 31.0 ng/dL 13.9     Cortisol Serum ug/dL 6.7     Cortisol Free Creat R UR mg/dL  57    Cortisol Free Creat R UR mg/dL  57    Cortisol Free Creat T  - 1400 mg/d  855    Cortisol Free Creat T  - 1400 mg/d  855    Cortisol Free Urine Random ug/L  7.31    Cortisol Free Urine <=45.0 ug/d  11.0    Cortisol ug/g creatinine ug/g CRT  12.82    Creatinine 0.51 - 0.95 mg/dL     Creatinine Urine mg/dL     Metanephrine 0.00 - 0.49 nmol/L 0.19     Metanephr 24 H ur/cr 0 - 300 ug/g CRT  98    Normetanephrine Urine per Volume ug/L  238    Normetanephrine 0.00 - 0.89 nmol/L 0.90 (H)     Potassium 3.4 - 5.3 mmol/L     Renin Activity ng/mL/hr 0.3     Sodium 135 - 145 mmol/L        L  TSH   Date Value Ref Range Status   10/03/2023 1.76 0.30 - 4.20 uIU/mL Final   05/19/2023 2.09 0.30 - 4.20 uIU/mL Final   03/15/2018 2.98 0.40 - 4.00 mU/L Final   08/29/2017 2.82 0.40 - 4.00 mU/L Final   04/11/2017 2.01 0.40 - 4.00 mU/L Final   09/04/2013 5.25 (H) 0.4 - 5.0 mU/L Final   07/08/2009 2.57 0.4 - 5.0 mU/L Final     T4 Free   Date Value Ref Range Status   09/04/2013 0.97 0.70 - 1.85 ng/dL Final   04/04/2005 0.90 0.70 - 1.85 ng/dL Final       Creatinine   Date Value Ref Range Status   02/12/2024 0.85 0.51 - 0.95 mg/dL Final   06/09/2021 0.69 0.52 - 1.04 mg/dL Final   ]    This note has been dictated using voice recognition software.  As a result, there may be errors in the documentation that have gone undetected.  Please consider this when interpreting information in this documentation.    40 minutes spent by me on the date of the encounter doing chart review,  counseling on workup and management of primary aldosteronism, coordination of care, history and exam, documentation and further activities per the note.  More than 50% spent directly face-to-face with the patient. The longitudinal plan of care for the diagnosis(es)/condition(s) as documented were addressed during this visit. Due to the added complexity in care, I will continue to support Janice in the subsequent management and with ongoing continuity of care.        Again, thank you for allowing me to participate in the care of your patient.        Sincerely,        Jeanne Magana MD

## 2024-04-23 NOTE — NURSING NOTE
Janice Ulloa's goals for this visit include:   Chief Complaint   Patient presents with    Follow Up     She requests these members of her care team be copied on today's visit information: Yes     PCP: Anisha Alfaro    Referring Provider:  No referring provider defined for this encounter.    BP (!) 140/78 (BP Location: Left arm, Patient Position: Sitting, Cuff Size: Adult Large)   Pulse 65   Wt 77.1 kg (170 lb)   LMP 08/19/2013 (Approximate)   SpO2 97%   BMI 32.12 kg/m      Do you need any medication refills at today's visit? Yes    Kaykay Muir, RAFI  Adult Endocrinology   [facility]

## 2024-04-23 NOTE — PATIENT INSTRUCTIONS
Welcome to the [facility] Endocrinology and Diabetes Clinics     Our Endocrinology Clinics are here to provide you with a team-based, collaborative approach in the diagnosis and treatment of patients with diabetes and endocrine disorders. The team is made up of Physicians, Physician Assistants, Certified Diabetes Educators, Registered Nurses, Medical Assistants, Emergency Medical Technicians, and many others, all of whom have the unified goal of providing our patients with high quality care.     Please see below for some helpful tips to best navigate and use the [facility] Endocrinology clinic:     [place] Respect: At [facility], we are committed to a respectful and safe space for all patients, visitors, and staff.  We believe that mutual respect between patients and their care team is the foundation of quality care.  It is our expectation that you will be treated with respect by your care team.  In turn, we ask that all communication with the care team (written and verbal) be respectful and free from profanity, threatening, or abusive language.  Disrespectful communication undermines our therapeutic relationship with you and may result in us being unable to continue to provide your care.    Refills: A provider must see you at least annually to prescribe and refill medications. This is to ensure your safety as well as meet insurance and compliance regulations.    Scheduling: Many of our Providers offer both in-person or video visits. Please call to schedule any needed follow ups as soon as possible because our provider schedules fill up very quickly. Our care team has the right to require an in-person visit when they believe that it is medically necessary. Please remember that for any virtual visits, you must be in the [facility] at the time of the visit, otherwise we are unable to see you and you will need to be rescheduled.    Missed Appointments: If you need to cancel or miss your  scheduled appointment, please call the clinic at [phone] to reschedule.  Please note if you repeatedly miss appointments or repeatedly miss appointments without calling to inform us ahead of time (no-show), the clinic may elect to not allow you to reschedule without speaking to a manager, may require a Partnership In Care Agreement prior to rescheduling, or could result in you no longer being able to receive care from the clinic. Providing the clinic with timely notification if you have to miss an appointment, allows us to better serve the needs of all of our patients.    Primary Care Provider: Our Endocrinologists are Specialists in their field. We expect you to have a Primary Care Provider established to handle any needs outside of your diabetes and endocrine care.  We would be happy to assist you find a Primary Care Provider, if you do not have one.    [employer]: [employer] is a wonderful resource that allows you access to your Care Team via online or the gary. Please ask a member of the team if you would like help creating an account. Please note that it may take up to 2 business days for a response. [employer] messages are not reviewed on weekends or after business hours.  Emergent or urgent care needs should never be communicated via [employer].  If you experience a medical emergency call 911 or go to the nearest emergency room.    Labs: It is recommended that you stay within the [facility] for labs but you are welcome to obtain ordered labs (with some exceptions) from any location of your choice as long as they are able to complete and process the needed labs. If you need us to fax orders to your preferred lab, please provide us the name and fax number of the lab you would like to go to so we can fax the orders. If your labs are drawn outside of the [facility], please have them fax the results to [phone] ([place]) or [phone] ([place]) or via [facility]. It is your  responsibility to ensure that outside lab results are sent to us.    We look forward to working with you. Please do not hesitate to reach out with any questions.    Thank you,    The Endocrine Team    [facility] Address:   [place] Address:     [address] [place] [address]    Phone: [phone]  Fax: [phone] [address] [place] [address]    Phone: [phone]  Fax: [phone]     [facility] Cost Estimate Phone Number: [phone]    General Lab and Imaging Scheduling Phone Number: [phone]

## 2024-04-23 NOTE — PROGRESS NOTES
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  The workup and treatment modality discussed in detail with the patient.  Patient does not want to pursue surgical intervention irrespective of findings at this time therefore medical treatment pursued.     Started on Spironolactone 50 mg daily and discontinued hydrochlorothiazide, potassium supplement and diltiazem  Home /70's more than 85% of the time however her BP was high today @ 140/78 in the office. We will obtain home BP.       Plan  BP @ goal at home but high in clinic today. Potassium @ target of 4.5 and renin activity pending.   Metanephrines Normal  CT no adrenal nodule or mass  Increase Spironolactone to 100 mg daily and stop Lisinopril   Continue carvedilol   One week after increasing spironolactone check blood work at [place]  Check blood pressure with a nurse visit at [facility] in one week    Incidentally the CT scan showed bladder prolapse - offered referral to urology pt declined. Would follow up with PCP.  CT scan also showed incidentally showed atherosclerosis - currently on atorvastatin; target LDL less than 70.       Test and/or medications prescribed today:  Orders Placed This Encounter   Procedures    Basic metabolic panel    Renin activity         Jeanne Magana MD  Staff Endocrinologist    Division of Endocrinology and Diabetes      Subjective:          HPI: Janice Ulloa is a 63 year old female with history of hypertension who is seen in consultation at Anisha Alfaro's request for elevated aldosterone to renin ratio.    Per report hypertension diagnosed about 5 years ago and patient is currently being treated with carvedilol 25 mg twice daily, diltiazem 360 mg daily, lisinopril 40 mg daily and hydrochlorothiazide 25 mg daily.  Despite all these medications blood pressure is not controlled.  She takes also potassium supplement for low potassium which was noted previously.  Reports weight is stable.  Other review of system negative today for chest pain, shortness of breath, vision changes.   Allergies   Allergen Reactions    No Known Drug Allergy        Current Outpatient Medications   Medication Sig Dispense Refill    Aspirin 81 MG CAPS       atorvastatin (LIPITOR) 40 MG tablet TAKE ONE TABLET BY MOUTH ONCE DAILY 90 tablet 1    carvedilol (COREG) 25 MG tablet Take 1 tablet (25 mg) by mouth 2 times daily (with meals) 180 tablet 3    diltiazem ER (CARDIAZEM LA) 360 MG 24 hr tablet Take 1 tablet (360 mg) by mouth daily 90 tablet 1    lisinopril (ZESTRIL) 20 MG tablet Take 1 tablet (20 mg) by mouth daily 90 tablet 2    Multiple Vitamin (MULTI-VITAMIN DAILY PO)       spironolactone (ALDACTONE) 50 MG tablet Take 1 tablet (50 mg) by mouth daily 90 tablet 2    tiZANidine (ZANAFLEX) 4 MG tablet Take 1 tablet (4 mg) by mouth 3 times daily as needed for muscle spasms 180 tablet 1       Review of Systems     11 point review system (Constitutional, HENT, Eyes, Respiratory, Cardiovascular, Gastrointestinal, Genitourinary, Musculoskeletal,Neurological, Psychiatric/Behavioural, Endocrine) is negative or is as per HPI above.  Pertinent to the visit past medical history, past surgical history, family and social history reviewed.    Objective:   BP (!) 140/78 (BP Location: Left arm, Patient Position: Sitting, Cuff Size: Adult Large)   Pulse 65   Wt 77.1 kg (170 lb)   LMP  08/19/2013 (Approximate)   SpO2 97%   BMI 32.12 kg/m    Constitutional: Pleasant no acute cardiopulmonary distress.   EYES: anicteric, normal extra-ocular movements, no lid lag or retraction, is equal and reactive to light bilaterally.  HEENT: Mouth/Throat: Mucous membrane is moist. Oropharynx is clear.    Cardiovascular: RRR, S1, S2 normal.   Pulmonary/Chest: CTAB. No wheezing or rales.   Abdominal: +BS. Non tender to palpation.  Stretch marks: none.   Neurological: Alert and oriented.  No tremor and reflexes are symmetrical bilaterally and within the normal limits. Muscle strength 5/5.   Extremities: trace edema.  Psychological: appropriate mood and affect    In House Labs:   Lab Results   Component Value Date    A1C 6.1 05/01/2023    A1C 5.9 05/07/2021     Component      Latest Ref Rn 4/23/2024  3:47 PM   Sodium      135 - 145 mmol/L 141    Potassium      3.4 - 5.3 mmol/L 4.5    Chloride      98 - 107 mmol/L 100    Carbon Dioxide (CO2)      22 - 29 mmol/L 29    Anion Gap      7 - 15 mmol/L 12    Urea Nitrogen      8.0 - 23.0 mg/dL 15.4    Creatinine      0.51 - 0.95 mg/dL 0.76    GFR Estimate      >60 mL/min/1.73m2 87    Calcium      8.8 - 10.2 mg/dL 10.1    Glucose      70 - 99 mg/dL 79        Component      Latest Ref Rng 1/31/2024  3:38 PM 2/12/2024  10:06 AM   Sodium      135 - 145 mmol/L  138    Potassium      3.4 - 5.3 mmol/L  4.3    Chloride      98 - 107 mmol/L  100    Carbon Dioxide (CO2)      22 - 29 mmol/L  28    Anion Gap      7 - 15 mmol/L  10    Urea Nitrogen      8.0 - 23.0 mg/dL  16.2    Creatinine      0.51 - 0.95 mg/dL  0.85    GFR Estimate      >60 mL/min/1.73m2  76    Calcium      8.8 - 10.2 mg/dL  10.2    Glucose      70 - 99 mg/dL  98    Metanephrine      0.00 - 0.49 nmol/L 0.15     Normetanephrine      0.00 - 0.89 nmol/L 0.79     Metanephrines Interpretation See Note     Aldosterone      0.0 - 31.0 ng/dL 11.7     Renin Activity      ng/mL/hr 0.2     Aldosterone Renin Ratio      0.0 -  25.0  58.5 (H)        Latest Ref Rng 10/9/2023  2:49 PM 10/14/2023  8:30 AM   ENDO ADRENAL LABS      Aldosterone 0.0 - 31.0 ng/dL 13.9     Cortisol Serum ug/dL 6.7     Cortisol Free Creat R UR mg/dL  57    Cortisol Free Creat R UR mg/dL  57    Cortisol Free Creat T  - 1400 mg/d  855    Cortisol Free Creat T  - 1400 mg/d  855    Cortisol Free Urine Random ug/L  7.31    Cortisol Free Urine <=45.0 ug/d  11.0    Cortisol ug/g creatinine ug/g CRT  12.82    Creatinine 0.51 - 0.95 mg/dL     Creatinine Urine mg/dL     Metanephrine 0.00 - 0.49 nmol/L 0.19     Metanephr 24 H ur/cr 0 - 300 ug/g CRT  98    Normetanephrine Urine per Volume ug/L  238    Normetanephrine 0.00 - 0.89 nmol/L 0.90 (H)     Potassium 3.4 - 5.3 mmol/L     Renin Activity ng/mL/hr 0.3     Sodium 135 - 145 mmol/L        L  TSH   Date Value Ref Range Status   10/03/2023 1.76 0.30 - 4.20 uIU/mL Final   05/19/2023 2.09 0.30 - 4.20 uIU/mL Final   03/15/2018 2.98 0.40 - 4.00 mU/L Final   08/29/2017 2.82 0.40 - 4.00 mU/L Final   04/11/2017 2.01 0.40 - 4.00 mU/L Final   09/04/2013 5.25 (H) 0.4 - 5.0 mU/L Final   07/08/2009 2.57 0.4 - 5.0 mU/L Final     T4 Free   Date Value Ref Range Status   09/04/2013 0.97 0.70 - 1.85 ng/dL Final   04/04/2005 0.90 0.70 - 1.85 ng/dL Final       Creatinine   Date Value Ref Range Status   02/12/2024 0.85 0.51 - 0.95 mg/dL Final   06/09/2021 0.69 0.52 - 1.04 mg/dL Final   ]    This note has been dictated using voice recognition software.  As a result, there may be errors in the documentation that have gone undetected.  Please consider this when interpreting information in this documentation.    40 minutes spent by me on the date of the encounter doing chart review, counseling on workup and management of primary aldosteronism, coordination of care, history and exam, documentation and further activities per the note.  More than 50% spent directly face-to-face with the patient. The longitudinal plan of care for the  diagnosis(es)/condition(s) as documented were addressed during this visit. Due to the added complexity in care, I will continue to support Janice in the subsequent management and with ongoing continuity of care.

## 2024-04-24 ENCOUNTER — TELEPHONE (OUTPATIENT)
Dept: ENDOCRINOLOGY | Facility: CLINIC | Age: 64
End: 2024-04-24
Payer: COMMERCIAL

## 2024-04-24 NOTE — RESULT ENCOUNTER NOTE
Please inform Janice Ulloa blood work looks good. Please make the following changes:     Increase Spironolactone to 100 mg daily   -           Discontinue or stop Lisinopril     Continue carvedilol     One week after increasing spironolactone check blood     work at [place]     Check blood pressure with a nurse visit at [place] during blood work if possible.   Jeanne Magana MD

## 2024-04-24 NOTE — TELEPHONE ENCOUNTER
Writer attempted to contact patient. No answer. Left voicemail for patient to contact our office.       Radha Cantu RN  Endocrine Care Coordinator  [facility]

## 2024-04-24 NOTE — TELEPHONE ENCOUNTER
----- Message from Jeanne Magana MD sent at 4/23/2024  8:48 PM CDT -----  Please inform Janice MAT Ulloa blood work looks good. Please make the following changes:     Increase Spironolactone to 100 mg daily   -           Discontinue or stop Lisinopril     Continue carvedilol     One week after increasing spironolactone check blood     work at [place]     Check blood pressure with a nurse visit at [place] during blood work if possible.   Jeanne Magana MD

## 2024-04-25 NOTE — TELEPHONE ENCOUNTER
Patient was advised of results and recommendations from Dr. Magana. Patient verbalizes understanding and agrees to plan.     Writer advised patient that clinic will call [place] to see if she is able to get a blood pressure check next Friday (per patient request) and labs at same time.

## 2024-04-25 NOTE — TELEPHONE ENCOUNTER
Writer called [facility] and spoke to scheduling.     Scheduled patient for 5/3/24 lab and blood pressure check. Writer called patient and left voicemail to call back to clinic.      Radha Cantu RN  Endocrine Care Coordinator  [facility]

## 2024-04-26 LAB — RENIN PLAS-CCNC: 0.3 NG/ML/HR

## 2024-05-01 DIAGNOSIS — E26.09 PRIMARY ALDOSTERONISM (H): Primary | ICD-10-CM

## 2024-05-01 NOTE — PROGRESS NOTES
Janice Ulloa has an upcoming lab appointment:    Future Appointments   Date Time Provider Department Center   5/3/2024  1:00 PM PH LAB [facility]   5/3/2024  1:30 PM PH MA/LPN [facility]   4/29/2025 11:30 AM Jeanne Magana MD [facility] [place]     Patient is scheduled for the following lab(s): HMPO due    There is no order available. Please review and place either future orders or HMPO (Review of Health Maintenance Protocol Orders), as appropriate.    Health Maintenance Due   Topic    LIPID     MICROALBUMIN      Annette Cuenca

## 2024-05-03 ENCOUNTER — ALLIED HEALTH/NURSE VISIT (OUTPATIENT)
Dept: FAMILY MEDICINE | Facility: CLINIC | Age: 64
End: 2024-05-03
Payer: COMMERCIAL

## 2024-05-03 ENCOUNTER — LAB (OUTPATIENT)
Dept: LAB | Facility: CLINIC | Age: 64
End: 2024-05-03
Payer: COMMERCIAL

## 2024-05-03 DIAGNOSIS — Z01.30 BLOOD PRESSURE CHECK: Primary | ICD-10-CM

## 2024-05-03 DIAGNOSIS — E78.5 HYPERLIPIDEMIA WITH TARGET LDL LESS THAN 130: ICD-10-CM

## 2024-05-03 DIAGNOSIS — E26.09 PRIMARY ALDOSTERONISM (H): ICD-10-CM

## 2024-05-03 DIAGNOSIS — Z13.220 SCREENING FOR HYPERLIPIDEMIA: Primary | ICD-10-CM

## 2024-05-03 LAB
ANION GAP SERPL CALCULATED.3IONS-SCNC: 11 MMOL/L (ref 7–15)
BUN SERPL-MCNC: 15.1 MG/DL (ref 8–23)
CALCIUM SERPL-MCNC: 9.8 MG/DL (ref 8.8–10.2)
CHLORIDE SERPL-SCNC: 101 MMOL/L (ref 98–107)
CHOLEST SERPL-MCNC: 161 MG/DL
CREAT SERPL-MCNC: 0.86 MG/DL (ref 0.51–0.95)
DEPRECATED HCO3 PLAS-SCNC: 26 MMOL/L (ref 22–29)
EGFRCR SERPLBLD CKD-EPI 2021: 75 ML/MIN/1.73M2
FASTING STATUS PATIENT QL REPORTED: NO
GLUCOSE SERPL-MCNC: 87 MG/DL (ref 70–99)
HDLC SERPL-MCNC: 43 MG/DL
LDLC SERPL CALC-MCNC: 73 MG/DL
NONHDLC SERPL-MCNC: 118 MG/DL
POTASSIUM SERPL-SCNC: 4.4 MMOL/L (ref 3.4–5.3)
SODIUM SERPL-SCNC: 138 MMOL/L (ref 135–145)
TRIGL SERPL-MCNC: 225 MG/DL

## 2024-05-03 PROCEDURE — 99207 PR NO CHARGE NURSE ONLY: CPT

## 2024-05-03 PROCEDURE — 36415 COLL VENOUS BLD VENIPUNCTURE: CPT

## 2024-05-03 PROCEDURE — 80061 LIPID PANEL: CPT

## 2024-05-03 PROCEDURE — 80048 BASIC METABOLIC PNL TOTAL CA: CPT

## 2024-05-03 NOTE — PROGRESS NOTES
Janice Ulloa is a 63 year old year old patient who comes in today for a Blood Pressure check because of medication change and ongoing blood pressure monitoring.  Vital Signs as repeated by RN :123/65  Patient is taking medication as prescribed  Patient is tolerating medications well.  Current complaints: none  Disposition:  Sent information over to Endo as requested for review.    Dylon Campbell,KARMENN, RN

## 2024-05-03 NOTE — LETTER
May 14, 2024      Janice Ulloa  [address]  [place] [address]        Dear ,    We are writing to inform you of your test results.    1.  Total cholesterol, LDL cholesterol are within the normal limits.  Triglycerides are slightly high and reducing simple carbohydrates and saturated fat helps to reduce triglyceride levels.  #2 the potassium level is within the desired range.  Kidney function test result is stable.  Please continue the spironolactone at 100 mg daily.  Please note that lisinopril medication has been discontinued at your last visit.  Please closely follow-up with your primary care physician.  I recommend to check blood work again in 6 months with me to follow-up at that time.  Team, please send the chart to coordinators to schedule 6 months virtual visit.    Resulted Orders   Basic metabolic panel   Result Value Ref Range    Sodium 138 135 - 145 mmol/L      Comment:      Reference intervals for this test were updated on 09/26/2023 to more accurately reflect our healthy population. There may be differences in the flagging of prior results with similar values performed with this method. Interpretation of those prior results can be made in the context of the updated reference intervals.     Potassium 4.4 3.4 - 5.3 mmol/L    Chloride 101 98 - 107 mmol/L    Carbon Dioxide (CO2) 26 22 - 29 mmol/L    Anion Gap 11 7 - 15 mmol/L    Urea Nitrogen 15.1 8.0 - 23.0 mg/dL    Creatinine 0.86 0.51 - 0.95 mg/dL    GFR Estimate 75 >60 mL/min/1.73m2    Calcium 9.8 8.8 - 10.2 mg/dL    Glucose 87 70 - 99 mg/dL   Lipid panel reflex to direct LDL Non-fasting   Result Value Ref Range    Cholesterol 161 <200 mg/dL    Triglycerides 225 (H) <150 mg/dL    Direct Measure HDL 43 (L) >=50 mg/dL    LDL Cholesterol Calculated 73 <=100 mg/dL    Non HDL Cholesterol 118 <130 mg/dL    Patient Fasting > 8hrs? No       Comment:      popcorn  popcorn  popcorn    Narrative    Cholesterol  Desirable:  <200  mg/dL    Triglycerides  Normal:  Less than 150 mg/dL  Borderline High:  150-199 mg/dL  High:  200-499 mg/dL  Very High:  Greater than or equal to 500 mg/dL    Direct Measure HDL  Female:  Greater than or equal to 50 mg/dL   Male:  Greater than or equal to 40 mg/dL    LDL Cholesterol  Desirable:  <100mg/dL  Above Desirable:  100-129 mg/dL   Borderline High:  130-159 mg/dL   High:  160-189 mg/dL   Very High:  >= 190 mg/dL    Non HDL Cholesterol  Desirable:  130 mg/dL  Above Desirable:  130-159 mg/dL  Borderline High:  160-189 mg/dL  High:  190-219 mg/dL  Very High:  Greater than or equal to 220 mg/dL     If you have any questions or concerns, please call the clinic at the number listed above.     Sincerely,    Jeanne Magana MD

## 2024-05-10 NOTE — RESULT ENCOUNTER NOTE
Please inform results as patient does not have [facility] access.  1.  Total cholesterol, LDL cholesterol are within the normal limits.  Triglycerides are slightly high and reducing simple carbohydrates and saturated fat helps to reduce triglyceride levels.  #2 the potassium level is within the desired range.  Kidney function test result is stable.  Please continue the spironolactone at 100 mg daily.  Please note that lisinopril medication has been discontinued at your last visit.  Please closely follow-up with your primary care physician.  I recommend to check blood work again in 6 months with me to follow-up at that time.  Team, please send the chart to coordinators to schedule 6 months virtual visit.    Jeanne Magana MD

## 2024-05-13 NOTE — TELEPHONE ENCOUNTER
Please refer to 5/3/24 results message for follow-up details.       Radha Cantu, RN  Endocrine Care Coordinator  [facility]

## 2024-05-20 DIAGNOSIS — I10 ESSENTIAL HYPERTENSION WITH GOAL BLOOD PRESSURE LESS THAN 140/90: ICD-10-CM

## 2024-05-21 RX ORDER — DILTIAZEM HYDROCHLORIDE EXTENDED-RELEASE TABLETS 360 MG/1
360 TABLET, EXTENDED RELEASE ORAL DAILY
Qty: 90 TABLET | Refills: 1 | Status: SHIPPED | OUTPATIENT
Start: 2024-05-21

## 2024-05-31 NOTE — TELEPHONE ENCOUNTER
Left message for patient to return call to clinic. When call is returned please let patient know she can call [phone] to schedule her epidural back injection that Dr. Orozco had ordered.     Wing Potter,      Spoke to Ioana and have her father scheduled for Monday with Rudolph Velazquez

## 2024-07-10 DIAGNOSIS — M54.2 NECK PAIN: ICD-10-CM

## 2024-07-10 NOTE — TELEPHONE ENCOUNTER
Patient requesting a 3 month supply with dispensing 180 tablets.     Kamryn Quigley CMA ([facility])

## 2024-07-30 ENCOUNTER — TELEPHONE (OUTPATIENT)
Dept: FAMILY MEDICINE | Facility: CLINIC | Age: 64
End: 2024-07-30
Payer: COMMERCIAL

## 2024-07-30 NOTE — TELEPHONE ENCOUNTER
Reason for Call:  Appointment Request    Patient requesting this type of appt: Chronic Diease Management/Medication/Follow-Up    Requested provider: Anisha Alfaro    Reason patient unable to be scheduled: Not within requested timeframe    When does patient want to be seen/preferred time: 1-2 weeks    Comments: Pt open to anytime and day    Okay to leave a detailed message?: Yes at Home number on file [phone] (home)    Call taken on 7/30/2024 at 9:38 AM by Ry Najera

## 2024-08-06 ENCOUNTER — ANCILLARY PROCEDURE (OUTPATIENT)
Dept: GENERAL RADIOLOGY | Facility: CLINIC | Age: 64
End: 2024-08-06
Attending: PHYSICIAN ASSISTANT
Payer: COMMERCIAL

## 2024-08-06 ENCOUNTER — OFFICE VISIT (OUTPATIENT)
Dept: FAMILY MEDICINE | Facility: CLINIC | Age: 64
End: 2024-08-06
Payer: COMMERCIAL

## 2024-08-06 VITALS
HEART RATE: 67 BPM | HEIGHT: 61 IN | TEMPERATURE: 98.2 F | OXYGEN SATURATION: 95 % | WEIGHT: 166 LBS | BODY MASS INDEX: 31.34 KG/M2 | SYSTOLIC BLOOD PRESSURE: 122 MMHG | DIASTOLIC BLOOD PRESSURE: 80 MMHG

## 2024-08-06 DIAGNOSIS — M54.16 LUMBAR RADICULOPATHY: ICD-10-CM

## 2024-08-06 DIAGNOSIS — M54.16 LUMBAR RADICULOPATHY: Primary | ICD-10-CM

## 2024-08-06 DIAGNOSIS — E78.5 HYPERLIPIDEMIA WITH TARGET LDL LESS THAN 130: ICD-10-CM

## 2024-08-06 DIAGNOSIS — M54.50 LUMBAR BACK PAIN: ICD-10-CM

## 2024-08-06 PROCEDURE — 72100 X-RAY EXAM L-S SPINE 2/3 VWS: CPT | Mod: TC | Performed by: INTERNAL MEDICINE

## 2024-08-06 PROCEDURE — 99214 OFFICE O/P EST MOD 30 MIN: CPT | Performed by: PHYSICIAN ASSISTANT

## 2024-08-06 RX ORDER — ATORVASTATIN CALCIUM 40 MG/1
40 TABLET, FILM COATED ORAL DAILY
Qty: 90 TABLET | Refills: 1 | Status: SHIPPED | OUTPATIENT
Start: 2024-08-06

## 2024-08-06 RX ORDER — METHYLPREDNISOLONE 4 MG
TABLET, DOSE PACK ORAL
Qty: 21 TABLET | Refills: 0 | Status: SHIPPED | OUTPATIENT
Start: 2024-08-06

## 2024-08-06 ASSESSMENT — ANXIETY QUESTIONNAIRES
5. BEING SO RESTLESS THAT IT IS HARD TO SIT STILL: MORE THAN HALF THE DAYS
7. FEELING AFRAID AS IF SOMETHING AWFUL MIGHT HAPPEN: SEVERAL DAYS
2. NOT BEING ABLE TO STOP OR CONTROL WORRYING: SEVERAL DAYS
1. FEELING NERVOUS, ANXIOUS, OR ON EDGE: SEVERAL DAYS
GAD7 TOTAL SCORE: 9
7. FEELING AFRAID AS IF SOMETHING AWFUL MIGHT HAPPEN: SEVERAL DAYS
IF YOU CHECKED OFF ANY PROBLEMS ON THIS QUESTIONNAIRE, HOW DIFFICULT HAVE THESE PROBLEMS MADE IT FOR YOU TO DO YOUR WORK, TAKE CARE OF THINGS AT HOME, OR GET ALONG WITH OTHER PEOPLE: SOMEWHAT DIFFICULT
3. WORRYING TOO MUCH ABOUT DIFFERENT THINGS: SEVERAL DAYS
4. TROUBLE RELAXING: MORE THAN HALF THE DAYS
8. IF YOU CHECKED OFF ANY PROBLEMS, HOW DIFFICULT HAVE THESE MADE IT FOR YOU TO DO YOUR WORK, TAKE CARE OF THINGS AT HOME, OR GET ALONG WITH OTHER PEOPLE?: SOMEWHAT DIFFICULT
6. BECOMING EASILY ANNOYED OR IRRITABLE: SEVERAL DAYS

## 2024-08-06 ASSESSMENT — PATIENT HEALTH QUESTIONNAIRE - PHQ9
SUM OF ALL RESPONSES TO PHQ QUESTIONS 1-9: 7
SUM OF ALL RESPONSES TO PHQ QUESTIONS 1-9: 7
10. IF YOU CHECKED OFF ANY PROBLEMS, HOW DIFFICULT HAVE THESE PROBLEMS MADE IT FOR YOU TO DO YOUR WORK, TAKE CARE OF THINGS AT HOME, OR GET ALONG WITH OTHER PEOPLE: SOMEWHAT DIFFICULT

## 2024-08-06 ASSESSMENT — PAIN SCALES - GENERAL: PAINLEVEL: SEVERE PAIN (7)

## 2024-08-06 ASSESSMENT — ENCOUNTER SYMPTOMS: BACK PAIN: 1

## 2024-08-06 NOTE — PROGRESS NOTES
"    Assessment & Plan     Lumbar radiculopathy  This is radiating down her right buttock, previous radiculopathy was down left leg.  I did  recommend that she report this to work since she was injured at work.  She is requesting immediate MRI and follow-up with her neurosurgical team.  We will start with lumbar x-ray and a trial of Medrol.  I am hopeful that her symptoms are more muscular.  I recommended a referral to physical therapy which she declined we will have her follow-up with her spine specialist.  - methylPREDNISolone (MEDROL DOSEPAK) 4 MG tablet therapy pack; Follow Package Directions  - XR Lumbar Spine 2/3 Views; Future  - Spine  Referral; Future    Lumbar back pain  I am hopeful this is more muscular as above ice, heat, stretch and she may take Tylenol as needed.  We will steer clear of NSAIDs due to her hypertension though thankfully it is currently well-controlled  - XR Lumbar Spine 2/3 Views; Future  - Spine  Referral; Future    Hyperlipidemia with target LDL less than 130  She is due for refills  - atorvastatin (LIPITOR) 40 MG tablet; Take 1 tablet (40 mg) by mouth daily      This chart documentation was completed in part with Dragon voice recognition software.  Documentation is reviewed after completion, however, some words and grammatical errors may remain.  Anisha Alfaro PA-C      BMI  Estimated body mass index is 31.37 kg/m  as calculated from the following:    Height as of this encounter: 1.549 m (5' 1\").    Weight as of this encounter: 75.3 kg (166 lb).   Weight management plan: Discussed healthy diet and exercise guidelines          Leihg Camacho is a 63 year old, presenting for the following health issues:  Recheck Medication and Back Pain        8/6/2024    11:09 AM   Additional Questions   Roomed by BT   Accompanied by self     Back Pain     History of Present Illness       Back Pain:  She presents for follow up of back pain. Patient's back pain is a recurring " problem.  Location of back pain:  Left middle of back  Description of back pain: shooting  Back pain spreads: right buttocks    Since patient first noticed back pain, pain is: rapidly worsening  Does back pain interfere with her job:  Yes       She eats 0-1 servings of fruits and vegetables daily.She consumes 3 sweetened beverage(s) daily.She exercises with enough effort to increase her heart rate 20 to 29 minutes per day.  She exercises with enough effort to increase her heart rate 3 or less days per week. She is missing 7 dose(s) of medications per week.  She is not taking prescribed medications regularly due to other.         Hyperlipidemia Follow-Up    Are you regularly taking any medication or supplement to lower your cholesterol?   Yes   Are you having muscle aches or other side effects that you think could be caused by your cholesterol lowering medication?  No    Hypertension Follow-up    Do you check your blood pressure regularly outside of the clinic? No   Are you following a low salt diet? No  Are your blood pressures ever more than 140 on the top number (systolic) OR more   than 90 on the bottom number (diastolic), for example 140/90? No  How many servings of fruits and vegetables do you eat daily?  0-1  On average, how many sweetened beverages do you drink each day (Examples: soda, juice, sweet tea, etc.  Do NOT count diet or artificially sweetened beverages)?   2  How many days per week do you exercise enough to make your heart beat faster? 3 or less  How many minutes a day do you exercise enough to make your heart beat faster? 20 - 29  How many days per week do you miss taking your medication? 0    Approximately 2 weeks ago while at work she was lifting a garbage bag from the ground up overhead to place it in a dumpster when she felt back pain.  She had immediate pain shooting down into her left foot and in her right low back and buttocks area.  She has had residual numbness and tingling in her left  "foot ever since her L4-L5 decompression and discectomy on June 16, 2021.  She has done well from a back pain and radicular standpoint ever since however after her injury the numbness and tingling is intensified on the bottom of her left foot.  She does have right low back pain that refers into her buttocks and ends over the proximal aspect of her hamstring.  She denies bowel or bladder incontinence. She has been treating this with tizanidine but is not much help in reducing pain but it makes her tired so she can rest better at [facility].  She wonders if she can take Tylenol or ibuprofen for this.  With her last episode of lumbar radiculopathy she completed physical therapy though did not find it beneficial.  She would really like to get an MRI and go right back to spine specialty at this point.  She  did take a Medrol Dosepak but does not recall its efficacy.      Review of Systems  Constitutional, HEENT, cardiovascular, pulmonary, gi and gu systems are negative, except as otherwise noted.      Objective    /80   Pulse 67   Temp 98.2  F (36.8  C) (Temporal)   Ht 1.549 m (5' 1\")   Wt 75.3 kg (166 lb)   LMP 08/19/2013 (Approximate)   SpO2 95%   BMI 31.37 kg/m    Body mass index is 31.37 kg/m .  Physical Exam   GENERAL: alert and no distress  NECK: no adenopathy, no asymmetry, masses, or scars  RESP: lungs clear to auscultation - no rales, rhonchi or wheezes  CV: regular rate and rhythm, normal S1 S2, no S3 or S4, no murmur, click or rub, no peripheral edema  Comprehensive back pain exam:  Tenderness of right sided lumbosacral musculature, Range of motion not limited by pain, Lower extremity strength functional and equal on both sides, Lower extremity reflexes within normal limits bilaterally, and Straight leg positive on  right, indicating possible ipsilateral radiculopathy    Xray - Reviewed and interpreted by me.  Normal alignment, no obvious compression fractures noted, official report pending from " radiology  Lab on 05/03/2024   Component Date Value Ref Range Status    Sodium 05/03/2024 138  135 - 145 mmol/L Final    Reference intervals for this test were updated on 09/26/2023 to more accurately reflect our healthy population. There may be differences in the flagging of prior results with similar values performed with this method. Interpretation of those prior results can be made in the context of the updated reference intervals.     Potassium 05/03/2024 4.4  3.4 - 5.3 mmol/L Final    Chloride 05/03/2024 101  98 - 107 mmol/L Final    Carbon Dioxide (CO2) 05/03/2024 26  22 - 29 mmol/L Final    Anion Gap 05/03/2024 11  7 - 15 mmol/L Final    Urea Nitrogen 05/03/2024 15.1  8.0 - 23.0 mg/dL Final    Creatinine 05/03/2024 0.86  0.51 - 0.95 mg/dL Final    GFR Estimate 05/03/2024 75  >60 mL/min/1.73m2 Final    Calcium 05/03/2024 9.8  8.8 - 10.2 mg/dL Final    Glucose 05/03/2024 87  70 - 99 mg/dL Final    Cholesterol 05/03/2024 161  <200 mg/dL Final    Triglycerides 05/03/2024 225 (H)  <150 mg/dL Final    Direct Measure HDL 05/03/2024 43 (L)  >=50 mg/dL Final    LDL Cholesterol Calculated 05/03/2024 73  <=100 mg/dL Final    Non HDL Cholesterol 05/03/2024 118  <130 mg/dL Final    Patient Fasting > 8hrs? 05/03/2024 No   Final    popcorn  popcorn  popcorn           Signed Electronically by: Anisha Alfaro PA-C

## 2024-11-10 DIAGNOSIS — M54.2 NECK PAIN: ICD-10-CM

## 2024-11-10 DIAGNOSIS — I10 ESSENTIAL HYPERTENSION WITH GOAL BLOOD PRESSURE LESS THAN 140/90: ICD-10-CM

## 2024-11-11 DIAGNOSIS — I10 ESSENTIAL HYPERTENSION WITH GOAL BLOOD PRESSURE LESS THAN 140/90: ICD-10-CM

## 2024-11-11 RX ORDER — DILTIAZEM HYDROCHLORIDE EXTENDED-RELEASE TABLETS 360 MG/1
360 TABLET, EXTENDED RELEASE ORAL DAILY
Qty: 90 TABLET | Refills: 0 | Status: SHIPPED | OUTPATIENT
Start: 2024-11-11

## 2024-11-11 NOTE — TELEPHONE ENCOUNTER
Last Written Prescription Date:  11/30/23  Last Fill Quantity: 180,  # refills: 3   Last office visit: 11/30/2023 with Lynette Cisneros   Future Office Visit:  Per LOV pt. Was to be seen back in May of 2024 but pt. Did not schedule. Will have scheduling reach out to pt. To schedule prior to additional refills.     Cassidy Gomez on 11/11/2024 at 8:38 AM

## 2024-11-13 RX ORDER — CARVEDILOL 25 MG/1
25 TABLET ORAL 2 TIMES DAILY WITH MEALS
Qty: 180 TABLET | Refills: 0 | Status: SHIPPED | OUTPATIENT
Start: 2024-11-13

## 2024-12-05 DIAGNOSIS — I1A.0 RESISTANT HYPERTENSION: ICD-10-CM

## 2024-12-05 DIAGNOSIS — E26.09 PRIMARY ALDOSTERONISM (H): ICD-10-CM

## 2024-12-05 RX ORDER — SPIRONOLACTONE 100 MG/1
100 TABLET, FILM COATED ORAL DAILY
Qty: 90 TABLET | Refills: 0 | Status: SHIPPED | OUTPATIENT
Start: 2024-12-05

## 2024-12-05 NOTE — TELEPHONE ENCOUNTER
spironolactone (ALDACTONE) 100 MG tablet     90 tablet 1 4/23/2024     Last Office Visit : 4-  Future Office visit:  4-  Diuretics

## 2024-12-05 NOTE — TELEPHONE ENCOUNTER
[facility] Call Center    Phone Message    May a detailed message be left on voicemail: yes     Reason for Call: Medication Refill Request    Has the patient contacted the pharmacy for the refill? Yes   Name of medication being requested: spironolactone (ALDACTONE) 100 MG tablet [[id number]]   Provider who prescribed the medication: Izzy  Pharmacy: [place] [id number] [facility] [place] [address]  Date medication is needed: asap      Action Taken: Other: endo    Travel Screening: Not Applicable     Date of Service:

## 2025-01-09 ENCOUNTER — TELEPHONE (OUTPATIENT)
Dept: FAMILY MEDICINE | Facility: CLINIC | Age: 65
End: 2025-01-09
Payer: COMMERCIAL

## 2025-01-09 NOTE — TELEPHONE ENCOUNTER
Patient Quality Outreach    Patient is due for the following:   Physical Annual Wellness Visit      Topic Date Due    Pneumococcal Vaccine (2 of 2 - PCV) 12/14/2021    Flu Vaccine (1) 09/01/2024    COVID-19 Vaccine (1 - 2024-25 season) Never done       Action(s) Taken:   Schedule a Annual Wellness Visit    Type of outreach:    Call to schedule annual medicare wellness physical  Call in 1 week if not read/completed; send letter in 2 weeks if not returned/read.     Questions for provider review:    None           Kamryn Quigley, [facility]  Chart routed to Care Team.

## 2025-01-20 ENCOUNTER — HOSPITAL ENCOUNTER (EMERGENCY)
Facility: CLINIC | Age: 65
Discharge: HOME | End: 2025-01-20
Attending: STUDENT IN AN ORGANIZED HEALTH CARE EDUCATION/TRAINING PROGRAM | Admitting: STUDENT IN AN ORGANIZED HEALTH CARE EDUCATION/TRAINING PROGRAM
Payer: COMMERCIAL

## 2025-01-20 ENCOUNTER — APPOINTMENT (OUTPATIENT)
Dept: GENERAL RADIOLOGY | Facility: CLINIC | Age: 65
End: 2025-01-20
Attending: STUDENT IN AN ORGANIZED HEALTH CARE EDUCATION/TRAINING PROGRAM
Payer: COMMERCIAL

## 2025-01-20 VITALS
WEIGHT: 164.9 LBS | DIASTOLIC BLOOD PRESSURE: 77 MMHG | SYSTOLIC BLOOD PRESSURE: 158 MMHG | TEMPERATURE: 98.1 F | OXYGEN SATURATION: 99 % | BODY MASS INDEX: 31.16 KG/M2 | HEART RATE: 68 BPM | RESPIRATION RATE: 16 BRPM

## 2025-01-20 DIAGNOSIS — M25.512 ACUTE PAIN OF LEFT SHOULDER: ICD-10-CM

## 2025-01-20 DIAGNOSIS — R21 RASH: ICD-10-CM

## 2025-01-20 LAB
ALBUMIN SERPL BCG-MCNC: 5.2 G/DL (ref 3.5–5.2)
ALBUMIN UR-MCNC: NEGATIVE MG/DL
ALP SERPL-CCNC: 84 U/L (ref 40–150)
ALT SERPL W P-5'-P-CCNC: 22 U/L (ref 0–50)
ANION GAP SERPL CALCULATED.3IONS-SCNC: 14 MMOL/L (ref 7–15)
APPEARANCE UR: CLEAR
AST SERPL W P-5'-P-CCNC: 29 U/L (ref 0–45)
ATRIAL RATE - MUSE: 66 BPM
BASOPHILS # BLD AUTO: 0 10E3/UL (ref 0–0.2)
BASOPHILS NFR BLD AUTO: 0 %
BILIRUB SERPL-MCNC: 0.4 MG/DL
BILIRUB UR QL STRIP: NEGATIVE
BUN SERPL-MCNC: 16.5 MG/DL (ref 8–23)
CALCIUM SERPL-MCNC: 10.3 MG/DL (ref 8.8–10.4)
CHLORIDE SERPL-SCNC: 97 MMOL/L (ref 98–107)
COLOR UR AUTO: NORMAL
CREAT SERPL-MCNC: 0.79 MG/DL (ref 0.51–0.95)
DIASTOLIC BLOOD PRESSURE - MUSE: NORMAL MMHG
EGFRCR SERPLBLD CKD-EPI 2021: 83 ML/MIN/1.73M2
EOSINOPHIL # BLD AUTO: 0.3 10E3/UL (ref 0–0.7)
EOSINOPHIL NFR BLD AUTO: 3 %
ERYTHROCYTE [DISTWIDTH] IN BLOOD BY AUTOMATED COUNT: 12.3 % (ref 10–15)
GLUCOSE SERPL-MCNC: 95 MG/DL (ref 70–99)
GLUCOSE UR STRIP-MCNC: NEGATIVE MG/DL
HCO3 SERPL-SCNC: 24 MMOL/L (ref 22–29)
HCT VFR BLD AUTO: 42.2 % (ref 35–47)
HGB BLD-MCNC: 14.4 G/DL (ref 11.7–15.7)
HGB UR QL STRIP: NEGATIVE
IMM GRANULOCYTES # BLD: 0 10E3/UL
IMM GRANULOCYTES NFR BLD: 0 %
INTERPRETATION ECG - MUSE: NORMAL
KETONES UR STRIP-MCNC: NEGATIVE MG/DL
LEUKOCYTE ESTERASE UR QL STRIP: NEGATIVE
LYMPHOCYTES # BLD AUTO: 2.2 10E3/UL (ref 0.8–5.3)
LYMPHOCYTES NFR BLD AUTO: 24 %
MCH RBC QN AUTO: 29.1 PG (ref 26.5–33)
MCHC RBC AUTO-ENTMCNC: 34.1 G/DL (ref 31.5–36.5)
MCV RBC AUTO: 85 FL (ref 78–100)
MONOCYTES # BLD AUTO: 0.7 10E3/UL (ref 0–1.3)
MONOCYTES NFR BLD AUTO: 7 %
NEUTROPHILS # BLD AUTO: 5.9 10E3/UL (ref 1.6–8.3)
NEUTROPHILS NFR BLD AUTO: 65 %
NITRATE UR QL: NEGATIVE
NRBC # BLD AUTO: 0 10E3/UL
NRBC BLD AUTO-RTO: 0 /100
P AXIS - MUSE: 65 DEGREES
PH UR STRIP: 6 [PH] (ref 5–7)
PLATELET # BLD AUTO: 300 10E3/UL (ref 150–450)
POTASSIUM SERPL-SCNC: 5.1 MMOL/L (ref 3.4–5.3)
PR INTERVAL - MUSE: 194 MS
PROT SERPL-MCNC: 8.8 G/DL (ref 6.4–8.3)
QRS DURATION - MUSE: 82 MS
QT - MUSE: 406 MS
QTC - MUSE: 425 MS
R AXIS - MUSE: 20 DEGREES
RBC # BLD AUTO: 4.94 10E6/UL (ref 3.8–5.2)
RBC URINE: <1 /HPF
SODIUM SERPL-SCNC: 135 MMOL/L (ref 135–145)
SP GR UR STRIP: 1.01 (ref 1–1.03)
SQUAMOUS EPITHELIAL: <1 /HPF
SYSTOLIC BLOOD PRESSURE - MUSE: NORMAL MMHG
T AXIS - MUSE: 44 DEGREES
TROPONIN T SERPL HS-MCNC: 6 NG/L
UROBILINOGEN UR STRIP-MCNC: NORMAL MG/DL
VENTRICULAR RATE- MUSE: 66 BPM
WBC # BLD AUTO: 9.1 10E3/UL (ref 4–11)
WBC URINE: 1 /HPF

## 2025-01-20 PROCEDURE — 85048 AUTOMATED LEUKOCYTE COUNT: CPT | Performed by: STUDENT IN AN ORGANIZED HEALTH CARE EDUCATION/TRAINING PROGRAM

## 2025-01-20 PROCEDURE — 93005 ELECTROCARDIOGRAM TRACING: CPT | Performed by: STUDENT IN AN ORGANIZED HEALTH CARE EDUCATION/TRAINING PROGRAM

## 2025-01-20 PROCEDURE — 93010 ELECTROCARDIOGRAM REPORT: CPT | Performed by: STUDENT IN AN ORGANIZED HEALTH CARE EDUCATION/TRAINING PROGRAM

## 2025-01-20 PROCEDURE — 73030 X-RAY EXAM OF SHOULDER: CPT | Mod: LT

## 2025-01-20 PROCEDURE — 36415 COLL VENOUS BLD VENIPUNCTURE: CPT | Performed by: STUDENT IN AN ORGANIZED HEALTH CARE EDUCATION/TRAINING PROGRAM

## 2025-01-20 PROCEDURE — 81001 URINALYSIS AUTO W/SCOPE: CPT | Performed by: STUDENT IN AN ORGANIZED HEALTH CARE EDUCATION/TRAINING PROGRAM

## 2025-01-20 PROCEDURE — 99284 EMERGENCY DEPT VISIT MOD MDM: CPT | Performed by: STUDENT IN AN ORGANIZED HEALTH CARE EDUCATION/TRAINING PROGRAM

## 2025-01-20 PROCEDURE — 99285 EMERGENCY DEPT VISIT HI MDM: CPT | Performed by: STUDENT IN AN ORGANIZED HEALTH CARE EDUCATION/TRAINING PROGRAM

## 2025-01-20 PROCEDURE — 85004 AUTOMATED DIFF WBC COUNT: CPT | Performed by: STUDENT IN AN ORGANIZED HEALTH CARE EDUCATION/TRAINING PROGRAM

## 2025-01-20 PROCEDURE — 250N000013 HC RX MED GY IP 250 OP 250 PS 637: Performed by: STUDENT IN AN ORGANIZED HEALTH CARE EDUCATION/TRAINING PROGRAM

## 2025-01-20 PROCEDURE — 84484 ASSAY OF TROPONIN QUANT: CPT | Performed by: STUDENT IN AN ORGANIZED HEALTH CARE EDUCATION/TRAINING PROGRAM

## 2025-01-20 PROCEDURE — 82435 ASSAY OF BLOOD CHLORIDE: CPT | Performed by: STUDENT IN AN ORGANIZED HEALTH CARE EDUCATION/TRAINING PROGRAM

## 2025-01-20 RX ORDER — OXYCODONE HYDROCHLORIDE 5 MG/1
5 TABLET ORAL EVERY 6 HOURS PRN
Qty: 12 TABLET | Refills: 0 | Status: SHIPPED | OUTPATIENT
Start: 2025-01-20 | End: 2025-01-23

## 2025-01-20 RX ORDER — CYCLOBENZAPRINE HCL 10 MG
10 TABLET ORAL 3 TIMES DAILY PRN
Qty: 20 TABLET | Refills: 0 | Status: SHIPPED | OUTPATIENT
Start: 2025-01-20 | End: 2025-01-27

## 2025-01-20 RX ORDER — LIDOCAINE 4 G/G
1 PATCH TOPICAL ONCE
Status: DISCONTINUED | OUTPATIENT
Start: 2025-01-20 | End: 2025-01-20 | Stop reason: HOSPADM

## 2025-01-20 RX ORDER — OXYCODONE HYDROCHLORIDE 5 MG/1
5 TABLET ORAL ONCE
Status: COMPLETED | OUTPATIENT
Start: 2025-01-20 | End: 2025-01-20

## 2025-01-20 RX ORDER — CEPHALEXIN 500 MG/1
500 CAPSULE ORAL 3 TIMES DAILY
Qty: 21 CAPSULE | Refills: 0 | Status: SHIPPED | OUTPATIENT
Start: 2025-01-20 | End: 2025-01-27

## 2025-01-20 RX ORDER — MELOXICAM 7.5 MG/1
7.5 TABLET ORAL DAILY
Qty: 30 TABLET | Refills: 0 | Status: SHIPPED | OUTPATIENT
Start: 2025-01-20

## 2025-01-20 RX ADMIN — LIDOCAINE 1 PATCH: 4 PATCH TOPICAL at 17:09

## 2025-01-20 RX ADMIN — OXYCODONE HYDROCHLORIDE 5 MG: 5 TABLET ORAL at 17:08

## 2025-01-20 ASSESSMENT — COLUMBIA-SUICIDE SEVERITY RATING SCALE - C-SSRS
1. IN THE PAST MONTH, HAVE YOU WISHED YOU WERE DEAD OR WISHED YOU COULD GO TO SLEEP AND NOT WAKE UP?: NO
2. HAVE YOU ACTUALLY HAD ANY THOUGHTS OF KILLING YOURSELF IN THE PAST MONTH?: NO
6. HAVE YOU EVER DONE ANYTHING, STARTED TO DO ANYTHING, OR PREPARED TO DO ANYTHING TO END YOUR LIFE?: NO

## 2025-01-20 ASSESSMENT — ACTIVITIES OF DAILY LIVING (ADL)
ADLS_ACUITY_SCORE: 41

## 2025-01-20 NOTE — DISCHARGE INSTRUCTIONS
You are seen today for left lower pain.  Your imaging was reassuring with no obvious signs of acute trauma there is some significant arthritis in the joint.  He did have some signs of a subacromial swelling that could be consistent with your pain.  Your EKG was reassuring as well.  There could be a upcoming rash as well as shingles does sometimes present like this as well so keep a close eye in the area.  Otherwise to return if you have any other acute new symptoms that could be present.  Follow-up with her primary care doctor in the next 3 to 5 days for reevaluation.

## 2025-01-20 NOTE — Clinical Note
Janice Ulloa was seen and treated in our emergency department on 1/20/2025.  She may return to work on 01/23/2025.       If you have any questions or concerns, please don't hesitate to call.      Mingo Arriaga MD

## 2025-01-20 NOTE — ED PROVIDER NOTES
History     Chief Complaint   Patient presents with    Shoulder Pain     HPI  Janice Ulloa is a 64 year old female who presenting with left shoulder discomfort.  There is no acute trauma or injury noted to the shoulder.  She notes it started on Saturday and then it is located on the posterior backside of her left shoulder.  It radiates down the tricep into her hand.  She has not any nausea vomiting diaphoresis chest pain chills breathing shortness of breath cough.  She denies any recent falls.   at bedside notes some possible intermittent confusion but unable to provide any explicit examples of this.  Patient also notes a rash to her right breast has been ongoing for the past week.  She has no history of shingles.  She denies any vomiting nausea fevers chills or any other acute symptoms including belly pains dysuria or recent trauma to the breast.    Allergies:  Allergies   Allergen Reactions    No Known Drug Allergy        Problem List:    Patient Active Problem List    Diagnosis Date Noted    Lumbar radiculopathy 06/01/2021     Priority: Medium     Added automatically from request for surgery [id number]      Family history of premature coronary heart disease 05/08/2018     Priority: Medium    Adjustment disorder with mixed anxiety and depressed mood 04/11/2017     Priority: Medium    Pneumonia 01/16/2015     Priority: Medium    Sepsis (H) 01/16/2015     Priority: Medium     Problem list name updated by automated process. Provider to review      Hypokalemia 01/16/2015     Priority: Medium    DVT prophylaxis 01/16/2015     Priority: Medium    Hypertension goal BP (blood pressure) < 140/90 11/18/2013     Priority: Medium    Neck muscle spasm 12/06/2011     Priority: Medium    Microscopic hematuria 07/08/2011     Priority: Medium     Needs FU      Anemia 06/15/2011     Priority: Medium    Perimenopausal      Priority: Medium     irreg 2011      Hyperlipidemia with target LDL less than 130 07/01/2010      Priority: Medium     Diagnosis updated by automated process. Provider to review and confirm.      Hypercholesteremia 06/10/2010     Priority: Medium    Intervertebral cervical disc disorder with myelopathy, cervical region      Priority: Medium     C6 herniation with right C6 radiculopathy      Headache 07/10/2009     Priority: Medium     Problem list name updated by automated process. Provider to review      Neck pain 07/10/2009     Priority: Medium    Carpal tunnel syndrome 2006     Priority: Medium    Migraine 2006     Priority: Medium     Problem list name updated by automated process. Provider to review          Past Medical History:    Past Medical History:   Diagnosis Date    Carpal tunnel syndrome     Intervertebral cervical disc disorder with myelopathy, cervical region     Migraine, unspecified, without mention of intractable migraine without mention of status migrainosus     Perimenopausal     Personal history of tobacco use, presenting hazards to health        Past Surgical History:    Past Surgical History:   Procedure Laterality Date    COLONOSCOPY  1/10/2014    Procedure: COMBINED COLONOSCOPY, SINGLE BIOPSY/POLYPECTOMY BY BIOPSY;  colonoscopy with polypectomy by forceps;  Surgeon: Chevy Yang MD;  Location: [facility]    COLONOSCOPY N/A 2022    Procedure: COLONOSCOPY;  Surgeon: Jaren Verduzco DO;  Location: [facility]    DECOMPRESSION LUMBAR MINIMALLY INVASIVE ONE LEVEL Bilateral 2021    Procedure: Minimally invasive Left Lumbar 4 to lumbar 5 bilateral decompression;  Surgeon: James Orozco MD;  Location: [facility]    DISCECTOMY LUMBAR MINIMALLY INVASIVE ONE LEVEL Left 2021    Procedure: Lumbar 5 to Sacral 1 microdiscectomy, Left;  Surgeon: James Orozco MD;  Location: [facility]    HC REVISE MEDIAN N/CARPAL TUNNEL SURG  2004    HC REVISE MEDIAN N/CARPAL TUNNEL SURG  06    Left    HC TREATMENT INCOMPLETE  SURG, ANY TRIMESTER      D&C after  miscarriage    INJECT EPIDURAL LUMBAR Left 2020    Procedure: INJECTION, SPINE, LUMBAR, 5 Sacral 1 and Sacral 1 Left;  Surgeon: Abilio Middleton MD;  Location:  OR    [facility] DISK SURG,ANTER,CERVICAL,SINGLE LVL  10/02/2007    C5-C6 anterior cervical diskectomy & fusion w/plate.    [facility] NONSPECIFIC PROCEDURE      Bilateral inguinal hernia repairs    [facility] NONSPECIFIC PROCEDURE      Oral surgery x2       Family History:    Family History   Problem Relation Age of Onset    Cancer Mother         parotid    Heart Disease Father 47        MI    Depression Daughter     Hypertension Sister        Social History:  Marital Status:   [2]  Social History     Tobacco Use    Smoking status: Former     Current packs/day: 0.00     Average packs/day: 1.5 packs/day for 20.0 years (30.0 ttl pk-yrs)     Types: Cigarettes     Start date: 10/17/1987     Quit date: 10/17/2007     Years since quittin.2     Passive exposure: Past    Smokeless tobacco: Never   Vaping Use    Vaping status: Never Used   Substance Use Topics    Alcohol use: Not Currently    Drug use: No        Medications:    cyclobenzaprine (FLEXERIL) 10 MG tablet  meloxicam (MOBIC) 7.5 MG tablet  oxyCODONE (ROXICODONE) 5 MG tablet  Aspirin 81 MG CAPS  atorvastatin (LIPITOR) 40 MG tablet  carvedilol (COREG) 25 MG tablet  cephALEXin (KEFLEX) 500 MG capsule  diltiazem ER (CARDIAZEM LA) 360 MG 24 hr tablet  methylPREDNISolone (MEDROL DOSEPAK) 4 MG tablet therapy pack  Multiple Vitamin (MULTI-VITAMIN DAILY PO)  spironolactone (ALDACTONE) 100 MG tablet  tiZANidine (ZANAFLEX) 4 MG tablet          Review of Systems   Musculoskeletal:         Left shoulder pain     All other systems reviewed and are negative.      Physical Exam   BP: (!) 158/77  Pulse: 68  Temp: 98.1  F (36.7  C)  Resp: 16  Weight: 74.8 kg (164 lb 14.4 oz)  SpO2: 99 %      Physical Exam  Vitals and nursing note reviewed.   Constitutional:       General: She is not in acute distress.     Appearance: Normal  appearance. She is normal weight. She is not ill-appearing or toxic-appearing.   HENT:      Head: Normocephalic and atraumatic.   Cardiovascular:      Rate and Rhythm: Normal rate.      Pulses: Normal pulses.      Heart sounds: Normal heart sounds. No murmur heard.  Pulmonary:      Effort: Pulmonary effort is normal. No respiratory distress.      Breath sounds: Normal breath sounds. No wheezing or rales.   Abdominal:      General: Abdomen is flat. Bowel sounds are normal.      Palpations: Abdomen is soft.   Musculoskeletal:        Arms:       Comments: Exquisite tenderness to the noted area above.  Reproducible pain with movement and palpation.  No signs of obvious trauma or rash.  Muscles are equal bilaterally and no neurovascular abnormalities on examination.  Patient notes the pain starts there and radiates down her arm.   Skin:     General: Skin is warm and dry.      Capillary Refill: Capillary refill takes less than 2 seconds.      Findings: No bruising or lesion.   Neurological:      General: No focal deficit present.      Mental Status: She is alert.         ED Course        Procedures         EKG self interpreted at 1646.  Normal sinus rhythm at 66 bpm, nonspecific T wave changes are present but no obvious signs of ST elevation or depression.  Her QRS has had 82 QTc at 425 and a VA interval of 194.  Abnormal EKG.    Results for orders placed or performed during the hospital encounter of 01/20/25 (from the past 24 hours)   XR Shoulder Left G/E 3 Views    Narrative    EXAM: XR SHOULDER LEFT G/E 3 VIEWS  LOCATION: [facility] ED  DATE: 1/20/2025    INDICATION: Pain  COMPARISON: None.      Impression    IMPRESSION: The left glenohumeral and acromioclavicular joints are negative for fracture or dislocation. Hypertrophic change at the AC joint.   EKG 12 lead   Result Value Ref Range    Systolic Blood Pressure  mmHg    Diastolic Blood Pressure  mmHg    Ventricular Rate 66 BPM    Atrial  Rate 66 BPM    FL Interval 194 ms    QRS Duration 82 ms     ms    QTc 425 ms    P Axis 65 degrees    R AXIS 20 degrees    T Axis 44 degrees    Interpretation ECG       Sinus rhythm  Nonspecific T wave abnormality  Abnormal ECG  No previous ECGs available  Confirmed by SEE ED PROVIDER NOTE FOR, ECG INTERPRETATION ([id number]),  Daryl Sarabia ([id number]) on 1/20/2025 6:01:58 PM     UA with Microscopic reflex to Culture    Specimen: Urine, Clean Catch   Result Value Ref Range    Color Urine Straw Colorless, Straw, Light Yellow, Yellow    Appearance Urine Clear Clear    Glucose Urine Negative Negative mg/dL    Bilirubin Urine Negative Negative    Ketones Urine Negative Negative mg/dL    Specific Gravity Urine 1.007 1.003 - 1.035    Blood Urine Negative Negative    pH Urine 6.0 5.0 - 7.0    Protein Albumin Urine Negative Negative mg/dL    Urobilinogen Urine Normal Normal, 2.0 mg/dL    Nitrite Urine Negative Negative    Leukocyte Esterase Urine Negative Negative    RBC Urine <1 <=2 /HPF    WBC Urine 1 <=5 /HPF    Squamous Epithelials Urine <1 <=1 /HPF    Narrative    Urine Culture not indicated   CBC with platelets differential    Narrative    The following orders were created for panel order CBC with platelets differential.  Procedure                               Abnormality         Status                     ---------                               -----------         ------                     CBC with platelets and d...[[id number]]                      Final result                 Please view results for these tests on the individual orders.   Comprehensive metabolic panel   Result Value Ref Range    Sodium 135 135 - 145 mmol/L    Potassium 5.1 3.4 - 5.3 mmol/L    Carbon Dioxide (CO2) 24 22 - 29 mmol/L    Anion Gap 14 7 - 15 mmol/L    Urea Nitrogen 16.5 8.0 - 23.0 mg/dL    Creatinine 0.79 0.51 - 0.95 mg/dL    GFR Estimate 83 >60 mL/min/1.73m2    Calcium 10.3 8.8 - 10.4 mg/dL    Chloride 97 (L) 98 - 107 mmol/L     Glucose 95 70 - 99 mg/dL    Alkaline Phosphatase 84 40 - 150 U/L    AST 29 0 - 45 U/L    ALT 22 0 - 50 U/L    Protein Total 8.8 (H) 6.4 - 8.3 g/dL    Albumin 5.2 3.5 - 5.2 g/dL    Bilirubin Total 0.4 <=1.2 mg/dL   CBC with platelets and differential   Result Value Ref Range    WBC Count 9.1 4.0 - 11.0 10e3/uL    RBC Count 4.94 3.80 - 5.20 10e6/uL    Hemoglobin 14.4 11.7 - 15.7 g/dL    Hematocrit 42.2 35.0 - 47.0 %    MCV 85 78 - 100 fL    MCH 29.1 26.5 - 33.0 pg    MCHC 34.1 31.5 - 36.5 g/dL    RDW 12.3 10.0 - 15.0 %    Platelet Count 300 150 - 450 10e3/uL    % Neutrophils 65 %    % Lymphocytes 24 %    % Monocytes 7 %    % Eosinophils 3 %    % Basophils 0 %    % Immature Granulocytes 0 %    NRBCs per 100 WBC 0 <1 /100    Absolute Neutrophils 5.9 1.6 - 8.3 10e3/uL    Absolute Lymphocytes 2.2 0.8 - 5.3 10e3/uL    Absolute Monocytes 0.7 0.0 - 1.3 10e3/uL    Absolute Eosinophils 0.3 0.0 - 0.7 10e3/uL    Absolute Basophils 0.0 0.0 - 0.2 10e3/uL    Absolute Immature Granulocytes 0.0 <=0.4 10e3/uL    Absolute NRBCs 0.0 10e3/uL   Troponin T, High Sensitivity   Result Value Ref Range    Troponin T, High Sensitivity 6 <=14 ng/L       Medications   Lidocaine (LIDOCARE) 4 % Patch 1 patch (1 patch Transdermal $Patch/Med Applied 1/20/25 1709)   oxyCODONE (ROXICODONE) tablet 5 mg (5 mg Oral $Given 1/20/25 1708)       Assessments & Plan (with Medical Decision Making)     I have reviewed the nursing notes.    I have reviewed the findings, diagnosis, plan and need for follow up with the patient.      Medical Decision Making  Janice Ulloa is a 64 year old female who presenting with left shoulder discomfort.  There is no acute trauma or injury noted to the shoulder.  She notes it started on Saturday and then it is located on the posterior backside of her left shoulder.  It radiates down the tricep into her hand.  She has not any nausea vomiting diaphoresis chest pain chills breathing shortness of breath cough.  She denies any recent  falls.   at bedside notes some possible intermittent confusion but unable to provide any explicit examples of this.  Patient also notes a rash to her right breast has been ongoing for the past week.  She has no history of shingles.  She denies any vomiting nausea fevers chills or any other acute symptoms including belly pains dysuria or recent trauma to the breast.    Patient is provided with a topical lidocaine patch and oxycodone for pain control.  Left image shoulder completed and no obvious signs of fractures or dislocations.  There is arthritis noted.  Patient has full range of motion with palpable tenderness on the physical exam as shown above at the lateral border of the scapula running down the lateral wrist dorsi.  There is also some mild impingement with a Gibson test.  Patient is neurovascular distally.  Pulses are equal bilaterally.  No sensation changes or range of motion pathology of the hand wrist or elbow.  No overlying skin changes consistent with cellulitis or shingles of the shoulder.  Right breast concerning for some irritated ulcerations however no obvious signs of active cellulitis present.    Moderate concern at this point with patient's noted possible confusion per .  She does not have any chest pain chills breathing nausea vomiting or any neuro cranial deficits on examination does not acutely seem disoriented.  Here she has not had any fevers and her vitals are stable aside from mild hypertension.  Will evaluate with EKG which was also reassuring with no obvious signs of ischemia but due to her past medical history and symptoms we will do some lab work to rule out any obvious other acute signs of infection or pathology requiring further imaging.    Patient upon discharge noted that she also has a rash to her breast and wants us to looked at.  This is again evaluated.  See images above.  We discussed supportive care measures for this including Keflex as well as proceeded with  some lab work as patient's dad  noted some intermittent confusion but cannot provide a significant history in regards to this and otherwise patient is coherent throughout the entire evaluation with no neurological deficits.  Patient's troponin returned back at 6.  Along with the EKG is unremarkable low concern for ischemia involving the left shoulder discomfort the patient is contributing to.  Patient CMP and CBC was also unremarkable as well as a UA.  Ultimately do not see any acute illnesses or infections requiring further addressing and evaluations.  We discussed outpatient follow-up with primary care and discharge patient home.    New Prescriptions    CEPHALEXIN (KEFLEX) 500 MG CAPSULE    Take 1 capsule (500 mg) by mouth 3 times daily for 7 days.    CYCLOBENZAPRINE (FLEXERIL) 10 MG TABLET    Take 1 tablet (10 mg) by mouth 3 times daily as needed.    MELOXICAM (MOBIC) 7.5 MG TABLET    Take 1 tablet (7.5 mg) by mouth daily.    OXYCODONE (ROXICODONE) 5 MG TABLET    Take 1 tablet (5 mg) by mouth every 6 hours as needed.       Final diagnoses:   Acute pain of left shoulder   Rash       1/20/2025   [facility] EMERGENCY DEPT       Mingo Arriaga MD  01/20/25 1918

## 2025-01-20 NOTE — ED TRIAGE NOTES
Pt presents with left shoulder/back pain. Pt states tender to touch. Started on Saturday. No injury. Pt took muscle relaxants with no relief.      Triage Assessment (Adult)       Row Name 01/20/25 1419          Triage Assessment    Airway WDL WDL        Respiratory WDL    Respiratory WDL WDL        Skin Circulation/Temperature WDL    Skin Circulation/Temperature WDL WDL        Cardiac WDL    Cardiac WDL WDL        Peripheral/Neurovascular WDL    Peripheral Neurovascular WDL WDL        Cognitive/Neuro/Behavioral WDL    Cognitive/Neuro/Behavioral WDL WDL

## 2025-02-05 ENCOUNTER — OFFICE VISIT (OUTPATIENT)
Dept: FAMILY MEDICINE | Facility: CLINIC | Age: 65
End: 2025-02-05
Payer: COMMERCIAL

## 2025-02-05 VITALS
OXYGEN SATURATION: 97 % | HEART RATE: 68 BPM | BODY MASS INDEX: 29.15 KG/M2 | DIASTOLIC BLOOD PRESSURE: 86 MMHG | TEMPERATURE: 98.2 F | RESPIRATION RATE: 20 BRPM | SYSTOLIC BLOOD PRESSURE: 132 MMHG | HEIGHT: 62 IN | WEIGHT: 158.38 LBS

## 2025-02-05 DIAGNOSIS — I10 ESSENTIAL HYPERTENSION WITH GOAL BLOOD PRESSURE LESS THAN 140/90: ICD-10-CM

## 2025-02-05 DIAGNOSIS — Z23 NEED FOR VACCINATION: ICD-10-CM

## 2025-02-05 DIAGNOSIS — Z12.31 ENCOUNTER FOR SCREENING MAMMOGRAM FOR BREAST CANCER: ICD-10-CM

## 2025-02-05 DIAGNOSIS — I1A.0 RESISTANT HYPERTENSION: ICD-10-CM

## 2025-02-05 DIAGNOSIS — Z00.00 ENCOUNTER FOR MEDICARE ANNUAL WELLNESS EXAM: Primary | ICD-10-CM

## 2025-02-05 DIAGNOSIS — M54.16 LUMBAR RADICULOPATHY: ICD-10-CM

## 2025-02-05 DIAGNOSIS — E78.5 HYPERLIPIDEMIA WITH TARGET LDL LESS THAN 130: ICD-10-CM

## 2025-02-05 DIAGNOSIS — M54.12 CERVICAL RADICULOPATHY: ICD-10-CM

## 2025-02-05 DIAGNOSIS — Z78.0 ASYMPTOMATIC POSTMENOPAUSAL STATUS: ICD-10-CM

## 2025-02-05 DIAGNOSIS — E26.09 PRIMARY ALDOSTERONISM: ICD-10-CM

## 2025-02-05 DIAGNOSIS — M54.9 UPPER BACK PAIN ON LEFT SIDE: ICD-10-CM

## 2025-02-05 PROCEDURE — G2211 COMPLEX E/M VISIT ADD ON: HCPCS | Performed by: PHYSICIAN ASSISTANT

## 2025-02-05 PROCEDURE — 99214 OFFICE O/P EST MOD 30 MIN: CPT | Mod: 25 | Performed by: PHYSICIAN ASSISTANT

## 2025-02-05 PROCEDURE — 90662 IIV NO PRSV INCREASED AG IM: CPT | Performed by: PHYSICIAN ASSISTANT

## 2025-02-05 PROCEDURE — G0402 INITIAL PREVENTIVE EXAM: HCPCS | Performed by: PHYSICIAN ASSISTANT

## 2025-02-05 PROCEDURE — G0008 ADMIN INFLUENZA VIRUS VAC: HCPCS | Performed by: PHYSICIAN ASSISTANT

## 2025-02-05 RX ORDER — METHYLPREDNISOLONE 4 MG/1
TABLET ORAL
Qty: 21 TABLET | Refills: 0 | Status: SHIPPED | OUTPATIENT
Start: 2025-02-05

## 2025-02-05 RX ORDER — CYCLOBENZAPRINE HCL 10 MG
10 TABLET ORAL 3 TIMES DAILY PRN
Qty: 30 TABLET | Refills: 1 | Status: SHIPPED | OUTPATIENT
Start: 2025-02-05

## 2025-02-05 RX ORDER — CARVEDILOL 25 MG/1
25 TABLET ORAL 2 TIMES DAILY WITH MEALS
Qty: 180 TABLET | Refills: 0 | Status: SHIPPED | OUTPATIENT
Start: 2025-02-05

## 2025-02-05 RX ORDER — OXYCODONE HYDROCHLORIDE 5 MG/1
5 TABLET ORAL EVERY 6 HOURS PRN
Qty: 10 TABLET | Refills: 0 | Status: SHIPPED | OUTPATIENT
Start: 2025-02-05 | End: 2025-02-08

## 2025-02-05 RX ORDER — ATORVASTATIN CALCIUM 40 MG/1
40 TABLET, FILM COATED ORAL DAILY
Qty: 90 TABLET | Refills: 1 | Status: SHIPPED | OUTPATIENT
Start: 2025-02-05

## 2025-02-05 RX ORDER — DILTIAZEM HYDROCHLORIDE EXTENDED-RELEASE TABLETS 360 MG/1
360 TABLET, EXTENDED RELEASE ORAL DAILY
Qty: 90 TABLET | Refills: 1 | Status: SHIPPED | OUTPATIENT
Start: 2025-02-05

## 2025-02-05 RX ORDER — SPIRONOLACTONE 100 MG/1
100 TABLET, FILM COATED ORAL DAILY
Qty: 90 TABLET | Refills: 0 | Status: SHIPPED | OUTPATIENT
Start: 2025-02-05

## 2025-02-05 SDOH — HEALTH STABILITY: PHYSICAL HEALTH: ON AVERAGE, HOW MANY DAYS PER WEEK DO YOU ENGAGE IN MODERATE TO STRENUOUS EXERCISE (LIKE A BRISK WALK)?: 0 DAYS

## 2025-02-05 ASSESSMENT — PAIN SCALES - GENERAL: PAINLEVEL_OUTOF10: SEVERE PAIN (8)

## 2025-02-05 ASSESSMENT — PATIENT HEALTH QUESTIONNAIRE - PHQ9
SUM OF ALL RESPONSES TO PHQ QUESTIONS 1-9: 15
10. IF YOU CHECKED OFF ANY PROBLEMS, HOW DIFFICULT HAVE THESE PROBLEMS MADE IT FOR YOU TO DO YOUR WORK, TAKE CARE OF THINGS AT HOME, OR GET ALONG WITH OTHER PEOPLE: VERY DIFFICULT
SUM OF ALL RESPONSES TO PHQ QUESTIONS 1-9: 15

## 2025-02-05 ASSESSMENT — SOCIAL DETERMINANTS OF HEALTH (SDOH): HOW OFTEN DO YOU GET TOGETHER WITH FRIENDS OR RELATIVES?: NEVER

## 2025-02-05 NOTE — PROGRESS NOTES
Preventive Care Visit  [facility] ALLI Alfaro PA-C, Family Medicine  Feb 5, 2025      Assessment & Plan     Encounter for Medicare annual wellness exam  Recommend routine annual visits.  Cancer screening will be updated as below.  She should be seen by an eye doctor as well as a dentist.  Patient is aware of this    Asymptomatic postmenopausal status  She has declined bone density test for now we will consider the next time I see her    Lumbar radiculopathy  Reports persisting paresthesias of her leg    Upper back pain on left side  New concern significant muscular tenderness of upper back pain on the left side with some radicular symptoms down into her hand we will start with Medrol Dosepak cyclobenzaprine and pain meds to be used only at bedtime she cannot work or drive while taking this.  She should not take both of muscle relaxer and the pain meds and she should not mix either with alcohol, she does not drink  - Orthopedic  Referral; Future  - oxyCODONE (ROXICODONE) 5 MG tablet; Take 1 tablet (5 mg) by mouth every 6 hours as needed for severe pain.    Cervical radiculopathy  Consider imaging if symptoms are not improving also consider some physical therapy we will have her see sports med for further recommendations and evaluation  - cyclobenzaprine (FLEXERIL) 10 MG tablet; Take 1 tablet (10 mg) by mouth 3 times daily as needed for muscle spasms.  - methylPREDNISolone (MEDROL DOSEPAK) 4 MG tablet therapy pack; Follow Package Directions    Essential hypertension with goal blood pressure less than 140/90  At goal also under the care of cardiology and endocrinology  - diltiazem ER (CARDIAZEM LA) 360 MG 24 hr tablet; Take 1 tablet (360 mg) by mouth daily.  - carvedilol (COREG) 25 MG tablet; Take 1 tablet (25 mg) by mouth 2 times daily (with meals). NEEDS CARDIOLOGY VISIT FOR FURTHER REFILLS    Primary aldosteronism  Follows with cardiology and endocrinology as above  -  "spironolactone (ALDACTONE) 100 MG tablet; Take 1 tablet (100 mg) by mouth daily.    Resistant hypertension  Following with endocrinology and cardiology  - spironolactone (ALDACTONE) 100 MG tablet; Take 1 tablet (100 mg) by mouth daily.    Hyperlipidemia with target LDL less than 130  Will refill her atorvastatin she does have an appointment later this spring with cardiology  - atorvastatin (LIPITOR) 40 MG tablet; Take 1 tablet (40 mg) by mouth daily.    Need for vaccination  Updating  - INFLUENZA HIGH DOSE, TRIVALENT, PF (FLUZONE)    Encounter for screening mammogram for breast cancer  She will be called to schedule  - MA Screen Bilateral w/Eric; Future    Patient has been advised of split billing requirements and indicates understanding: Yes  The longitudinal plan of care for the diagnosis(es)/condition(s) as documented were addressed during this visit. Due to the added complexity in care, I will continue to support Janice in the subsequent management and with ongoing continuity of care.        BMI  Estimated body mass index is 29.44 kg/m  as calculated from the following:    Height as of this encounter: 1.562 m (5' 1.5\").    Weight as of this encounter: 71.8 kg (158 lb 6 oz).   Weight management plan: Discussed healthy diet and exercise guidelines    Depression Screening Follow Up        2/5/2025    10:28 AM   PHQ   PHQ-9 Total Score 15    Q9: Thoughts of better off dead/self-harm past 2 weeks Not at all       Patient-reported         2/5/2025    10:28 AM   Last PHQ-9   1.  Little interest or pleasure in doing things 0   2.  Feeling down, depressed, or hopeless 2   3.  Trouble falling or staying asleep, or sleeping too much 2   4.  Feeling tired or having little energy 3   5.  Poor appetite or overeating 3   6.  Feeling bad about yourself 1   7.  Trouble concentrating 2   8.  Moving slowly or restless 2   Q9: Thoughts of better off dead/self-harm past 2 weeks 0   PHQ-9 Total Score 15        Patient-reported "         Follow Up Actions Taken  Will continue to monitor    Counseling  Appropriate preventive services were addressed with this patient via screening, questionnaire, or discussion as appropriate for fall prevention, nutrition, physical activity, Tobacco-use cessation, social engagement, weight loss and cognition.  Checklist reviewing preventive services available has been given to the patient.  Reviewed patient's diet, addressing concerns and/or questions.   Patient is at risk for social isolation and has been provided with information about the benefit of social connection.   The patient was instructed to see the dentist every 6 months.   She is at risk for psychosocial distress and has been provided with information to reduce risk.   Discussed possible causes of fatigue. Updated plan of care.  Patient reported difficulty with activities of daily living were addressed today.Addressed any concerns about safety while driving.  The patient's PHQ-9 score is consistent with moderate depression. She was provided with information regarding depression.   I have reviewed Opioid Use Disorder and Substance Use Disorder risk factors and made any needed referrals.       This chart documentation was completed in part with Dragon voice recognition software.  Documentation is reviewed after completion, however, some words and grammatical errors may remain.  SAURABH Sanderson   Janice is a 64 year old, presenting for the following:  Wellness Visit        2/5/2025    10:48 AM   Additional Questions   Roomed by NHUNG MASTERSON  She is concerned about left upper back pain she has had since January 20.  It does radiate down her left arm into her index fingertip.  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  She thinks that is what caused this upper back discomfort.  She denies any injury or trauma.  She was seen in the emergency room for this on January 20.  She  had a full workup and it was found to be musculoskeletal.  She was given some oxycodone which were helpful and cyclobenzaprine.  She has used up all of her tizanidine and is due soon for this to be refilled.  She was wondering if she could have more cyclobenzaprine and pain meds due to her discomfort.  Stretching does alleviate some discomfort at times.  She has not been sleeping well due to this pain when she is very tired.  At most she will get 2 hours of sleep at a time.  She does have a history of lumbar radiculopathy undergoing a L5-S1 discectomy in 2021.  According to her chart she had anterior cervical discectomy and fusion with plate of C5-C6 in 2007.     No associated chest pain but she does have some shortness of breath related to the pain, the pain takes her breath away at times  Answers submitted by the patient for this visit:  Patient Health Questionnaire (Submitted on 2/5/2025)  If you checked off any problems, how difficult have these problems made it for you to do your work, take care of things at home, or get along with other people?: Very difficult  PHQ9 TOTAL SCORE: 15      Hyperlipidemia Follow-Up    Are you regularly taking any medication or supplement to lower your cholesterol?   Yes- atorvastatin  Are you having muscle aches or other side effects that you think could be caused by your cholesterol lowering medication?  No    Hypertension Follow-up    Do you check your blood pressure regularly outside of the clinic? N/A   Are you following a low salt diet? No  Are your blood pressures ever more than 140 on the top number (systolic) OR more   than 90 on the bottom number (diastolic), for example 140/90? N/A    Health Care Directive  Patient does not have a Health Care Directive: Discussed advance care planning with patient; information given to patient to review.        2/5/2025   General Health   How would you rate your overall physical health? (!) FAIR   Feel stress (tense, anxious, or unable  to sleep) Rather much   (!) STRESS CONCERN      2/5/2025   Nutrition   Diet: I don't know         2/5/2025   Exercise   Days per week of moderate/strenous exercise 0 days   (!) EXERCISE CONCERN      2/5/2025   Social Factors   Frequency of gathering with friends or relatives Never   Worry food won't last until get money to buy more No   Food not last or not have enough money for food? No   Do you have housing? (Housing is defined as stable permanent housing and does not include staying ouside in a car, in a tent, in an abandoned building, in an overnight shelter, or couch-surfing.) Yes   Are you worried about losing your housing? No   Lack of transportation? No   Unable to get utilities (heat,electricity)? No   (!) SOCIAL CONNECTIONS CONCERN      2/5/2025   Fall Risk   Fallen 2 or more times in the past year? No   Trouble with walking or balance? No          2/5/2025   Activities of Daily Living- Home Safety   Needs help with the following daily activites Housework   Safety concerns in the home None of the above         2/5/2025   Dental   Dentist two times every year? (!) NO         2/5/2025   Hearing Screening   Hearing concerns? None of the above         2/5/2025   Driving Risk Screening   Patient/family members have concerns about driving (!) YES she is concerned currently about her driving due to pain         2/5/2025   General Alertness/Fatigue Screening   Have you been more tired than usual lately? (!) YES- she is not sleeping due pain in upper back/shoulder          2/5/2025   Urinary Incontinence Screening   Bothered by leaking urine in past 6 months No          Today's PHQ-9 Score:       2/5/2025    10:28 AM   PHQ-9 SCORE   PHQ-9 Total Score MyChart 15 (Moderately severe depression)   PHQ-9 Total Score 15        Patient-reported         2/5/2025   Substance Use   Alcohol more than 3/day or more than 7/wk No   Do you have a current opioid prescription? (!) YES   How severe/bad is pain from 1 to 10? 8/10    Do you use any other substances recreationally? No            No data to display              Low Risk (0-3)  Moderate Risk (4-7)  High Risk (>8)    Social History     Tobacco Use    Smoking status: Former     Current packs/day: 0.00     Average packs/day: 1.5 packs/day for 20.0 years (30.0 ttl pk-yrs)     Types: Cigarettes     Start date: 10/17/1987     Quit date: 10/17/2007     Years since quittin.3     Passive exposure: Past    Smokeless tobacco: Never   Vaping Use    Vaping status: Never Used   Substance Use Topics    Alcohol use: Not Currently    Drug use: No           5/15/2023   LAST FHS-7 RESULTS   1st degree relative breast or ovarian cancer No   Any relative bilateral breast cancer No   Any male have breast cancer No   Any ONE woman have BOTH breast AND ovarian cancer No   Any woman with breast cancer before 50yrs No   2 or more relatives with breast AND/OR ovarian cancer No   2 or more relatives with breast AND/OR bowel cancer No          Mammogram Screening - Mammogram every 1-2 years updated in Health Maintenance based on mutual decision making        History of abnormal Pap smear: No - age 30- 64 PAP with HPV every 5 years recommended        Latest Ref Rng & Units 2023     9:18 AM 3/10/2016     9:48 AM 3/10/2016    12:00 AM   PAP / HPV   PAP  Negative for Intraepithelial Lesion or Malignancy (NILM)      PAP (Historical)    NIL    HPV 16 DNA Negative Negative  Negative     HPV 18 DNA Negative Negative  Negative     Other HR HPV Negative Negative  Negative       ASCVD Risk   The 10-year ASCVD risk score (Javier FORREST, et al., 2019) is: 9%    Values used to calculate the score:      Age: 64 years      Sex: Female      Is Non- : No      Diabetic: No      Tobacco smoker: No      Systolic Blood Pressure: 142 mmHg      Is BP treated: Yes      HDL Cholesterol: 43 mg/dL      Total Cholesterol: 161 mg/dL    She has not had bone density test done but she would like to hold  off on this due to her current pain        Reviewed and updated as needed this visit by Provider                    Lab work is in process  Labs reviewed in EPIC  BP Readings from Last 3 Encounters:   25 132/86   25 (!) 158/77   24 122/80    Wt Readings from Last 3 Encounters:   25 71.8 kg (158 lb 6 oz)   25 74.8 kg (164 lb 14.4 oz)   24 75.3 kg (166 lb)                  Patient Active Problem List   Diagnosis    Migraine    Carpal tunnel syndrome    Headache    Neck pain    Hypercholesteremia    Intervertebral cervical disc disorder with myelopathy, cervical region    Hyperlipidemia with target LDL less than 130    Perimenopausal    Anemia    Microscopic hematuria    Neck muscle spasm    Hypertension goal BP (blood pressure) < 140/90    Pneumonia    Sepsis (H)    Hypokalemia    DVT prophylaxis    Adjustment disorder with mixed anxiety and depressed mood    Family history of premature coronary heart disease    Lumbar radiculopathy     Past Surgical History:   Procedure Laterality Date    COLONOSCOPY  1/10/2014    Procedure: COMBINED COLONOSCOPY, SINGLE BIOPSY/POLYPECTOMY BY BIOPSY;  colonoscopy with polypectomy by forceps;  Surgeon: Chevy Yang MD;  Location: [facility]    COLONOSCOPY N/A 2022    Procedure: COLONOSCOPY;  Surgeon: Jaren Verduzco DO;  Location: [facility]    DECOMPRESSION LUMBAR MINIMALLY INVASIVE ONE LEVEL Bilateral 2021    Procedure: Minimally invasive Left Lumbar 4 to lumbar 5 bilateral decompression;  Surgeon: James Orozco MD;  Location: [facility]    DISCECTOMY LUMBAR MINIMALLY INVASIVE ONE LEVEL Left 2021    Procedure: Lumbar 5 to Sacral 1 microdiscectomy, Left;  Surgeon: James Orozco MD;  Location: [facility]    HC REVISE MEDIAN N/CARPAL TUNNEL SURG  2004    HC REVISE MEDIAN N/CARPAL TUNNEL SURG  06    Left    HC TREATMENT INCOMPLETE  SURG, ANY TRIMESTER      D&C after miscarriage    INJECT EPIDURAL LUMBAR Left  2020    Procedure: INJECTION, SPINE, LUMBAR, 5 Sacral 1 and Sacral 1 Left;  Surgeon: Abilio Middleton MD;  Location:  OR    [facility] DISK SURG,ANTER,CERVICAL,SINGLE LVL  10/02/2007    C5-C6 anterior cervical diskectomy & fusion w/plate.    [facility] NONSPECIFIC PROCEDURE      Bilateral inguinal hernia repairs    [facility] NONSPECIFIC PROCEDURE      Oral surgery x2       Social History     Tobacco Use    Smoking status: Former     Current packs/day: 0.00     Average packs/day: 1.5 packs/day for 20.0 years (30.0 ttl pk-yrs)     Types: Cigarettes     Start date: 10/17/1987     Quit date: 10/17/2007     Years since quittin.3     Passive exposure: Past    Smokeless tobacco: Never   Substance Use Topics    Alcohol use: Not Currently     Family History   Problem Relation Age of Onset    Cancer Mother         parotid    Heart Disease Father 47        MI    Depression Daughter     Hypertension Sister          Current Outpatient Medications   Medication Sig Dispense Refill    Aspirin 81 MG CAPS       atorvastatin (LIPITOR) 40 MG tablet Take 1 tablet (40 mg) by mouth daily. 90 tablet 1    carvedilol (COREG) 25 MG tablet Take 1 tablet (25 mg) by mouth 2 times daily (with meals). NEEDS CARDIOLOGY VISIT FOR FURTHER REFILLS 180 tablet 0    cyclobenzaprine (FLEXERIL) 10 MG tablet Take 1 tablet (10 mg) by mouth 3 times daily as needed for muscle spasms. 30 tablet 1    diltiazem ER (CARDIAZEM LA) 360 MG 24 hr tablet Take 1 tablet (360 mg) by mouth daily. 90 tablet 1    methylPREDNISolone (MEDROL DOSEPAK) 4 MG tablet therapy pack Follow Package Directions 21 tablet 0    Multiple Vitamin (MULTI-VITAMIN DAILY PO)       oxyCODONE (ROXICODONE) 5 MG tablet Take 1 tablet (5 mg) by mouth every 6 hours as needed for severe pain. 10 tablet 0    spironolactone (ALDACTONE) 100 MG tablet Take 1 tablet (100 mg) by mouth daily. 90 tablet 0    tiZANidine (ZANAFLEX) 4 MG tablet TAKE ONE TABLET BY MOUTH THREE TIMES A DAY AS NEEDED FOR MUSCLE SPASMS 180  tablet 1     Allergies   Allergen Reactions    No Known Drug Allergy      Current providers sharing in care for this patient include:  Patient Care Team:  Anisha Alfaro PA-C as PCP - General (Family Medicine)  Anisha Alfaro PA-C as Assigned PCP  Varinder Avila MD as MD (Cardiovascular Disease)  Dixie Espinoza MD as MD (Endocrinology, Diabetes, and Metabolism)  Jeanne Magana MD as MD (Endocrinology, Diabetes, and Metabolism)  Lynette Cisneros APRN CNP as Assigned Heart and Vascular Provider  Jeanne Magana MD as Assigned Endocrinology Provider    The following health maintenance items are reviewed in Epic and correct as of today:  Health Maintenance   Topic Date Due    DEXA  Never done    Pneumococcal Vaccine: 50+ Years (2 of 2 - PCV) 12/14/2021    MICROALBUMIN  05/01/2024    INFLUENZA VACCINE (1) 09/01/2024    COVID-19 Vaccine (1 - 2024-25 season) Never done    MEDICARE ANNUAL WELLNESS VISIT  09/06/2024    ANNUAL REVIEW OF HM ORDERS  05/01/2025    LIPID  05/03/2025    MAMMO SCREENING  05/15/2025    PHQ-9  08/05/2025    CHELO ASSESSMENT  08/06/2025    BMP  01/20/2026    FALL RISK ASSESSMENT  02/05/2026    DTAP/TDAP/TD IMMUNIZATION (2 - Td or Tdap) 02/11/2026    ADVANCE CARE PLANNING  06/09/2026    COLORECTAL CANCER SCREENING  05/26/2027    GLUCOSE  01/20/2028    RSV VACCINE (1 - 1-dose 75+ series) 09/06/2034    HEPATITIS C SCREENING  Completed    MIGRAINE ACTION PLAN  Completed    PHQ-2 (once per calendar year)  Completed    ZOSTER IMMUNIZATION  Completed    HPV IMMUNIZATION  Aged Out    MENINGITIS IMMUNIZATION  Aged Out    HIV SCREENING  Discontinued    PAP  Discontinued    LUNG CANCER SCREENING  Discontinued         Review of Systems  Constitutional, HEENT, cardiovascular, pulmonary, gi and gu systems are negative, except as otherwise noted.     Objective    Exam  BP (!) 142/98 (BP Location: Right arm, Patient Position: Chair, Cuff Size: Adult Regular)   Pulse 68   Temp 98.2  " F (36.8  C) (Temporal)   Resp 20   Ht 1.562 m (5' 1.5\")   Wt 71.8 kg (158 lb 6 oz)   LMP 08/19/2013 (Approximate)   SpO2 97%   Breastfeeding No   BMI 29.44 kg/m     Estimated body mass index is 29.44 kg/m  as calculated from the following:    Height as of this encounter: 1.562 m (5' 1.5\").    Weight as of this encounter: 71.8 kg (158 lb 6 oz).    Physical Exam  GENERAL: alert and no distress  GENERAL: Patient in physical discomfort today no acute respiratory distress or distress otherwise she does walk around the exam room throughout the visit today  EYES: Eyes grossly normal to inspection, PERRL and conjunctivae and sclerae normal  HENT: ear canals and TM's normal, nose and mouth without ulcers or lesions  NECK: no adenopathy, no asymmetry, masses, or scars  RESP: lungs clear to auscultation - no rales, rhonchi or wheezes  CV: regular rate and rhythm, normal S1 S2, no S3 or S4, no murmur, click or rub, no peripheral edema  ABDOMEN: Patient declined abdominal exam  MS: no gross musculoskeletal defects noted, no edema  MS: No cervical vertebral tenderness she is tender to palpation over the left trapezius musculature and is quite tender throughout this entire region movement of her shoulder increases this discomfort.  No overlying rash, no bony point tenderness of the superior spine of the scapula acromion or AC joint  SKIN: Erythematous eruption on right breast no signs of expanding erythema   NEURO: Normal strength and tone, mentation intact and speech normal  PSYCH: mentation appears normal, affect normal/bright        2/5/2025   Mini Cog   Clock Draw Score 2 Normal   3 Item Recall 2 objects recalled   Mini Cog Total Score 4         Vision Screen  Patient wears corrective lenses (select all that apply): Worn during vision screen  Vision Screen Results: (!) REFER      Signed Electronically by: Anisha Alfaro PA-C    "

## 2025-02-05 NOTE — PATIENT INSTRUCTIONS
Patient Education   Preventive Care Advice   This is general advice given by our system to help you stay healthy. However, your care team may have specific advice just for you. Please talk to your care team about your preventive care needs.  Nutrition  Eat 5 or more servings of fruits and vegetables each day.  Try wheat bread, brown rice and whole grain pasta (instead of white bread, rice, and pasta).  Get enough calcium and vitamin D. Check the label on foods and aim for 100% of the RDA (recommended daily allowance).  Lifestyle  Exercise at least 150 minutes each week  (30 minutes a day, 5 days a week).  Do muscle strengthening activities 2 days a week. These help control your weight and prevent disease.  No smoking.  Wear sunscreen to prevent skin cancer.  Have a dental exam and cleaning every 6 months.  Yearly exams  See your health care team every year to talk about:  Any changes in your health.  Any medicines your care team has prescribed.  Preventive care, family planning, and ways to prevent chronic diseases.  Shots (vaccines)   HPV shots (up to age 26), if you've never had them before.  Hepatitis B shots (up to age 59), if you've never had them before.  COVID-19 shot: Get this shot when it's due.  Flu shot: Get a flu shot every year.  Tetanus shot: Get a tetanus shot every 10 years.  Pneumococcal, hepatitis A, and RSV shots: Ask your care team if you need these based on your risk.  Shingles shot (for age 50 and up)  General health tests  Diabetes screening:  Starting at age 35, Get screened for diabetes at least every 3 years.  If you are younger than age 35, ask your care team if you should be screened for diabetes.  Cholesterol test: At age 39, start having a cholesterol test every 5 years, or more often if advised.  Bone density scan (DEXA): At age 50, ask your care team if you should have this scan for osteoporosis (brittle bones).  Hepatitis C: Get tested at least once in your life.  STIs (sexually  transmitted infections)  Before age 24: Ask your care team if you should be screened for STIs.  After age 24: Get screened for STIs if you're at risk. You are at risk for STIs (including HIV) if:  You are sexually active with more than one person.  You don't use condoms every time.  You or a partner was diagnosed with a sexually transmitted infection.  If you are at risk for HIV, ask about PrEP medicine to prevent HIV.  Get tested for HIV at least once in your life, whether you are at risk for HIV or not.  Cancer screening tests  Cervical cancer screening: If you have a cervix, begin getting regular cervical cancer screening tests starting at age 21.  Breast cancer scan (mammogram): If you've ever had breasts, begin having regular mammograms starting at age 40. This is a scan to check for breast cancer.  Colon cancer screening: It is important to start screening for colon cancer at age 45.  Have a colonoscopy test every 10 years (or more often if you're at risk) Or, ask your provider about stool tests like a FIT test every year or Cologuard test every 3 years.  To learn more about your testing options, visit:   .  For help making a decision, visit:   https://bit.ly/ud27879.  Prostate cancer screening test: If you have a prostate, ask your care team if a prostate cancer screening test (PSA) at age 55 is right for you.  Lung cancer screening: If you are a current or former smoker ages 50 to 80, ask your care team if ongoing lung cancer screenings are right for you.  For informational purposes only. Not to replace the advice of your health care provider. Copyright   2023 [facility]. All rights reserved. Clinically reviewed by the [facility]. Shahiya [id number] - REV 01/24.  Learning About Activities of Daily Living  What are activities of daily living?     Activities of daily living (ADLs) are the basic self-care tasks you do every day. These include eating, bathing, dressing,  and moving around.  As you age, and if you have health problems, you may find that it's harder to do some of these tasks. If so, your doctor can suggest ideas that may help.  To measure what kind of help you may need, your doctor will ask how well you are able to do ADLs. Let your doctor know if there are any tasks that you are having trouble doing. This is an important first step to getting help. And when you have the help you need, you can stay as independent as possible.  How will a doctor assess your ADLs?  Asking about ADLs is part of a routine health checkup your doctor will likely do as you age. Your health check might be done in a doctor's office, in your home, or at a hospital. The goal is to find out if you are having any problems that could make it hard to care for yourself or that make it unsafe for you to be on your own.  To measure your ADLs, your doctor will ask how hard it is for you to do routine tasks. Your doctor may also want to know if you have changed the way you do a task because of a health problem. Your doctor may watch how you:  Walk back and forth.  Keep your balance while you stand or walk.  Move from sitting to standing or from a bed to a chair.  Button or unbutton a shirt or sweater.  Remove and put on your shoes.  It's common to feel a little worried or anxious if you find you can't do all the things you used to be able to do. Talking with your doctor about ADLs is a way to make sure you're as safe as possible and able to care for yourself as well as you can. You may want to bring a caregiver, friend, or family member to your checkup. They can help you talk to your doctor.  Follow-up care is a key part of your treatment and safety. Be sure to make and go to all appointments, and call your doctor if you are having problems. It's also a good idea to know your test results and keep a list of the medicines you take.  Current as of: October 24, 2023  Content Version: 14.3    2024 [facility]  [employer].   Care instructions adapted under license by your healthcare professional. If you have questions about a medical condition or this instruction, always ask your healthcare professional. [facility] [employer] disclaims any warranty or liability for your use of this information.    Learning About Stress  What is stress?     Stress is your body's response to a hard situation. Your body can have a physical, emotional, or mental response. Stress is a fact of life for most people, and it affects everyone differently. What causes stress for you may not be stressful for someone else.  A lot of things can cause stress. You may feel stress when you go on a job interview, take a test, or run a race. This kind of short-term stress is normal and even useful. It can help you if you need to work hard or react quickly. For example, stress can help you finish an important job on time.  Long-term stress is caused by ongoing stressful situations or events. Examples of long-term stress include long-term health problems, ongoing problems at work, or conflicts in your family. Long-term stress can harm your health.  How does stress affect your health?  When you are stressed, your body responds as though you are in danger. It makes hormones that speed up your heart, make you breathe faster, and give you a burst of energy. This is called the fight-or-flight stress response. If the stress is over quickly, your body goes back to normal and no harm is done.  But if stress happens too often or lasts too long, it can have bad effects. Long-term stress can make you more likely to get sick, and it can make symptoms of some diseases worse. If you tense up when you are stressed, you may develop neck, shoulder, or low back pain. Stress is linked to high blood pressure and heart disease.  Stress also harms your emotional health. It can make you finch, tense, or depressed. Your relationships may suffer, and you may not do well at work  or school.  What can you do to manage stress?  You can try these things to help manage stress:   Do something active. Exercise or activity can help reduce stress. Walking is a great way to get started. Even everyday activities such as housecleaning or yard work can help.  Try yoga or kandy chi. These techniques combine exercise and meditation. You may need some training at first to learn them.  Do something you enjoy. For example, listen to music or go to a movie. Practice your hobby or do volunteer work.  Meditate. This can help you relax, because you are not worrying about what happened before or what may happen in the future.  Do guided imagery. Imagine yourself in any setting that helps you feel calm. You can use online videos, books, or a teacher to guide you.  Do breathing exercises. For example:  From a standing position, bend forward from the waist with your knees slightly bent. Let your arms dangle close to the floor.  Breathe in slowly and deeply as you return to a standing position. Roll up slowly and lift your head last.  Hold your breath for just a few seconds in the standing position.  Breathe out slowly and bend forward from the waist.  Let your feelings out. Talk, laugh, cry, and express anger when you need to. Talking with supportive friends or family, a counselor, or a edvin leader about your feelings is a healthy way to relieve stress. Avoid discussing your feelings with people who make you feel worse.  Write. It may help to write about things that are bothering you. This helps you find out how much stress you feel and what is causing it. When you know this, you can find better ways to cope.  What can you do to prevent stress?  You might try some of these things to help prevent stress:  Manage your time. This helps you find time to do the things you want and need to do.  Get enough sleep. Your body recovers from the stresses of the day while you are sleeping.  Get support. Your family, friends, and  "community can make a difference in how you experience stress.  Limit your news feed. Avoid or limit time on social media or news that may make you feel stressed.  Do something active. Exercise or activity can help reduce stress. Walking is a great way to get started.  Where can you learn more?  Go to https://www.SYLOB.net/patiented  Enter N032 in the search box to learn more about \"Learning About Stress.\"  Current as of: October 24, 2023  Content Version: 14.3    2024 [employer].   Care instructions adapted under license by your healthcare professional. If you have questions about a medical condition or this instruction, always ask your healthcare professional. [employer] disclaims any warranty or liability for your use of this information.    Learning About Sleeping Well  What does sleeping well mean?     Sleeping well means getting enough sleep to feel good and stay healthy. How much sleep is enough varies among people.  The number of hours you sleep and how you feel when you wake up are both important. If you do not feel refreshed, you probably need more sleep. Another sign of not getting enough sleep is feeling tired during the day.  Experts recommend that adults get at least 7 or more hours of sleep per day. Children and older adults need more sleep.  Why is getting enough sleep important?  Getting enough quality sleep is a basic part of good health. When your sleep suffers, your physical health, mood, and your thoughts can suffer too. You may find yourself feeling more grumpy or stressed. Not getting enough sleep also can lead to serious problems, including injury, accidents, anxiety, and depression.  What might cause poor sleeping?  Many things can cause sleep problems, including:  Changes to your sleep schedule.  Stress. Stress can be caused by fear about a single event, such as giving a speech. Or you may have ongoing stress, such as worry about work or school.  Depression, " "anxiety, and other mental or emotional conditions.  Changes in your sleep habits or surroundings. This includes changes that happen where you sleep, such as noise, light, or sleeping in a different bed. It also includes changes in your sleep pattern, such as having jet lag or working a late shift.  Health problems, such as pain, breathing problems, and restless legs syndrome.  Lack of regular exercise.  Using alcohol, nicotine, or caffeine before bed.  How can you help yourself?  Here are some tips that may help you sleep more soundly and wake up feeling more refreshed.  Your sleeping area   Use your bedroom only for sleeping and sex. A bit of light reading may help you fall asleep. But if it doesn't, do your reading elsewhere in the house. Try not to use your TV, computer, smartphone, or tablet while you are in bed.  Be sure your bed is big enough to stretch out comfortably, especially if you have a sleep partner.  Keep your bedroom quiet, dark, and cool. Use curtains, blinds, or a sleep mask to block out light. To block out noise, use earplugs, soothing music, or a \"white noise\" machine.  Your evening and bedtime routine   Create a relaxing bedtime routine. You might want to take a warm shower or bath, or listen to soothing music.  Go to bed at the same time every night. And get up at the same time every morning, even if you feel tired.  What to avoid   Limit caffeine (coffee, tea, caffeinated sodas) during the day, and don't have any for at least 6 hours before bedtime.  Avoid drinking alcohol before bedtime. Alcohol can cause you to wake up more often during the night.  Try not to smoke or use tobacco, especially in the evening. Nicotine can keep you awake.  Limit naps during the day, especially close to bedtime.  Avoid lying in bed awake for too long. If you can't fall asleep or if you wake up in the middle of the night and can't get back to sleep within about 20 minutes, get out of bed and go to another room " "until you feel sleepy.  Avoid taking medicine right before bed that may keep you awake or make you feel hyper or energized. Your doctor can tell you if your medicine may do this and if you can take it earlier in the day.  If you can't sleep   Imagine yourself in a peaceful, pleasant scene. Focus on the details and feelings of being in a place that is relaxing.  Get up and do a quiet or boring activity until you feel sleepy.  Avoid drinking any liquids before going to bed to help prevent waking up often to use the bathroom.  Where can you learn more?  Go to https://www.CFO.com.net/patiented  Enter J942 in the search box to learn more about \"Learning About Sleeping Well.\"  Current as of: July 31, 2024  Content Version: 14.3    2024 [employer].   Care instructions adapted under license by your healthcare professional. If you have questions about a medical condition or this instruction, always ask your healthcare professional. [employer] disclaims any warranty or liability for your use of this information.    Learning About Depression Screening  What is depression screening?  Depression screening is a way to see if you have depression symptoms. It may be done by a doctor or counselor. It's often part of a routine checkup. That's because your mental health is just as important as your physical health.  Depression is a mental health condition that affects how you feel, think, and act. You may:  Have less energy.  Lose interest in your daily activities.  Feel sad and grouchy for a long time.  Depression is very common. It affects people of all ages.  Many things can lead to depression. Some people become depressed after they have a stroke or find out they have a major illness like cancer or heart disease. The death of a loved one or a breakup may lead to depression. It can run in families. Most experts believe that a combination of inherited genes and stressful life events can cause it.  What " "happens during screening?  You may be asked to fill out a form about your depression symptoms. You and the doctor will discuss your answers. The doctor may ask you more questions to learn more about how you think, act, and feel.  What happens after screening?  If you have symptoms of depression, your doctor will talk to you about your options.  Doctors usually treat depression with medicines or counseling. Often, combining the two works best. Many people don't get help because they think that they'll get over the depression on their own. But people with depression may not get better unless they get treatment.  The cause of depression is not well understood. There may be many factors involved. But if you have depression, it's not your fault.  A serious symptom of depression is thinking about death or suicide. If you or someone you care about talks about this or about feeling hopeless, get help right away.  It's important to know that depression can be treated. Medicine, counseling, and self-care may help.  Where can you learn more?  Go to https://www.Maktoob.net/patiented  Enter T185 in the search box to learn more about \"Learning About Depression Screening.\"  Current as of: July 31, 2024  Content Version: 14.3    2024 [employer].   Care instructions adapted under license by your healthcare professional. If you have questions about a medical condition or this instruction, always ask your healthcare professional. [employer] disclaims any warranty or liability for your use of this information.    Chronic Pain: Care Instructions  Your Care Instructions     Chronic pain is pain that lasts a long time (months or even years) and may or may not have a clear cause. It is different from acute pain, which usually does have a clear cause--like an injury or illness--and gets better over time. Chronic pain:  Lasts over time but may vary from day to day.  Does not go away despite efforts to end it.  May " disrupt your sleep and lead to fatigue.  May cause depression or anxiety.  May make your muscles tense, causing more pain.  Can disrupt your work, hobbies, home life, and relationships with friends and family.  Chronic pain is a very real condition. It is not just in your head. Treatment can help and usually includes several methods used together, such as medicines, physical therapy, exercise, and other treatments. Learning how to relax and changing negative thought patterns can also help you cope.  Chronic pain is complex. Taking an active role in your treatment will help you better manage your pain. Tell your doctor if you have trouble dealing with your pain. You may have to try several things before you find what works best for you.  Follow-up care is a key part of your treatment and safety. Be sure to make and go to all appointments, and call your doctor if you are having problems. It's also a good idea to know your test results and keep a list of the medicines you take.  How can you care for yourself at home?  Pace yourself. Break up large jobs into smaller tasks. Save harder tasks for days when you have less pain, or go back and forth between hard tasks and easier ones. Take rest breaks.  Relax, and reduce stress. Relaxation techniques such as deep breathing or meditation can help.  Keep moving. Gentle, daily exercise can help reduce pain over the long run. Try low- or no-impact exercises such as walking, swimming, and stationary biking. Do stretches to stay flexible.  Try heat, cold packs, and massage.  Get enough sleep. Chronic pain can make you tired and drain your energy. Talk with your doctor if you have trouble sleeping because of pain.  Think positive. Your thoughts can affect your pain level. Do things that you enjoy to distract yourself when you have pain instead of focusing on the pain. See a movie, read a book, listen to music, or spend time with a friend.  If you think you are depressed, talk to  your doctor about treatment.  Keep a daily pain diary. Record how your moods, thoughts, sleep patterns, activities, and medicine affect your pain. You may find that your pain is worse during or after certain activities or when you are feeling a certain emotion. Having a record of your pain can help you and your doctor find the best ways to treat your pain.  Take pain medicines exactly as directed.  If the doctor gave you a prescription medicine for pain, take it as prescribed.  If you are not taking a prescription pain medicine, ask your doctor if you can take an over-the-counter medicine.  Reducing constipation caused by pain medicine  Talk to your doctor about a laxative. If a laxative doesn't work, your doctor may suggest a prescription medicine.  Include fruits, vegetables, beans, and whole grains in your diet each day. These foods are high in fiber.  If your doctor recommends it, get more exercise. Walking is a good choice. Bit by bit, increase the amount you walk every day. Try for at least 30 minutes on most days of the week.  Schedule time each day for a bowel movement. A daily routine may help. Take your time and do not strain when having a bowel movement.  When should you call for help?   Call your doctor now or seek immediate medical care if:    Your pain gets worse or is out of control.     You feel down or blue, or you do not enjoy things like you once did. You may be depressed, which is common in people with chronic pain. Depression can be treated.     You have vomiting or cramps for more than 2 hours.   Watch closely for changes in your health, and be sure to contact your doctor if:    You cannot sleep because of pain.     You are very worried or anxious about your pain.     You have trouble taking your pain medicine.     You have any concerns about your pain medicine.     You have trouble with bowel movements, such as:  No bowel movement in 3 days.  Blood in the anal area, in your stool, or on the  "toilet paper.  Diarrhea for more than 24 hours.   Where can you learn more?  Go to https://www.[employer].net/patiented  Enter N004 in the search box to learn more about \"Chronic Pain: Care Instructions.\"  Current as of: July 31, 2024  Content Version: 14.3    2024 [employer].   Care instructions adapted under license by your healthcare professional. If you have questions about a medical condition or this instruction, always ask your healthcare professional. [employer] disclaims any warranty or liability for your use of this information.       "

## 2025-02-06 ENCOUNTER — PATIENT OUTREACH (OUTPATIENT)
Dept: CARE COORDINATION | Facility: CLINIC | Age: 65
End: 2025-02-06
Payer: COMMERCIAL

## 2025-02-12 NOTE — PROGRESS NOTES
Janice Ulloa  :  1960  DOS: 2025  MRN: [id number]  PCP: Anisha Alfaro    Sports Medicine Clinic Visit      HPI  Janice Ulloa is a 64 year old female who is seen in consultation at the request of  Anisha Alfaro PA-C presenting with upper back pain.    - Mechanism of Injury:    -  First noticed 2025  - Pertinent history and prior evaluations:     - 2025 with Anisha Alfaro: Left sided upper back pain with radiation into the left hand. Started on Medrol Dosepak and cyclobenzaprine and Oxycodone. Suspected cervical radiculopathy. Has tried physical therapy.  - 2025 ED visit: Provided topical lidocaine and oxycodone for pain control.   - Significant history of C5-6 cervical ACDF    - Pain Character:    - Location:  posterior left shoulder with radiation into lateral upper arm and left index finger  - Character:  aching, numbness, zinging  - Duration:  3 weeks  - Course:  lingering  - Endorses:    -  numbness into index finger of left hand , unsure if she feels the numbness in the rest of the arm.  She does feel vague discomfort along the lateral arm to the index finger with certain neck positions.  Back pain.  Shoulder blade pain.  - Denies:    - swelling, clicking/popping, grinding, mechanical locking symptoms, instability, weakness  - Alleviating factors:    -  flexeril, medrol dosepak have helped somewhat.  Activity modifications.  - Aggravating factors:    -  lifting items, constant index finger numbness  - Other treatments tried:    -  medrol dose pack, flexeril, oxycodone    - Patient Goals:    - get a formal diagnosis, discuss treatment options  - Social History:   -  Merlin's    Review of Systems  Musculoskeletal: as above  Remainder of review of systems is negative including constitutional, CV, pulmonary, GI, Skin and Neurologic except as noted in HPI or medical history.    Past Medical History:   Diagnosis Date    Carpal tunnel syndrome     Intervertebral cervical disc disorder  with myelopathy, cervical region     C6 herniation with right C6 radiculopathy    Migraine, unspecified, without mention of intractable migraine without mention of status migrainosus     Perimenopausal     irreg     Personal history of tobacco use, presenting hazards to health      Past Surgical History:   Procedure Laterality Date    COLONOSCOPY  1/10/2014    Procedure: COMBINED COLONOSCOPY, SINGLE BIOPSY/POLYPECTOMY BY BIOPSY;  colonoscopy with polypectomy by forceps;  Surgeon: Chevy Yang MD;  [facility]    COLONOSCOPY N/A 2022    Procedure: COLONOSCOPY;  Surgeon: Jaren Verduzco DO;  Location[facility]    DECOMPRESSION LUMBAR MINIMALLY INVASIVE ONE LEVEL Bilateral 2021    Procedure: Minimally invasive Left Lumbar 4 to lumbar 5 bilateral decompression;  Surgeon: James Orozco MD;  [facility]    DISCECTOMY LUMBAR MINIMALLY INVASIVE ONE LEVEL Left 2021    Procedure: Lumbar 5 to Sacral 1 microdiscectomy, Left;  Surgeon: James Orozco MD;  [facility]    HC REVISE MEDIAN N/CARPAL TUNNEL SURG  2004    HC REVISE MEDIAN N/CARPAL TUNNEL SURG  06    Left    HC TREATMENT INCOMPLETE  SURG, ANY TRIMESTER      D&C after miscarriage    INJECT EPIDURAL LUMBAR Left 2020    Procedure: INJECTION, SPINE, LUMBAR, 5 Sacral 1 and Sacral 1 Left;  Surgeon: Abilio Middleton MD;  [facility]    ZZC DISK SURG,ANTER,CERVICAL,SINGLE LVL  10/02/2007    C5-C6 anterior cervical diskectomy & fusion w/plate.    ZZC NONSPECIFIC PROCEDURE      Bilateral inguinal hernia repairs    ZZC NONSPECIFIC PROCEDURE      Oral surgery x2     Family History   Problem Relation Age of Onset    Cancer Mother         parotid    Heart Disease Father 47        MI    Depression Daughter     Hypertension Sister          Objective  LMP 2013 (Approximate)     General: healthy, alert and in no acute distress.    HEENT: no scleral icterus or conjunctival erythema.   Skin: no  suspicious lesions or rash. No jaundice.   CV: regular rhythm by palpation, 2+ distal pulses.  Resp: normal respiratory effort without conversational dyspnea.   Psych: normal mood and affect.    Gait: nonantalgic, appropriate coordination and balance.     Neuro:       - Sensation to light touch:    - Diminished sensation in the left index finger. Otherwise SILT in all other nerve distributions.        - MSR:       RUE  LUE  - Biceps  2+ 2+  - Brachioradialis 2+ 2+  - Triceps  2+ 2+       - Special tests:   - Spurling's: Positive on the left for reproduction of her C7 radiculopathy symptoms   - Bakody sign: Positive   - Pacheco's:  Neg    MSK - Cervical Spine:       - Inspection:    - No significant swelling, erythema, warmth, ecchymosis, lesion, or atrophy noted.   - Forward positioned posture       - ROM:    - Limited in cervical extension, rotation, sidebending by stiffness and pain       - Palpation:    - TTP at the left cervical paraspinals, periscapular musculature, lateral shoulder..   - NTTP elsewhere.        - Strength:   (*antalgic)  RUE LUE  - Shoulder Abduction   5 5-*    - Shoulder Internal Rotation  5 5-   - Shoulder External Rotation  5 5-  - Elbow Flexion   5 5  - Elbow Extension   5 5-  - Forearm Pronation   5 5  - Forearm Supination   5 5  - Wrist Extension   5 5  - Wrist Flexion    5 5  - FDI     5 5  - ADM     5 5  - FPL     5 5  - APB     5 5  - EIP     5 5  - EDC     5 5  - APL/EPB    5 5        Radiology  I independently reviewed the available relevant imaging in the chart with my interpretations as above in HPI.   - XR L shoulder 1/20/2025X-rays show moderate degenerative changes of the AC joint, no major degenerative changes of the glenohumeral joint.  No acute fractures or dislocations.      Assessment  1. Cervical radiculopathy        Daphnie Ulloa is a pleasant 64 year old female with a significant history of prior C5-C6 ACDF that presents with chronic left shoulder pain.  She  describes pain in the lateral shoulder that hurts worse with some shoulder movements.  Also endorses left-sided neck pain, shoulder blade pain, and radiation of pain down to her left index finger.  Associated numbness/tingling and paresthesias in the left index finger and lateral arm.  On exam, she does have a positive Spurling's test for reproduction of her cervical radiculopathy symptoms in a C7 distribution.  Only mild weakness present on exam.  She has found some relief from a Medrol Dosepak and Flexeril.  No physical therapy performed yet.     Her history and physical exam appear most consistent with left-sided cervical radiculopathy, likely C7 based on her exam.  She also may have some rotator cuff impingement on the left, but this does not seem to be as prominent as her radiculopathy symptoms.    We discussed the nature of the condition and available treatment options, and mutually agreed upon the following plan:    - Imaging:          - Reviewed and independently interpreted the relevant imaging in the chart, including any imaging ordered for today's clinic.  - Reviewed results and images with patient.   - Could consider cervical spine MRI and/or EMG in the future for persistent symptoms.  - Medications:          - Discussed pharmacologic options for pain relief.   - May use NSAIDs (Ibuprofen, Naproxen) or Acetaminophen (Tylenol) as needed for pain control.   - Do not take these if previously advised to avoid them for other medical conditions.  - May also use topical medications such as lidocaine, IcyHot, BioFreeze, or Voltaren gel as needed for pain control.    - Voltaren gel is an anti-inflammatory cream that may be used up to 4 times per day over the painful area.   - May continue muscle relaxers. Could consider neuropathic pain medications in the future if needed.   - Injections:          - Discussed possible injection options and alternatives.    - Injection options include: Cervical epidural steroid  injection could be considered for persistent symptoms.  May also consider a left subacromial bursa injection for diagnostic and therapeutic purposes if her rotator cuff symptoms seem to be more prominent  - Therapy:          - Discussed the benefits of therapy vs home exercise program for optimization of range of motion, flexibility, strength, stability and function.   - Preference is for therapy.   - Physical therapy referral placed today and instructed to call [phone] to schedule appointments.   - Modalities:          - May use ice, heat, massage or other modalities as needed.   - Activity:          - Encouraged to remain active and participate in regular activities as symptoms allow.   Avoid or modify exacerbating activities as needed.  - Follow up:          - As needed for re-evaluation and update to treatment plan.  - May follow up sooner for new/worsening symptoms.  - May contact clinic by phone or MyChart for questions or concerns.       Elliott Smith DO, GASTON  [facility] - Sports Medicine  [facility] - Department of Orthopedic Surgery       Disclaimer:  This note was prepared and written using Dragon Medical dictation software. As a result, there may be errors in the script that have gone undetected. Please consider this when interpreting the information in this note.

## 2025-02-13 ENCOUNTER — OFFICE VISIT (OUTPATIENT)
Dept: ORTHOPEDICS | Facility: CLINIC | Age: 65
End: 2025-02-13
Payer: COMMERCIAL

## 2025-02-13 DIAGNOSIS — M54.12 CERVICAL RADICULOPATHY: Primary | ICD-10-CM

## 2025-02-13 NOTE — LETTER
2025      Janice Ulloa  [address]  [place] [address]      Dear Colleague,    Thank you for referring your patient, Janice Ulloa, to the [facility] [place]. Please see a copy of my visit note below.    Janice Ulloa  :  1960  DOS: 2025  MRN: [id number]  PCP: Anisha Alfaro    Sports Medicine Clinic Visit      HPI  Janice Ulloa is a 64 year old female who is seen in consultation at the request of  Anisha Alfaro PA-C presenting with upper back pain.    - Mechanism of Injury:    -  First noticed 2025  - Pertinent history and prior evaluations:     - 2025 with Anisha Alfaro: Left sided upper back pain with radiation into the left hand. Started on Medrol Dosepak and cyclobenzaprine and Oxycodone. Suspected cervical radiculopathy. Has tried physical therapy.  - 2025 ED visit: Provided topical lidocaine and oxycodone for pain control.   - Significant history of C5-6 cervical ACDF    - Pain Character:    - Location:  posterior left shoulder with radiation into lateral upper arm and left index finger  - Character:  aching, numbness, zinging  - Duration:  3 weeks  - Course:  lingering  - Endorses:    -  numbness into index finger of left hand , unsure if she feels the numbness in the rest of the arm.  She does feel vague discomfort along the lateral arm to the index finger with certain neck positions.  Back pain.  Shoulder blade pain.  - Denies:    - swelling, clicking/popping, grinding, mechanical locking symptoms, instability, weakness  - Alleviating factors:    -  flexeril, medrol dosepak have helped somewhat.  Activity modifications.  - Aggravating factors:    -  lifting items, constant index finger numbness  - Other treatments tried:    -  medrol dose pack, flexeril, oxycodone    - Patient Goals:    - get a formal diagnosis, discuss treatment options  - Social History:   -  Merlin's    Review of Systems  Musculoskeletal: as  above  Remainder of review of systems is negative including constitutional, CV, pulmonary, GI, Skin and Neurologic except as noted in HPI or medical history.    Past Medical History:   Diagnosis Date     Carpal tunnel syndrome      Intervertebral cervical disc disorder with myelopathy, cervical region     C6 herniation with right C6 radiculopathy     Migraine, unspecified, without mention of intractable migraine without mention of status migrainosus      Perimenopausal     irreg      Personal history of tobacco use, presenting hazards to health      Past Surgical History:   Procedure Laterality Date     COLONOSCOPY  1/10/2014    Procedure: COMBINED COLONOSCOPY, SINGLE BIOPSY/POLYPECTOMY BY BIOPSY;  colonoscopy with polypectomy by forceps;  Surgeon: Chevy Yang MD;  [facility]     COLONOSCOPY N/A 2022    Procedure: COLONOSCOPY;  Surgeon: Jaren Verduzco DO;  [facility]     DECOMPRESSION LUMBAR MINIMALLY INVASIVE ONE LEVEL Bilateral 2021    Procedure: Minimally invasive Left Lumbar 4 to lumbar 5 bilateral decompression;  Surgeon: James Orozco MD;  [facility]     DISCECTOMY LUMBAR MINIMALLY INVASIVE ONE LEVEL Left 2021    Procedure: Lumbar 5 to Sacral 1 microdiscectomy, Left;  Surgeon: James Orozco MD;  [facility]     HC REVISE MEDIAN N/CARPAL TUNNEL SURG  2004     HC REVISE MEDIAN N/CARPAL TUNNEL SURG  06    Left     HC TREATMENT INCOMPLETE  SURG, ANY TRIMESTER      D&C after miscarriage     INJECT EPIDURAL LUMBAR Left 2020    Procedure: INJECTION, SPINE, LUMBAR, 5 Sacral 1 and Sacral 1 Left;  Surgeon: Abilio Middleton MD;  [facility]     ZZC DISK SURG,ANTER,CERVICAL,SINGLE LVL  10/02/2007    C5-C6 anterior cervical diskectomy & fusion w/plate.     ZZC NONSPECIFIC PROCEDURE      Bilateral inguinal hernia repairs     ZZC NONSPECIFIC PROCEDURE      Oral surgery x2     Family History   Problem Relation Age of Onset     Cancer  Mother         parotid     Heart Disease Father 47        MI     Depression Daughter      Hypertension Sister          Objective  LMP 08/19/2013 (Approximate)     General: healthy, alert and in no acute distress.    HEENT: no scleral icterus or conjunctival erythema.   Skin: no suspicious lesions or rash. No jaundice.   CV: regular rhythm by palpation, 2+ distal pulses.  Resp: normal respiratory effort without conversational dyspnea.   Psych: normal mood and affect.    Gait: nonantalgic, appropriate coordination and balance.     Neuro:       - Sensation to light touch:    - Diminished sensation in the left index finger. Otherwise SILT in all other nerve distributions.        - MSR:       RUE  LUE  - Biceps  2+ 2+  - Brachioradialis 2+ 2+  - Triceps  2+ 2+       - Special tests:   - Spurling's: Positive on the left for reproduction of her C7 radiculopathy symptoms   - Bakody sign: Positive   - Pacheco's:  Neg    MSK - Cervical Spine:       - Inspection:    - No significant swelling, erythema, warmth, ecchymosis, lesion, or atrophy noted.   - Forward positioned posture       - ROM:    - Limited in cervical extension, rotation, sidebending by stiffness and pain       - Palpation:    - TTP at the left cervical paraspinals, periscapular musculature, lateral shoulder..   - NTTP elsewhere.        - Strength:   (*antalgic)  RUE LUE  - Shoulder Abduction   5 5-*    - Shoulder Internal Rotation  5 5-   - Shoulder External Rotation  5 5-  - Elbow Flexion   5 5  - Elbow Extension   5 5-  - Forearm Pronation   5 5  - Forearm Supination   5 5  - Wrist Extension   5 5  - Wrist Flexion    5 5  - FDI     5 5  - ADM     5 5  - FPL     5 5  - APB     5 5  - EIP     5 5  - EDC     5 5  - APL/EPB    5 5        Radiology  I independently reviewed the available relevant imaging in the chart with my interpretations as above in HPI.   - XR L shoulder 1/20/2025X-rays show moderate degenerative changes of the AC joint, no major degenerative  changes of the glenohumeral joint.  No acute fractures or dislocations.      Assessment  1. Cervical radiculopathy        Plan  Janice Ulloa is a pleasant 64 year old female with a significant history of prior C5-C6 ACDF that presents with chronic left shoulder pain.  She describes pain in the lateral shoulder that hurts worse with some shoulder movements.  Also endorses left-sided neck pain, shoulder blade pain, and radiation of pain down to her left index finger.  Associated numbness/tingling and paresthesias in the left index finger and lateral arm.  On exam, she does have a positive Spurling's test for reproduction of her cervical radiculopathy symptoms in a C7 distribution.  Only mild weakness present on exam.  She has found some relief from a Medrol Dosepak and Flexeril.  No physical therapy performed yet.     Her history and physical exam appear most consistent with left-sided cervical radiculopathy, likely C7 based on her exam.  She also may have some rotator cuff impingement on the left, but this does not seem to be as prominent as her radiculopathy symptoms.    We discussed the nature of the condition and available treatment options, and mutually agreed upon the following plan:    - Imaging:          - Reviewed and independently interpreted the relevant imaging in the chart, including any imaging ordered for today's clinic.  - Reviewed results and images with patient.   - Could consider cervical spine MRI and/or EMG in the future for persistent symptoms.  - Medications:          - Discussed pharmacologic options for pain relief.   - May use NSAIDs (Ibuprofen, Naproxen) or Acetaminophen (Tylenol) as needed for pain control.   - Do not take these if previously advised to avoid them for other medical conditions.  - May also use topical medications such as lidocaine, IcyHot, BioFreeze, or Voltaren gel as needed for pain control.    - Voltaren gel is an anti-inflammatory cream that may be used up to 4 times  per day over the painful area.   - May continue muscle relaxers. Could consider neuropathic pain medications in the future if needed.   - Injections:          - Discussed possible injection options and alternatives.    - Injection options include: Cervical epidural steroid injection could be considered for persistent symptoms.  May also consider a left subacromial bursa injection for diagnostic and therapeutic purposes if her rotator cuff symptoms seem to be more prominent  - Therapy:          - Discussed the benefits of therapy vs home exercise program for optimization of range of motion, flexibility, strength, stability and function.   - Preference is for therapy.   - Physical therapy referral placed today and instructed to call [phone] to schedule appointments.   - Modalities:          - May use ice, heat, massage or other modalities as needed.   - Activity:          - Encouraged to remain active and participate in regular activities as symptoms allow.   Avoid or modify exacerbating activities as needed.  - Follow up:          - As needed for re-evaluation and update to treatment plan.  - May follow up sooner for new/worsening symptoms.  - May contact clinic by phone or MyChart for questions or concerns.       Elliott Smith DO, CAQSM  [facility] - Sports Medicine  [facility] - Department of Orthopedic Surgery       Disclaimer:  This note was prepared and written using Dragon Medical dictation software. As a result, there may be errors in the script that have gone undetected. Please consider this when interpreting the information in this note.        Again, thank you for allowing me to participate in the care of your patient.        Sincerely,        Elliott Smith DO    Electronically signed

## 2025-02-17 ENCOUNTER — THERAPY VISIT (OUTPATIENT)
Dept: PHYSICAL THERAPY | Facility: CLINIC | Age: 65
End: 2025-02-17
Attending: STUDENT IN AN ORGANIZED HEALTH CARE EDUCATION/TRAINING PROGRAM
Payer: COMMERCIAL

## 2025-02-17 DIAGNOSIS — M54.12 CERVICAL RADICULOPATHY: ICD-10-CM

## 2025-02-17 PROCEDURE — 97110 THERAPEUTIC EXERCISES: CPT | Mod: GP | Performed by: PHYSICAL THERAPIST

## 2025-02-17 PROCEDURE — 97161 PT EVAL LOW COMPLEX 20 MIN: CPT | Mod: GP | Performed by: PHYSICAL THERAPIST

## 2025-02-17 PROCEDURE — 97140 MANUAL THERAPY 1/> REGIONS: CPT | Mod: GP | Performed by: PHYSICAL THERAPIST

## 2025-02-17 ASSESSMENT — ACTIVITIES OF DAILY LIVING (ADL)
PUTTING_ON_AN_UNDERSHIRT_OR_A_PULLOVER_SWEATER: 2
PLACING_AN_OBJECT_ON_A_HIGH_SHELF: 5
TOUCHING_THE_BACK_OF_YOUR_NECK: 5
AT_ITS_WORST?: 5
CARRYING_A_HEAVY_OBJECT_OF_10_POUNDS: 7
WASHING_YOUR_HAIR?: 2
WHEN_LYING_ON_THE_INVOLVED_SIDE: 3
WASHING_YOUR_BACK: 2
PUSHING_WITH_THE_INVOLVED_ARM: 6
PLEASE_INDICATE_YOR_PRIMARY_REASON_FOR_REFERRAL_TO_THERAPY:: SHOULDER
REACHING_FOR_SOMETHING_ON_A_HIGH_SHELF: 5
PUTTING_ON_YOUR_PANTS: 2
PUTTING_ON_A_SHIRT_THAT_BUTTONS_DOWN_THE_FRONT: 2

## 2025-02-17 NOTE — PROGRESS NOTES
PHYSICAL THERAPY EVALUATION  Type of Visit: Evaluation              Subjective         Presenting condition or subjective complaint:   Pt is a 63 yo female who presents to PT with pain in neck radiating into L arm and fingers starting  of this year. Pt reports pain started in L shoulder blade and radiated into her upper arm and then began feeling numbness in her index finger. Pt states her activity was no different than usual, no precipitating injury or event. Pt has significant PMH for ACDF.  Date of onset: 24    Relevant medical history:     Past Medical History:   Diagnosis Date    Carpal tunnel syndrome     Intervertebral cervical disc disorder with myelopathy, cervical region     C6 herniation with right C6 radiculopathy    Migraine, unspecified, without mention of intractable migraine without mention of status migrainosus     Perimenopausal     irreg     Personal history of tobacco use, presenting hazards to health        Dates & types of surgery:     Past Surgical History:   Procedure Laterality Date    COLONOSCOPY  1/10/2014    Procedure: COMBINED COLONOSCOPY, SINGLE BIOPSY/POLYPECTOMY BY BIOPSY;  colonoscopy with polypectomy by forceps;  Surgeon: Chevy Yang MD;  [facility]    COLONOSCOPY N/A 2022    Procedure: COLONOSCOPY;  Surgeon: aJren Verduzco DO;  [facility]    DECOMPRESSION LUMBAR MINIMALLY INVASIVE ONE LEVEL Bilateral 2021    Procedure: Minimally invasive Left Lumbar 4 to lumbar 5 bilateral decompression;  Surgeon: James Orozco MD;  [facility]    DISCECTOMY LUMBAR MINIMALLY INVASIVE ONE LEVEL Left 2021    Procedure: Lumbar 5 to Sacral 1 microdiscectomy, Left;  Surgeon: James Orozco MD;  [facility]    HC REVISE MEDIAN N/CARPAL TUNNEL SURG  2004    HC REVISE MEDIAN N/CARPAL TUNNEL SURG  06    Left    HC TREATMENT INCOMPLETE  SURG, ANY TRIMESTER      D&C after miscarriage    INJECT EPIDURAL LUMBAR  Left 7/9/2020    Procedure: INJECTION, SPINE, LUMBAR, 5 Sacral 1 and Sacral 1 Left;  Surgeon: Abilio Middleton MD;  Location:  OR    [facility] DISK SURG,ANTER,CERVICAL,SINGLE LVL  10/02/2007    C5-C6 anterior cervical diskectomy & fusion w/plate.    [facility] NONSPECIFIC PROCEDURE      Bilateral inguinal hernia repairs    [facility] NONSPECIFIC PROCEDURE      Oral surgery x2         Prior diagnostic imaging/testing results: MRI; X-ray     Prior therapy history for the same diagnosis, illness or injury: No      Prior Level of Function  Transfers:   Ambulation:   ADL:   IADL:     Living Environment  Social support: With a significant other or spouse   Type of home: House   Stairs to enter the home: No       Ramp: No   Stairs inside the home: No       Help at home: None  Equipment owned:       Employment: Yes    Hobbies/Interests:      Patient goals for therapy:      Pain assessment: Pain present     Objective   CERVICAL SPINE EVALUATION  PAIN: Pain Level at Rest: 5/10  Pain Level with Use: 9/10  INTEGUMENTARY (edema, incisions):   POSTURE:  Pt with cervical and shoulder protraction  GAIT:   Weightbearing Status:   Assistive Device(s):   Gait Deviations:   BALANCE/PROPRIOCEPTION:   WEIGHTBEARING ALIGNMENT:   ROM:     MYOTOMES:  Pt with 5/5 strength in UT, 4-/5 in B shoulder abd, 4/5 elbow flex and ext, 4/5 wrist ext/flex, 4/5 throughout fingers and intrinsics  DTR S:   CORD SIGNS:   DERMATOMES:   NEURAL TENSION:   FLEXIBILITY:    SPECIAL TESTS:   PALPATION:  Pt noted to have some tenderness through B UT, L rhomboids, B pec minor  SPINAL SEGMENTAL CONCLUSIONS:       Assessment & Plan   CLINICAL IMPRESSIONS  Medical Diagnosis: Cervical radiculopathy (M54.12)    Treatment Diagnosis: Cervical radiculopathy   Impression/Assessment:  Pt presents to PT with soreness through her neck radiating into her L shoulder with weakness noted by her functionally. Pt shows fair ROM through cervical spine with rotation, flex/ext, and sidebending, but would  benefit from improvements to reduce some of her radicular sx. Pt would benefit from skilled PT interventions in order to address pt pain and discomfort, radiating sx, provide education in posture and HEP in order to support cervical spine and reduce sx for improved QOL.    Clinical Decision Making (Complexity):  Clinical Presentation: Stable/Uncomplicated  Clinical Presentation Rationale: based on medical and personal factors listed in PT evaluation  Clinical Decision Making (Complexity): Low complexity    PLAN OF CARE  Treatment Interventions:  Interventions: Manual Therapy, Neuromuscular Re-education, Therapeutic Activity, Therapeutic Exercise    Long Term Goals     PT Goal 1  Goal Identifier: HEP  Goal Description: Pt will demo independence in performance and progression of strengthening and balance HEP in order to improve CLOF.  Target Date:  (Ongoing, updates as needed)  PT Goal 2  Goal Identifier: NDI  Goal Description: Pt will demo improved neck pain and function as shown by decreased NDI score of at least 9 points in order to show significant improvement and greater return to previous function.  Target Date: 04/28/25      Frequency of Treatment: 1-2x/week  Duration of Treatment: 10 weeks    Recommended Referrals to Other Professionals:   Education Assessment:   Learner/Method: Patient;Listening;Demonstration    Risks and benefits of evaluation/treatment have been explained.   Patient/Family/caregiver agrees with Plan of Care.     Evaluation Time:     PT Eval, Low Complexity Minutes (90971): 15       Signing Clinician: ELAINE Shaikh [facility]                                                                                   OUTPATIENT PHYSICAL THERAPY      PLAN OF TREATMENT FOR OUTPATIENT REHABILITATION   Patient's Last Name, First Name, Janice Boyer YOB: 1960   Provider's Name   [facility]   Medical Record  No.  [id number]     Onset Date: 02/13/25  Start of Care Date: 02/17/25     Medical Diagnosis:  Cervical radiculopathy (M54.12)      PT Treatment Diagnosis:  Cervical radiculopathy Plan of Treatment  Frequency/Duration: 1-2x/week/ 10 weeks    Certification date from 02/17/25 to 04/28/25         See note for plan of treatment details and functional goals     Lino Richardson, PT                         I CERTIFY THE NEED FOR THESE SERVICES FURNISHED UNDER        THIS PLAN OF TREATMENT AND WHILE UNDER MY CARE     (Physician attestation of this document indicates review and certification of the therapy plan).              Referring Provider:  Elliott Smith,     Initial Assessment  See Epic Evaluation- Start of Care Date: 02/17/25

## 2025-03-03 DIAGNOSIS — M54.2 NECK PAIN: ICD-10-CM

## 2025-03-18 ENCOUNTER — THERAPY VISIT (OUTPATIENT)
Dept: PHYSICAL THERAPY | Facility: CLINIC | Age: 65
End: 2025-03-18
Attending: STUDENT IN AN ORGANIZED HEALTH CARE EDUCATION/TRAINING PROGRAM
Payer: COMMERCIAL

## 2025-03-18 DIAGNOSIS — M54.12 CERVICAL RADICULOPATHY: Primary | ICD-10-CM

## 2025-03-18 PROCEDURE — 97110 THERAPEUTIC EXERCISES: CPT | Mod: GP | Performed by: PHYSICAL THERAPIST

## 2025-03-18 PROCEDURE — 97112 NEUROMUSCULAR REEDUCATION: CPT | Mod: GP | Performed by: PHYSICAL THERAPIST

## 2025-04-15 ENCOUNTER — PATIENT OUTREACH (OUTPATIENT)
Dept: CARE COORDINATION | Facility: CLINIC | Age: 65
End: 2025-04-15
Payer: COMMERCIAL

## 2025-04-17 PROBLEM — M54.12 CERVICAL RADICULOPATHY: Status: RESOLVED | Noted: 2025-02-17 | Resolved: 2025-04-17

## 2025-04-23 ENCOUNTER — OFFICE VISIT (OUTPATIENT)
Dept: CARDIOLOGY | Facility: CLINIC | Age: 65
End: 2025-04-23
Payer: COMMERCIAL

## 2025-04-23 VITALS
SYSTOLIC BLOOD PRESSURE: 126 MMHG | OXYGEN SATURATION: 95 % | WEIGHT: 164 LBS | HEIGHT: 62 IN | DIASTOLIC BLOOD PRESSURE: 84 MMHG | HEART RATE: 73 BPM | BODY MASS INDEX: 30.18 KG/M2

## 2025-04-23 DIAGNOSIS — I10 ESSENTIAL HYPERTENSION WITH GOAL BLOOD PRESSURE LESS THAN 140/90: Primary | ICD-10-CM

## 2025-04-23 PROCEDURE — 3074F SYST BP LT 130 MM HG: CPT | Performed by: NURSE PRACTITIONER

## 2025-04-23 PROCEDURE — 99213 OFFICE O/P EST LOW 20 MIN: CPT | Performed by: NURSE PRACTITIONER

## 2025-04-23 PROCEDURE — 1126F AMNT PAIN NOTED NONE PRSNT: CPT | Performed by: NURSE PRACTITIONER

## 2025-04-23 PROCEDURE — 3079F DIAST BP 80-89 MM HG: CPT | Performed by: NURSE PRACTITIONER

## 2025-04-23 RX ORDER — CARVEDILOL 25 MG/1
25 TABLET ORAL 2 TIMES DAILY WITH MEALS
Qty: 180 TABLET | Refills: 3 | Status: SHIPPED | OUTPATIENT
Start: 2025-04-23

## 2025-04-23 RX ORDER — DILTIAZEM HYDROCHLORIDE EXTENDED-RELEASE TABLETS 360 MG/1
360 TABLET, EXTENDED RELEASE ORAL DAILY
Qty: 90 TABLET | Refills: 3 | Status: SHIPPED | OUTPATIENT
Start: 2025-04-23

## 2025-04-23 ASSESSMENT — PAIN SCALES - GENERAL: PAINLEVEL_OUTOF10: NO PAIN (0)

## 2025-04-23 NOTE — PROGRESS NOTES
Cardiology Clinic Progress Note  Janice Ulloa MRN# [id number]   YOB: 1960 Age: 64 year old       HPI:    Janice Ulloa is a delightful 64 year old patient with past medical history significant for:    Hypertension  Hyperlipidemia followed by endocrinology     It is my pleasure meeting Janice at today's visit in [place].  He was seen by Dr. Marie in 2023. Echocardiogram was completed which showed normal EF 55-60%. Grade I diastolic dysfunction.     She has a history of hypertension currently well-controlled on carvedilol 25 mg twice daily, diltiazem 360 mg daily and spironolactone 100 mg daily.  Patient is seen on an annual basis by her PCP, Anisha Alfaro PA-C, and her endocrinologist Dr. Magana.  Patient states that her potassium was stopped last year.  She is on 100 of spironolactone and is due for fasting labs with her this spring.    Denies chest pain, shortness of breath, lightheadedness, dizziness, lower extremity edema.    Diagnotic studies:  EKG 1/20/2025: Sinus rhythm at 66 bpm.  No acute concerns noted  Echocardiogram 11/2023: 55 to 60%.  Grade 1 diastolic dysfunction.  No wall motion abnormality noted.  No significant valve disease.      Latest Reference Range & Units 01/20/25 17:49   Sodium 135 - 145 mmol/L 135   Potassium 3.4 - 5.3 mmol/L 5.1   Chloride 98 - 107 mmol/L 97 (L)   Carbon Dioxide (CO2) 22 - 29 mmol/L 24   Urea Nitrogen 8.0 - 23.0 mg/dL 16.5   Creatinine 0.51 - 0.95 mg/dL 0.79   GFR Estimate >60 mL/min/1.73m2 83   Calcium 8.8 - 10.4 mg/dL 10.3   Anion Gap 7 - 15 mmol/L 14   Albumin 3.5 - 5.2 g/dL 5.2   Protein Total 6.4 - 8.3 g/dL 8.8 (H)   Alkaline Phosphatase 40 - 150 U/L 84   ALT 0 - 50 U/L 22   AST 0 - 45 U/L 29   Bilirubin Total <=1.2 mg/dL 0.4         Plan:   Hypertension with LVH noted on echo.  Blood pressure well-controlled.  I recommend continue carvedilol and diltiazem as previously ordered.  Also was placed on spironolactone 100 mg daily by endocrine.  Labs  are stable.  Hyperlipidemia managed by endocrinology.  On atorvastatin 40 mg daily.    Janice can see cardiology as needed.  She has no concerns from a cardiac standpoint.  Last echocardiogram was stable.  She will check with her PCP, Anisha Alfaro PA-C to see if she would be willing to fill her carvedilol and diltiazem.  We would be happy to see her on a as needed basis.    Fany Childress, NP, APRN CNP      Today's clinic visit entailed:  Review of the result(s) of each unique test - labs and echo   25  minutes spent by me on the date of the encounter doing chart review, review of test results, interpretation of tests, patient visit, and documentation   Provider  Link to [facility] Help Grid     The level of medical decision making during this visit was of moderate complexity.      No orders of the defined types were placed in this encounter.    No orders of the defined types were placed in this encounter.    There are no discontinued medications.      No diagnosis found.    CURRENT MEDICATIONS:  Current Outpatient Medications   Medication Sig Dispense Refill    Aspirin 81 MG CAPS       atorvastatin (LIPITOR) 40 MG tablet Take 1 tablet (40 mg) by mouth daily. 90 tablet 1    carvedilol (COREG) 25 MG tablet Take 1 tablet (25 mg) by mouth 2 times daily (with meals). NEEDS CARDIOLOGY VISIT FOR FURTHER REFILLS 180 tablet 0    cyclobenzaprine (FLEXERIL) 10 MG tablet Take 1 tablet (10 mg) by mouth 3 times daily as needed for muscle spasms. 30 tablet 1    diltiazem ER (CARDIAZEM LA) 360 MG 24 hr tablet Take 1 tablet (360 mg) by mouth daily. 90 tablet 1    methylPREDNISolone (MEDROL DOSEPAK) 4 MG tablet therapy pack Follow Package Directions 21 tablet 0    Multiple Vitamin (MULTI-VITAMIN DAILY PO)       spironolactone (ALDACTONE) 100 MG tablet Take 1 tablet (100 mg) by mouth daily. 90 tablet 0    tiZANidine (ZANAFLEX) 4 MG tablet TAKE ONE TABLET BY MOUTH THREE TIMES A DAY AS NEEDED FOR MUSCLE SPASMS 180 tablet 1       ALLERGIES      Allergies   Allergen Reactions    No Known Drug Allergy        PAST MEDICAL HISTORY:  Past Medical History:   Diagnosis Date    Carpal tunnel syndrome     Intervertebral cervical disc disorder with myelopathy, cervical region     C6 herniation with right C6 radiculopathy    Migraine, unspecified, without mention of intractable migraine without mention of status migrainosus     Perimenopausal     irreg     Personal history of tobacco use, presenting hazards to health        PAST SURGICAL HISTORY:  Past Surgical History:   Procedure Laterality Date    COLONOSCOPY  1/10/2014    Procedure: COMBINED COLONOSCOPY, SINGLE BIOPSY/POLYPECTOMY BY BIOPSY;  colonoscopy with polypectomy by forceps;  Surgeon: Chevy Yang MD;  [facility]    COLONOSCOPY N/A 2022    Procedure: COLONOSCOPY;  Surgeon: Jaren Verduzco DO;  [facility]    DECOMPRESSION LUMBAR MINIMALLY INVASIVE ONE LEVEL Bilateral 2021    Procedure: Minimally invasive Left Lumbar 4 to lumbar 5 bilateral decompression;  Surgeon: James Orozco MD;  [facility]    DISCECTOMY LUMBAR MINIMALLY INVASIVE ONE LEVEL Left 2021    Procedure: Lumbar 5 to Sacral 1 microdiscectomy, Left;  Surgeon: James Orozco MD;  [facility]    [facility] REVISE MEDIAN N/CARPAL TUNNEL SURG  2004    [facility] REVISE MEDIAN N/CARPAL TUNNEL SURG  06    Left    [facility] TREATMENT INCOMPLETE  SURG, ANY TRIMESTER      D&C after miscarriage    INJECT EPIDURAL LUMBAR Left 2020    Procedure: INJECTION, SPINE, LUMBAR, 5 Sacral 1 and Sacral 1 Left;  Surgeon: Abilio Middleton MD;  [facility]    [facility] DISK SURG,ANTER,CERVICAL,SINGLE LVL  10/02/2007    C5-C6 anterior cervical diskectomy & fusion w/plate.    [facility] NONSPECIFIC PROCEDURE      Bilateral inguinal hernia repairs    [facility] NONSPECIFIC PROCEDURE      Oral surgery x2       FAMILY HISTORY:  Family History   Problem Relation Age of Onset    Cancer Mother         parotid    Heart Disease  Father 47        MI    Depression Daughter     Hypertension Sister        SOCIAL HISTORY:  Social History     Socioeconomic History    Marital status:      Spouse name: Carlin    Number of children: 2    Years of education: 12   Occupational History    Occupation:      Employer: [employer]   Tobacco Use    Smoking status: Former     Current packs/day: 0.00     Average packs/day: 1.5 packs/day for 20.0 years (30.0 ttl pk-yrs)     Types: Cigarettes     Start date: 10/17/1987     Quit date: 10/17/2007     Years since quittin.5     Passive exposure: Past    Smokeless tobacco: Never   Vaping Use    Vaping status: Never Used   Substance and Sexual Activity    Alcohol use: Not Currently    Drug use: No    Sexual activity: Not Currently     Partners: Male     Comment: not currently   Other Topics Concern    Caffeine Concern No    Sleep Concern No    Stress Concern No    Weight Concern No    Exercise No    Seat Belt Yes    Parent/sibling w/ CABG, MI or angioplasty before 65F 55M? Yes   Social History Narrative    Lives with spouse and son. No domestic violence issues.     Social Drivers of Health     Financial Resource Strain: Low Risk  (2025)    Financial Resource Strain     Within the past 12 months, have you or your family members you live with been unable to get utilities (heat, electricity) when it was really needed?: No   Food Insecurity: Low Risk  (2025)    Food Insecurity     Within the past 12 months, did you worry that your food would run out before you got money to buy more?: No     Within the past 12 months, did the food you bought just not last and you didn t have money to get more?: No   Transportation Needs: Low Risk  (2025)    Transportation Needs     Within the past 12 months, has lack of transportation kept you from medical appointments, getting your medicines, non-medical meetings or appointments, work, or from getting things that you need?: No   Physical Activity:  Unknown (2/5/2025)    Exercise Vital Sign     Days of Exercise per Week: 0 days   Stress: Stress Concern Present (2/5/2025)    [employer] [place] [employer] - Occupational Stress Questionnaire     Feeling of Stress : Rather much   Social Connections: Unknown (2/5/2025)    Social Connection and Isolation Panel [NHANES]     Frequency of Social Gatherings with Friends and Family: Never   Interpersonal Safety: Low Risk  (2/5/2025)    Interpersonal Safety     Do you feel physically and emotionally safe where you currently live?: Yes     Within the past 12 months, have you been hit, slapped, kicked or otherwise physically hurt by someone?: No     Within the past 12 months, have you been humiliated or emotionally abused in other ways by your partner or ex-partner?: No   Housing Stability: Low Risk  (2/5/2025)    Housing Stability     Do you have housing? : Yes     Are you worried about losing your housing?: No       Review of Systems:  Skin:        Eyes:       ENT:       Respiratory:       Cardiovascular:       Gastroenterology:      Genitourinary:       Musculoskeletal:       Neurologic:       Psychiatric:       Heme/Lymph/Imm:       Endocrine:         Physical Exam:    Vitals: LMP 08/19/2013 (Approximate)   Constitutional: Well nourished and in no apparent distress.  Eyes: Pupils equal, round. Sclerae anicteric.   HEENT: Normocephalic, atraumatic.   Neck: Supple.  No carotid bruits or JVD   Respiratory: Breathing non-labored. Lungs clear to auscultation bilaterally. No crackles, wheezes, rhonchi, or rales.  Cardiovascular:  Regular rate and rhythm, normal S1 and S2. No murmur.  Skin: Warm, dry. No rashes, cyanosis, or xanthelasma.  Extremities: No edema.  Neurologic: No gross motor deficits. Alert, awake, and oriented to person, place and time.  Psychiatric: Affect appropriate.        Recent Lab Results:  LIPID RESULTS:  Lab Results   Component Value Date    CHOL 161 05/03/2024    CHOL 213 (H) 04/23/2021     "HDL 43 (L) 05/03/2024    HDL 54 04/23/2021    LDL 73 05/03/2024     (H) 04/23/2021    TRIG 225 (H) 05/03/2024    TRIG 197 (H) 04/23/2021    CHOLHDLRATIO 4.6 11/13/2014       LIVER ENZYME RESULTS:  Lab Results   Component Value Date    AST 29 01/20/2025    AST 19 04/23/2021    ALT 22 01/20/2025    ALT 35 04/23/2021       CBC RESULTS:  Lab Results   Component Value Date    WBC 9.1 01/20/2025    WBC 5.6 06/09/2021    RBC 4.94 01/20/2025    RBC 5.04 06/09/2021    HGB 14.4 01/20/2025    HGB 14.2 06/09/2021    HCT 42.2 01/20/2025    HCT 43.4 06/09/2021    MCV 85 01/20/2025    MCV 86 06/09/2021    MCH 29.1 01/20/2025    MCH 28.2 06/09/2021    MCHC 34.1 01/20/2025    MCHC 32.7 06/09/2021    RDW 12.3 01/20/2025    RDW 13.5 06/09/2021     01/20/2025     06/09/2021       BMP RESULTS:  Lab Results   Component Value Date     01/20/2025     06/09/2021    POTASSIUM 5.1 01/20/2025    POTASSIUM 3.7 05/01/2023    POTASSIUM 4.0 06/09/2021    CHLORIDE 97 (L) 01/20/2025    CHLORIDE 101 05/01/2023    CHLORIDE 99 06/09/2021    CO2 24 01/20/2025    CO2 31 05/01/2023    CO2 34 (H) 06/09/2021    ANIONGAP 14 01/20/2025    ANIONGAP 6 05/01/2023    ANIONGAP 3 06/09/2021    GLC 95 01/20/2025     (H) 05/01/2023    GLC 98 06/09/2021    BUN 16.5 01/20/2025    BUN 11 05/01/2023    BUN 13 06/09/2021    CR 0.79 01/20/2025    CR 0.69 06/09/2021    GFRESTIMATED 83 01/20/2025    GFRESTIMATED >90 06/09/2021    GFRESTBLACK >90 06/09/2021    BILLY 10.3 01/20/2025    BILLY 9.6 06/09/2021        A1C RESULTS:  Lab Results   Component Value Date    A1C 6.1 (H) 05/01/2023    A1C 5.9 (H) 05/07/2021       INR RESULTS:  No results found for: \"INR\"        CC  No referring provider defined for this encounter.                "

## 2025-04-23 NOTE — LETTER
4/23/2025    Anisha Alfaro PA-C  [address] [facility] [address]    RE: Janice Ulloa       Dear Colleague,     I had the pleasure of seeing Janice Ulloa in the [facility].  Cardiology Clinic Progress Note  Janice Ulloa MRN# [id number]   YOB: 1960 Age: 64 year old       HPI:    Janice Ulloa is a delightful 64 year old patient with past medical history significant for:    Hypertension  Hyperlipidemia followed by endocrinology     It is my pleasure meeting Janice at today's visit in [place].  He was seen by Dr. Marie in 2023. Echocardiogram was completed which showed normal EF 55-60%. Grade I diastolic dysfunction.     She has a history of hypertension currently well-controlled on carvedilol 25 mg twice daily, diltiazem 360 mg daily and spironolactone 100 mg daily.  Patient is seen on an annual basis by her PCP, Anisha Alfaro PA-C, and her endocrinologist Dr. Magana.  Patient states that her potassium was stopped last year.  She is on 100 of spironolactone and is due for fasting labs with her this spring.    Denies chest pain, shortness of breath, lightheadedness, dizziness, lower extremity edema.    Diagnotic studies:  EKG 1/20/2025: Sinus rhythm at 66 bpm.  No acute concerns noted  Echocardiogram 11/2023: 55 to 60%.  Grade 1 diastolic dysfunction.  No wall motion abnormality noted.  No significant valve disease.      Latest Reference Range & Units 01/20/25 17:49   Sodium 135 - 145 mmol/L 135   Potassium 3.4 - 5.3 mmol/L 5.1   Chloride 98 - 107 mmol/L 97 (L)   Carbon Dioxide (CO2) 22 - 29 mmol/L 24   Urea Nitrogen 8.0 - 23.0 mg/dL 16.5   Creatinine 0.51 - 0.95 mg/dL 0.79   GFR Estimate >60 mL/min/1.73m2 83   Calcium 8.8 - 10.4 mg/dL 10.3   Anion Gap 7 - 15 mmol/L 14   Albumin 3.5 - 5.2 g/dL 5.2   Protein Total 6.4 - 8.3 g/dL 8.8 (H)   Alkaline Phosphatase 40 - 150 U/L 84   ALT 0 - 50 U/L 22   AST 0 - 45 U/L 29   Bilirubin Total <=1.2 mg/dL 0.4         Plan:    Hypertension with LVH noted on echo.  Blood pressure well-controlled.  I recommend continue carvedilol and diltiazem as previously ordered.  Also was placed on spironolactone 100 mg daily by endocrine.  Labs are stable.  Hyperlipidemia managed by endocrinology.  On atorvastatin 40 mg daily.    Janice can see cardiology as needed.  She has no concerns from a cardiac standpoint.  Last echocardiogram was stable.  She will check with her PCP, Anisha Alfaro PA-C to see if she would be willing to fill her carvedilol and diltiazem.  We would be happy to see her on a as needed basis.    Fany Childress, NP, APRN CNP      Today's clinic visit entailed:  Review of the result(s) of each unique test - labs and echo   25  minutes spent by me on the date of the encounter doing chart review, review of test results, interpretation of tests, patient visit, and documentation   Provider  Link to [facility] Help Grid     The level of medical decision making during this visit was of moderate complexity.      No orders of the defined types were placed in this encounter.    No orders of the defined types were placed in this encounter.    There are no discontinued medications.      No diagnosis found.    CURRENT MEDICATIONS:  Current Outpatient Medications   Medication Sig Dispense Refill     Aspirin 81 MG CAPS        atorvastatin (LIPITOR) 40 MG tablet Take 1 tablet (40 mg) by mouth daily. 90 tablet 1     carvedilol (COREG) 25 MG tablet Take 1 tablet (25 mg) by mouth 2 times daily (with meals). NEEDS CARDIOLOGY VISIT FOR FURTHER REFILLS 180 tablet 0     cyclobenzaprine (FLEXERIL) 10 MG tablet Take 1 tablet (10 mg) by mouth 3 times daily as needed for muscle spasms. 30 tablet 1     diltiazem ER (CARDIAZEM LA) 360 MG 24 hr tablet Take 1 tablet (360 mg) by mouth daily. 90 tablet 1     methylPREDNISolone (MEDROL DOSEPAK) 4 MG tablet therapy pack Follow Package Directions 21 tablet 0     Multiple Vitamin (MULTI-VITAMIN DAILY PO)         spironolactone (ALDACTONE) 100 MG tablet Take 1 tablet (100 mg) by mouth daily. 90 tablet 0     tiZANidine (ZANAFLEX) 4 MG tablet TAKE ONE TABLET BY MOUTH THREE TIMES A DAY AS NEEDED FOR MUSCLE SPASMS 180 tablet 1       ALLERGIES     Allergies   Allergen Reactions     No Known Drug Allergy        PAST MEDICAL HISTORY:  Past Medical History:   Diagnosis Date     Carpal tunnel syndrome      Intervertebral cervical disc disorder with myelopathy, cervical region     C6 herniation with right C6 radiculopathy     Migraine, unspecified, without mention of intractable migraine without mention of status migrainosus      Perimenopausal     irreg      Personal history of tobacco use, presenting hazards to health        PAST SURGICAL HISTORY:  Past Surgical History:   Procedure Laterality Date     COLONOSCOPY  1/10/2014    Procedure: COMBINED COLONOSCOPY, SINGLE BIOPSY/POLYPECTOMY BY BIOPSY;  colonoscopy with polypectomy by forceps;  Surgeon: Chevy Yang MD;  Location: [facility]     COLONOSCOPY N/A 2022    Procedure: COLONOSCOPY;  Surgeon: Jaren Verduzco DO;  Location: [facility]     DECOMPRESSION LUMBAR MINIMALLY INVASIVE ONE LEVEL Bilateral 2021    Procedure: Minimally invasive Left Lumbar 4 to lumbar 5 bilateral decompression;  Surgeon: James Orozco MD;  Location: [facility]     DISCECTOMY LUMBAR MINIMALLY INVASIVE ONE LEVEL Left 2021    Procedure: Lumbar 5 to Sacral 1 microdiscectomy, Left;  Surgeon: James Orozco MD;  Location: [facility]     HC REVISE MEDIAN N/CARPAL TUNNEL SURG  2004     HC REVISE MEDIAN N/CARPAL TUNNEL SURG  06    Left     HC TREATMENT INCOMPLETE  SURG, ANY TRIMESTER      D&C after miscarriage     INJECT EPIDURAL LUMBAR Left 2020    Procedure: INJECTION, SPINE, LUMBAR, 5 Sacral 1 and Sacral 1 Left;  Surgeon: Abilio Middleton MD;  Location: [facility]     ZC DISK SURG,ANTER,CERVICAL,SINGLE LVL  10/02/2007    C5-C6 anterior cervical diskectomy & fusion  w/plate.     ZZC NONSPECIFIC PROCEDURE      Bilateral inguinal hernia repairs     ZZC NONSPECIFIC PROCEDURE      Oral surgery x2       FAMILY HISTORY:  Family History   Problem Relation Age of Onset     Cancer Mother         parotid     Heart Disease Father 47        MI     Depression Daughter      Hypertension Sister        SOCIAL HISTORY:  Social History     Socioeconomic History     Marital status:      Spouse name: Carlin     Number of children: 2     Years of education: 12   Occupational History     Occupation:      Employer: [employer]   Tobacco Use     Smoking status: Former     Current packs/day: 0.00     Average packs/day: 1.5 packs/day for 20.0 years (30.0 ttl pk-yrs)     Types: Cigarettes     Start date: 10/17/1987     Quit date: 10/17/2007     Years since quittin.5     Passive exposure: Past     Smokeless tobacco: Never   Vaping Use     Vaping status: Never Used   Substance and Sexual Activity     Alcohol use: Not Currently     Drug use: No     Sexual activity: Not Currently     Partners: Male     Comment: not currently   Other Topics Concern     Caffeine Concern No     Sleep Concern No     Stress Concern No     Weight Concern No     Exercise No     Seat Belt Yes     Parent/sibling w/ CABG, MI or angioplasty before 65F 55M? Yes   Social History Narrative    Lives with spouse and son. No domestic violence issues.     Social Drivers of Health     Financial Resource Strain: Low Risk  (2025)    Financial Resource Strain      Within the past 12 months, have you or your family members you live with been unable to get utilities (heat, electricity) when it was really needed?: No   Food Insecurity: Low Risk  (2025)    Food Insecurity      Within the past 12 months, did you worry that your food would run out before you got money to buy more?: No      Within the past 12 months, did the food you bought just not last and you didn t have money to get more?: No   Transportation  Needs: Low Risk  (2/5/2025)    Transportation Needs      Within the past 12 months, has lack of transportation kept you from medical appointments, getting your medicines, non-medical meetings or appointments, work, or from getting things that you need?: No   Physical Activity: Unknown (2/5/2025)    Exercise Vital Sign      Days of Exercise per Week: 0 days   Stress: Stress Concern Present (2/5/2025)    [employer] [place] [employer] - Occupational Stress Questionnaire      Feeling of Stress : Rather much   Social Connections: Unknown (2/5/2025)    Social Connection and Isolation Panel [NHANES]      Frequency of Social Gatherings with Friends and Family: Never   Interpersonal Safety: Low Risk  (2/5/2025)    Interpersonal Safety      Do you feel physically and emotionally safe where you currently live?: Yes      Within the past 12 months, have you been hit, slapped, kicked or otherwise physically hurt by someone?: No      Within the past 12 months, have you been humiliated or emotionally abused in other ways by your partner or ex-partner?: No   Housing Stability: Low Risk  (2/5/2025)    Housing Stability      Do you have housing? : Yes      Are you worried about losing your housing?: No       Review of Systems:  Skin:        Eyes:       ENT:       Respiratory:       Cardiovascular:       Gastroenterology:      Genitourinary:       Musculoskeletal:       Neurologic:       Psychiatric:       Heme/Lymph/Imm:       Endocrine:         Physical Exam:    Vitals: [facility] 08/19/2013 (Approximate)   Constitutional: Well nourished and in no apparent distress.  Eyes: Pupils equal, round. Sclerae anicteric.   HEENT: Normocephalic, atraumatic.   Neck: Supple.  No carotid bruits or JVD   Respiratory: Breathing non-labored. Lungs clear to auscultation bilaterally. No crackles, wheezes, rhonchi, or rales.  Cardiovascular:  Regular rate and rhythm, normal S1 and S2. No murmur.  Skin: Warm, dry. No rashes, cyanosis, or  xanthelasma.  Extremities: No edema.  Neurologic: No gross motor deficits. Alert, awake, and oriented to person, place and time.  Psychiatric: Affect appropriate.        Recent Lab Results:  LIPID RESULTS:  Lab Results   Component Value Date    CHOL 161 05/03/2024    CHOL 213 (H) 04/23/2021    HDL 43 (L) 05/03/2024    HDL 54 04/23/2021    LDL 73 05/03/2024     (H) 04/23/2021    TRIG 225 (H) 05/03/2024    TRIG 197 (H) 04/23/2021    CHOLHDLRATIO 4.6 11/13/2014       LIVER ENZYME RESULTS:  Lab Results   Component Value Date    AST 29 01/20/2025    AST 19 04/23/2021    ALT 22 01/20/2025    ALT 35 04/23/2021       CBC RESULTS:  Lab Results   Component Value Date    WBC 9.1 01/20/2025    WBC 5.6 06/09/2021    RBC 4.94 01/20/2025    RBC 5.04 06/09/2021    HGB 14.4 01/20/2025    HGB 14.2 06/09/2021    HCT 42.2 01/20/2025    HCT 43.4 06/09/2021    MCV 85 01/20/2025    MCV 86 06/09/2021    MCH 29.1 01/20/2025    MCH 28.2 06/09/2021    MCHC 34.1 01/20/2025    MCHC 32.7 06/09/2021    RDW 12.3 01/20/2025    RDW 13.5 06/09/2021     01/20/2025     06/09/2021       BMP RESULTS:  Lab Results   Component Value Date     01/20/2025     06/09/2021    POTASSIUM 5.1 01/20/2025    POTASSIUM 3.7 05/01/2023    POTASSIUM 4.0 06/09/2021    CHLORIDE 97 (L) 01/20/2025    CHLORIDE 101 05/01/2023    CHLORIDE 99 06/09/2021    CO2 24 01/20/2025    CO2 31 05/01/2023    CO2 34 (H) 06/09/2021    ANIONGAP 14 01/20/2025    ANIONGAP 6 05/01/2023    ANIONGAP 3 06/09/2021    GLC 95 01/20/2025     (H) 05/01/2023    GLC 98 06/09/2021    BUN 16.5 01/20/2025    BUN 11 05/01/2023    BUN 13 06/09/2021    CR 0.79 01/20/2025    CR 0.69 06/09/2021    GFRESTIMATED 83 01/20/2025    GFRESTIMATED >90 06/09/2021    GFRESTBLACK >90 06/09/2021    BILLY 10.3 01/20/2025    BILLY 9.6 06/09/2021        A1C RESULTS:  Lab Results   Component Value Date    A1C 6.1 (H) 05/01/2023    A1C 5.9 (H) 05/07/2021       INR RESULTS:  No results found  "for: \"INR\"        CC  No referring provider defined for this encounter.                  Thank you for allowing me to participate in the care of your patient.      Sincerely,     Fany Childress NP, [facility]  cc:   Referred Self, MD  No address on file      "

## 2025-04-23 NOTE — PATIENT INSTRUCTIONS
Call my nurse with any questions or concerns:  [phone]  *If you have concerns after hours, please call [phone], option 2 to speak with on call Cardiologist.    See us as needed  Follow up labs with endocrinology  IF ok with Anisha she can refill diltiazem and carvedilol unless she prefers you to see us.    Call with questions or concerns

## 2025-04-29 ENCOUNTER — OFFICE VISIT (OUTPATIENT)
Dept: ENDOCRINOLOGY | Facility: CLINIC | Age: 65
End: 2025-04-29
Payer: COMMERCIAL

## 2025-04-29 ENCOUNTER — TELEPHONE (OUTPATIENT)
Dept: ENDOCRINOLOGY | Facility: CLINIC | Age: 65
End: 2025-04-29

## 2025-04-29 VITALS
BODY MASS INDEX: 30.49 KG/M2 | DIASTOLIC BLOOD PRESSURE: 83 MMHG | OXYGEN SATURATION: 97 % | SYSTOLIC BLOOD PRESSURE: 137 MMHG | RESPIRATION RATE: 14 BRPM | WEIGHT: 164 LBS

## 2025-04-29 DIAGNOSIS — E26.09 PRIMARY ALDOSTERONISM: Primary | ICD-10-CM

## 2025-04-29 DIAGNOSIS — I1A.0 RESISTANT HYPERTENSION: ICD-10-CM

## 2025-04-29 LAB
ANION GAP SERPL CALCULATED.3IONS-SCNC: 11 MMOL/L (ref 7–15)
BUN SERPL-MCNC: 17.9 MG/DL (ref 8–23)
CALCIUM SERPL-MCNC: 10.7 MG/DL (ref 8.8–10.4)
CHLORIDE SERPL-SCNC: 99 MMOL/L (ref 98–107)
CREAT SERPL-MCNC: 0.82 MG/DL (ref 0.51–0.95)
EGFRCR SERPLBLD CKD-EPI 2021: 79 ML/MIN/1.73M2
GLUCOSE SERPL-MCNC: 111 MG/DL (ref 70–99)
HCO3 SERPL-SCNC: 29 MMOL/L (ref 22–29)
POTASSIUM SERPL-SCNC: 5.6 MMOL/L (ref 3.4–5.3)
SODIUM SERPL-SCNC: 139 MMOL/L (ref 135–145)

## 2025-04-29 RX ORDER — SPIRONOLACTONE 100 MG/1
100 TABLET, FILM COATED ORAL DAILY
Qty: 90 TABLET | Refills: 3 | Status: SHIPPED | OUTPATIENT
Start: 2025-04-29 | End: 2025-04-29

## 2025-04-29 RX ORDER — SPIRONOLACTONE 25 MG/1
75 TABLET ORAL DAILY
Qty: 270 TABLET | Refills: 0 | Status: SHIPPED | OUTPATIENT
Start: 2025-04-29

## 2025-04-29 NOTE — NURSING NOTE
Janice Ulloa's goals for this visit include:   Chief Complaint   Patient presents with    Follow Up     aldosteronism       She requests these members of her care team be copied on today's visit information: yes    PCP: Anisha Alfaro    Referring Provider:  Referred Self, MD  No address on file    BP (!) 143/80   Resp 14   Wt 74.4 kg (164 lb)   LMP 08/19/2013 (Approximate)   SpO2 97%   BMI 30.49 kg/m      Do you need any medication refills at today's visit? None    Robe Aggarwal, EMT

## 2025-04-29 NOTE — PROGRESS NOTES
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  The workup and treatment modality discussed in detail with the patient.  Patient does not want to pursue surgical intervention irrespective of findings at this time therefore medical treatment pursued.   CT adrenal 2024 no nodule or mass  Current medication spironolactone 100 mg daily, carvedilol 12.5 mg twice a day and diltiazem 360 mg daily [per cardiology]  Potassium high at 5.6 today.  This writer called patient's cell phone and home number: 4/29/2025 at 7:20 PM however no one answered.  Sent message to clinic team to contact patient. This writer tried cell phone again @ 7:33; no one answered. Voice mail full. MALCOLM on home phone at 7:35 with the following message.   Potassium was high at 5.6 therefore we need to reduce spironolactone to 75 mg daily from the current dose of 100 mg daily.  Spironolactone does not come in 75 mg tablet therefore we have sent to the pharmacy a 25 mg tablet so that you can take 3 tablets/day= 75 mg daily.  Until you  the new prescription; take half a tablet of spironolactone you have on hand = 50 mg daily. Check potassium again within 3 days of dose adjustment.    Addendum: this writer called Janice on April 30, 2025 at 12:21 and discussed the above.   Plan  Metanephrines Normal  CT no adrenal nodule or mass  Reduce Spironolactone to 75 mg daily and recheck potassium in 3 days.   Continue carvedilol   Consider stopping diltiazem and stating  hydrochlorothiazide if no indication for diltiazem other than BP as the later is potassium wasting diuretic and balances the high potassium from spironolactone.     CT scan also showed incidentally showed atherosclerosis - currently on atorvastatin; target LDL less than 70.       Test and/or medications prescribed today:  Orders Placed This Encounter   Procedures    Basic metabolic panel    Renin activity    Basic metabolic panel    Basic metabolic panel         Jeanne Magana MD  Staff Endocrinologist    Division of Endocrinology and Diabetes      Subjective:         HPI: Janice Ulloa is a 64 year old female with history of hypertension who is here for PA follow up.   Per report hypertension diagnosed about 5 years ago and patient is currently being treated with carvedilol 25 mg twice daily, diltiazem 360 mg daily, spironolactone 100 mg daily.  Off potassium supplement, hydrochlorothiazide and lisinopril since adding spironolactone. Tolerating well.   Other review of system negative today for chest pain, shortness of breath, vision changes.   Allergies   Allergen Reactions    No Known Drug Allergy        Current Outpatient Medications   Medication Sig Dispense Refill    Aspirin 81 MG CAPS       atorvastatin (LIPITOR) 40 MG tablet Take 1 tablet (40 mg) by mouth daily. 90 tablet 1    carvedilol (COREG) 25 MG tablet Take 1 tablet (25 mg) by mouth 2 times daily (with meals). 180 tablet 3    cyclobenzaprine (FLEXERIL) 10 MG tablet Take 1 tablet (10 mg) by mouth 3 times daily as needed for muscle spasms. 30 tablet 1    diltiazem ER (CARDIAZEM LA) 360 MG 24 hr tablet Take 1 tablet (360 mg) by mouth daily. 90 tablet 3    Multiple Vitamin (MULTI-VITAMIN DAILY PO)       spironolactone (ALDACTONE) 25 MG tablet Take 3 tablets (75 mg) by mouth daily. 270 tablet 0    tiZANidine (ZANAFLEX) 4 MG tablet TAKE ONE TABLET BY MOUTH THREE TIMES A DAY AS NEEDED FOR MUSCLE SPASMS 180 tablet 1       Review of Systems     11 point  review system (Constitutional, HENT, Eyes, Respiratory, Cardiovascular, Gastrointestinal, Genitourinary, Musculoskeletal,Neurological, Psychiatric/Behavioural, Endocrine) is negative or is as per HPI above.  Pertinent to the visit past medical history, past surgical history, family and social history reviewed.    Objective:   /83   Resp 14   Wt 74.4 kg (164 lb)   LMP 08/19/2013 (Approximate)   SpO2 97%   BMI 30.49 kg/m    Constitutional: Pleasant no acute cardiopulmonary distress.   EYES: anicteric, normal extra-ocular movements, no lid lag or retraction, is equal and reactive to light bilaterally.  HEENT: Mouth/Throat: Mucous membrane is moist. Oropharynx is clear.    Cardiovascular: RRR, S1, S2 normal.   Pulmonary/Chest: CTAB. No wheezing or rales.   Abdominal: +BS. Non tender to palpation.  Stretch marks: none.   Neurological: Alert and oriented.  No tremor and reflexes are symmetrical bilaterally and within the normal limits. Muscle strength 5/5.   Extremities: trace edema.  Psychological: appropriate mood and affect    In House Labs:   Lab Results   Component Value Date    A1C 6.1 05/01/2023    A1C 5.9 05/07/2021     Metanephrine      0.00 - 0.49 nmol/L 0.15     Normetanephrine      0.00 - 0.89 nmol/L 0.79     Metanephrines Interpretation See Note     Aldosterone      0.0 - 31.0 ng/dL 11.7     Renin Activity      ng/mL/hr 0.2     Aldosterone Renin Ratio      0.0 - 25.0  58.5 (H)        Latest Ref Rng 10/9/2023  2:49 PM 10/14/2023  8:30 AM   ENDO ADRENAL LABS      Aldosterone 0.0 - 31.0 ng/dL 13.9     Cortisol Serum ug/dL 6.7     Cortisol Free Creat R UR mg/dL  57    Cortisol Free Creat R UR mg/dL  57    Cortisol Free Creat T  - 1400 mg/d  855    Cortisol Free Creat T  - 1400 mg/d  855    Cortisol Free Urine Random ug/L  7.31    Cortisol Free Urine <=45.0 ug/d  11.0    Cortisol ug/g creatinine ug/g CRT  12.82    Creatinine 0.51 - 0.95 mg/dL     Creatinine Urine mg/dL     Metanephrine 0.00  - 0.49 nmol/L 0.19     Metanephr 24 H ur/cr 0 - 300 ug/g CRT  98    Normetanephrine Urine per Volume ug/L  238    Normetanephrine 0.00 - 0.89 nmol/L 0.90 (H)     Potassium 3.4 - 5.3 mmol/L     Renin Activity ng/mL/hr 0.3     Sodium 135 - 145 mmol/L        L  TSH   Date Value Ref Range Status   10/03/2023 1.76 0.30 - 4.20 uIU/mL Final   05/19/2023 2.09 0.30 - 4.20 uIU/mL Final   03/15/2018 2.98 0.40 - 4.00 mU/L Final   08/29/2017 2.82 0.40 - 4.00 mU/L Final   04/11/2017 2.01 0.40 - 4.00 mU/L Final   09/04/2013 5.25 (H) 0.4 - 5.0 mU/L Final   07/08/2009 2.57 0.4 - 5.0 mU/L Final     T4 Free   Date Value Ref Range Status   09/04/2013 0.97 0.70 - 1.85 ng/dL Final   04/04/2005 0.90 0.70 - 1.85 ng/dL Final       Creatinine   Date Value Ref Range Status   04/29/2025 0.82 0.51 - 0.95 mg/dL Final   06/09/2021 0.69 0.52 - 1.04 mg/dL Final   ]  Component      Latest Ref Rng 4/29/2025  12:19 PM   Sodium      135 - 145 mmol/L 139    Potassium      3.4 - 5.3 mmol/L 5.6 (H)    Chloride      98 - 107 mmol/L 99    Carbon Dioxide (CO2)      22 - 29 mmol/L 29    Anion Gap      7 - 15 mmol/L 11    Urea Nitrogen      8.0 - 23.0 mg/dL 17.9    Creatinine      0.51 - 0.95 mg/dL 0.82    GFR Estimate      >60 mL/min/1.73m2 79    Calcium      8.8 - 10.4 mg/dL 10.7 (H)    Glucose      70 - 99 mg/dL 111 (H)      This note has been dictated using voice recognition software.  As a result, there may be errors in the documentation that have gone undetected.  Please consider this when interpreting information in this documentation.    56 minutes spent by me on the date of the encounter doing chart review, counseling on workup and management of primary aldosteronism, coordination of care, history and exam, documentation and further activities per the note.  More than 50% spent directly face-to-face with the patient. The longitudinal plan of care for the diagnosis(es)/condition(s) as documented were addressed during this visit. Due to the added  complexity in care, I will continue to support Janice in the subsequent management and with ongoing continuity of care.

## 2025-04-29 NOTE — Clinical Note
Wade, I saw Janice today for possible primary aldosteronism management.  Her blood pressure is well-controlled on spironolactone 100 mg daily however I noted that her potassium was 5.6 today.  We are reducing the spironolactone to 75 mg daily.  She is currently on  diltiazem and carvedilol which are currently being prescribed by cardiology.  If there is no indication from cardiology standpoint for diltiazem, we can switch diltiazem to hydrochlorothiazide which can balances out the effect of hyperkalemia from spironolactone.  Appreciate your input.  Thank you, Rosi

## 2025-04-29 NOTE — PATIENT INSTRUCTIONS
What is Primary Aldosteronism?    Primary aldosteronism (PA) is a condition where your adrenal glands produce too much of the hormone aldosterone. This hormone helps balance your body s salt and water levels. When there is too much aldosterone, it can lead to high blood pressure and low potassium levels. Primary aldosteronism is a treatable condition. Early diagnosis and treatment can greatly improve your quality of life, reduce blood pressure, and prevent complications.    Why Does It Happen?  Primary aldosteronism is often caused by:  ? Adrenal Tumor: A benign growth on one of the adrenal glands.  ? Overactive Adrenal Glands: Both glands may be producing too much aldosterone.    How is Primary Aldosteronism Diagnosed?    1. Blood Tests: The initial testing showed you have primary aldosteronism.     Next steps:  Blood work today and then in six months.   Continue spironolactone.      Treatment Options  Primary aldosteronism is treatable. Your treatment may include:  ? Medications:  o Aldosterone Blockers: Medications like spironolactone or eplerenone can help  block the effects of aldosterone.    ? Surgery:  o Adrenalectomy: If only one adrenal gland is overproducing aldosterone,  removing that gland may be recommended.    Lifestyle Changes to Help Manage PA  ? Limit Salt: A low-salt diet can help reduce blood pressure.  ? Eat Potassium-Rich Foods: Such as bananas, oranges, tomatoes, and potatoes.  ? Stay Active: Regular physical activity can help manage blood pressure.  ? Quit Smoking and Limit Alcohol: Smoking and alcohol can worsen high blood  pressure.      [facility] Address:   [place] Address:     [address] [place] [address]    Phone: [phone]  Fax: [phone]   [address] [place] [address]    Phone: [phone]  Fax: [phone]     [facility] [place] Cost Estimate Phone Number: [phone]    General Lab and Imaging Scheduling Phone Number: [phone]      If you are  interested in exploring research opportunities in the Division of Diabetes, Endocrinology and Metabolism and within the [facility] you are welcome to view the following QR codes by scanning them using your phone's camera to access a link to more information.  Participating in a research study is voluntary. Your decision whether or not to participate will not affect your current or future relations with the [facility] or [facility]. Current available studies are:     Type 1 Diabetes  Adults     Pediatrics     Type 2 Diabetes  Weight Loss - People Treated with Diet or Metformin Only     Pediatrics and Young Adults

## 2025-04-29 NOTE — LETTER
4/29/2025      Janice Ulloa  [address]  [place] [address]      Dear Colleague,    Thank you for referring your patient, Janice Ulloa, to the [facility]. Please see a copy of my visit note below.    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  The workup and treatment modality discussed in detail with the patient.  Patient does not want to pursue surgical intervention irrespective of findings at this time therefore medical treatment pursued.   CT adrenal 2024 no nodule or mass  Current medication spironolactone 100 mg daily, carvedilol 12.5 mg twice a day and diltiazem 360 mg daily [per cardiology]  Potassium high at 5.6 today.  This writer called patient's cell phone and home number: 4/29/2025 at 7:20 PM however no one answered.  Sent message to clinic team to contact patient. This writer tried cell phone again @ 7:33; no one answered. Voice mail full. MALCOLM on home phone at 7:35 with the following message.   Potassium was high at 5.6 therefore we need to reduce spironolactone to 75 mg daily from the current dose of 100 mg daily.  Spironolactone does not come in 75 mg tablet therefore we have sent to the pharmacy a 25 mg tablet so that you can take 3 tablets/day= 75 mg daily.  Until you  the new prescription; take half a tablet of spironolactone you have on hand = 50 mg daily. Check potassium again within 3 days of dose adjustment.    Addendum: this writer called Janice on April  30, 2025 at 12:21 and discussed the above.   Plan  Metanephrines Normal  CT no adrenal nodule or mass  Reduce Spironolactone to 75 mg daily and recheck potassium in 3 days.   Continue carvedilol   Consider stopping diltiazem and stating hydrochlorothiazide if no indication for diltiazem other than BP as the later is potassium wasting diuretic and balances the high potassium from spironolactone.     CT scan also showed incidentally showed atherosclerosis - currently on atorvastatin; target LDL less than 70.       Test and/or medications prescribed today:  Orders Placed This Encounter   Procedures     Basic metabolic panel     Renin activity     Basic metabolic panel     Basic metabolic panel         Jeanne Magana MD  Staff Endocrinologist    Division of Endocrinology and Diabetes      Subjective:         HPI: Janice Ulloa is a 64 year old female with history of hypertension who is here for PA follow up.   Per report hypertension diagnosed about 5 years ago and patient is currently being treated with carvedilol 25 mg twice daily, diltiazem 360 mg daily, spironolactone 100 mg daily.  Off potassium supplement, hydrochlorothiazide and lisinopril since adding spironolactone. Tolerating well.   Other review of system negative today for chest pain, shortness of breath, vision changes.   Allergies   Allergen Reactions     No Known Drug Allergy        Current Outpatient Medications   Medication Sig Dispense Refill     Aspirin 81 MG CAPS        atorvastatin (LIPITOR) 40 MG tablet Take 1 tablet (40 mg) by mouth daily. 90 tablet 1     carvedilol (COREG) 25 MG tablet Take 1 tablet (25 mg) by mouth 2 times daily (with meals). 180 tablet 3     cyclobenzaprine (FLEXERIL) 10 MG tablet Take 1 tablet (10 mg) by mouth 3 times daily as needed for muscle spasms. 30 tablet 1     diltiazem ER (CARDIAZEM LA) 360 MG 24 hr tablet Take 1 tablet (360 mg) by mouth daily. 90 tablet 3     Multiple Vitamin (MULTI-VITAMIN DAILY PO)         spironolactone (ALDACTONE) 25 MG tablet Take 3 tablets (75 mg) by mouth daily. 270 tablet 0     tiZANidine (ZANAFLEX) 4 MG tablet TAKE ONE TABLET BY MOUTH THREE TIMES A DAY AS NEEDED FOR MUSCLE SPASMS 180 tablet 1       Review of Systems     11 point review system (Constitutional, HENT, Eyes, Respiratory, Cardiovascular, Gastrointestinal, Genitourinary, Musculoskeletal,Neurological, Psychiatric/Behavioural, Endocrine) is negative or is as per HPI above.  Pertinent to the visit past medical history, past surgical history, family and social history reviewed.    Objective:   /83   Resp 14   Wt 74.4 kg (164 lb)   LMP 08/19/2013 (Approximate)   SpO2 97%   BMI 30.49 kg/m    Constitutional: Pleasant no acute cardiopulmonary distress.   EYES: anicteric, normal extra-ocular movements, no lid lag or retraction, is equal and reactive to light bilaterally.  HEENT: Mouth/Throat: Mucous membrane is moist. Oropharynx is clear.    Cardiovascular: RRR, S1, S2 normal.   Pulmonary/Chest: CTAB. No wheezing or rales.   Abdominal: +BS. Non tender to palpation.  Stretch marks: none.   Neurological: Alert and oriented.  No tremor and reflexes are symmetrical bilaterally and within the normal limits. Muscle strength 5/5.   Extremities: trace edema.  Psychological: appropriate mood and affect    In House Labs:   Lab Results   Component Value Date    A1C 6.1 05/01/2023    A1C 5.9 05/07/2021     Metanephrine      0.00 - 0.49 nmol/L 0.15     Normetanephrine      0.00 - 0.89 nmol/L 0.79     Metanephrines Interpretation See Note     Aldosterone      0.0 - 31.0 ng/dL 11.7     Renin Activity      ng/mL/hr 0.2     Aldosterone Renin Ratio      0.0 - 25.0  58.5 (H)        Latest Ref Rng 10/9/2023  2:49 PM 10/14/2023  8:30 AM   ENDO ADRENAL LABS      Aldosterone 0.0 - 31.0 ng/dL 13.9     Cortisol Serum ug/dL 6.7     Cortisol Free Creat R UR mg/dL  57    Cortisol Free Creat R UR mg/dL  57    Cortisol Free Creat T  - 1400 mg/d  855     Cortisol Free Creat T  - 1400 mg/d  855    Cortisol Free Urine Random ug/L  7.31    Cortisol Free Urine <=45.0 ug/d  11.0    Cortisol ug/g creatinine ug/g CRT  12.82    Creatinine 0.51 - 0.95 mg/dL     Creatinine Urine mg/dL     Metanephrine 0.00 - 0.49 nmol/L 0.19     Metanephr 24 H ur/cr 0 - 300 ug/g CRT  98    Normetanephrine Urine per Volume ug/L  238    Normetanephrine 0.00 - 0.89 nmol/L 0.90 (H)     Potassium 3.4 - 5.3 mmol/L     Renin Activity ng/mL/hr 0.3     Sodium 135 - 145 mmol/L        L  TSH   Date Value Ref Range Status   10/03/2023 1.76 0.30 - 4.20 uIU/mL Final   05/19/2023 2.09 0.30 - 4.20 uIU/mL Final   03/15/2018 2.98 0.40 - 4.00 mU/L Final   08/29/2017 2.82 0.40 - 4.00 mU/L Final   04/11/2017 2.01 0.40 - 4.00 mU/L Final   09/04/2013 5.25 (H) 0.4 - 5.0 mU/L Final   07/08/2009 2.57 0.4 - 5.0 mU/L Final     T4 Free   Date Value Ref Range Status   09/04/2013 0.97 0.70 - 1.85 ng/dL Final   04/04/2005 0.90 0.70 - 1.85 ng/dL Final       Creatinine   Date Value Ref Range Status   04/29/2025 0.82 0.51 - 0.95 mg/dL Final   06/09/2021 0.69 0.52 - 1.04 mg/dL Final   ]  Component      Latest Ref Rng 4/29/2025  12:19 PM   Sodium      135 - 145 mmol/L 139    Potassium      3.4 - 5.3 mmol/L 5.6 (H)    Chloride      98 - 107 mmol/L 99    Carbon Dioxide (CO2)      22 - 29 mmol/L 29    Anion Gap      7 - 15 mmol/L 11    Urea Nitrogen      8.0 - 23.0 mg/dL 17.9    Creatinine      0.51 - 0.95 mg/dL 0.82    GFR Estimate      >60 mL/min/1.73m2 79    Calcium      8.8 - 10.4 mg/dL 10.7 (H)    Glucose      70 - 99 mg/dL 111 (H)      This note has been dictated using voice recognition software.  As a result, there may be errors in the documentation that have gone undetected.  Please consider this when interpreting information in this documentation.    56 minutes spent by me on the date of the encounter doing chart review, counseling on workup and management of primary aldosteronism, coordination of care, history and  exam, documentation and further activities per the note.  More than 50% spent directly face-to-face with the patient. The longitudinal plan of care for the diagnosis(es)/condition(s) as documented were addressed during this visit. Due to the added complexity in care, I will continue to support Janice in the subsequent management and with ongoing continuity of care.        Again, thank you for allowing me to participate in the care of your patient.        Sincerely,        Jeanne Magana MD    Electronically signed

## 2025-04-29 NOTE — TELEPHONE ENCOUNTER
Left Voicemail (1st Attempt) for the patient to call back and schedule the following:    Appointment type: lab   Provider: dr. cespedes  Return date: 10/29/2025  Specialty phone number: [phone]      [facility]   Orthopedics, Podiatry, Sports Medicine, Ent ,Eye , Audiology, Adult Endocrine & Diabetes, Nutrition & Medication Therapy Management Specialties   [facility]

## 2025-04-30 ENCOUNTER — TELEPHONE (OUTPATIENT)
Dept: ENDOCRINOLOGY | Facility: CLINIC | Age: 65
End: 2025-04-30
Payer: COMMERCIAL

## 2025-04-30 NOTE — RESULT ENCOUNTER NOTE
This writer called patient on April 30, 2025 at 12:20 PM and discussed the following.-     Potassium was high at 5.6 therefore we need to reduce spironolactone to 75 mg daily from the current dose of 100 mg daily.  Spironolactone does not come in 75 mg tablet therefore we have sent to the pharmacy a 25 mg tablet so that he can take 3 tablets/day= 75 mg daily.  We need to recheck your potassium again within 3 days of dose adjustment.  Your calcium level was also high and we will recheck this with the blood work as well.  Jeanne Magana MD

## 2025-04-30 NOTE — RESULT ENCOUNTER NOTE
Please call and inform Janice Ulloa that her potassium was high at 5.6 therefore we need to reduce spironolactone to 75 mg daily from the current dose of 100 mg daily.  Spironolactone does not come in 75 mg tablet therefore we have sent to the pharmacy a 25 mg tablet so that he can take 3 tablets/day= 75 mg daily.  We need to recheck your potassium again within 3 days of dose adjustment.  Your calcium level was also high and we will recheck this with the blood work as well.  Off note, this writer called on April 29, 2025 both home and cell phone numbers at 7:20 PM however no one answered.  This writer did not leave a message.  Jeanne Magana MD

## 2025-04-30 NOTE — TELEPHONE ENCOUNTER
----- Message from Jeanne Magana sent at 4/29/2025  7:26 PM CDT -----  Please call and inform Janice Ulloa that her potassium was high at 5.6 therefore we need to reduce spironolactone to 75 mg daily from the current dose of 100 mg daily.  Spironolactone does not come in 75 mg tablet therefore we have sent to the pharmacy a 25 mg tablet so that he can take 3 tablets/day= 75 mg daily.  We need to recheck your potassium again within 3 days of dose adjustment.  Your calcium level was also high and we will recheck this with the blood work as well.  Off note, this writer called on April 29, 2025 both home and cell phone numbers at 7:20 PM however no one answered.  This writer did not leave a message.  Jeanne Magana MD

## 2025-04-30 NOTE — TELEPHONE ENCOUNTER
Writer called patient to review results and recommendations. She notes that she already spoke to Dr. Magana about her labs and plan. Writer confirmed that she made reduction in her spironolactone to half of her current tablet (total 50 mg) until she can  her 25 mg tablets to equal 75 mg daily. Patient is going to call pharmacy now to make sure she can  new prescription.     Patient is having labs Friday as at 11:30am. Writer will update Dr. Magana.      Radha Cantu RN  Endocrine Care Coordinator  [facility]

## 2025-05-06 ENCOUNTER — TELEPHONE (OUTPATIENT)
Dept: ENDOCRINOLOGY | Facility: CLINIC | Age: 65
End: 2025-05-06
Payer: COMMERCIAL

## 2025-05-06 NOTE — TELEPHONE ENCOUNTER
Patient returning call to LAURA Garcia please call her at [phone] ok to [facility]. thank you

## 2025-05-06 NOTE — TELEPHONE ENCOUNTER
5/6 Called and spoke to patient, lab is currently scheduled.     [facility]   Orthopedics, Podiatry, Sports Medicine, Ent ,Eye , Audiology, Adult Endocrine & Diabetes, Nutrition & Medication Therapy Management Specialties   [facility]

## 2025-05-06 NOTE — TELEPHONE ENCOUNTER
Patient advised of results and recommendations from Dr. العلي. Patient verbalizes understanding and agrees to plan.     Patient requested scheduling contact her to arrange the lab at [facility] in 2-3 months.       Will send to Clinic Coordinator team.      Radha Cantu RN  Endocrine Care Coordinator  [facility]

## 2025-05-06 NOTE — TELEPHONE ENCOUNTER
Left voicemail for patient to contact our office.     Radha Cantu RN  Endocrine Care Coordinator  [facility]

## 2025-05-06 NOTE — TELEPHONE ENCOUNTER
----- Message from Jeanne Magana sent at 5/2/2025 12:29 PM CDT -----  Please call and inform potassium is now back to normal.  Please continue spironolactone at a reduced dose of 75 mg daily.  New prescription sent to pharmacy.  Please check a follow-up lab in 2-3 months.     Jeanne Magana MD

## 2025-05-13 ENCOUNTER — PATIENT OUTREACH (OUTPATIENT)
Dept: CARE COORDINATION | Facility: CLINIC | Age: 65
End: 2025-05-13
Payer: COMMERCIAL

## 2025-06-30 DIAGNOSIS — M54.2 NECK PAIN: ICD-10-CM

## 2025-07-07 ENCOUNTER — RESULTS FOLLOW-UP (OUTPATIENT)
Dept: FAMILY MEDICINE | Facility: CLINIC | Age: 65
End: 2025-07-07

## 2025-07-07 ENCOUNTER — LAB (OUTPATIENT)
Dept: LAB | Facility: CLINIC | Age: 65
End: 2025-07-07
Payer: COMMERCIAL

## 2025-07-07 DIAGNOSIS — E78.5 HYPERLIPIDEMIA WITH TARGET LDL LESS THAN 130: ICD-10-CM

## 2025-07-07 DIAGNOSIS — E26.09 PRIMARY ALDOSTERONISM: ICD-10-CM

## 2025-07-07 DIAGNOSIS — Z13.6 SCREENING FOR CARDIOVASCULAR CONDITION: Primary | ICD-10-CM

## 2025-07-07 LAB
ANION GAP SERPL CALCULATED.3IONS-SCNC: 12 MMOL/L (ref 7–15)
BUN SERPL-MCNC: 14.2 MG/DL (ref 8–23)
CALCIUM SERPL-MCNC: 10.1 MG/DL (ref 8.8–10.4)
CHLORIDE SERPL-SCNC: 100 MMOL/L (ref 98–107)
CHOLEST SERPL-MCNC: 150 MG/DL
CREAT SERPL-MCNC: 0.81 MG/DL (ref 0.51–0.95)
CREAT UR-MCNC: 24.7 MG/DL
EGFRCR SERPLBLD CKD-EPI 2021: 80 ML/MIN/1.73M2
FASTING STATUS PATIENT QL REPORTED: YES
FASTING STATUS PATIENT QL REPORTED: YES
GLUCOSE SERPL-MCNC: 80 MG/DL (ref 70–99)
HCO3 SERPL-SCNC: 26 MMOL/L (ref 22–29)
HDLC SERPL-MCNC: 46 MG/DL
LDLC SERPL CALC-MCNC: 65 MG/DL
MICROALBUMIN UR-MCNC: <12 MG/L
MICROALBUMIN/CREAT UR: NORMAL MG/G{CREAT}
NONHDLC SERPL-MCNC: 104 MG/DL
POTASSIUM SERPL-SCNC: 4.4 MMOL/L (ref 3.4–5.3)
SODIUM SERPL-SCNC: 138 MMOL/L (ref 135–145)
TRIGL SERPL-MCNC: 195 MG/DL

## 2025-07-07 PROCEDURE — 80061 LIPID PANEL: CPT

## 2025-07-07 PROCEDURE — 82043 UR ALBUMIN QUANTITATIVE: CPT

## 2025-07-07 PROCEDURE — 3048F LDL-C <100 MG/DL: CPT

## 2025-07-07 PROCEDURE — 80048 BASIC METABOLIC PNL TOTAL CA: CPT

## 2025-07-07 PROCEDURE — 82570 ASSAY OF URINE CREATININE: CPT

## 2025-07-07 PROCEDURE — 36415 COLL VENOUS BLD VENIPUNCTURE: CPT

## 2025-07-08 NOTE — TELEPHONE ENCOUNTER
RN Triage    Patient Contact    Attempt # 1    Was call answered?  No.  Left message on voicemail with information to call me back.    Upon call back relay result message.     Maxwell Oropeza RN  [facility] Triage [place]  July 8, 2025

## 2025-07-09 ENCOUNTER — RESULTS FOLLOW-UP (OUTPATIENT)
Dept: ENDOCRINOLOGY | Facility: CLINIC | Age: 65
End: 2025-07-09
Payer: COMMERCIAL

## 2025-07-09 NOTE — TELEPHONE ENCOUNTER
Writer spoke with patient and relayed provider's message. Will continue medication as directed.     Mailed out letter for patient.       Kaykay Muir [facility]  Adult Endocrinology   [facility]

## 2025-07-09 NOTE — TELEPHONE ENCOUNTER
Patient called and relayed message of the results note. Patient verbalized understanding and all questions answered.     Maxwell Oropeza RN  [facility] Triage [place]  July 9, 2025

## 2025-07-09 NOTE — TELEPHONE ENCOUNTER
[facility] Call Center    Phone Message    May a detailed message be left on voicemail: yes     Reason for Call: Other: pt returning call, please call after 2 pm     Action Taken: Other: endo    Travel Screening: Not Applicable     Date of Service:

## 2025-07-09 NOTE — RESULT ENCOUNTER NOTE
Please inform electrolytes and kidney function test results are normal.  Please advise to continue current dose of spironolactone. Jeanne Magana MD

## 2025-07-28 ENCOUNTER — NURSE TRIAGE (OUTPATIENT)
Dept: FAMILY MEDICINE | Facility: CLINIC | Age: 65
End: 2025-07-28
Payer: COMMERCIAL

## 2025-07-28 NOTE — TELEPHONE ENCOUNTER
Reason for Call:  Appointment Request    Patient requesting this type of appt:  shoulder injury    Requested provider: Anisha Alfaro    Reason patient unable to be scheduled: Not within requested timeframe    When does patient want to be seen/preferred time: 3-7 days    Comments: pt wants it as a phone visit but DT says in person only    Okay to leave a detailed message?: Yes at Cell number on file:    Telephone Information:   Mobile [phone]       Call taken on 7/28/2025 at 4:33 PM by Zay Roy

## 2025-07-29 NOTE — TELEPHONE ENCOUNTER
Nurse Triage SBAR    Is this a 2nd Level Triage? YES, LICENSED PRACTITIONER REVIEW IS REQUIRED    Situation: Patient states past 1 week increased pain/nerve pain to left shoulder down arm. Mild intermittent numbness and tingling. Overuse of arm.     Background: Previous injury noted 1/2025 flexeril, oxycodone, prednisone and imaging done.    Assessment: Denies fever, chills, swelling, loss of control of limb, constant numbness/tingling, Ha, weakness.    Protocol Recommended Disposition:   See in Office Today or Tomorrow  Appointments in Next Year      Jul 30, 2025 9:30 AM  (Arrive by 9:10 AM)  Provider Visit with Anisha Alfaro PA-C  [facility] ([facility]) [phone]       Recommendation: New or worsening symptoms to be seen sooner. Patient  verbalized understanding and all questions answered.     Routed to provider    Does the patient meet one of the following criteria for ADS visit consideration? 16+ years old, with an [facility] PCP   Mariana Tyler RN  [facility] - Registered Nurse  Clinic Triage [place]   July 29, 2025

## 2025-07-29 NOTE — TELEPHONE ENCOUNTER
Reason for Disposition    MODERATE pain (e.g., interferes with normal activities) and high-risk adult (e.g., age > 60 years, osteoporosis, chronic steroid use)    Additional Information    Negative: Major bleeding (actively dripping or spurting) that can't be stopped    Negative: Amputation or bone sticking through the skin    Negative: Bullet, stabbed by knife or other serious penetrating wound    Negative: Serious injury with multiple fractures (broken bones)    Negative: Sounds like a life-threatening emergency to the triager    Negative: Wound looks infected (e.g., spreading redness, pus)    Negative: Looks like a broken bone or dislocated joint (crooked or deformed)    Negative: Can't move injured shoulder at all    Negative: Collarbone is painful or tender to touch    Negative: Skin is split open or gaping (length > 1/2 inch or 12 mm)    Negative: Bleeding won't stop after 10 minutes of direct pressure (using correct technique)    Negative: Dirt in the wound and not removed after 15 minutes of scrubbing    Negative: New numbness (loss of sensation) or weakness of hand or finger(s), present now    Negative: Sounds like a serious injury to the triager    Negative: SEVERE pain (e.g., excruciating)    Negative: No prior tetanus shots (or is not fully vaccinated) and any wound (e.g., cut or scrape)    Negative: HIV positive or severe immunodeficiency (severely weak immune system) and DIRTY cut or scrape    Negative: Patient wants to be seen    Protocols used: Shoulder Injury-A-[place]

## 2025-07-30 ENCOUNTER — OFFICE VISIT (OUTPATIENT)
Dept: FAMILY MEDICINE | Facility: CLINIC | Age: 65
End: 2025-07-30
Payer: COMMERCIAL

## 2025-07-30 ENCOUNTER — RESULTS FOLLOW-UP (OUTPATIENT)
Dept: FAMILY MEDICINE | Facility: CLINIC | Age: 65
End: 2025-07-30

## 2025-07-30 VITALS
SYSTOLIC BLOOD PRESSURE: 128 MMHG | DIASTOLIC BLOOD PRESSURE: 74 MMHG | BODY MASS INDEX: 31.21 KG/M2 | RESPIRATION RATE: 13 BRPM | TEMPERATURE: 97.7 F | HEIGHT: 61 IN | OXYGEN SATURATION: 94 % | WEIGHT: 165.3 LBS | HEART RATE: 63 BPM

## 2025-07-30 DIAGNOSIS — R53.83 FATIGUE, UNSPECIFIED TYPE: ICD-10-CM

## 2025-07-30 DIAGNOSIS — Z78.0 ASYMPTOMATIC POSTMENOPAUSAL STATUS: ICD-10-CM

## 2025-07-30 DIAGNOSIS — M54.12 CERVICAL RADICULOPATHY: Primary | ICD-10-CM

## 2025-07-30 DIAGNOSIS — Z12.31 VISIT FOR SCREENING MAMMOGRAM: ICD-10-CM

## 2025-07-30 DIAGNOSIS — M54.9 UPPER BACK PAIN ON LEFT SIDE: ICD-10-CM

## 2025-07-30 LAB
ERYTHROCYTE [DISTWIDTH] IN BLOOD BY AUTOMATED COUNT: 12.3 % (ref 10–15)
HCT VFR BLD AUTO: 40.4 % (ref 35–47)
HGB BLD-MCNC: 13.6 G/DL (ref 11.7–15.7)
MCH RBC QN AUTO: 28.8 PG (ref 26.5–33)
MCHC RBC AUTO-ENTMCNC: 33.7 G/DL (ref 31.5–36.5)
MCV RBC AUTO: 86 FL (ref 78–100)
PLATELET # BLD AUTO: 232 10E3/UL (ref 150–450)
RBC # BLD AUTO: 4.72 10E6/UL (ref 3.8–5.2)
TSH SERPL DL<=0.005 MIU/L-ACNC: 3.02 UIU/ML (ref 0.3–4.2)
WBC # BLD AUTO: 6.7 10E3/UL (ref 4–11)

## 2025-07-30 PROCEDURE — 3074F SYST BP LT 130 MM HG: CPT | Performed by: PHYSICIAN ASSISTANT

## 2025-07-30 PROCEDURE — 1125F AMNT PAIN NOTED PAIN PRSNT: CPT | Performed by: PHYSICIAN ASSISTANT

## 2025-07-30 PROCEDURE — 99214 OFFICE O/P EST MOD 30 MIN: CPT | Performed by: PHYSICIAN ASSISTANT

## 2025-07-30 PROCEDURE — 85027 COMPLETE CBC AUTOMATED: CPT | Performed by: PHYSICIAN ASSISTANT

## 2025-07-30 PROCEDURE — 3078F DIAST BP <80 MM HG: CPT | Performed by: PHYSICIAN ASSISTANT

## 2025-07-30 PROCEDURE — 84443 ASSAY THYROID STIM HORMONE: CPT | Performed by: PHYSICIAN ASSISTANT

## 2025-07-30 PROCEDURE — 36415 COLL VENOUS BLD VENIPUNCTURE: CPT | Performed by: PHYSICIAN ASSISTANT

## 2025-07-30 RX ORDER — METHYLPREDNISOLONE 4 MG/1
TABLET ORAL
Qty: 21 TABLET | Refills: 0 | Status: SHIPPED | OUTPATIENT
Start: 2025-07-30

## 2025-07-30 RX ORDER — OXYCODONE HYDROCHLORIDE 5 MG/1
5 TABLET ORAL EVERY 6 HOURS PRN
Qty: 5 TABLET | Refills: 0 | Status: SHIPPED | OUTPATIENT
Start: 2025-07-30

## 2025-07-30 ASSESSMENT — PAIN SCALES - GENERAL: PAINLEVEL_OUTOF10: MODERATE PAIN (5)

## 2025-07-30 ASSESSMENT — ANXIETY QUESTIONNAIRES
GAD7 TOTAL SCORE: 4
GAD7 TOTAL SCORE: 4
4. TROUBLE RELAXING: SEVERAL DAYS
GAD7 TOTAL SCORE: 4
3. WORRYING TOO MUCH ABOUT DIFFERENT THINGS: SEVERAL DAYS
6. BECOMING EASILY ANNOYED OR IRRITABLE: NOT AT ALL
1. FEELING NERVOUS, ANXIOUS, OR ON EDGE: NOT AT ALL
7. FEELING AFRAID AS IF SOMETHING AWFUL MIGHT HAPPEN: SEVERAL DAYS
2. NOT BEING ABLE TO STOP OR CONTROL WORRYING: NOT AT ALL
5. BEING SO RESTLESS THAT IT IS HARD TO SIT STILL: SEVERAL DAYS
7. FEELING AFRAID AS IF SOMETHING AWFUL MIGHT HAPPEN: SEVERAL DAYS
8. IF YOU CHECKED OFF ANY PROBLEMS, HOW DIFFICULT HAVE THESE MADE IT FOR YOU TO DO YOUR WORK, TAKE CARE OF THINGS AT HOME, OR GET ALONG WITH OTHER PEOPLE?: SOMEWHAT DIFFICULT
IF YOU CHECKED OFF ANY PROBLEMS ON THIS QUESTIONNAIRE, HOW DIFFICULT HAVE THESE PROBLEMS MADE IT FOR YOU TO DO YOUR WORK, TAKE CARE OF THINGS AT HOME, OR GET ALONG WITH OTHER PEOPLE: SOMEWHAT DIFFICULT

## 2025-07-30 ASSESSMENT — PATIENT HEALTH QUESTIONNAIRE - PHQ9
SUM OF ALL RESPONSES TO PHQ QUESTIONS 1-9: 4
10. IF YOU CHECKED OFF ANY PROBLEMS, HOW DIFFICULT HAVE THESE PROBLEMS MADE IT FOR YOU TO DO YOUR WORK, TAKE CARE OF THINGS AT HOME, OR GET ALONG WITH OTHER PEOPLE: SOMEWHAT DIFFICULT
SUM OF ALL RESPONSES TO PHQ QUESTIONS 1-9: 4

## 2025-07-30 NOTE — PROGRESS NOTES
"  Assessment & Plan     Cervical radiculopathy  Will restart methylprednisolone, she will use oxycodone sparingly and not use at the same time as her muscle relaxer.  She may apply ice and/or heat as well she will come continue her physical therapy exercises and may call if she would like to go back to physical therapy  - methylPREDNISolone (MEDROL DOSEPAK) 4 MG tablet therapy pack; Follow Package Directions  - oxyCODONE (ROXICODONE) 5 MG tablet; Take 1 tablet (5 mg) by mouth every 6 hours as needed for severe pain.    Upper back pain on left side  Plan as above  - oxyCODONE (ROXICODONE) 5 MG tablet; Take 1 tablet (5 mg) by mouth every 6 hours as needed for severe pain.    Visit for screening mammogram    - MA Screen Bilateral w/Eric; Future    Asymptomatic postmenopausal status    - DEXA HIP/PELVIS/SPINE - Future; Future    Fatigue, unspecified type  Patient request checking her hemoglobin as she has been iron deficient in the past.  Insurance would not cover ferritin we will await the results of hemoglobin and add an additional test as needed  - CBC with platelets; Future  - TSH with free T4 reflex; Future    BMI  Estimated body mass index is 30.73 kg/m  as calculated from the following:    Height as of this encounter: 1.562 m (5' 1.5\").    Weight as of this encounter: 75 kg (165 lb 4.8 oz).   Weight management plan: Discussed healthy diet and exercise guidelines    This chart documentation was completed in part with Dragon voice recognition software.  Documentation is reviewed after completion, however, some words and grammatical errors may remain.  Anisha Alfaro PA-C      Leigh Camacho is a 64 year old, presenting for the following health issues:  Shoulder Pain        7/30/2025     9:08 AM   Additional Questions   Roomed by JASON   Accompanied by self     History of Present Illness       Reason for visit:  Shoulder  left    She eats 0-1 servings of fruits and vegetables daily.She consumes 2 sweetened " beverage(s) daily.She exercises with enough effort to increase her heart rate 20 to 29 minutes per day.  She exercises with enough effort to increase her heart rate 3 or less days per week.   She is taking medications regularly.          Pain History:  When did you first notice your pain? 1/2025 --was seen in the emergency room, visit reviewed.  She had an x-ray done of her left shoulder which did show some arthritic changes.  The symptoms did improve with physical therapy and Medrol Dosepak eventually.  She has been fine up until last week when she was weed whipping.  That has restarted her pain.  The pain is not as severe as when she was in the emergency room.  It is in her left upper back and radiates into her tricep musculature the majority of her discomfort is in her left upper arm.  She has no chest pain, no shortness of breath, no visual changes, no loss of control of the limb, headache or weakness.   Have you seen this provider for your pain in the past? Yes   Where in your body do you have pain? Left shoulder  Are you seeing anyone else for your pain? Yes - Physical Therapy--she has restarted the exercises that she was given in physical therapy.  She is also applying ice which gives her some relief.  She does have a few pain pills left over, oxycodone, from her ER visit.  She does use 1 at night if she cannot sleep.  She only has 1 left and is requesting a refill of this.  She would really like to repeat the Medrol Dosepak once again.  She has cyclobenzaprine at home.         8/6/2024    10:51 AM 2/5/2025    10:28 AM 7/30/2025     8:58 AM   PHQ-9 SCORE   PHQ-9 Total Score MyChart 7 (Mild depression) 15 (Moderately severe depression) 4 (Minimal depression)   PHQ-9 Total Score 7 15  4        Patient-reported           3/7/2023     9:31 AM 8/6/2024    10:52 AM 7/30/2025     9:00 AM   CHELO-7 SCORE   Total Score 0 (minimal anxiety) 9 (mild anxiety) 4 (minimal anxiety)   Total Score 0 9 4        Patient-reported  "      {    The rash on her breast has resolved she still has scars.  She is concerned about this and is too embarrassed to go to have a mammogram because of this.  She states she does not have open sores.  She has never had a bone density test but would be willing to do so.  She also reports she has been so tired lately.  She is wondering if she should take iron pills or not she does have a history of profound iron deficiency anemia although she was having significant menorrhea at that point which has since resolved with menopause.          Review of Systems  Constitutional, HEENT, cardiovascular, pulmonary, gi and gu systems are negative, except as otherwise noted.      Objective    /74   Pulse 63   Temp 97.7  F (36.5  C) (Temporal)   Resp 13   Ht 1.562 m (5' 1.5\")   Wt 75 kg (165 lb 4.8 oz)   LMP 08/19/2013 (Approximate)   SpO2 94%   BMI 30.73 kg/m    Body mass index is 30.73 kg/m .  Physical Exam   GENERAL: alert and no distress  NECK: no adenopathy, no asymmetry, masses, or scars  RESP: lungs clear to auscultation - no rales, rhonchi or wheezes  CV: regular rate and rhythm, normal S1 S2, no S3 or S4, no murmur, click or rub, no peripheral edema  MS: no gross musculoskeletal defects noted, no edema  Upper back-significant tightness and tenderness of the left trapezius musculature she is tender around the anterior aspect of her shoulder radiating down into her tricep region.   strength is symmetric  NEURO: Normal strength and tone, mentation intact and speech normal  PSYCH: mentation appears normal, affect normal/bright    Lab on 07/07/2025   Component Date Value Ref Range Status    Sodium 07/07/2025 138  135 - 145 mmol/L Final    Potassium 07/07/2025 4.4  3.4 - 5.3 mmol/L Final    Chloride 07/07/2025 100  98 - 107 mmol/L Final    Carbon Dioxide (CO2) 07/07/2025 26  22 - 29 mmol/L Final    Anion Gap 07/07/2025 12  7 - 15 mmol/L Final    Urea Nitrogen 07/07/2025 14.2  8.0 - 23.0 mg/dL Final    " Creatinine 07/07/2025 0.81  0.51 - 0.95 mg/dL Final    GFR Estimate 07/07/2025 80  >60 mL/min/1.73m2 Final    eGFR calculated using 2021 CKD-EPI equation.    Calcium 07/07/2025 10.1  8.8 - 10.4 mg/dL Final    Glucose 07/07/2025 80  70 - 99 mg/dL Final    Patient Fasting > 8hrs? 07/07/2025 Yes   Final    Creatinine Urine mg/dL 07/07/2025 24.7  mg/dL Final    The reference ranges have not been established in urine creatinine. The results should be integrated into the clinical context for interpretation.    Albumin Urine mg/L 07/07/2025 <12.0  mg/L Final    The reference ranges have not been established in urine albumin. The results should be integrated into the clinical context for interpretation.    Albumin Urine mg/g Cr 07/07/2025    Final    Unable to calculate, urine albumin and/or urine creatinine is outside detectable limits.  Microalbuminuria is defined as an albumin:creatinine ratio of 17 to 299 for males and 25 to 299 for females. A ratio of albumin:creatinine of 300 or higher is indicative of overt proteinuria.  Due to biologic variability, positive results should be confirmed by a second, first-morning random or 24-hour timed urine specimen. If there is discrepancy, a third specimen is recommended. When 2 out of 3 results are in the microalbuminuria range, this is evidence for incipient nephropathy and warrants increased efforts at glucose control, blood pressure control, and institution of therapy with an angiotensin-converting-enzyme (ACE) inhibitor (if the patient can tolerate it).      Cholesterol 07/07/2025 150  <200 mg/dL Final    Triglycerides 07/07/2025 195 (H)  <150 mg/dL Final    Direct Measure HDL 07/07/2025 46 (L)  >=50 mg/dL Final    LDL Cholesterol Calculated 07/07/2025 65  <100 mg/dL Final    LDL calculated using the Friedewald equation.    Non HDL Cholesterol 07/07/2025 104  <130 mg/dL Final    Patient Fasting > 8hrs? 07/07/2025 Yes   Final     Results for orders placed or performed in visit  on 07/30/25   CBC with platelets     Status: Normal   Result Value Ref Range    WBC Count 6.7 4.0 - 11.0 10e3/uL    RBC Count 4.72 3.80 - 5.20 10e6/uL    Hemoglobin 13.6 11.7 - 15.7 g/dL    Hematocrit 40.4 35.0 - 47.0 %    MCV 86 78 - 100 fL    MCH 28.8 26.5 - 33.0 pg    MCHC 33.7 31.5 - 36.5 g/dL    RDW 12.3 10.0 - 15.0 %    Platelet Count 232 150 - 450 10e3/uL           Signed Electronically by: Anisha Alfaro PA-C

## 2025-07-31 ENCOUNTER — HOSPITAL ENCOUNTER (OUTPATIENT)
Dept: BONE DENSITY | Facility: CLINIC | Age: 65
Discharge: HOME | End: 2025-07-31
Attending: PHYSICIAN ASSISTANT
Payer: COMMERCIAL

## 2025-07-31 ENCOUNTER — HOSPITAL ENCOUNTER (OUTPATIENT)
Dept: MAMMOGRAPHY | Facility: CLINIC | Age: 65
Discharge: HOME | End: 2025-07-31
Attending: PHYSICIAN ASSISTANT
Payer: COMMERCIAL

## 2025-07-31 DIAGNOSIS — Z78.0 ASYMPTOMATIC POSTMENOPAUSAL STATUS: ICD-10-CM

## 2025-07-31 DIAGNOSIS — Z12.31 VISIT FOR SCREENING MAMMOGRAM: ICD-10-CM

## 2025-07-31 PROCEDURE — 77063 BREAST TOMOSYNTHESIS BI: CPT

## 2025-07-31 PROCEDURE — 77080 DXA BONE DENSITY AXIAL: CPT

## 2025-08-21 DIAGNOSIS — E78.5 HYPERLIPIDEMIA WITH TARGET LDL LESS THAN 130: ICD-10-CM

## 2025-08-21 RX ORDER — ATORVASTATIN CALCIUM 40 MG/1
40 TABLET, FILM COATED ORAL DAILY
Qty: 90 TABLET | Refills: 2 | Status: SHIPPED | OUTPATIENT
Start: 2025-08-21

## (undated) DEVICE — SPONGE SURGIFOAM 12 1972

## (undated) DEVICE — GLOVE ESTEEM BLUE W/NEU-THERA 8.0  2D73PB80

## (undated) DEVICE — TUBING SUCTION 12"X1/4" N612

## (undated) DEVICE — ESU ELEC BLADE 2.75" COATED/INSULATED E1455

## (undated) DEVICE — Device

## (undated) DEVICE — NDL ECLIPSE 18GA 1.5"

## (undated) DEVICE — SOL WATER IRRIG 1000ML BOTTLE 07139-09

## (undated) DEVICE — GOWN XXL 9575

## (undated) DEVICE — DRAPE MICRO ZEISS OPMI 137X381CM 306070-0000-000

## (undated) DEVICE — PREP CHLORAPREP 26ML TINTED ORANGE  260815

## (undated) DEVICE — SYR 03ML LL W/O NDL

## (undated) DEVICE — DRAPE C-ARM

## (undated) DEVICE — STOCKING SLEEVE COMPRESSION CALF MED

## (undated) DEVICE — GLOVE ESTEEM POWDER FREE 8.5  2D72PL85

## (undated) DEVICE — SPONGE COTTONOID 1/2X1/2" 80-1400

## (undated) DEVICE — GLOVE ESTEEM BLUE W/NEU-THERA 8.5  2D73PB85

## (undated) DEVICE — SU VICRYL 2-0 CT-2 CR 8X18" J726D

## (undated) DEVICE — ADH FLOSEAL W/HUMAN THROMBIN 5ML 1501825

## (undated) DEVICE — SYR BULB IRRIG DOVER 60 ML LATEX FREE 67000

## (undated) DEVICE — BUR STRK CARBIDE MATCH HEAD 3.0MM 5820-107-530C

## (undated) DEVICE — INTRODUCER CATH TAUT 2.0MMX1.6MMX7.6CM PI-63

## (undated) DEVICE — ESU PENCIL W/HOLSTER

## (undated) DEVICE — NDL SPINAL 25GA 4.69" QUINCKE 405234

## (undated) DEVICE — NDL SPINAL 18GA 3.5" 405184

## (undated) DEVICE — GLOVE EXAM NITRILE LG

## (undated) DEVICE — GOWN XLG DISP 9545

## (undated) DEVICE — LUBRICATING JELLY 4.25OZ

## (undated) DEVICE — SYR 10ML LL W/O NDL

## (undated) DEVICE — TRAY PROCEDURE SUPPORT PAIN MANAGEMENT 332114

## (undated) DEVICE — DRSG BANDAID 1X3" FABRIC

## (undated) DEVICE — SU VICRYL 0 CT-2 CR 8X18" J727D

## (undated) DEVICE — BONE WAX 2.5GM W31G

## (undated) DEVICE — GLOVE ESTEEM POWDER FREE 7.5  2D72PL75

## (undated) DEVICE — ADH LIQUID MASTISOL TOPICAL VIAL 2-3ML 0523-48

## (undated) DEVICE — DRAPE MAYO STAND 23X54 8337

## (undated) DEVICE — GLOVE PROTEXIS W/NEU-THERA 7.5  2D73TE75

## (undated) DEVICE — KIT ENDO TURNOVER/PROCEDURE CARRY-ON 101822

## (undated) DEVICE — SYR 05ML LL W/O NDL

## (undated) DEVICE — ESU ELEC BLADE 6" COATED E1450-6

## (undated) DEVICE — SU MONOCRYL 4-0 PS-2 18" UND Y496G

## (undated) DEVICE — TUBING IV EXTENSION SET 34"

## (undated) RX ORDER — PROPOFOL 10 MG/ML
INJECTION, EMULSION INTRAVENOUS
Status: DISPENSED
Start: 2021-06-16

## (undated) RX ORDER — HYDROMORPHONE HYDROCHLORIDE 1 MG/ML
INJECTION, SOLUTION INTRAMUSCULAR; INTRAVENOUS; SUBCUTANEOUS
Status: DISPENSED
Start: 2021-06-16

## (undated) RX ORDER — METHYLPREDNISOLONE ACETATE 40 MG/ML
INJECTION, SUSPENSION INTRA-ARTICULAR; INTRALESIONAL; INTRAMUSCULAR; SOFT TISSUE
Status: DISPENSED
Start: 2021-06-16

## (undated) RX ORDER — FENTANYL CITRATE 50 UG/ML
INJECTION, SOLUTION INTRAMUSCULAR; INTRAVENOUS
Status: DISPENSED
Start: 2021-06-16

## (undated) RX ORDER — LIDOCAINE HYDROCHLORIDE AND EPINEPHRINE 10; 10 MG/ML; UG/ML
INJECTION, SOLUTION INFILTRATION; PERINEURAL
Status: DISPENSED
Start: 2021-06-16

## (undated) RX ORDER — ONDANSETRON 2 MG/ML
INJECTION INTRAMUSCULAR; INTRAVENOUS
Status: DISPENSED
Start: 2021-06-16

## (undated) RX ORDER — EPHEDRINE SULFATE 50 MG/ML
INJECTION, SOLUTION INTRAMUSCULAR; INTRAVENOUS; SUBCUTANEOUS
Status: DISPENSED
Start: 2021-06-16

## (undated) RX ORDER — LIDOCAINE HYDROCHLORIDE 20 MG/ML
INJECTION, SOLUTION EPIDURAL; INFILTRATION; INTRACAUDAL; PERINEURAL
Status: DISPENSED
Start: 2021-06-16

## (undated) RX ORDER — DEXAMETHASONE SODIUM PHOSPHATE 10 MG/ML
INJECTION, SOLUTION INTRAMUSCULAR; INTRAVENOUS
Status: DISPENSED
Start: 2021-06-16